# Patient Record
Sex: MALE | Race: BLACK OR AFRICAN AMERICAN
[De-identification: names, ages, dates, MRNs, and addresses within clinical notes are randomized per-mention and may not be internally consistent; named-entity substitution may affect disease eponyms.]

---

## 2017-07-24 ENCOUNTER — HOSPITAL ENCOUNTER (OUTPATIENT)
Dept: HOSPITAL 72 - CAT | Age: 67
Discharge: HOME | End: 2017-07-24
Payer: MEDICARE

## 2017-07-24 DIAGNOSIS — R22.31: Primary | ICD-10-CM

## 2017-07-24 DIAGNOSIS — Z95.0: ICD-10-CM

## 2017-07-24 DIAGNOSIS — M47.892: ICD-10-CM

## 2017-07-24 DIAGNOSIS — I51.7: ICD-10-CM

## 2017-07-24 DIAGNOSIS — J32.9: ICD-10-CM

## 2017-07-24 DIAGNOSIS — R60.0: ICD-10-CM

## 2017-07-24 PROCEDURE — 71260 CT THORAX DX C+: CPT

## 2017-07-24 PROCEDURE — 70491 CT SOFT TISSUE NECK W/DYE: CPT

## 2017-07-24 NOTE — DIAGNOSTIC IMAGING REPORT
Clinical Indication: 58-year-old male outpatient with right upper extremity swelling

and chest pain



Technique: IV administration nonionic contrast. Spiral acquisition obtained through

the chest. Multiplanar reconstructions generated. Total dose length product 1746

mGycm. CTDIvol(s) 8, 121, 27, 19  mGy. At the desires are inclusive of neck CT

reported separately Dose reduction achieved using automated exposure control





Comparison: None



Findings:  There are somewhat low lung volumes. There is crowding of the 

vascular

markings. There is equivocal slight prominence to the centrilobular 

interstitial

markings, particularly in the upper lobes. No focal airspace consolidation,

infiltrates, or effusions are demonstrated. Some scarring is seen in the 

posterior

lateral left lower lobe at the level of pulmonary hilum.



The heart is enlarged, demonstrating four-chamber cardiomegaly. There is a 

right

subclavian AICD. The right innominate vein is probably patent. Patency of the 

right

subclavian vein cannot be established as it is not opacified. However, there are 

no

significant collaterals to suggest significant venoocclusive disease. The main

pulmonary artery is ectatic, measuring 3.8 cm diameter. The thoracic aorta is 

also

somewhat ectatic although not aneurysmal. There are extensive coronary 

artery

calcifications.



No pericardial effusion. No mediastinal hilar mass or adenopathy. Included thyroid

is unremarkable. No axillary or chest wall mass or adenopathy.



The included upper abdominal anatomy is unremarkable.



Impression: Possible mild pulmonary edema



No definite findings to explain stated clinical history of right arm swelling.

However, the right subclavian vein is not opacified, and stenosis of it cannot 

be

confidently ruled out, particular in view of the presence of AICD wires within.

However, there are no definite collaterals to suggest such. Noninvasive duplex

imaging may be useful to confirm patency



Cardiomegaly



Mild interstitial septal thickening, may reflect mild pulmonary edema



Mildly ectatic main pulmonary artery, could indicate pulmonary internal hypertension



Minimal left upper lobe parenchymal scarring



The CT scanner at [facility] is accredited by the American College 

of

Radiology and the scans are performed using protocols designed to limit 

radiation

exposure to as low as reasonably achievable to attain images of sufficient

resolution adequate for diagnostic evaluation.

## 2017-07-24 NOTE — DIAGNOSTIC IMAGING REPORT
Indication: Right upper extremity swelling and chest pain



Technique: IV administration nonionic contrast  Spiral acquisitions obtained through

the neck   Multiplanar      reconstructions were generated. Total dose length

product 1746 mGycm.  CTDIvol(s) 8, 121, 27, 19 mGy. Radiation dose was minimized

using automated exposure control



Comparison: None



Findings: Slightly prominent adenoids, otherwise unremarkable nasopharynx. 

Symmetric

prominent tonsillar pillars, otherwise unremarkable oral pharynx. Note tortuosity of

the proximal right internal carotid artery, which protrudes immediately lateral 

and

posterior to the right posterior oral pharyngeal mucosa. The left puriform sinus is

not aerated, and there is a small amount of hypoattenuating soft tissue in this

area, which measures approximately 7 mm transverse by 12 mm AP by 21 mm caudad.

Within this soft tissue, there is a 5 mm calcification. The larynx appears

unremarkable.



No cervical mass or adenopathy is evident. No evidence of significant carotid

arterial or jugular venous stenosis. Normal and symmetrical bilateral parotid 

and

submandibular glands. No supraclavicular mass or adenopathy. There is sphenoid 

sinus

disease noted on the highest cut. The thyroid is unremarkable. Wires from what 

is

presumably a right chest pacemaker are noted. There is degenerative cervical

spondylosis. Patient is edentulous



Impression: Asymmetric absence of aeration of the left piriform sinus, possibly

occupied by a small amount of abnormal soft tissue as well as a calcification.

Although this could be on the basis of physiologic asymmetry, mucosal malignancy at

this location cannot be ruled out, and further evaluation with direct visualization

is recommended.



Tortuosity of the proximal right internal carotid artery, which is located just

behind the right lateral oropharynx



CT sinus disease



Pacemaker



Degenerative cervical spondylosis



The CT scanner at [facility] is accredited by the American College 

of

Radiology and the scans are performed using protocols designed to limit 

radiation

exposure to as low as reasonably achievable to attain images of sufficient

resolution adequate for diagnostic evaluation.

## 2017-11-24 ENCOUNTER — HOSPITAL ENCOUNTER (OUTPATIENT)
Dept: HOSPITAL 72 - CAT | Age: 67
Discharge: HOME | End: 2017-11-24
Payer: MEDICARE

## 2017-11-24 DIAGNOSIS — M81.0: Primary | ICD-10-CM

## 2017-11-24 PROCEDURE — 77078 CT BONE DENSITY AXIAL: CPT

## 2017-11-29 NOTE — DIAGNOSTIC IMAGING REPORT
Indication: Osteoporosis



Technique: 10 mm thick slices obtained through the L2, L3, and L4 vertebral 

bodies.

Cortical and trabecular regions of interest were drawn. The average trabecular 

bone

mineral density was calculated. Total dose length product 34 mGycm.  CTDIvol(s) 4x3

mGy. Dose reduction achieved using automated exposure control





Comparison: None



Findings: The calculated bone mineral density is 139 mg ca-HA/ml.  The T score is

-1.33. This indicates the patient's bone mineral density is  1.33 standard

deviations  below that of normal 20-year-old males. The Z score is 1.84. This

indicates the patient's bone mineral density is 1.84 standard deviations above that

of age-matched controls



Incidentally noted on images through L2 is possible dilatation of the abdominal

aorta, which appears to measure up to 4.2 cm transverse



Impression: Patient mineral density is 10-25% below that of normal 20-year-old

males. Patient is considered osteopenic by WHO criteria. Insufficiency fracture risk

is moderate



Possible infrarenal abdominal aortic aneurysm. Recommend further evaluation 

with

abdominal aortic ultrasound. Dr. Hebert was notified of this at the time 

of

interpretation







The CT scanner at [facility] is accredited by the American College 

of

Radiology and the scans are performed using protocols designed to limit 

radiation

exposure to as low as reasonably achievable to attain images of sufficient

resolution adequate for diagnostic evaluation.

## 2018-11-28 ENCOUNTER — HOSPITAL ENCOUNTER (OUTPATIENT)
Dept: HOSPITAL 72 - NUM | Age: 68
Discharge: HOME | End: 2018-11-28
Payer: MEDICARE

## 2018-11-28 DIAGNOSIS — M54.9: Primary | ICD-10-CM

## 2018-11-28 PROCEDURE — 78306 BONE IMAGING WHOLE BODY: CPT

## 2018-11-28 NOTE — DIAGNOSTIC IMAGING REPORT
Indication: Back pain.

 

Technique: 24.4 mCi of technetium 99 M-MDP was injected intravenously. A whole-body

bone scan was then performed in anterior and posterior projections. Several spot

images were also obtained.

 

Comparison:  None

 

Findings: . Normal uptake is demonstrated throughout the axial skeleton. Some uptake

associated with arthritis likely present within the shoulders, knees, feet/ankles.

There are no focal lesions identified to indicate metastatic neoplasm.

 

Impression:  Negative whole body bone scan

## 2020-01-28 ENCOUNTER — HOSPITAL ENCOUNTER (INPATIENT)
Dept: HOSPITAL 72 - EMR | Age: 70
LOS: 4 days | Discharge: NURSING, REHAB OR LONG-TERM CARE | DRG: 101 | End: 2020-02-01
Payer: MEDICARE

## 2020-01-28 VITALS — BODY MASS INDEX: 32.51 KG/M2 | WEIGHT: 219.5 LBS | HEIGHT: 69 IN

## 2020-01-28 VITALS — DIASTOLIC BLOOD PRESSURE: 76 MMHG | SYSTOLIC BLOOD PRESSURE: 133 MMHG

## 2020-01-28 VITALS — SYSTOLIC BLOOD PRESSURE: 132 MMHG | DIASTOLIC BLOOD PRESSURE: 84 MMHG

## 2020-01-28 VITALS — DIASTOLIC BLOOD PRESSURE: 73 MMHG | SYSTOLIC BLOOD PRESSURE: 144 MMHG

## 2020-01-28 VITALS — DIASTOLIC BLOOD PRESSURE: 68 MMHG | SYSTOLIC BLOOD PRESSURE: 132 MMHG

## 2020-01-28 DIAGNOSIS — G40.909: Primary | ICD-10-CM

## 2020-01-28 DIAGNOSIS — D64.9: ICD-10-CM

## 2020-01-28 DIAGNOSIS — N39.0: ICD-10-CM

## 2020-01-28 DIAGNOSIS — B96.20: ICD-10-CM

## 2020-01-28 DIAGNOSIS — Z95.0: ICD-10-CM

## 2020-01-28 DIAGNOSIS — E11.9: ICD-10-CM

## 2020-01-28 DIAGNOSIS — I69.351: ICD-10-CM

## 2020-01-28 DIAGNOSIS — N17.9: ICD-10-CM

## 2020-01-28 LAB
ADD MANUAL DIFF: NO
ALBUMIN SERPL-MCNC: 3.7 G/DL (ref 3.4–5)
ALBUMIN/GLOB SERPL: 0.8 {RATIO} (ref 1–2.7)
ALP SERPL-CCNC: 82 U/L (ref 46–116)
ALT SERPL-CCNC: 28 U/L (ref 12–78)
ANION GAP SERPL CALC-SCNC: 8 MMOL/L (ref 5–15)
APPEARANCE UR: CLEAR
APTT PPP: (no result) S
AST SERPL-CCNC: 19 U/L (ref 15–37)
BASOPHILS NFR BLD AUTO: 1.4 % (ref 0–2)
BILIRUB SERPL-MCNC: 0.3 MG/DL (ref 0.2–1)
BUN SERPL-MCNC: 14 MG/DL (ref 7–18)
CALCIUM SERPL-MCNC: 9.5 MG/DL (ref 8.5–10.1)
CHLORIDE SERPL-SCNC: 104 MMOL/L (ref 98–107)
CO2 SERPL-SCNC: 28 MMOL/L (ref 21–32)
CREAT SERPL-MCNC: 1.3 MG/DL (ref 0.55–1.3)
EOSINOPHIL NFR BLD AUTO: 3.8 % (ref 0–3)
ERYTHROCYTE [DISTWIDTH] IN BLOOD BY AUTOMATED COUNT: 11.8 % (ref 11.6–14.8)
GLOBULIN SER-MCNC: 4.7 G/DL
GLUCOSE UR STRIP-MCNC: NEGATIVE MG/DL
HCT VFR BLD CALC: 39.5 % (ref 42–52)
HGB BLD-MCNC: 13.4 G/DL (ref 14.2–18)
KETONES UR QL STRIP: NEGATIVE
LEUKOCYTE ESTERASE UR QL STRIP: (no result)
LYMPHOCYTES NFR BLD AUTO: 27.4 % (ref 20–45)
MCV RBC AUTO: 87 FL (ref 80–99)
MONOCYTES NFR BLD AUTO: 6.4 % (ref 1–10)
NEUTROPHILS NFR BLD AUTO: 61.1 % (ref 45–75)
NITRITE UR QL STRIP: NEGATIVE
PH UR STRIP: 6 [PH] (ref 4.5–8)
PLATELET # BLD: 215 K/UL (ref 150–450)
POTASSIUM SERPL-SCNC: 3.9 MMOL/L (ref 3.5–5.1)
PROT UR QL STRIP: NEGATIVE
RBC # BLD AUTO: 4.53 M/UL (ref 4.7–6.1)
SODIUM SERPL-SCNC: 140 MMOL/L (ref 136–145)
SP GR UR STRIP: 1.01 (ref 1–1.03)
UROBILINOGEN UR-MCNC: NORMAL MG/DL (ref 0–1)
WBC # BLD AUTO: 8 K/UL (ref 4.8–10.8)

## 2020-01-28 PROCEDURE — 81003 URINALYSIS AUTO W/O SCOPE: CPT

## 2020-01-28 PROCEDURE — 96375 TX/PRO/DX INJ NEW DRUG ADDON: CPT

## 2020-01-28 PROCEDURE — 87086 URINE CULTURE/COLONY COUNT: CPT

## 2020-01-28 PROCEDURE — 93005 ELECTROCARDIOGRAM TRACING: CPT

## 2020-01-28 PROCEDURE — 87081 CULTURE SCREEN ONLY: CPT

## 2020-01-28 PROCEDURE — 96365 THER/PROPH/DIAG IV INF INIT: CPT

## 2020-01-28 PROCEDURE — 85025 COMPLETE CBC W/AUTO DIFF WBC: CPT

## 2020-01-28 PROCEDURE — 99285 EMERGENCY DEPT VISIT HI MDM: CPT

## 2020-01-28 PROCEDURE — 80053 COMPREHEN METABOLIC PANEL: CPT

## 2020-01-28 PROCEDURE — 71045 X-RAY EXAM CHEST 1 VIEW: CPT

## 2020-01-28 PROCEDURE — 87181 SC STD AGAR DILUTION PER AGT: CPT

## 2020-01-28 PROCEDURE — 36415 COLL VENOUS BLD VENIPUNCTURE: CPT

## 2020-01-28 PROCEDURE — 80048 BASIC METABOLIC PNL TOTAL CA: CPT

## 2020-01-28 PROCEDURE — 80299 QUANTITATIVE ASSAY DRUG: CPT

## 2020-01-28 PROCEDURE — 84484 ASSAY OF TROPONIN QUANT: CPT

## 2020-01-28 RX ADMIN — CARVEDILOL SCH MG: 25 TABLET, FILM COATED ORAL at 22:27

## 2020-01-28 RX ADMIN — DEXTROSE AND SODIUM CHLORIDE SCH MLS/HR: 5; .45 INJECTION, SOLUTION INTRAVENOUS at 22:29

## 2020-01-28 RX ADMIN — HEPARIN SODIUM SCH UNITS: 5000 INJECTION INTRAVENOUS; SUBCUTANEOUS at 22:29

## 2020-01-28 RX ADMIN — TAMSULOSIN HYDROCHLORIDE SCH MG: 0.4 CAPSULE ORAL at 22:26

## 2020-01-28 NOTE — EMERGENCY ROOM REPORT
History of Present Illness


General


Chief Complaint:  Seizure


Source:  Patient





Present Illness


HPI


Patient is a 69-year-old male brought in by ambulance after multiple seizures.  

Patient reportedly had multiple focal seizures at his facility.  He had no head 

injury.  Had not been sick for the past few days.  Previous history of seizure 

disorders and normally takes Keppra.  He had prior CVA with resulting right-

sided weakness.  Denies any headache currently.  Reports being compliant with 

his medications.


Allergies:  


Coded Allergies:  


     No Known Allergies (Unverified , 12/15/14)





Patient History


Past Medical History:  see triage record


Reviewed Nursing Documentation:  PMH: Agreed; PSxH: Agreed





Nursing Documentation-PMH


Past Medical History:  No History, Except For


Hx Cardiac Problems:  Yes


Hx Hypertension:  Yes


Hx COPD:  Yes


Hx Diabetes:  Yes


Hx Cancer:  No


Hx Gastrointestinal Problems:  No


Hx Cerebrovascular Accident:  Yes - X 3


Hx Seizures:  Yes


Hx Weakness:  Yes - RT SIDED WEAKNESS





Review of Systems


All Other Systems:  negative except mentioned in HPI





Physical Exam





Vital Signs








  Date Time  Temp Pulse Resp B/P (MAP) Pulse Ox O2 Delivery O2 Flow Rate FiO2


 


1/28/20 14:15 98.8 66 18 132/68 (89) 100 Room Air  








Sp02 EP Interpretation:  reviewed, normal


General Appearance:  normal inspection, no apparent distress, alert, 

Chronically Ill


Head:  atraumatic


ENT:  normal ENT inspection, hearing grossly normal, normal voice


Neck:  normal inspection, full range of motion, supple, no bony tend


Respiratory:  normal inspection, lungs clear, normal breath sounds, no 

respiratory distress, no retraction, no wheezing


Cardiovascular #1:  regular rate, rhythm, no edema


Gastrointestinal:  normal inspection, normal bowel sounds, non tender, soft, no 

guarding, no hernia


Genitourinary:  no CVA tenderness


Musculoskeletal:  normal inspection, back normal, normal range of motion


Neurologic:  alert, CNs III-XII nml as tested, motor weakness - dense right 

hemiplegia, responsive, aphasia - expressive


Psychiatric:  normal inspection, judgement/insight normal, mood/affect normal


Skin:  no rash


Lymphatic:  normal inspection





Medical Decision Making


Diagnostic Impression:  


 Primary Impression:  


 Urinary tract infection


 Additional Impressions:  


 Recurrent seizures


 History of CVA with residual deficit


ER Course


Presented for recurrent seizures.  Differential diagnosis include was not 

limited to electrolyte abnormality, medication noncompliance, seizure among 

others.  Because of complexity of patient's case laboratory tests and imaging 

studies were ordered.  Laboratory testing showed no acute abnormalities.  

Patient was given IV Keppra.   Dr. Arron Barkley was contacted for inpatient 

management due to recurrent seizures.





Laboratory Tests








Test


  1/28/20


14:30 1/28/20


15:15


 


White Blood Count


  8.0 K/UL


(4.8-10.8) 


 


 


Red Blood Count


  4.53 M/UL


(4.70-6.10)  L 


 


 


Hemoglobin


  13.4 G/DL


(14.2-18.0)  L 


 


 


Hematocrit


  39.5 %


(42.0-52.0)  L 


 


 


Mean Corpuscular Volume 87 FL (80-99)   


 


Mean Corpuscular Hemoglobin


  29.5 PG


(27.0-31.0) 


 


 


Mean Corpuscular Hemoglobin


Concent 33.8 G/DL


(32.0-36.0) 


 


 


Red Cell Distribution Width


  11.8 %


(11.6-14.8) 


 


 


Platelet Count


  215 K/UL


(150-450) 


 


 


Mean Platelet Volume


  7.9 FL


(6.5-10.1) 


 


 


Neutrophils (%) (Auto)


  61.1 %


(45.0-75.0) 


 


 


Lymphocytes (%) (Auto)


  27.4 %


(20.0-45.0) 


 


 


Monocytes (%) (Auto)


  6.4 %


(1.0-10.0) 


 


 


Eosinophils (%) (Auto)


  3.8 %


(0.0-3.0)  H 


 


 


Basophils (%) (Auto)


  1.4 %


(0.0-2.0) 


 


 


Sodium Level


  140 MMOL/L


(136-145) 


 


 


Potassium Level


  3.9 MMOL/L


(3.5-5.1) 


 


 


Chloride Level


  104 MMOL/L


() 


 


 


Carbon Dioxide Level


  28 MMOL/L


(21-32) 


 


 


Anion Gap


  8 mmol/L


(5-15) 


 


 


Blood Urea Nitrogen


  14 mg/dL


(7-18) 


 


 


Creatinine


  1.3 MG/DL


(0.55-1.30) 


 


 


Estimate Glomerular


Filtration Rate  mL/min (>60)  


  


 


 


Glucose Level


  161 MG/DL


()  H 


 


 


Calcium Level


  9.5 MG/DL


(8.5-10.1) 


 


 


Total Bilirubin


  0.3 MG/DL


(0.2-1.0) 


 


 


Aspartate Amino Transferase


(AST) 19 U/L (15-37)


  


 


 


Alanine Aminotransferase (ALT)


  28 U/L (12-78)


  


 


 


Alkaline Phosphatase


  82 U/L


() 


 


 


Troponin I


  0.000 ng/mL


(0.000-0.056) 


 


 


Total Protein


  8.4 G/DL


(6.4-8.2)  H 


 


 


Albumin


  3.7 G/DL


(3.4-5.0) 


 


 


Globulin 4.7 g/dL   


 


Albumin/Globulin Ratio


  0.8 (1.0-2.7)


L 


 


 


Urine Color  Pale yellow  


 


Urine Appearance  Clear  


 


Urine pH  6 (4.5-8.0)  


 


Urine Specific Gravity


  


  1.010


(1.005-1.035)


 


Urine Protein


  


  Negative


(NEGATIVE)


 


Urine Glucose (UA)


  


  Negative


(NEGATIVE)


 


Urine Ketones


  


  Negative


(NEGATIVE)


 


Urine Blood


  


  Negative


(NEGATIVE)


 


Urine Nitrite


  


  Negative


(NEGATIVE)


 


Urine Bilirubin


  


  Negative


(NEGATIVE)


 


Urine Urobilinogen


  


  Normal MG/DL


(0.0-1.0)


 


Urine Leukocyte Esterase


  


  2+ (NEGATIVE)


H


 


Urine RBC


  


  0-2 /HPF (0 -


0)  H


 


Urine WBC


  


  10-15 /HPF (0


- 0)  H


 


Urine Squamous Epithelial


Cells 


  Few /LPF


(NONE/OCC)


 


Urine Bacteria


  


  Moderate /HPF


(NONE)  H











Last Vital Signs








  Date Time  Temp Pulse Resp B/P (MAP) Pulse Ox O2 Delivery O2 Flow Rate FiO2


 


1/28/20 14:23 98.8 81 18 132/68 100 Room Air  








Status:  unchanged


Disposition:  ADMITTED AS INPATIENT


Condition:  Stable











Victor Manuel Nam MD Jan 28, 2020 14:27

## 2020-01-28 NOTE — NUR
ED Nurse Note:



PT ARRIVED WITH RA 61 DUE TO SEIZURE FROM HOME. ACCOMPANYING NURSE STATEST THAT 
PT HAS HAD 2 FOCAL SEIZURES IN CHAIR. DENIES HEAD TRAUMA OR KO. PT IS AOX4 AND 
IS COOPERATIVE

## 2020-01-28 NOTE — NUR
HAND-OFF: 

Report given to PJ CH. Pt is awake and stable. Pt is on seizure precaution, pads around 
bed and suction is available.

## 2020-01-28 NOTE — DIAGNOSTIC IMAGING REPORT
Indication: Shortness of breath

 

Technique: One view of the chest

 

Comparison: none

 

Findings: There is an AICD noted. The power pack obscures the right lung base. No

definite acute infiltrates, effusions, congestion. The heart is mildly enlarged. The

aorta is tortuous ectatic and calcified.

 

Impression: Somewhat limited exam due to the presence of an AICD

 

No definite acute process

 

Cardiomegaly

## 2020-01-28 NOTE — NUR
TRANSFER TO FLOOR:

Patient transferred to TELE as ordered, per ERMD .   Report given to DMIA KEANE. 
 Belongings GIVEN TO PT.

## 2020-01-28 NOTE — NUR
NURSE NOTES:

Received pt from ER RN KENNEDY, Pt is awake and alert, pt has tachypnea, no complain of pain 
at this moment, pt has intact iv  access LFA 20G SL. pt is on continues heart monitoring. 
skin is intact. pt has 10$ and refused to keep in safe. all needs attended, bed is locked 
and is in the lowest position, call light within easy reach. will continue to monitor.

## 2020-01-28 NOTE — NUR
NURSE NOTES:

received patient from Amberly Garcia, patient in stable condition, on RA, VSS, denies pain at 
this time, IV access on left forearm, asymptomatic, patent, bed low&locked, side rails upx3, 
call light within reach, will continue to monitor and reassess. Dr Barkley called for 
admission orders, awaiting call back

## 2020-01-29 VITALS — SYSTOLIC BLOOD PRESSURE: 110 MMHG | DIASTOLIC BLOOD PRESSURE: 76 MMHG

## 2020-01-29 VITALS — DIASTOLIC BLOOD PRESSURE: 80 MMHG | SYSTOLIC BLOOD PRESSURE: 123 MMHG

## 2020-01-29 VITALS — SYSTOLIC BLOOD PRESSURE: 117 MMHG | DIASTOLIC BLOOD PRESSURE: 77 MMHG

## 2020-01-29 VITALS — DIASTOLIC BLOOD PRESSURE: 77 MMHG | SYSTOLIC BLOOD PRESSURE: 124 MMHG

## 2020-01-29 VITALS — SYSTOLIC BLOOD PRESSURE: 120 MMHG | DIASTOLIC BLOOD PRESSURE: 76 MMHG

## 2020-01-29 VITALS — SYSTOLIC BLOOD PRESSURE: 110 MMHG | DIASTOLIC BLOOD PRESSURE: 77 MMHG

## 2020-01-29 VITALS — DIASTOLIC BLOOD PRESSURE: 82 MMHG | SYSTOLIC BLOOD PRESSURE: 142 MMHG

## 2020-01-29 LAB
ADD MANUAL DIFF: NO
ANION GAP SERPL CALC-SCNC: 8 MMOL/L (ref 5–15)
BASOPHILS NFR BLD AUTO: 1.9 % (ref 0–2)
BUN SERPL-MCNC: 16 MG/DL (ref 7–18)
CALCIUM SERPL-MCNC: 9 MG/DL (ref 8.5–10.1)
CHLORIDE SERPL-SCNC: 105 MMOL/L (ref 98–107)
CO2 SERPL-SCNC: 29 MMOL/L (ref 21–32)
CREAT SERPL-MCNC: 1 MG/DL (ref 0.55–1.3)
EOSINOPHIL NFR BLD AUTO: 3.4 % (ref 0–3)
ERYTHROCYTE [DISTWIDTH] IN BLOOD BY AUTOMATED COUNT: 11.7 % (ref 11.6–14.8)
HCT VFR BLD CALC: 37.6 % (ref 42–52)
HGB BLD-MCNC: 12.7 G/DL (ref 14.2–18)
LYMPHOCYTES NFR BLD AUTO: 24 % (ref 20–45)
MCV RBC AUTO: 86 FL (ref 80–99)
MONOCYTES NFR BLD AUTO: 7.5 % (ref 1–10)
NEUTROPHILS NFR BLD AUTO: 63.2 % (ref 45–75)
PLATELET # BLD: 206 K/UL (ref 150–450)
POTASSIUM SERPL-SCNC: 3.9 MMOL/L (ref 3.5–5.1)
RBC # BLD AUTO: 4.36 M/UL (ref 4.7–6.1)
SODIUM SERPL-SCNC: 142 MMOL/L (ref 136–145)
WBC # BLD AUTO: 6.9 K/UL (ref 4.8–10.8)

## 2020-01-29 RX ADMIN — HEPARIN SODIUM SCH UNITS: 5000 INJECTION INTRAVENOUS; SUBCUTANEOUS at 08:49

## 2020-01-29 RX ADMIN — HYDRALAZINE HYDROCHLORIDE SCH MG: 50 TABLET ORAL at 22:00

## 2020-01-29 RX ADMIN — SPIRONOLACTONE SCH MG: 25 TABLET, FILM COATED ORAL at 08:45

## 2020-01-29 RX ADMIN — HYDRALAZINE HYDROCHLORIDE SCH MG: 50 TABLET ORAL at 14:00

## 2020-01-29 RX ADMIN — CARVEDILOL SCH MG: 25 TABLET, FILM COATED ORAL at 08:46

## 2020-01-29 RX ADMIN — SODIUM CHLORIDE SCH MLS/HR: 0.9 INJECTION INTRAVENOUS at 17:11

## 2020-01-29 RX ADMIN — DEXTROSE AND SODIUM CHLORIDE SCH MLS/HR: 5; .45 INJECTION, SOLUTION INTRAVENOUS at 13:44

## 2020-01-29 RX ADMIN — HEPARIN SODIUM SCH UNITS: 5000 INJECTION INTRAVENOUS; SUBCUTANEOUS at 22:03

## 2020-01-29 RX ADMIN — LISINOPRIL SCH MG: 20 TABLET ORAL at 21:00

## 2020-01-29 RX ADMIN — LISINOPRIL SCH MG: 20 TABLET ORAL at 08:47

## 2020-01-29 RX ADMIN — TAMSULOSIN HYDROCHLORIDE SCH MG: 0.4 CAPSULE ORAL at 21:58

## 2020-01-29 RX ADMIN — CARVEDILOL SCH MG: 25 TABLET, FILM COATED ORAL at 22:00

## 2020-01-29 NOTE — HISTORY AND PHYSICAL REPORT
DATE OF ADMISSION:  01/28/2020

TIME SEEN:  9 a.m.



CONSULTANTS:

1. ______

2. Marc Mitchell M.D



CHIEF COMPLAINT:  Seizure, CVA, UTI.



BRIEF HISTORY:  This is a 69-year-old male from [facility],

presented with above-mentioned diagnoses, seizure x2 was noted,  came to

[place] campus, diagnosed with the above, admitted to telemetry for further

care.  Currently, calm in bed, slightly lethargic which is baseline.  No

complaint.



REVIEW OF SYSTEMS:  No chest pain.  No shortness of breath.  No nausea,

vomiting, or diarrhea.



PAST MEDICAL HISTORY:  Includes CVA, seizure, right-sided weakness.



PAST SURGICAL HISTORY:  Pacemaker.



MEDICATIONS:  Include atorvastatin, hydralazine, furosemide, gabapentin,

spironolactone, levetiracetam, Tylenol, nitroglycerin, ceftriaxone.



ALLERGIES:  Denies.



SOCIAL HISTORY:  No smoking.  No alcohol.  No intravenous drug abuse.



FAMILY HISTORY:  Noncontributory.



PHYSICAL EXAMINATION:

GENERAL:  Calm in bed, oriented x2, in no acute distress.

VITAL SIGNS:  Temperature 99, pulse 77, respirations 18, and blood pressure

142/82, now recheck at 126/77.

CARDIOVASCULAR:  No murmur.

LUNGS:  Distant and clear.

ABDOMEN:  Positive bowel sound positive.  Nontender.  Nondistended.

EXTREMITIES:  No cyanosis, clubbing, or edema.

NEUROLOGIC:  Right-sided weakness noted on arm and legs.



LABORATORY DATA:  Hemoglobin and hematocrit 12 and 37, otherwise CBC is

normal.  BMP show glucose 196, otherwise BMP is normal.  Urinalysis show

2+ leukocyte esterase.



ASSESSMENT:

1. Seizure.

2. CVA.

3. Right-sided weakness.

4. UTI.

5. Diabetes.

6. Anemia.



PLAN:

1. Blood pressure and seizure control.

2. Dietary followup.

3. Resume home medications.

4. PT and dietary evaluation.

5. Seizure precautions.

6. CBC and BMP in the morning.









  ______________________________________________

  Arron Barkley D.O.





DR:  Kei

D:  01/29/2020 09:44

T:  01/29/2020 15:00

JOB#:  [id number]

CC:

## 2020-01-29 NOTE — NUR
P.T Note:

P.T evaluation completed. Based on P.T evaluation, pt is currently baseline  independent in 
all areas of functional mobilities and gait/locomotion w/o AD needed despite residual 
deficits from old CVA therefor skilled P.T not needed at this time. D/C P.T services. Thank 
you for this referral.

## 2020-01-29 NOTE — CONSULTATION
History of Present Illness


General


Date patient seen:  Jan 29, 2020


Chief Complaint:  Seizure





Present Illness


HPI





70 y/o M with hx of HTN, DM2, seizure disorder on Keppra, CVA x3 w/ resultant R 

side weakness presented to ED on 1/28 after multiple seizures episodes





Denied head injury, recent illness


Allergies:  


Coded Allergies:  


     No Known Allergies (Unverified , 12/15/14)





Medication History


Scheduled


Carvedilol (Coreg), 25 MG ORAL EVERY 12 HOURS, (Reported)


Cefepime Hcl/D5w (Cefepime-Dextrose 2 Gm/50 Ml), 2 GM IVPB EVERY 12 HOURS


Cranberry Fruit Concentrate (Cranberry), 450 MG PO DAILY, (Reported)


Furosemide* (Lasix*), 20 MG ORAL DAILY, (Reported)


Gabapentin (Neurontin), 300 MG ORAL THREE TIMES A DAY, (Reported)


Gabapentin* (Neurontin*), 300 MG ORAL THREE TIMES A DAY, (Reported)


Hydralazine Hcl* (Hydralazine Hcl*), 100 MG ORAL EVERY 8 HOURS, (Reported)


Levetiracetam (Keppra), 1,000 MG ORAL EVERY 12 HOURS, (Reported)


Lisinopril (Lisinopril*), 20 MG ORAL BID, (Reported)


Loratadine (Claritin), 10 MG ORAL DAILY, (Reported)


Multivitamins* (Multivitamins*), 1 TAB ORAL DAILY, (Reported)


Polyvinyl Alcohol (Polyvinyl Alcohol), 1 DRP BOTH EYES THREE TIMES A DAY, (

Reported)


Simvastatin (Zocor), 20 MG ORAL BEDTIME, (Reported)


Spironolactone* (Aldactone*), 25 MG ORAL DAILY, (Reported)


Tamsulosin HCl (Flomax), 0.4 MG ORAL DAILY, (Reported)





Scheduled PRN


Acetaminophen (Tylenol), 650 MG ORAL Q4HR PRN for Fever/Headache/Mild Pain, (

Reported)


Dextran 70/Hypromellose (Artificial Tears Eye Drops*), 1 DROP BOTH EYES TID PRN 

for Dry Eyes, (Reported)


Nitroglycerin (Nitroglycerin), 0.4 MG SL PRN PRN for Prn Chest Pain, (Reported)





Miscellaneous Medications


Leuprolide Acetate (Leuprolide Acetate), 7.5 MG IM, (Reported)





Patient History


Healthcare decision maker





Resuscitation status


Full Code


Advanced Directive on File


No


Patient History Narrative








Pmhx: as above





Shx: reviewed





Fhx: non contributory





Review of Systems


All Other Systems:  negative except mentioned in HPI





Physical Exam


Physical Exam Narrative


General Appearance:  normal inspection, well appearing, no apparent distress, 

alert, Chronically Ill


Head:  atraumatic


ENT:  normal ENT inspection, hearing grossly normal, normal voice


Neck:  normal inspection, full range of motion, supple, no bony tend


Respiratory:  normal inspection, lungs clear, normal breath sounds, no 

respiratory distress, no retraction, no wheezing


Cardiovascular   regular rate, rhythm, no edema


Gastrointestinal:  normal inspection, normal bowel sounds, non tender, soft, no 

guarding, no hernia


Genitourinary:  no CVA tenderness


Musculoskeletal:  normal inspection, back normal, normal range of motion


Neurologic:  alert, CNs III-XII nml as tested, motor weakness - dense right 

hemiplegia, responsive, aphasia - expressive


Skin:  no rash





Last 24 Hour Vital Signs








  Date Time  Temp Pulse Resp B/P (MAP) Pulse Ox O2 Delivery O2 Flow Rate FiO2


 


1/29/20 12:00 97.9 66 20 110/77 (88) 94   


 


1/29/20 09:00      Nasal Cannula 2.0 


 


1/29/20 09:00      Room Air  


 


1/29/20 08:47    126/77    


 


1/29/20 08:46  61  126/77    


 


1/29/20 08:00  72      


 


1/29/20 08:00 98.7 61 19 124/77 (93) 94   


 


1/29/20 04:00  65      


 


1/29/20 04:00 99.4 77 18 142/82 (102) 93   


 


1/29/20 00:00  94      


 


1/29/20 00:00  76      


 


1/29/20 00:00 98.8 81 18 117/77 (90) 93   


 


1/28/20 22:28    132/84    


 


1/28/20 22:27  94  132/84    


 


1/28/20 22:24 98.1 94 18 132/84 (100) 95   


 


1/28/20 21:00      Room Air  


 


1/28/20 19:06      Room Air  


 


1/28/20 19:06 99.1 93 22 144/73 (96) 96   


 


1/28/20 18:50  91      


 


1/28/20 18:40 98.7 93 25 127/83 96 Room Air  


 


1/28/20 18:32 98.8 60 16 133/76 99 Room Air  


 


1/28/20 14:23 98.8 81 18 132/68 100 Room Air  


 


1/28/20 14:23  66 18   Room Air  


 


1/28/20 14:15 98.8 66 18 132/68 (89) 100 Room Air  

















Intake and Output  


 


 1/28/20 1/29/20





 19:00 07:00


 


Intake Total 700 ml 


 


Output Total 0 ml 


 


Balance 700 ml 


 


  


 


Intake IV Total 700 ml 


 


Output Urine Total 0 ml 











Laboratory Tests








Test


  1/28/20


14:30 1/28/20


15:15 1/29/20


06:43


 


White Blood Count


  8.0 K/UL


(4.8-10.8) 


  6.9 K/UL


(4.8-10.8)


 


Red Blood Count


  4.53 M/UL


(4.70-6.10)  L 


  4.36 M/UL


(4.70-6.10)  L


 


Hemoglobin


  13.4 G/DL


(14.2-18.0)  L 


  12.7 G/DL


(14.2-18.0)  L


 


Hematocrit


  39.5 %


(42.0-52.0)  L 


  37.6 %


(42.0-52.0)  L


 


Mean Corpuscular Volume 87 FL (80-99)    86 FL (80-99)  


 


Mean Corpuscular Hemoglobin


  29.5 PG


(27.0-31.0) 


  29.2 PG


(27.0-31.0)


 


Mean Corpuscular Hemoglobin


Concent 33.8 G/DL


(32.0-36.0) 


  33.8 G/DL


(32.0-36.0)


 


Red Cell Distribution Width


  11.8 %


(11.6-14.8) 


  11.7 %


(11.6-14.8)


 


Platelet Count


  215 K/UL


(150-450) 


  206 K/UL


(150-450)


 


Mean Platelet Volume


  7.9 FL


(6.5-10.1) 


  8.3 FL


(6.5-10.1)


 


Neutrophils (%) (Auto)


  61.1 %


(45.0-75.0) 


  63.2 %


(45.0-75.0)


 


Lymphocytes (%) (Auto)


  27.4 %


(20.0-45.0) 


  24.0 %


(20.0-45.0)


 


Monocytes (%) (Auto)


  6.4 %


(1.0-10.0) 


  7.5 %


(1.0-10.0)


 


Eosinophils (%) (Auto)


  3.8 %


(0.0-3.0)  H 


  3.4 %


(0.0-3.0)  H


 


Basophils (%) (Auto)


  1.4 %


(0.0-2.0) 


  1.9 %


(0.0-2.0)


 


Sodium Level


  140 MMOL/L


(136-145) 


  142 MMOL/L


(136-145)


 


Potassium Level


  3.9 MMOL/L


(3.5-5.1) 


  3.9 MMOL/L


(3.5-5.1)


 


Chloride Level


  104 MMOL/L


() 


  105 MMOL/L


()


 


Carbon Dioxide Level


  28 MMOL/L


(21-32) 


  29 MMOL/L


(21-32)


 


Anion Gap


  8 mmol/L


(5-15) 


  8 mmol/L


(5-15)


 


Blood Urea Nitrogen


  14 mg/dL


(7-18) 


  16 mg/dL


(7-18)


 


Creatinine


  1.3 MG/DL


(0.55-1.30) 


  1.0 MG/DL


(0.55-1.30)


 


Estimat Glomerular Filtration


Rate  mL/min (>60)  


  


   mL/min (>60)  


 


 


Glucose Level


  161 MG/DL


()  H 


  196 MG/DL


()  H


 


Calcium Level


  9.5 MG/DL


(8.5-10.1) 


  9.0 MG/DL


(8.5-10.1)


 


Total Bilirubin


  0.3 MG/DL


(0.2-1.0) 


  


 


 


Aspartate Amino Transf


(AST/SGOT) 19 U/L (15-37)


  


  


 


 


Alanine Aminotransferase


(ALT/SGPT) 28 U/L (12-78)


  


  


 


 


Alkaline Phosphatase


  82 U/L


() 


  


 


 


Troponin I


  0.000 ng/mL


(0.000-0.056) 


  


 


 


Total Protein


  8.4 G/DL


(6.4-8.2)  H 


  


 


 


Albumin


  3.7 G/DL


(3.4-5.0) 


  


 


 


Globulin 4.7 g/dL    


 


Albumin/Globulin Ratio


  0.8 (1.0-2.7)


L 


  


 


 


Urine Color  Pale yellow   


 


Urine Appearance  Clear   


 


Urine pH  6 (4.5-8.0)   


 


Urine Specific Gravity


  


  1.010


(1.005-1.035) 


 


 


Urine Protein


  


  Negative


(NEGATIVE) 


 


 


Urine Glucose (UA)


  


  Negative


(NEGATIVE) 


 


 


Urine Ketones


  


  Negative


(NEGATIVE) 


 


 


Urine Blood


  


  Negative


(NEGATIVE) 


 


 


Urine Nitrite


  


  Negative


(NEGATIVE) 


 


 


Urine Bilirubin


  


  Negative


(NEGATIVE) 


 


 


Urine Urobilinogen


  


  Normal MG/DL


(0.0-1.0) 


 


 


Urine Leukocyte Esterase


  


  2+ (NEGATIVE)


H 


 


 


Urine RBC


  


  0-2 /HPF (0 -


0)  H 


 


 


Urine WBC


  


  10-15 /HPF (0


- 0)  H 


 


 


Urine Squamous Epithelial


Cells 


  Few /LPF


(NONE/OCC) 


 


 


Urine Bacteria


  


  Moderate /HPF


(NONE)  H 


 











Microbiology








 Date/Time


Source Procedure


Growth Status


 


 


 1/28/20 15:15


Urine,Clean Catch Urine Culture - Preliminary


Gram Negative Bacillus 1 Resulted








Height (Feet):  5


Height (Inches):  9.00


Weight (Pounds):  219


Medications





Current Medications








 Medications


  (Trade)  Dose


 Ordered  Sig/Aarti


 Route


 PRN Reason  Start Time


 Stop Time Status Last Admin


Dose Admin


 


 Acetaminophen


  (Tylenol)  650 mg  Q4H  PRN


 ORAL


 Mild Pain/Temp > 100.5  1/28/20 20:45


 2/27/20 20:44   


 


 


 Atorvastatin


 Calcium


  (Lipitor)  10 mg  BEDTIME


 ORAL


   1/29/20 21:00


 2/28/20 20:59   


 


 


 Carvedilol


  (Coreg)  25 mg  EVERY 12  HOURS


 ORAL


   1/28/20 21:00


 2/27/20 20:59  1/29/20 08:46


 


 


 Dextrose/Sodium


 Chloride  1,000 ml @ 


 60 mls/hr  [id number]


 IV


   1/28/20 21:15


 2/27/20 21:14  1/28/20 22:29


 


 


 Furosemide


  (Lasix)  20 mg  DAILY


 ORAL


   1/29/20 09:00


 2/28/20 08:59  1/29/20 08:46


 


 


 Gabapentin


  (Neurontin)  300 mg  THREE TIMES A  DAY


 ORAL


   1/29/20 09:00


 2/28/20 08:59  1/29/20 08:47


 


 


 Heparin Sodium


  (Porcine)


  (Heparin 5000


 units/ml)  5,000 units  EVERY 12  HOURS


 SUBQ


   1/28/20 21:00


 2/27/20 20:59  1/29/20 08:49


 


 


 Hydralazine HCl


  (Apresoline)  100 mg  Q8HR


 ORAL


   1/29/20 14:00


 2/28/20 13:59   


 


 


 Levetiracetam


  (Keppra)  1,000 mg  Q12HR


 ORAL


   1/28/20 21:00


 2/27/20 20:59  1/29/20 08:45


 


 


 Lisinopril


  (PriniviL)  20 mg  Q12HR


 ORAL


   1/29/20 09:00


 2/27/20 20:59  1/29/20 08:47


 


 


 Lorazepam


  (Ativan 2mg/ml


 1ml)  1 mg  Q4H  PRN


 IV


 For Anxiety  1/28/20 20:45


 2/4/20 20:44   


 


 


 Multivitamins


  (Multivitamins)  1 tab  DAILY


 ORAL


   1/29/20 09:00


 2/28/20 08:59  1/29/20 08:47


 


 


 Nitroglycerin


  (Ntg)  0.4 mg  Q5M  PRN


 SL


 Prn Chest Pain  1/28/20 20:45


 2/27/20 20:44   


 


 


 Spironolactone


  (Aldactone)  25 mg  DAILY


 ORAL


   1/29/20 09:00


 2/28/20 08:59  1/29/20 08:45


 


 


 Tamsulosin HCl


  (Flomax)  0.4 mg  BEDTIME


 ORAL


   1/28/20 21:00


 2/27/20 20:59  1/28/20 22:26


 











Assessment/Plan


Assessment/Plan:


Abx:


Ceftriaxone x1 1/28





Assessment:


Seizure breakthrough, multiple episodes





Afebrile


No leukocytosis





UTI (frequency)


  -u/a wbc 10-15, nit neg, leuk +2; ucx >100k GNR





MELVI, improving





HTN


 DM2


seizure disorder on Keppra


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





Plan:


-Continue empiric Ceftriaxone #2 penidng ucx


-f/u cx


-Monitor CBC/CMP, temperatures





Thank you for this consultation. Will continue to follow along with you.





Discussed with Naila Donnelly M.D. Jan 29, 2020 12:32

## 2020-01-29 NOTE — NUR
ST NOTES:

REFERRED FOR A SWALLOWING EVALUATION BY DR MATOS, SEE FULL REPORT.



DYSPHAGIA RISK FACTORS FOR THIS 69 Y.O.M.:



ACUTE ISSUES:



RECURRENT SEIZURES, UTI



RELEVANT MEDS: KEPPRA, ATIVAN, FLOMAX. 



H/O DYSPHAGIA, GERD (NO MEDS), ANOREXIA AND HAS FOOD PREFERENCES, LEFT CVA 

RSW AND APHASIA X3 ONE IN 2011, EPILEPSY (KEPPRA), COPD, SMOKING (QUIT 

2011), BIPOLAR II, CARDIAC D/O AND PACEMAKER, DM2,CHRONIC RHINITIS ON 

CLARITIN. 



[place] [facility] [place] HAD A MILD OROPHARYNGEAL DYSPHAGIA AND 

ORAL APRAXIA AND PERIODS OF REFUSAL OF PO TRIALS (HAD FOOD PREFERENCES). 

[facility]QU[facility] WITH ASPIRATION 

AND REFLUX PRECAUTIONS. NO PNA AT THAT TIME.  

(NO MOD BARIUM SWALLOW STUDY DUE TO SCHEDULE CONFLICTS), PLACED ON 

ADVANCE DIRETIVE STATES NO TUBE FEEDINGS. 



AT SNF ON A PATRICE REGULAR TEXTURE DIET AND THIN LIQUIDS. CURRENTLY ON A 

REGULAR TYPE AND TEXTURE DIET AND THIN LIQUIDS BUT NO INTAKE RECORDED (DOA 

YESTERDAY 1/28/20). PER RN, TOOK ALL 4 SMALL PILLS WITH WATER W/O APPARENT 

DIFFICULTY. PT WANTED TO TAKE THEM ALL AT ONCE. 



THE PATIENT IS MOSTLY NONVERBAL AND HAS EXPRESSIVE APHASIA AND ORAL VERBAL 

APRAXIA. HAS UPPER AND LOWER DENTURES THAT HE DOES NOT WANT TO USE WITH 

MASTICATED SOLIDS (PREFERS TO GUM THE FOOD). HE IS ABLE TO IMITATE SOME 

NUMBERS, VOWELS AND ALSO HAS SOME DYSARTHRIA (SOFTER VOICE AND LESS 

PRECISE IN ARTICULATION) MILD LEFT LABIAL WEAKNESS OR ASYMMETRY AT REST 

MOSTLY. ADEQUATE LIP STRENGTH FOR RETRACTION/PROTRUSION. TONGUE SPEED IS 

SLOWER, LESS COORDINATED, FAIR STRENGTH, POOR LATERALIZATION TO THE RIGHT, 

ELEVATION, AND DEPRESSION. PROBLEMS COMPOUNDED BY ORAL APRAXIA WITH TONGUE 

MOSTLY. 



INITIAL IMPRESSIONS

S/S OF AT LEAST A MILD OROPHARYNGEAL DYSPHAGIA MOSTLY WITH MASTICATED 

SOLIDS MOSTLY (W/O DENTITION)



GIVEN 1/2 CRACKER, SLOWER CHEWING (15 SECONDS), SWALLOWS 

WITH HYOLARYNGEAL EXCURSION, MIN ORAL RESIDUE ON TONGUE THAT HE CLEARS 

WITH A SECOND SPONTANEOUS SWALLOW.  NO OVERT ASPIRATION.



APPEARS TO HAVE A GROSSLY FUNCTIONAL SWALLOW WITH THIN LIQUIDS VIA STRAW 

SEQUENTIAL SIP (3 OZ WATER CALVIN SWALLOW PROTOCOL) WITH GOOD RESP RATE) AND 

 PUREED TSP. NO OVERT ASPIRATION.



GIVEN NEURO DX HAS SILENT ASPIRATION RISK BUT LUNGS ARE CLEAR NOW.



RECOMMENDATION

DOWNGRADE TO [facility] SOFT CHOPPED DIET AND THIN LIQUIDS WITH POSTED ASPIRATION AND REFLUX 
PRECAUTIONS AND ASSIST WITH MEALS.



IF DISLIKES CHOPPED CAN SEND SOFT CHEW AND CUT FOOD FOR HIM AS HE HAS A RIGHT UE DEFICT AND 
NEEDS ASSIST.



PATRICE DIET TYPE FROM SNF OR PER RD RECOMMENDATIONS WHEN AVAILABLE. 



CONSIDER MOD BARIUM SWALLOW STUDY TO FURTHER ASSESS SWALLOW, DETERMINE SILENT ASP 
RISK/ETIOLOGY, AND ATTEMPT TRIAL TX (DO NOT HOLD UP DC FOR THIS STUDY AS IT CAN BE COMPLETED 
AS AN OUTPATIENT).



SKILLED DYSPHAGIA MANAGEMENT AND TX AND COGNITIVE-COMMUNICATIVE EVAL/TX FOR COMMUNICATION 
TIPS (HAS EXPRESSIVE APHASIA AND ORAL-VERBAL APRAXIA).



EDUCATED/TRAINED RN (CATIE) IN POSTED PRECAUTIONS. 

CONSIDER

## 2020-01-29 NOTE — NUR
CASE MANAGEMENT:REVIEW



69 YR OLD MALE BIBA FROM [facility]



CC: SEIZURE



SI: RECURRENT SEIZURE. UTI

98.7   66  18   132/68  100% ON RA



IS: 500CC NS BOLUS

IV KEPPRA

IV ROCEPHIN

CHEST XRAY

**: TO TELEMETRY 

DCP; RETURN TO [facility]

## 2020-01-29 NOTE — NUR
NURSE NOTES:

Received patient from Amberly Ricardo, patient in stable condition, on RA,denies pain at this 
time, IV access on left wrist g 24, asymptomatic, patent, bed low&locked, side rails upx3, 
call light within reach, will continue to monitor and reassess.Family at bedside

## 2020-01-29 NOTE — NUR
NURSE NOTES:

Patient stable AOx3. No s/sx of distress. RR even and unlabored on RA. Patient ambulating 
with steady gait. Side rails upx2, call light within reach, bed low and locked. Will 
continue to monitor.

## 2020-01-30 VITALS — DIASTOLIC BLOOD PRESSURE: 66 MMHG | SYSTOLIC BLOOD PRESSURE: 102 MMHG

## 2020-01-30 VITALS — SYSTOLIC BLOOD PRESSURE: 124 MMHG | DIASTOLIC BLOOD PRESSURE: 74 MMHG

## 2020-01-30 VITALS — SYSTOLIC BLOOD PRESSURE: 139 MMHG | DIASTOLIC BLOOD PRESSURE: 88 MMHG

## 2020-01-30 VITALS — DIASTOLIC BLOOD PRESSURE: 79 MMHG | SYSTOLIC BLOOD PRESSURE: 122 MMHG

## 2020-01-30 VITALS — SYSTOLIC BLOOD PRESSURE: 105 MMHG | DIASTOLIC BLOOD PRESSURE: 69 MMHG

## 2020-01-30 VITALS — SYSTOLIC BLOOD PRESSURE: 115 MMHG | DIASTOLIC BLOOD PRESSURE: 74 MMHG

## 2020-01-30 LAB
ADD MANUAL DIFF: NO
ANION GAP SERPL CALC-SCNC: 9 MMOL/L (ref 5–15)
BASOPHILS NFR BLD AUTO: 1.7 % (ref 0–2)
BUN SERPL-MCNC: 13 MG/DL (ref 7–18)
CALCIUM SERPL-MCNC: 9.2 MG/DL (ref 8.5–10.1)
CHLORIDE SERPL-SCNC: 105 MMOL/L (ref 98–107)
CO2 SERPL-SCNC: 28 MMOL/L (ref 21–32)
CREAT SERPL-MCNC: 1 MG/DL (ref 0.55–1.3)
EOSINOPHIL NFR BLD AUTO: 6.4 % (ref 0–3)
ERYTHROCYTE [DISTWIDTH] IN BLOOD BY AUTOMATED COUNT: 12 % (ref 11.6–14.8)
HCT VFR BLD CALC: 38.9 % (ref 42–52)
HGB BLD-MCNC: 13 G/DL (ref 14.2–18)
LYMPHOCYTES NFR BLD AUTO: 18.9 % (ref 20–45)
MCV RBC AUTO: 87 FL (ref 80–99)
MONOCYTES NFR BLD AUTO: 9.4 % (ref 1–10)
NEUTROPHILS NFR BLD AUTO: 63.7 % (ref 45–75)
PLATELET # BLD: 208 K/UL (ref 150–450)
POTASSIUM SERPL-SCNC: 3.9 MMOL/L (ref 3.5–5.1)
RBC # BLD AUTO: 4.47 M/UL (ref 4.7–6.1)
SODIUM SERPL-SCNC: 142 MMOL/L (ref 136–145)
WBC # BLD AUTO: 5.9 K/UL (ref 4.8–10.8)

## 2020-01-30 RX ADMIN — LISINOPRIL SCH MG: 20 TABLET ORAL at 21:16

## 2020-01-30 RX ADMIN — DEXTROSE AND SODIUM CHLORIDE SCH MLS/HR: 5; .45 INJECTION, SOLUTION INTRAVENOUS at 23:21

## 2020-01-30 RX ADMIN — DEXTROSE AND SODIUM CHLORIDE SCH MLS/HR: 5; .45 INJECTION, SOLUTION INTRAVENOUS at 07:31

## 2020-01-30 RX ADMIN — HEPARIN SODIUM SCH UNITS: 5000 INJECTION INTRAVENOUS; SUBCUTANEOUS at 09:03

## 2020-01-30 RX ADMIN — HYDRALAZINE HYDROCHLORIDE SCH MG: 50 TABLET ORAL at 06:00

## 2020-01-30 RX ADMIN — SODIUM CHLORIDE SCH MLS/HR: 0.9 INJECTION INTRAVENOUS at 16:30

## 2020-01-30 RX ADMIN — CARVEDILOL SCH MG: 25 TABLET, FILM COATED ORAL at 09:02

## 2020-01-30 RX ADMIN — SPIRONOLACTONE SCH MG: 25 TABLET, FILM COATED ORAL at 09:02

## 2020-01-30 RX ADMIN — HYDRALAZINE HYDROCHLORIDE SCH MG: 50 TABLET ORAL at 12:53

## 2020-01-30 RX ADMIN — LISINOPRIL SCH MG: 20 TABLET ORAL at 09:02

## 2020-01-30 RX ADMIN — TAMSULOSIN HYDROCHLORIDE SCH MG: 0.4 CAPSULE ORAL at 21:06

## 2020-01-30 RX ADMIN — HEPARIN SODIUM SCH UNITS: 5000 INJECTION INTRAVENOUS; SUBCUTANEOUS at 21:17

## 2020-01-30 RX ADMIN — CARVEDILOL SCH MG: 25 TABLET, FILM COATED ORAL at 21:00

## 2020-01-30 RX ADMIN — HYDRALAZINE HYDROCHLORIDE SCH MG: 50 TABLET ORAL at 22:00

## 2020-01-30 NOTE — NUR
SWALLOW STATUS AND PLAN:

 NOTE:  D/C SUMMARY:

PATIENT TOLERATING CURRENT DIET WITH NO OVERT S/S OF ASPIRATION.  HE IS 

ABLE TO INDEPENDENTLY SELF FEED HIMSELF WITH MINIMAL SET UP ASSIST.  

PATIENT HAS MET P.O. INTAKE GOALS, NURSING STAFF MET ASPIRATION 

PRECAUTIONS AND ORAL CARE GOALS.  

CONTINUE CURRENT DIET

D/C FROM SKILLED ST SERVICE

## 2020-01-30 NOTE — GENERAL PROGRESS NOTE
Assessment/Plan


Problem List:  


(1) Sepsis


ICD Codes:  A41.9 - Sepsis, unspecified organism


SNOMED:  32366065


(2) Urinary tract infection


ICD Codes:  N39.0 - Urinary tract infection, site not specified


SNOMED:  91444420


(3) Recurrent seizures


ICD Codes:  G40.909 - Epilepsy, unspecified, not intractable, without status 

epilepticus


SNOMED:  88503827, [id number]


(4) History of CVA with residual deficit


ICD Codes:  I69.30 - Unspecified sequelae of cerebral infarction


SNOMED:  680957426


Status:  unchanged


Assessment/Plan:


seizure control pt diet eval cbc bmp am





Subjective


Constitutional:  Reports: weakness


Allergies:  


Coded Allergies:  


     No Known Allergies (Unverified , 12/15/14)


All Systems:  reviewed and negative except above


Subjective


sitting calm





Objective





Last 24 Hour Vital Signs








  Date Time  Temp Pulse Resp B/P (MAP) Pulse Ox O2 Delivery O2 Flow Rate FiO2


 


1/30/20 12:53    105/69    


 


1/30/20 12:00  60      


 


1/30/20 12:00 98.0 62 20 105/69 (81) 99   


 


1/30/20 09:02    122/79    


 


1/30/20 09:02  62  122/79    


 


1/30/20 09:00      Room Air  


 


1/30/20 08:00  61      


 


1/30/20 08:00 98.2 62 20 122/79 (93) 99   


 


1/30/20 06:00    140/75    


 


1/30/20 04:00  60      


 


1/30/20 04:00 97.2 63 19 139/88 (105) 96   


 


1/30/20 00:00  68      


 


1/30/20 00:00 97.2 60 18 115/74 (88) 95   


 


1/29/20 22:00    123/80    


 


1/29/20 22:00  60  123/80    


 


1/29/20 21:55    123/80 (94)    


 


1/29/20 21:00    123/80    


 


1/29/20 21:00      Room Air  


 


1/29/20 20:00 97.5 60 16 120/76 (91) 94   


 


1/29/20 20:00  64      


 


1/29/20 16:00 97.5 67 19 110/76 (87) 94   


 


1/29/20 16:00  62      

















Intake and Output  


 


 1/29/20 1/30/20





 19:00 07:00


 


Intake Total 965 ml 


 


Output Total 1500 ml 800 ml


 


Balance -535 ml -800 ml


 


  


 


Intake Oral 280 ml 


 


IV Total 685 ml 


 


Output Urine Total 1500 ml 800 ml


 


# Voids 3 








Laboratory Tests


1/30/20 06:38: 


White Blood Count 5.9, Red Blood Count 4.47L, Hemoglobin 13.0L, Hematocrit 38.9L

, Mean Corpuscular Volume 87, Mean Corpuscular Hemoglobin 29.1, Mean 

Corpuscular Hemoglobin Concent 33.4, Red Cell Distribution Width 12.0, Platelet 

Count 208, Mean Platelet Volume 8.1, Neutrophils (%) (Auto) 63.7, Lymphocytes (%

) (Auto) 18.9L, Monocytes (%) (Auto) 9.4, Eosinophils (%) (Auto) 6.4H, 

Basophils (%) (Auto) 1.7, Sodium Level 142, Potassium Level 3.9, Chloride Level 

105, Carbon Dioxide Level 28, Anion Gap 9, Blood Urea Nitrogen 13, Creatinine 

1.0, Estimat Glomerular Filtration Rate , Glucose Level 214H, Calcium Level 9.2


Height (Feet):  5


Height (Inches):  9.00


Weight (Pounds):  219


General Appearance:  lethargic


EENT:  normal ENT inspection


Neck:  normal alignment


Cardiovascular:  normal peripheral pulses, normal rate, regular rhythm


Respiratory/Chest:  chest wall non-tender, lungs clear, normal breath sounds


Abdomen:  normal bowel sounds, non tender, soft


Extremities:  normal inspection


Edema:  no edema noted Arm (L), no edema noted Arm (R), no edema noted Leg (L), 

no edema noted Leg (R), no edema noted Pedal (L), no edema noted Pedal (R), no 

edema noted Generalized


Neurologic:  motor weakness


Skin:  normal pigmentation, warm/dry











Arron Barkley DO Jan 30, 2020 14:44

## 2020-01-30 NOTE — INFECTIOUS DISEASES PROG NOTE
Assessment/Plan


Assessment/Plan








Assessment:


Seizure breakthrough, multiple episodes





Afebrile


No leukocytosis





UTI (frequency)


  -u/a wbc 10-15, nit neg, leuk +2; ucx >100k E.coli (R cipro/levo)





MELVI, improving





HTN


 DM2


seizure disorder on Keppra


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





Plan:


-Continue empiric Ceftriaxone #3/5-7 for UTI


-f/u cx


-Monitor CBC/CMP, temperatures


-Neuro eval


-aspiration precautions





Thank you for this consultation. Will continue to follow along with you.





Discussed with RN





Subjective


Allergies:  


Coded Allergies:  


     No Known Allergies (Unverified , 12/15/14)


Subjective


afebrile


no leukocytosis





Objective


Vital Signs





Last 24 Hour Vital Signs








  Date Time  Temp Pulse Resp B/P (MAP) Pulse Ox O2 Delivery O2 Flow Rate FiO2


 


1/30/20 12:53    105/69    


 


1/30/20 12:00  60      


 


1/30/20 12:00 98.0 62 20 105/69 (81) 99   


 


1/30/20 09:02    122/79    


 


1/30/20 09:02  62  122/79    


 


1/30/20 09:00      Room Air  


 


1/30/20 08:00  61      


 


1/30/20 08:00 98.2 62 20 122/79 (93) 99   


 


1/30/20 06:00    140/75    


 


1/30/20 04:00  60      


 


1/30/20 04:00 97.2 63 19 139/88 (105) 96   


 


1/30/20 00:00  68      


 


1/30/20 00:00 97.2 60 18 115/74 (88) 95   


 


1/29/20 22:00    123/80    


 


1/29/20 22:00  60  123/80    


 


1/29/20 21:55    123/80 (94)    


 


1/29/20 21:00    123/80    


 


1/29/20 21:00      Room Air  


 


1/29/20 20:00 97.5 60 16 120/76 (91) 94   


 


1/29/20 20:00  64      


 


1/29/20 16:00 97.5 67 19 110/76 (87) 94   


 


1/29/20 16:00  62      








Height (Feet):  5


Height (Inches):  9.00


Weight (Pounds):  219


Objective


General Appearance:  normal inspection, well appearing, no apparent distress, 

alert, Chronically Ill


Head:  atraumatic


ENT:  normal ENT inspection, hearing grossly normal, normal voice


Neck:  normal inspection, full range of motion, supple, no bony tend


Respiratory:  normal inspection, lungs clear, normal breath sounds, no 

respiratory distress, no retraction, no wheezing


Cardiovascular   regular rate, rhythm, no edema


Gastrointestinal:  normal inspection, normal bowel sounds, non tender, soft, no 

guarding, no hernia


Genitourinary:  no CVA tenderness


Musculoskeletal:  normal inspection, back normal, normal range of motion


Neurologic:  alert, CNs III-XII nml as tested, motor weakness - dense right 

hemiplegia, responsive, aphasia - expressive


Skin:  no rash





Microbiology








 Date/Time


Source Procedure


Growth Status


 


 


 1/28/20 15:15


Urine,Clean Catch Urine Culture - Final


Escherichia Coli Complete











Laboratory Tests








Test


  1/30/20


06:38


 


White Blood Count


  5.9 K/UL


(4.8-10.8)


 


Red Blood Count


  4.47 M/UL


(4.70-6.10)  L


 


Hemoglobin


  13.0 G/DL


(14.2-18.0)  L


 


Hematocrit


  38.9 %


(42.0-52.0)  L


 


Mean Corpuscular Volume 87 FL (80-99)  


 


Mean Corpuscular Hemoglobin


  29.1 PG


(27.0-31.0)


 


Mean Corpuscular Hemoglobin


Concent 33.4 G/DL


(32.0-36.0)


 


Red Cell Distribution Width


  12.0 %


(11.6-14.8)


 


Platelet Count


  208 K/UL


(150-450)


 


Mean Platelet Volume


  8.1 FL


(6.5-10.1)


 


Neutrophils (%) (Auto)


  63.7 %


(45.0-75.0)


 


Lymphocytes (%) (Auto)


  18.9 %


(20.0-45.0)  L


 


Monocytes (%) (Auto)


  9.4 %


(1.0-10.0)


 


Eosinophils (%) (Auto)


  6.4 %


(0.0-3.0)  H


 


Basophils (%) (Auto)


  1.7 %


(0.0-2.0)


 


Sodium Level


  142 MMOL/L


(136-145)


 


Potassium Level


  3.9 MMOL/L


(3.5-5.1)


 


Chloride Level


  105 MMOL/L


()


 


Carbon Dioxide Level


  28 MMOL/L


(21-32)


 


Anion Gap


  9 mmol/L


(5-15)


 


Blood Urea Nitrogen


  13 mg/dL


(7-18)


 


Creatinine


  1.0 MG/DL


(0.55-1.30)


 


Estimat Glomerular Filtration


Rate  mL/min (>60)  


 


 


Glucose Level


  214 MG/DL


()  H


 


Calcium Level


  9.2 MG/DL


(8.5-10.1)











Current Medications








 Medications


  (Trade)  Dose


 Ordered  Sig/Aarti


 Route


 PRN Reason  Start Time


 Stop Time Status Last Admin


Dose Admin


 


 Acetaminophen


  (Tylenol)  650 mg  Q4H  PRN


 ORAL


 Mild Pain/Temp > 100.5  1/28/20 20:45


 2/27/20 20:44   


 


 


 Atorvastatin


 Calcium


  (Lipitor)  10 mg  BEDTIME


 ORAL


   1/29/20 21:00


 2/28/20 20:59  1/29/20 21:57


 


 


 Carvedilol


  (Coreg)  25 mg  EVERY 12  HOURS


 ORAL


   1/28/20 21:00


 2/27/20 20:59  1/30/20 09:02


 


 


 Ceftriaxone


 Sodium 1 gm/


 Dextrose  55 ml @ 


 110 mls/hr  Q24H


 IVPB


   1/29/20 17:00


 2/5/20 16:59  1/29/20 17:11


 


 


 Dextrose/Sodium


 Chloride  1,000 ml @ 


 60 mls/hr  [id number]


 IV


   1/28/20 21:15


 2/27/20 21:14  1/30/20 07:31


 


 


 Furosemide


  (Lasix)  20 mg  DAILY


 ORAL


   1/29/20 09:00


 2/28/20 08:59  1/30/20 09:01


 


 


 Gabapentin


  (Neurontin)  300 mg  THREE TIMES A  DAY


 ORAL


   1/29/20 09:00


 2/28/20 08:59  1/30/20 12:53


 


 


 Heparin Sodium


  (Porcine)


  (Heparin 5000


 units/ml)  5,000 units  EVERY 12  HOURS


 SUBQ


   1/28/20 21:00


 2/27/20 20:59  1/30/20 09:03


 


 


 Hydralazine HCl


  (Apresoline)  100 mg  Q8HR


 ORAL


   1/29/20 14:00


 2/28/20 13:59  1/30/20 06:00


 


 


 Levetiracetam


  (Keppra)  1,000 mg  Q12HR


 ORAL


   1/28/20 21:00


 2/27/20 20:59  1/30/20 09:02


 


 


 Lisinopril


  (PriniviL)  20 mg  Q12HR


 ORAL


   1/29/20 09:00


 2/27/20 20:59  1/30/20 09:02


 


 


 Lorazepam


  (Ativan 2mg/ml


 1ml)  1 mg  Q4H  PRN


 IV


 For Anxiety  1/28/20 20:45


 2/4/20 20:44   


 


 


 Multivitamins


  (Multivitamins)  1 tab  DAILY


 ORAL


   1/29/20 09:00


 2/28/20 08:59  1/30/20 09:02


 


 


 Nitroglycerin


  (Ntg)  0.4 mg  Q5M  PRN


 SL


 Prn Chest Pain  1/28/20 20:45


 2/27/20 20:44   


 


 


 Spironolactone


  (Aldactone)  25 mg  DAILY


 ORAL


   1/29/20 09:00


 2/28/20 08:59  1/30/20 09:02


 


 


 Tamsulosin HCl


  (Flomax)  0.4 mg  BEDTIME


 ORAL


   1/28/20 21:00


 2/27/20 20:59  1/29/20 21:58


 

















Naila Lindsay M.D. Jan 30, 2020 14:50

## 2020-01-30 NOTE — NUR
NURSE NOTES:

Report received from Stephenie SWEET RN.  Pt in stable condition and plan of care endorsed.

## 2020-01-31 VITALS — DIASTOLIC BLOOD PRESSURE: 61 MMHG | SYSTOLIC BLOOD PRESSURE: 119 MMHG

## 2020-01-31 VITALS — SYSTOLIC BLOOD PRESSURE: 112 MMHG | DIASTOLIC BLOOD PRESSURE: 65 MMHG

## 2020-01-31 VITALS — DIASTOLIC BLOOD PRESSURE: 76 MMHG | SYSTOLIC BLOOD PRESSURE: 111 MMHG

## 2020-01-31 VITALS — DIASTOLIC BLOOD PRESSURE: 80 MMHG | SYSTOLIC BLOOD PRESSURE: 122 MMHG

## 2020-01-31 VITALS — DIASTOLIC BLOOD PRESSURE: 59 MMHG | SYSTOLIC BLOOD PRESSURE: 111 MMHG

## 2020-01-31 VITALS — DIASTOLIC BLOOD PRESSURE: 74 MMHG | SYSTOLIC BLOOD PRESSURE: 134 MMHG

## 2020-01-31 LAB
ADD MANUAL DIFF: NO
ANION GAP SERPL CALC-SCNC: 6 MMOL/L (ref 5–15)
BASOPHILS NFR BLD AUTO: 0.9 % (ref 0–2)
BUN SERPL-MCNC: 14 MG/DL (ref 7–18)
CALCIUM SERPL-MCNC: 9.2 MG/DL (ref 8.5–10.1)
CHLORIDE SERPL-SCNC: 105 MMOL/L (ref 98–107)
CO2 SERPL-SCNC: 31 MMOL/L (ref 21–32)
CREAT SERPL-MCNC: 1 MG/DL (ref 0.55–1.3)
EOSINOPHIL NFR BLD AUTO: 7.7 % (ref 0–3)
ERYTHROCYTE [DISTWIDTH] IN BLOOD BY AUTOMATED COUNT: 12.1 % (ref 11.6–14.8)
HCT VFR BLD CALC: 38.3 % (ref 42–52)
HGB BLD-MCNC: 12.7 G/DL (ref 14.2–18)
LYMPHOCYTES NFR BLD AUTO: 21.6 % (ref 20–45)
MCV RBC AUTO: 88 FL (ref 80–99)
MONOCYTES NFR BLD AUTO: 12.1 % (ref 1–10)
NEUTROPHILS NFR BLD AUTO: 57.8 % (ref 45–75)
PLATELET # BLD: 190 K/UL (ref 150–450)
POTASSIUM SERPL-SCNC: 4.3 MMOL/L (ref 3.5–5.1)
RBC # BLD AUTO: 4.35 M/UL (ref 4.7–6.1)
SODIUM SERPL-SCNC: 142 MMOL/L (ref 136–145)
WBC # BLD AUTO: 5.7 K/UL (ref 4.8–10.8)

## 2020-01-31 RX ADMIN — HEPARIN SODIUM SCH UNITS: 5000 INJECTION INTRAVENOUS; SUBCUTANEOUS at 09:25

## 2020-01-31 RX ADMIN — HYDRALAZINE HYDROCHLORIDE SCH MG: 50 TABLET ORAL at 21:31

## 2020-01-31 RX ADMIN — LISINOPRIL SCH MG: 20 TABLET ORAL at 09:00

## 2020-01-31 RX ADMIN — SPIRONOLACTONE SCH MG: 25 TABLET, FILM COATED ORAL at 09:14

## 2020-01-31 RX ADMIN — HYDRALAZINE HYDROCHLORIDE SCH MG: 50 TABLET ORAL at 06:00

## 2020-01-31 RX ADMIN — DEXTROSE AND SODIUM CHLORIDE SCH MLS/HR: 5; .45 INJECTION, SOLUTION INTRAVENOUS at 17:16

## 2020-01-31 RX ADMIN — HYDRALAZINE HYDROCHLORIDE SCH MG: 50 TABLET ORAL at 14:00

## 2020-01-31 RX ADMIN — HEPARIN SODIUM SCH UNITS: 5000 INJECTION INTRAVENOUS; SUBCUTANEOUS at 21:34

## 2020-01-31 RX ADMIN — CARVEDILOL SCH MG: 25 TABLET, FILM COATED ORAL at 09:15

## 2020-01-31 RX ADMIN — LISINOPRIL SCH MG: 20 TABLET ORAL at 21:30

## 2020-01-31 RX ADMIN — CARVEDILOL SCH MG: 25 TABLET, FILM COATED ORAL at 21:30

## 2020-01-31 RX ADMIN — TAMSULOSIN HYDROCHLORIDE SCH MG: 0.4 CAPSULE ORAL at 21:30

## 2020-01-31 RX ADMIN — SODIUM CHLORIDE SCH MLS/HR: 0.9 INJECTION INTRAVENOUS at 17:16

## 2020-01-31 NOTE — NUR
*-*DISCHRAGE PLANNING*-*

 

PATIENT HAS BEEN REFERRED TO:

 [facility]

  P: [phone]

  F: [phone]

## 2020-01-31 NOTE — NUR
NURSE NOTES:

Patient stable, eating breakfast at this time. No complaints at this time. No s/sx of 
distress. RR even and unlabored on RA. IV fluids infusing. Side rails upx2, call light 
within reach, bed low and locked. Will continue to monitor.

## 2020-01-31 NOTE — NUR
RD ASSESSMENT & RECOMMENDATIONS

SEE CARE ACTIVITY FOR COMPLETE ASSESSMENT



DAILY ESTIMATED NEEDS:

Needs based on cardiac 79kg abw 

25-30  kcals/kg 

[id number]  total kcals

1-1.2  g protein/kg

79-95  g total protein 

Fluid per MD, on lasix  





NUTRITION DIAGNOSIS:

Swallowing difficulty r/t cardiac history as evidenced by h/o multiple

CVA's, R side deficit, s/p SLP eval on mech soft chopped texture diet.



CURRENT DIET: PATRICE mech soft chopped     





PO DIET RECOMMENDATIONS:

Maintain Cardiac diet 





ADDITIONAL RECOMMENDATIONS:

1) Check A1C -> pt w/elev BG (187-214)  

2) Daily wts on lasix; standing preferred as able  

3) On lasix> check lytes daily

## 2020-01-31 NOTE — INFECTIOUS DISEASES PROG NOTE
Assessment/Plan


Assessment/Plan








Assessment:


Seizure breakthrough, multiple episodes





Afebrile


No leukocytosis





UTI (frequency)


  -u/a wbc 10-15, nit neg, leuk +2; ucx >100k E.coli (R cipro/levo)





MELVI, improving





HTN


 DM2


seizure disorder on Keppra


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





Plan:


-Continue empiric Ceftriaxone #4/5-7 for UTI


-f/u cx


-Monitor CBC/CMP, temperatures


-Neuro eval


-aspiration precautions





Thank you for this consultation. Will continue to follow along with you.





Discussed with RN





Subjective


Allergies:  


Coded Allergies:  


     No Known Allergies (Unverified , 12/15/14)


Subjective


afebrile


no leukocytosis





Objective


Vital Signs





Last 24 Hour Vital Signs








  Date Time  Temp Pulse Resp B/P (MAP) Pulse Ox O2 Delivery O2 Flow Rate FiO2


 


1/31/20 14:00    118/74    


 


1/31/20 12:00  61      


 


1/31/20 12:00 97.1 63 18 118/74 (89) 96   


 


1/31/20 09:15  71  111/76    


 


1/31/20 09:00    111/76    


 


1/31/20 08:08      Room Air  


 


1/31/20 08:00 98.0 71 18 111/76 (88) 95   


 


1/31/20 08:00  75      


 


1/31/20 06:00    110/70    


 


1/31/20 04:00  71      


 


1/31/20 04:00 97.4 73 18 119/61 (80) 97   


 


1/31/20 00:00  64      


 


1/31/20 00:00 97.3 64 18 111/59 (76) 97   


 


1/30/20 22:00    106/59    


 


1/30/20 21:16    105/66    


 


1/30/20 21:00      Room Air  


 


1/30/20 20:00  63      


 


1/30/20 20:00 97.5 67 20 102/66 (78) 99   








Height (Feet):  5


Height (Inches):  9.00


Weight (Pounds):  219


Objective


General Appearance:  normal inspection, well appearing, no apparent distress, 

alert, Chronically Ill


Head:  atraumatic


ENT:  normal ENT inspection, hearing grossly normal, normal voice


Neck:  normal inspection, full range of motion, supple, no bony tend


Respiratory:  normal inspection, lungs clear, normal breath sounds, no 

respiratory distress, no retraction, no wheezing


Cardiovascular   regular rate, rhythm, no edema


Gastrointestinal:  normal inspection, normal bowel sounds, non tender, soft, no 

guarding, no hernia


Genitourinary:  no CVA tenderness


Musculoskeletal:  normal inspection, back normal, normal range of motion


Neurologic:  alert, CNs III-XII nml as tested, motor weakness - dense right 

hemiplegia, responsive, aphasia - expressive


Skin:  no rash





Microbiology








 Date/Time


Source Procedure


Growth Status


 


 


 1/28/20 17:41


Nasal Nares MRSA Culture - Final


NO METHICILLIN RESISTANT STAPH AUREUS... Complete











Laboratory Tests








Test


  1/31/20


05:40


 


White Blood Count


  5.7 K/UL


(4.8-10.8)


 


Red Blood Count


  4.35 M/UL


(4.70-6.10)  L


 


Hemoglobin


  12.7 G/DL


(14.2-18.0)  L


 


Hematocrit


  38.3 %


(42.0-52.0)  L


 


Mean Corpuscular Volume 88 FL (80-99)  


 


Mean Corpuscular Hemoglobin


  29.2 PG


(27.0-31.0)


 


Mean Corpuscular Hemoglobin


Concent 33.1 G/DL


(32.0-36.0)


 


Red Cell Distribution Width


  12.1 %


(11.6-14.8)


 


Platelet Count


  190 K/UL


(150-450)


 


Mean Platelet Volume


  7.6 FL


(6.5-10.1)


 


Neutrophils (%) (Auto)


  57.8 %


(45.0-75.0)


 


Lymphocytes (%) (Auto)


  21.6 %


(20.0-45.0)


 


Monocytes (%) (Auto)


  12.1 %


(1.0-10.0)  H


 


Eosinophils (%) (Auto)


  7.7 %


(0.0-3.0)  H


 


Basophils (%) (Auto)


  0.9 %


(0.0-2.0)


 


Sodium Level


  142 MMOL/L


(136-145)


 


Potassium Level


  4.3 MMOL/L


(3.5-5.1)


 


Chloride Level


  105 MMOL/L


()


 


Carbon Dioxide Level


  31 MMOL/L


(21-32)


 


Anion Gap


  6 mmol/L


(5-15)


 


Blood Urea Nitrogen


  14 mg/dL


(7-18)


 


Creatinine


  1.0 MG/DL


(0.55-1.30)


 


Estimat Glomerular Filtration


Rate  mL/min (>60)  


 


 


Glucose Level


  187 MG/DL


()  H


 


Calcium Level


  9.2 MG/DL


(8.5-10.1)











Current Medications








 Medications


  (Trade)  Dose


 Ordered  Sig/Aarti


 Route


 PRN Reason  Start Time


 Stop Time Status Last Admin


Dose Admin


 


 Acetaminophen


  (Tylenol)  650 mg  Q4H  PRN


 ORAL


 Mild Pain/Temp > 100.5  1/28/20 20:45


 2/27/20 20:44   


 


 


 Atorvastatin


 Calcium


  (Lipitor)  10 mg  BEDTIME


 ORAL


   1/29/20 21:00


 2/28/20 20:59  1/30/20 21:06


 


 


 Carvedilol


  (Coreg)  25 mg  EVERY 12  HOURS


 ORAL


   1/28/20 21:00


 2/27/20 20:59  1/31/20 09:15


 


 


 Ceftriaxone


 Sodium 1 gm/


 Dextrose  55 ml @ 


 110 mls/hr  Q24H


 IVPB


   1/29/20 17:00


 2/5/20 16:59  1/31/20 17:16


 


 


 Dextrose/Sodium


 Chloride  1,000 ml @ 


 60 mls/hr  [id number]


 IV


   1/28/20 21:15


 2/27/20 21:14  1/31/20 17:16


 


 


 Furosemide


  (Lasix)  20 mg  DAILY


 ORAL


   1/29/20 09:00


 2/28/20 08:59  1/31/20 09:15


 


 


 Gabapentin


  (Neurontin)  300 mg  THREE TIMES A  DAY


 ORAL


   1/29/20 09:00


 2/28/20 08:59  1/31/20 17:17


 


 


 Heparin Sodium


  (Porcine)


  (Heparin 5000


 units/ml)  5,000 units  EVERY 12  HOURS


 SUBQ


   1/28/20 21:00


 2/27/20 20:59  1/31/20 09:25


 


 


 Hydralazine HCl


  (Apresoline)  100 mg  Q8HR


 ORAL


   1/29/20 14:00


 2/28/20 13:59  1/31/20 06:00


 


 


 Levetiracetam


  (Keppra)  1,000 mg  Q12HR


 ORAL


   1/28/20 21:00


 2/27/20 20:59  1/31/20 09:15


 


 


 Lisinopril


  (PriniviL)  20 mg  Q12HR


 ORAL


   1/29/20 09:00


 2/27/20 20:59  1/30/20 21:16


 


 


 Lorazepam


  (Ativan 2mg/ml


 1ml)  1 mg  Q4H  PRN


 IV


 For Anxiety  1/28/20 20:45


 2/4/20 20:44   


 


 


 Multivitamins


  (Multivitamins)  1 tab  DAILY


 ORAL


   1/29/20 09:00


 2/28/20 08:59  1/31/20 09:15


 


 


 Nitroglycerin


  (Ntg)  0.4 mg  Q5M  PRN


 SL


 Prn Chest Pain  1/28/20 20:45


 2/27/20 20:44   


 


 


 Spironolactone


  (Aldactone)  25 mg  DAILY


 ORAL


   1/29/20 09:00


 2/28/20 08:59  1/31/20 09:14


 


 


 Tamsulosin HCl


  (Flomax)  0.4 mg  BEDTIME


 ORAL


   1/28/20 21:00


 2/27/20 20:59  1/30/20 21:06


 

















Naila Lindsay M.D. Jan 31, 2020 17:20

## 2020-01-31 NOTE — GENERAL PROGRESS NOTE
Assessment/Plan


Problem List:  


(1) Sepsis


ICD Codes:  A41.9 - Sepsis, unspecified organism


SNOMED:  55168921


(2) Urinary tract infection


ICD Codes:  N39.0 - Urinary tract infection, site not specified


SNOMED:  52977284


(3) Recurrent seizures


ICD Codes:  G40.909 - Epilepsy, unspecified, not intractable, without status 

epilepticus


SNOMED:  33172302, 76691106


(4) History of CVA with residual deficit


ICD Codes:  I69.30 - Unspecified sequelae of cerebral infarction


SNOMED:  864932872


Status:  stable, progressing


Assessment/Plan:


seizure control pt diet eval cbc bmp am dc if clear





Subjective


Constitutional:  Reports: weakness


Allergies:  


Coded Allergies:  


     No Known Allergies (Unverified , 12/15/14)


All Systems:  reviewed and negative except above


Subjective


sitting calm no seizure last night





Objective





Last 24 Hour Vital Signs








  Date Time  Temp Pulse Resp B/P (MAP) Pulse Ox O2 Delivery O2 Flow Rate FiO2


 


1/31/20 08:08      Room Air  


 


1/31/20 06:00    110/70    


 


1/31/20 04:00  71      


 


1/31/20 04:00 97.4 73 18 119/61 (80) 97   


 


1/31/20 00:00  64      


 


1/31/20 00:00 97.3 64 18 111/59 (76) 97   


 


1/30/20 22:00    106/59    


 


1/30/20 21:16    105/66    


 


1/30/20 21:00      Room Air  


 


1/30/20 20:00  63      


 


1/30/20 20:00 97.5 67 20 102/66 (78) 99   


 


1/30/20 16:00  60      


 


1/30/20 16:00 98.3 61 20 124/74 (91) 99   


 


1/30/20 12:53    105/69    


 


1/30/20 12:00  60      


 


1/30/20 12:00 98.0 62 20 105/69 (81) 99   

















Intake and Output  


 


 1/30/20 1/31/20





 19:00 07:00


 


Intake Total 1200 ml 


 


Output Total 800 ml 


 


Balance 400 ml 


 


  


 


Intake Oral 1200 ml 


 


Output Urine Total 800 ml 


 


# Voids  3








Laboratory Tests


1/31/20 05:40: 


White Blood Count 5.7, Red Blood Count 4.35L, Hemoglobin 12.7L, Hematocrit 38.3L

, Mean Corpuscular Volume 88, Mean Corpuscular Hemoglobin 29.2, Mean 

Corpuscular Hemoglobin Concent 33.1, Red Cell Distribution Width 12.1, Platelet 

Count 190, Mean Platelet Volume 7.6, Neutrophils (%) (Auto) 57.8, Lymphocytes (%

) (Auto) 21.6, Monocytes (%) (Auto) 12.1H, Eosinophils (%) (Auto) 7.7H, 

Basophils (%) (Auto) 0.9, Sodium Level 142, Potassium Level 4.3, Chloride Level 

105, Carbon Dioxide Level 31, Anion Gap 6, Blood Urea Nitrogen 14, Creatinine 

1.0, Estimat Glomerular Filtration Rate , Glucose Level 187H, Calcium Level 9.2


Height (Feet):  5


Height (Inches):  9.00


Weight (Pounds):  219


General Appearance:  lethargic


EENT:  normal ENT inspection


Neck:  normal alignment


Cardiovascular:  normal peripheral pulses, normal rate, regular rhythm


Respiratory/Chest:  chest wall non-tender, lungs clear, normal breath sounds


Abdomen:  normal bowel sounds, non tender, soft


Extremities:  normal inspection


Edema:  no edema noted Arm (L), no edema noted Arm (R), no edema noted Leg (L), 

no edema noted Leg (R), no edema noted Pedal (L), no edema noted Pedal (R), no 

edema noted Generalized


Neurologic:  motor weakness


Skin:  normal pigmentation, warm/dry











Arron Barkley DO Jan 31, 2020 09:13

## 2020-02-01 VITALS — DIASTOLIC BLOOD PRESSURE: 70 MMHG | SYSTOLIC BLOOD PRESSURE: 118 MMHG

## 2020-02-01 VITALS — DIASTOLIC BLOOD PRESSURE: 75 MMHG | SYSTOLIC BLOOD PRESSURE: 122 MMHG

## 2020-02-01 VITALS — SYSTOLIC BLOOD PRESSURE: 114 MMHG | DIASTOLIC BLOOD PRESSURE: 71 MMHG

## 2020-02-01 VITALS — DIASTOLIC BLOOD PRESSURE: 77 MMHG | SYSTOLIC BLOOD PRESSURE: 127 MMHG

## 2020-02-01 LAB
ADD MANUAL DIFF: NO
ANION GAP SERPL CALC-SCNC: 8 MMOL/L (ref 5–15)
BASOPHILS NFR BLD AUTO: 2.1 % (ref 0–2)
BUN SERPL-MCNC: 12 MG/DL (ref 7–18)
CALCIUM SERPL-MCNC: 9.3 MG/DL (ref 8.5–10.1)
CHLORIDE SERPL-SCNC: 105 MMOL/L (ref 98–107)
CO2 SERPL-SCNC: 29 MMOL/L (ref 21–32)
CREAT SERPL-MCNC: 1 MG/DL (ref 0.55–1.3)
EOSINOPHIL NFR BLD AUTO: 9 % (ref 0–3)
ERYTHROCYTE [DISTWIDTH] IN BLOOD BY AUTOMATED COUNT: 12.4 % (ref 11.6–14.8)
HCT VFR BLD CALC: 38.2 % (ref 42–52)
HGB BLD-MCNC: 13.1 G/DL (ref 14.2–18)
LYMPHOCYTES NFR BLD AUTO: 22.7 % (ref 20–45)
MCV RBC AUTO: 87 FL (ref 80–99)
MONOCYTES NFR BLD AUTO: 12.9 % (ref 1–10)
NEUTROPHILS NFR BLD AUTO: 53.3 % (ref 45–75)
PLATELET # BLD: 190 K/UL (ref 150–450)
POTASSIUM SERPL-SCNC: 4.2 MMOL/L (ref 3.5–5.1)
RBC # BLD AUTO: 4.37 M/UL (ref 4.7–6.1)
SODIUM SERPL-SCNC: 142 MMOL/L (ref 136–145)
WBC # BLD AUTO: 5.3 K/UL (ref 4.8–10.8)

## 2020-02-01 RX ADMIN — SPIRONOLACTONE SCH MG: 25 TABLET, FILM COATED ORAL at 09:28

## 2020-02-01 RX ADMIN — LISINOPRIL SCH MG: 20 TABLET ORAL at 09:29

## 2020-02-01 RX ADMIN — CARVEDILOL SCH MG: 25 TABLET, FILM COATED ORAL at 09:29

## 2020-02-01 RX ADMIN — HEPARIN SODIUM SCH UNITS: 5000 INJECTION INTRAVENOUS; SUBCUTANEOUS at 09:28

## 2020-02-01 RX ADMIN — DEXTROSE AND SODIUM CHLORIDE SCH MLS/HR: 5; .45 INJECTION, SOLUTION INTRAVENOUS at 08:22

## 2020-02-01 RX ADMIN — HYDRALAZINE HYDROCHLORIDE SCH MG: 50 TABLET ORAL at 07:04

## 2020-02-01 NOTE — INFECTIOUS DISEASES PROG NOTE
Assessment/Plan


Assessment/Plan








Assessment:


Seizure breakthrough, multiple episodes





Afebrile


No leukocytosis





UTI (frequency)


  -u/a wbc 10-15, nit neg, leuk +2; ucx >100k E.coli (R cipro/levo)





MELVI, improving





HTN


 DM2


seizure disorder on Keppra


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





Plan:


-Continue empiric Ceftriaxone #5/5-7 for UTI


-f/u cx


-Monitor CBC/CMP, temperatures


-Neuro eval


-aspiration precautions





Thank you for this consultation. Will continue to follow along with you.





Discussed with RN





Subjective


Allergies:  


Coded Allergies:  


     No Known Allergies (Unverified , 12/15/14)


Subjective


afebrile


no leukocytosis





Objective


Vital Signs





Last 24 Hour Vital Signs








  Date Time  Temp Pulse Resp B/P (MAP) Pulse Ox O2 Delivery O2 Flow Rate FiO2


 


2/1/20 09:29    127/77    


 


2/1/20 09:29  72  127/77    


 


2/1/20 08:21      Room Air  


 


2/1/20 08:00  65      


 


2/1/20 08:00 98.2 72 18 127/77 (94) 95   


 


2/1/20 07:04    118/70    


 


2/1/20 04:00  64      


 


2/1/20 04:00 98.0 60 18 118/70 (86) 97   


 


2/1/20 00:00  60      


 


2/1/20 00:00 98.8 61 18 114/71 (85) 97   


 


1/31/20 21:31    112/65    


 


1/31/20 21:30    112/65    


 


1/31/20 21:30  68  112/65    


 


1/31/20 21:00      Room Air  


 


1/31/20 20:00 97.9 68 18 112/65 (81) 95   


 


1/31/20 20:00  65      


 


1/31/20 16:00 97.5 70 19 122/80 (94) 97   


 


1/31/20 16:00  62      


 


1/31/20 14:00    118/74    


 


1/31/20 12:00  61      


 


1/31/20 12:00 97.1 63 18 118/74 (89) 96   








Height (Feet):  5


Height (Inches):  9.00


Weight (Pounds):  219


Objective


General Appearance:  normal inspection, well appearing, no apparent distress, 

alert, Chronically Ill


Head:  atraumatic


ENT:  normal ENT inspection, hearing grossly normal, normal voice


Neck:  normal inspection, full range of motion, supple, no bony tend


Respiratory:  normal inspection, lungs clear, normal breath sounds, no 

respiratory distress, no retraction, no wheezing


Cardiovascular   regular rate, rhythm, no edema


Gastrointestinal:  normal inspection, normal bowel sounds, non tender, soft, no 

guarding, no hernia


Genitourinary:  no CVA tenderness


Musculoskeletal:  normal inspection, back normal, normal range of motion


Neurologic:  alert, CNs III-XII nml as tested, motor weakness - dense right 

hemiplegia, responsive, aphasia - expressive


Skin:  no rash





Laboratory Tests








Test


  2/1/20


06:30


 


White Blood Count


  5.3 K/UL


(4.8-10.8)


 


Red Blood Count


  4.37 M/UL


(4.70-6.10)  L


 


Hemoglobin


  13.1 G/DL


(14.2-18.0)  L


 


Hematocrit


  38.2 %


(42.0-52.0)  L


 


Mean Corpuscular Volume 87 FL (80-99)  


 


Mean Corpuscular Hemoglobin


  29.9 PG


(27.0-31.0)


 


Mean Corpuscular Hemoglobin


Concent 34.2 G/DL


(32.0-36.0)


 


Red Cell Distribution Width


  12.4 %


(11.6-14.8)


 


Platelet Count


  190 K/UL


(150-450)


 


Mean Platelet Volume


  7.9 FL


(6.5-10.1)


 


Neutrophils (%) (Auto)


  53.3 %


(45.0-75.0)


 


Lymphocytes (%) (Auto)


  22.7 %


(20.0-45.0)


 


Monocytes (%) (Auto)


  12.9 %


(1.0-10.0)  H


 


Eosinophils (%) (Auto)


  9.0 %


(0.0-3.0)  H


 


Basophils (%) (Auto)


  2.1 %


(0.0-2.0)  H


 


Sodium Level


  142 MMOL/L


(136-145)


 


Potassium Level


  4.2 MMOL/L


(3.5-5.1)


 


Chloride Level


  105 MMOL/L


()


 


Carbon Dioxide Level


  29 MMOL/L


(21-32)


 


Anion Gap


  8 mmol/L


(5-15)


 


Blood Urea Nitrogen


  12 mg/dL


(7-18)


 


Creatinine


  1.0 MG/DL


(0.55-1.30)


 


Estimat Glomerular Filtration


Rate  mL/min (>60)  


 


 


Glucose Level


  198 MG/DL


()  H


 


Calcium Level


  9.3 MG/DL


(8.5-10.1)











Current Medications








 Medications


  (Trade)  Dose


 Ordered  Sig/Aarti


 Route


 PRN Reason  Start Time


 Stop Time Status Last Admin


Dose Admin


 


 Acetaminophen


  (Tylenol)  650 mg  Q4H  PRN


 ORAL


 Mild Pain/Temp > 100.5  1/28/20 20:45


 2/27/20 20:44   


 


 


 Atorvastatin


 Calcium


  (Lipitor)  10 mg  BEDTIME


 ORAL


   1/29/20 21:00


 2/28/20 20:59  1/31/20 21:30


 


 


 Carvedilol


  (Coreg)  25 mg  EVERY 12  HOURS


 ORAL


   1/28/20 21:00


 2/27/20 20:59  2/1/20 09:29


 


 


 Ceftriaxone


 Sodium 1 gm/


 Dextrose  55 ml @ 


 110 mls/hr  Q24H


 IVPB


   1/29/20 17:00


 2/5/20 16:59  1/31/20 17:16


 


 


 Dextrose/Sodium


 Chloride  1,000 ml @ 


 60 mls/hr  [id number]


 IV


   1/28/20 21:15


 2/27/20 21:14  1/31/20 17:16


 


 


 Furosemide


  (Lasix)  20 mg  DAILY


 ORAL


   1/29/20 09:00


 2/28/20 08:59  2/1/20 09:28


 


 


 Gabapentin


  (Neurontin)  300 mg  THREE TIMES A  DAY


 ORAL


   1/29/20 09:00


 2/28/20 08:59  2/1/20 09:28


 


 


 Heparin Sodium


  (Porcine)


  (Heparin 5000


 units/ml)  5,000 units  EVERY 12  HOURS


 SUBQ


   1/28/20 21:00


 2/27/20 20:59  2/1/20 09:28


 


 


 Hydralazine HCl


  (Apresoline)  100 mg  Q8HR


 ORAL


   1/29/20 14:00


 2/28/20 13:59  2/1/20 07:04


 


 


 Levetiracetam


  (Keppra)  1,000 mg  Q12HR


 ORAL


   1/28/20 21:00


 2/27/20 20:59  2/1/20 09:28


 


 


 Lisinopril


  (PriniviL)  20 mg  Q12HR


 ORAL


   1/29/20 09:00


 2/27/20 20:59  2/1/20 09:29


 


 


 Lorazepam


  (Ativan 2mg/ml


 1ml)  1 mg  Q4H  PRN


 IV


 For Anxiety  1/28/20 20:45


 2/4/20 20:44   


 


 


 Multivitamins


  (Multivitamins)  1 tab  DAILY


 ORAL


   1/29/20 09:00


 2/28/20 08:59  2/1/20 09:28


 


 


 Nitroglycerin


  (Ntg)  0.4 mg  Q5M  PRN


 SL


 Prn Chest Pain  1/28/20 20:45


 2/27/20 20:44   


 


 


 Spironolactone


  (Aldactone)  25 mg  DAILY


 ORAL


   1/29/20 09:00


 2/28/20 08:59  2/1/20 09:28


 


 


 Tamsulosin HCl


  (Flomax)  0.4 mg  BEDTIME


 ORAL


   1/28/20 21:00


 2/27/20 20:59  1/31/20 21:30


 

















Naila Lindsay M.D. Feb 1, 2020 11:40

## 2020-02-01 NOTE — NUR
NURSE NOTES:

Patient discharged. All belongings with patient including clothes and upper and lower 
dentures. No s/sx of distress. Patient stable. VSS. Report given to Danielle at  [facility].

## 2020-02-01 NOTE — NUR
NURSE NOTES:

Patient stable AOx3. No s/sx of distress. RR even and unlabored on RA. Patient upset that he 
did not get his breakfast tray. Kitchen called. Side rails upx2, call light within reach, 
bed low and locked. Will continue to monitor.

## 2020-02-01 NOTE — NUR
NURSE NOTES:

Spoke with Dr. Barkley regarding discharge. Patient cannot be discharge without clearance from 
Dr. Short. Message left for Dr. Short.

## 2020-02-01 NOTE — CONSULTATION
DATE OF CONSULTATION:

INTERNAL MEDICINE CONSULTATION



CONSULTING PHYSICIAN:  Hayder Hahn M.D.



HISTORY OF PRESENT ILLNESS:  This is a 69-year-old male with a history of

hypertension, diabetes mellitus, seizure disorder, previous CVA, who is a

nursing home resident.  He was sent to the hospital after a breakthrough

seizure.  The patient has been monitored in the hospital for the last 48

hours.  He has remained seizure free after receiving Keppra in the

hospital.  He has also been found to have E. coli sensitive to all

medications except fluoroquinolones.  He has been on Rocephin.  He has no

further leukocytosis or fever.  At this time, he states he is feeling

well.



HOME MEDICATIONS:  Coreg, Lasix, Neurontin, hydralazine, Keppra,

lisinopril, Claritin, Zocor, Aldactone and Flomax.



REVIEW OF SYSTEMS:  Denies any headaches, hematemesis, melena,

hematochezia, weight loss.



SOCIAL HISTORY:  He is a nursing home resident.



PHYSICAL EXAMINATION:

GENERAL:  Reveals a 69-year-old male.

HEENT:  Unremarkable.

LUNGS:  Clear breath sounds bilaterally with normal heart sounds.

ABDOMEN:  Soft.

EXTREMITIES:  There is no edema.



LABORATORY DATA:  Lab testing shows normal CBC and BMP.  Glucose is 198.

Urine culture discussed above.  Imaging studies, x-ray chest is

negative.



IMPRESSION:

1. Seizure disorder, controlled now on Keppra.

2. UTI with E. coli.



DISCUSSION:  The patient is stable and seizure free for the last 2 days.  I

have been asked to consult as an internist due to his medical needs by Dr. Arron Barkley.  The patient is comfortable and afebrile.  I will switch him

to oral Bactrim and discharge back to nursing facility.  He has been

seizure free for 48 hours.









  ______________________________________________

  Hayder Hahn M.D. DR:  PAMELA

D:  02/01/2020 10:22

T:  02/01/2020 20:08

JOB#:  [id number]

CC:

## 2020-02-01 NOTE — GENERAL PROGRESS NOTE
Assessment/Plan


Problem List:  


(1) Sepsis


ICD Codes:  A41.9 - Sepsis, unspecified organism


SNOMED:  22728166


(2) Urinary tract infection


ICD Codes:  N39.0 - Urinary tract infection, site not specified


SNOMED:  83575177


(3) Recurrent seizures


ICD Codes:  G40.909 - Epilepsy, unspecified, not intractable, without status 

epilepticus


SNOMED:  65517407, 51427715


(4) History of CVA with residual deficit


ICD Codes:  I69.30 - Unspecified sequelae of cerebral infarction


SNOMED:  951711321


Status:  stable, progressing


Assessment/Plan:


seizure control pt diet eval cbc bmp am dc if clear





Subjective


Constitutional:  Reports: weakness


Allergies:  


Coded Allergies:  


     No Known Allergies (Unverified , 12/15/14)


All Systems:  reviewed and negative except above


Subjective


sitting calm sleepy





Objective





Last 24 Hour Vital Signs








  Date Time  Temp Pulse Resp B/P (MAP) Pulse Ox O2 Delivery O2 Flow Rate FiO2


 


2/1/20 08:21      Room Air  


 


2/1/20 07:04    118/70    


 


2/1/20 04:00  64      


 


2/1/20 04:00 98.0 60 18 118/70 (86) 97   


 


2/1/20 00:00  60      


 


2/1/20 00:00 98.8 61 18 114/71 (85) 97   


 


1/31/20 21:31    112/65    


 


1/31/20 21:30    112/65    


 


1/31/20 21:30  68  112/65    


 


1/31/20 21:00      Room Air  


 


1/31/20 20:00 97.9 68 18 112/65 (81) 95   


 


1/31/20 20:00  65      


 


1/31/20 16:00 97.5 70 19 122/80 (94) 97   


 


1/31/20 16:00  62      


 


1/31/20 14:00    118/74    


 


1/31/20 12:00  61      


 


1/31/20 12:00 97.1 63 18 118/74 (89) 96   

















Intake and Output  


 


 1/31/20 2/1/20





 19:00 07:00


 


Intake Total 1000 ml 


 


Balance 1000 ml 


 


  


 


Intake Oral 1000 ml 


 


# Voids 9 3


 


# Bowel Movements 1 








Laboratory Tests


2/1/20 06:30: 


White Blood Count 5.3, Red Blood Count 4.37L, Hemoglobin 13.1L, Hematocrit 38.2L

, Mean Corpuscular Volume 87, Mean Corpuscular Hemoglobin 29.9, Mean 

Corpuscular Hemoglobin Concent 34.2, Red Cell Distribution Width 12.4, Platelet 

Count 190, Mean Platelet Volume 7.9, Neutrophils (%) (Auto) 53.3, Lymphocytes (%

) (Auto) 22.7, Monocytes (%) (Auto) 12.9H, Eosinophils (%) (Auto) 9.0H, 

Basophils (%) (Auto) 2.1H, Sodium Level 142, Potassium Level 4.2, Chloride 

Level 105, Carbon Dioxide Level 29, Anion Gap 8, Blood Urea Nitrogen 12, 

Creatinine 1.0, Estimat Glomerular Filtration Rate , Glucose Level 198H, 

Calcium Level 9.3


Height (Feet):  5


Height (Inches):  9.00


Weight (Pounds):  219


General Appearance:  lethargic


EENT:  normal ENT inspection


Neck:  normal alignment


Cardiovascular:  normal peripheral pulses, normal rate, regular rhythm


Respiratory/Chest:  chest wall non-tender, lungs clear, normal breath sounds


Abdomen:  normal bowel sounds, non tender, soft


Extremities:  normal inspection


Edema:  no edema noted Arm (L), no edema noted Arm (R), no edema noted Leg (L), 

no edema noted Leg (R), no edema noted Pedal (L), no edema noted Pedal (R), no 

edema noted Generalized


Neurologic:  motor weakness


Skin:  normal pigmentation, warm/dry











Arron Barkley DO Feb 1, 2020 09:19

## 2020-02-03 NOTE — DISCHARGE SUMMARY
Discharge Summary


Discharge Summary


_


DATE OF ADMISSION: 1/28/2020





DATE OF DISCHARGE: 2/01/2020








DISCHARGED BY: Dr. Barkley





REASON FOR ADMISSION: 


69 years old male with past medical history of hypertension, COPD, CVA with 

right-sided weakness, seizure disorder, was brought by ambulance after multiply 

seizure episodes.


Laboratory work-up revealed no leukocytosis, stable hemoglobin  and hematocrit.

  


Stable electrolytes.  


BUN 14, creatinine 1.3.  


Glucose 161.  


Stable LFT.  


Troponin negative.  


Urinalysis revealed evidence of UTI.  


Chest x-ray demonstrated no definite acute process.  Cardiomegaly.


In emergency department patient received IV Keppra, started  on empiric 

antibiotics and admitted for further management.





CONSULTANTS:


pulmonary Dr. Selma [facility] specialist Dr. Lindsay


 


 


[facility] COURSE: 


Patient admitted to telemetry floor.  


Seizure precautions maintained.  


Patient was  on Keppra.  


Patient was working with physical therapist .


No further seizure activities.  


DVT prophylaxis provided.





Patient started on empiric antibiotic .


Urine culture revealed E. coli. 


Antibiotic continued to complete the course.


Patient remained afebrile no leukocytosis.





Aspiration precaution maintained..


Statin continued.  


Blood pressure was managed with the current regimen and remained stable.  


Renal parameters were closely monitored,  creatinine down to 1.0. 


Patient clinically stabilized and was ready for transfer back to skilled 

nursing facility for continuation of care





FINAL DIAGNOSES: 


Recurrent seizure with seizure breakthrough, multiply episodes


E. coli UTI


History of CVA  x3 with residual R sided deficit 


Acute kidney injury


Hypertension


Diabetes mellitus type 2











DISCHARGE MEDICATIONS:


See Medication Reconciliation list.





DISCHARGE INSTRUCTIONS:


Patient was discharged to the skilled nursing facility.


Follow up with medical doctor at the facility.





I have been assigned to dictate discharge summary for this account. 


I was not involved in the patient's management.











Dorita Duke NP Feb 3, 2020 08:24

## 2020-04-24 ENCOUNTER — HOSPITAL ENCOUNTER (INPATIENT)
Dept: HOSPITAL 72 - EMR | Age: 70
LOS: 22 days | DRG: 871 | End: 2020-05-16
Payer: MEDICARE

## 2020-04-24 VITALS — SYSTOLIC BLOOD PRESSURE: 121 MMHG | DIASTOLIC BLOOD PRESSURE: 70 MMHG

## 2020-04-24 VITALS — DIASTOLIC BLOOD PRESSURE: 71 MMHG | SYSTOLIC BLOOD PRESSURE: 118 MMHG

## 2020-04-24 VITALS — SYSTOLIC BLOOD PRESSURE: 95 MMHG | DIASTOLIC BLOOD PRESSURE: 54 MMHG

## 2020-04-24 VITALS — DIASTOLIC BLOOD PRESSURE: 69 MMHG | SYSTOLIC BLOOD PRESSURE: 121 MMHG

## 2020-04-24 VITALS — DIASTOLIC BLOOD PRESSURE: 64 MMHG | SYSTOLIC BLOOD PRESSURE: 118 MMHG

## 2020-04-24 VITALS — DIASTOLIC BLOOD PRESSURE: 72 MMHG | SYSTOLIC BLOOD PRESSURE: 125 MMHG

## 2020-04-24 VITALS — DIASTOLIC BLOOD PRESSURE: 69 MMHG | SYSTOLIC BLOOD PRESSURE: 117 MMHG

## 2020-04-24 VITALS — DIASTOLIC BLOOD PRESSURE: 69 MMHG | SYSTOLIC BLOOD PRESSURE: 126 MMHG

## 2020-04-24 VITALS — SYSTOLIC BLOOD PRESSURE: 121 MMHG | DIASTOLIC BLOOD PRESSURE: 72 MMHG

## 2020-04-24 VITALS — DIASTOLIC BLOOD PRESSURE: 68 MMHG | SYSTOLIC BLOOD PRESSURE: 122 MMHG

## 2020-04-24 VITALS — SYSTOLIC BLOOD PRESSURE: 126 MMHG | DIASTOLIC BLOOD PRESSURE: 70 MMHG

## 2020-04-24 VITALS — BODY MASS INDEX: 26.52 KG/M2 | WEIGHT: 175 LBS | HEIGHT: 68 IN

## 2020-04-24 VITALS — DIASTOLIC BLOOD PRESSURE: 77 MMHG | SYSTOLIC BLOOD PRESSURE: 123 MMHG

## 2020-04-24 VITALS — DIASTOLIC BLOOD PRESSURE: 89 MMHG | SYSTOLIC BLOOD PRESSURE: 136 MMHG

## 2020-04-24 VITALS — SYSTOLIC BLOOD PRESSURE: 80 MMHG | DIASTOLIC BLOOD PRESSURE: 56 MMHG

## 2020-04-24 VITALS — DIASTOLIC BLOOD PRESSURE: 62 MMHG | SYSTOLIC BLOOD PRESSURE: 119 MMHG

## 2020-04-24 VITALS — SYSTOLIC BLOOD PRESSURE: 108 MMHG | DIASTOLIC BLOOD PRESSURE: 63 MMHG

## 2020-04-24 VITALS — SYSTOLIC BLOOD PRESSURE: 126 MMHG | DIASTOLIC BLOOD PRESSURE: 67 MMHG

## 2020-04-24 VITALS — DIASTOLIC BLOOD PRESSURE: 65 MMHG | SYSTOLIC BLOOD PRESSURE: 121 MMHG

## 2020-04-24 VITALS — SYSTOLIC BLOOD PRESSURE: 115 MMHG | DIASTOLIC BLOOD PRESSURE: 63 MMHG

## 2020-04-24 VITALS — SYSTOLIC BLOOD PRESSURE: 119 MMHG | DIASTOLIC BLOOD PRESSURE: 65 MMHG

## 2020-04-24 VITALS — SYSTOLIC BLOOD PRESSURE: 125 MMHG | DIASTOLIC BLOOD PRESSURE: 68 MMHG

## 2020-04-24 VITALS — DIASTOLIC BLOOD PRESSURE: 69 MMHG | SYSTOLIC BLOOD PRESSURE: 123 MMHG

## 2020-04-24 DIAGNOSIS — Z95.810: ICD-10-CM

## 2020-04-24 DIAGNOSIS — J12.9: ICD-10-CM

## 2020-04-24 DIAGNOSIS — F33.3: ICD-10-CM

## 2020-04-24 DIAGNOSIS — I69.951: ICD-10-CM

## 2020-04-24 DIAGNOSIS — I50.9: ICD-10-CM

## 2020-04-24 DIAGNOSIS — E87.0: ICD-10-CM

## 2020-04-24 DIAGNOSIS — G40.909: ICD-10-CM

## 2020-04-24 DIAGNOSIS — E11.9: ICD-10-CM

## 2020-04-24 DIAGNOSIS — I11.0: ICD-10-CM

## 2020-04-24 DIAGNOSIS — R65.21: ICD-10-CM

## 2020-04-24 DIAGNOSIS — J96.01: ICD-10-CM

## 2020-04-24 DIAGNOSIS — N17.9: ICD-10-CM

## 2020-04-24 DIAGNOSIS — U07.1: ICD-10-CM

## 2020-04-24 DIAGNOSIS — J44.0: ICD-10-CM

## 2020-04-24 DIAGNOSIS — N39.0: ICD-10-CM

## 2020-04-24 DIAGNOSIS — I42.9: ICD-10-CM

## 2020-04-24 DIAGNOSIS — A41.89: Primary | ICD-10-CM

## 2020-04-24 DIAGNOSIS — E87.6: ICD-10-CM

## 2020-04-24 DIAGNOSIS — I69.920: ICD-10-CM

## 2020-04-24 DIAGNOSIS — F32.3: ICD-10-CM

## 2020-04-24 LAB
ADD MANUAL DIFF: NO
ALBUMIN SERPL-MCNC: 3.3 G/DL (ref 3.4–5)
ALBUMIN/GLOB SERPL: 0.7 {RATIO} (ref 1–2.7)
ALP SERPL-CCNC: 51 U/L (ref 46–116)
ALT SERPL-CCNC: 32 U/L (ref 12–78)
ANION GAP SERPL CALC-SCNC: 12 MMOL/L (ref 5–15)
APPEARANCE UR: (no result)
APTT BLD: 34 SEC (ref 23–33)
APTT PPP: YELLOW S
AST SERPL-CCNC: 27 U/L (ref 15–37)
BASOPHILS NFR BLD AUTO: 0.6 % (ref 0–2)
BILIRUB SERPL-MCNC: 0.3 MG/DL (ref 0.2–1)
BUN SERPL-MCNC: 71 MG/DL (ref 7–18)
CALCIUM SERPL-MCNC: 8.4 MG/DL (ref 8.5–10.1)
CHLORIDE SERPL-SCNC: 105 MMOL/L (ref 98–107)
CK MB SERPL-MCNC: 0.8 NG/ML (ref 0–3.6)
CK SERPL-CCNC: 423 U/L (ref 26–308)
CO2 SERPL-SCNC: 25 MMOL/L (ref 21–32)
CREAT SERPL-MCNC: 2.4 MG/DL (ref 0.55–1.3)
EOSINOPHIL NFR BLD AUTO: 0.1 % (ref 0–3)
ERYTHROCYTE [DISTWIDTH] IN BLOOD BY AUTOMATED COUNT: 11.1 % (ref 11.6–14.8)
GLOBULIN SER-MCNC: 4.6 G/DL
GLUCOSE UR STRIP-MCNC: NEGATIVE MG/DL
HCT VFR BLD CALC: 34.4 % (ref 42–52)
HGB BLD-MCNC: 11.6 G/DL (ref 14.2–18)
INR PPP: 1.1 (ref 0.9–1.1)
KETONES UR QL STRIP: NEGATIVE
LEUKOCYTE ESTERASE UR QL STRIP: NEGATIVE
LYMPHOCYTES NFR BLD AUTO: 9.1 % (ref 20–45)
MCV RBC AUTO: 86 FL (ref 80–99)
MONOCYTES NFR BLD AUTO: 6.5 % (ref 1–10)
NEUTROPHILS NFR BLD AUTO: 83.7 % (ref 45–75)
NITRITE UR QL STRIP: NEGATIVE
PH UR STRIP: 5 [PH] (ref 4.5–8)
PHOSPHATE SERPL-MCNC: 4.3 MG/DL (ref 2.5–4.9)
PLATELET # BLD: 149 K/UL (ref 150–450)
POTASSIUM SERPL-SCNC: 4.5 MMOL/L (ref 3.5–5.1)
PROT UR QL STRIP: (no result)
RBC # BLD AUTO: 3.99 M/UL (ref 4.7–6.1)
SODIUM SERPL-SCNC: 142 MMOL/L (ref 136–145)
SP GR UR STRIP: 1.02 (ref 1–1.03)
UROBILINOGEN UR-MCNC: NORMAL MG/DL (ref 0–1)
WBC # BLD AUTO: 5.7 K/UL (ref 4.8–10.8)

## 2020-04-24 PROCEDURE — 80076 HEPATIC FUNCTION PANEL: CPT

## 2020-04-24 PROCEDURE — 84481 FREE ASSAY (FT-3): CPT

## 2020-04-24 PROCEDURE — 83615 LACTATE (LD) (LDH) ENZYME: CPT

## 2020-04-24 PROCEDURE — 06HM33Z INSERTION OF INFUSION DEVICE INTO RIGHT FEMORAL VEIN, PERCUTANEOUS APPROACH: ICD-10-PCS

## 2020-04-24 PROCEDURE — 82553 CREATINE MB FRACTION: CPT

## 2020-04-24 PROCEDURE — 80202 ASSAY OF VANCOMYCIN: CPT

## 2020-04-24 PROCEDURE — 97803 MED NUTRITION INDIV SUBSEQ: CPT

## 2020-04-24 PROCEDURE — 94664 DEMO&/EVAL PT USE INHALER: CPT

## 2020-04-24 PROCEDURE — 83605 ASSAY OF LACTIC ACID: CPT

## 2020-04-24 PROCEDURE — 85730 THROMBOPLASTIN TIME PARTIAL: CPT

## 2020-04-24 PROCEDURE — 36569 INSJ PICC 5 YR+ W/O IMAGING: CPT

## 2020-04-24 PROCEDURE — 85610 PROTHROMBIN TIME: CPT

## 2020-04-24 PROCEDURE — 93306 TTE W/DOPPLER COMPLETE: CPT

## 2020-04-24 PROCEDURE — 82728 ASSAY OF FERRITIN: CPT

## 2020-04-24 PROCEDURE — 82550 ASSAY OF CK (CPK): CPT

## 2020-04-24 PROCEDURE — 85025 COMPLETE CBC W/AUTO DIFF WBC: CPT

## 2020-04-24 PROCEDURE — 96367 TX/PROPH/DG ADDL SEQ IV INF: CPT

## 2020-04-24 PROCEDURE — 85651 RBC SED RATE NONAUTOMATED: CPT

## 2020-04-24 PROCEDURE — 80048 BASIC METABOLIC PNL TOTAL CA: CPT

## 2020-04-24 PROCEDURE — 76937 US GUIDE VASCULAR ACCESS: CPT

## 2020-04-24 PROCEDURE — 36600 WITHDRAWAL OF ARTERIAL BLOOD: CPT

## 2020-04-24 PROCEDURE — 82803 BLOOD GASES ANY COMBINATION: CPT

## 2020-04-24 PROCEDURE — 96365 THER/PROPH/DIAG IV INF INIT: CPT

## 2020-04-24 PROCEDURE — 83690 ASSAY OF LIPASE: CPT

## 2020-04-24 PROCEDURE — 99291 CRITICAL CARE FIRST HOUR: CPT

## 2020-04-24 PROCEDURE — 87040 BLOOD CULTURE FOR BACTERIA: CPT

## 2020-04-24 PROCEDURE — 36415 COLL VENOUS BLD VENIPUNCTURE: CPT

## 2020-04-24 PROCEDURE — 74018 RADEX ABDOMEN 1 VIEW: CPT

## 2020-04-24 PROCEDURE — 83036 HEMOGLOBIN GLYCOSYLATED A1C: CPT

## 2020-04-24 PROCEDURE — 83880 ASSAY OF NATRIURETIC PEPTIDE: CPT

## 2020-04-24 PROCEDURE — 81003 URINALYSIS AUTO W/O SCOPE: CPT

## 2020-04-24 PROCEDURE — 84443 ASSAY THYROID STIM HORMONE: CPT

## 2020-04-24 PROCEDURE — 84484 ASSAY OF TROPONIN QUANT: CPT

## 2020-04-24 PROCEDURE — 87081 CULTURE SCREEN ONLY: CPT

## 2020-04-24 PROCEDURE — 82977 ASSAY OF GGT: CPT

## 2020-04-24 PROCEDURE — 87086 URINE CULTURE/COLONY COUNT: CPT

## 2020-04-24 PROCEDURE — 85007 BL SMEAR W/DIFF WBC COUNT: CPT

## 2020-04-24 PROCEDURE — 93005 ELECTROCARDIOGRAM TRACING: CPT

## 2020-04-24 PROCEDURE — 94660 CPAP INITIATION&MGMT: CPT

## 2020-04-24 PROCEDURE — 85384 FIBRINOGEN ACTIVITY: CPT

## 2020-04-24 PROCEDURE — 82962 GLUCOSE BLOOD TEST: CPT

## 2020-04-24 PROCEDURE — 84550 ASSAY OF BLOOD/URIC ACID: CPT

## 2020-04-24 PROCEDURE — 87635 SARS-COV-2 COVID-19 AMP PRB: CPT

## 2020-04-24 PROCEDURE — 86140 C-REACTIVE PROTEIN: CPT

## 2020-04-24 PROCEDURE — 84100 ASSAY OF PHOSPHORUS: CPT

## 2020-04-24 PROCEDURE — 71045 X-RAY EXAM CHEST 1 VIEW: CPT

## 2020-04-24 PROCEDURE — 84439 ASSAY OF FREE THYROXINE: CPT

## 2020-04-24 PROCEDURE — 85379 FIBRIN DEGRADATION QUANT: CPT

## 2020-04-24 PROCEDURE — 83735 ASSAY OF MAGNESIUM: CPT

## 2020-04-24 PROCEDURE — 80053 COMPREHEN METABOLIC PANEL: CPT

## 2020-04-24 PROCEDURE — 92950 HEART/LUNG RESUSCITATION CPR: CPT

## 2020-04-24 PROCEDURE — 80299 QUANTITATIVE ASSAY DRUG: CPT

## 2020-04-24 NOTE — NUR
ED Nurse Note:



stop titrating Levofed at this point. Patient's /63. Per Dr. Powell 
keep MAP at 70.

## 2020-04-24 NOTE — NUR
ED Nurse Note:

Patient is resting with no physical s/s of acute distress vital signs stable 
and documented.

## 2020-04-24 NOTE — NUR
ED Nurse Note:

Patietn is resting comfortably with no physical s/s of acute distress. vital 
signs are within normal range. Will continue to monitor.

## 2020-04-24 NOTE — NUR
ED Nurse Note:

Called and rendered report to [facility]. Receving nurse will call back when room 
is ready for patient arrival.

## 2020-04-24 NOTE — CONSULTATION
DATE OF CONSULTATION:  04/24/2020

CARDIAC ELECTROPHYSIOLOGY CONSULTATION



CONSULTING PHYSICIAN:  Tommy Otero MD.



REFERRING PHYSICIAN:  Arron Barkley DO.



REASON FOR CONSULTATION:  Management of congestive heart failure and

evaluation of the patient's defibrillator and management of shock.



HISTORY OF PRESENT ILLNESS:  The patient is a 69-year-old gentleman with

history of hypertension, history of severe cardiomyopathy status post

right-sided dual-chamber defibrillator implantation as well as history of

CVA with right-sided weakness and seizure disorder, who is a resident of

[facility].  The patient was brought to the

emergency room for hypotension, altered mental status.  Apparently at the

patient's nursing home, there were other patient's with COVID.  At time of

my evaluation, the patient is in COVID isolation.  The patient also was

hypotensive and started on Levophed.  At the time of my evaluation, the

patient is unable to provide meaningful information.



REVIEW OF SYSTEMS:  Cannot be obtained.



PAST MEDICAL HISTORY:  As mentioned above.



FAMILY HISTORY:  Noncontributory.



MEDICATIONS:  Include Coreg, Lasix, hydralazine, lisinopril, Zocor,

Aldactone, Flomax.



PHYSICAL EXAMINATION:

VITAL SIGNS:  Show blood pressure of 136/89, pulse 72, respirations 16,

temperature 97.2.  His initial blood pressure was 84/54.

HEAD AND NECK:  Shows positive JVD.

LUNGS:  Decreased breath sounds.

CARDIOVASCULAR:  Regular S1 and S2 with no gallop.

ABDOMEN:  Soft.

EXTREMITIES:  A 1+ pitting edema.  Defibrillator is in right subclavian.



LABORATORY AND DIAGNOSTIC DATA:  His labs show white count of 5.7,

hemoglobin 11.6, hematocrit 34.4, platelet count 149.  Sodium 142,

potassium 4.5, BUN of 71, creatinine 2.4, and glucose of 229.  His first

troponin is negative.



ASSESSMENT AND PLAN:

1. Hypotension could be due to cardiomyopathy versus sepsis.  His BNP is

736.  The patient was already evaluated by Dr. Lindsay and started on IV

antibiotics.  The patient is already on Levophed.  We will get an

echocardiogram for further evaluation.

2. History of cardiomyopathy.  Because of hypotension, we will

discontinue Coreg, hydralazine, lisinopril, and Aldactone until blood

pressure more stabilized.

3. Status post right-sided ICD.  The brand is not clear at this point.

We will try to interrogate the ICD after we find the device.

4. History of hypertension, currently hypotensive.

5. History of CVA with residual weakness.

6. Rule out COVID-19 in a patient in the nursing home with COVID-19

residents.



Thank you very much, Dr. Barkley, for allowing me to participate in the

care of this patient.  Please do not hesitate to contact me if you have

any questions regarding my evaluation.  The case was discussed with the

emergency room physician as well.









  ______________________________________________

  Tommy Otero M.D.





DR:  Xander

D:  04/24/2020 15:27

T:  04/24/2020 22:55

JOB#:  [id number]

CC:

## 2020-04-24 NOTE — NUR
Spoke with -states keep patient NPO- he will give ICU orders to ICU 
nurse when patient gets to ICU.

ICU nurse should call him for orders.

-------------------------------------------------------------------------------

Addendum: 04/24/20 at 2028 by ALEXIA

-------------------------------------------------------------------------------

Raul Paiz-charge nurse spoke with -jeannette Rbaago.

## 2020-04-24 NOTE — EMERGENCY ROOM REPORT
History of Present Illness


General


Chief Complaint:  Altered Mental Status


Source:  Medical Record





Present Illness


HPI


69-year-old male history of hypertension, cardiomyopathy AICD presents with 

hypotension, altered mental status no known aggravating relieving factors 

severity is severe, constant started today patient was more altered, patient 

presents from [facility] via EMS


Allergies:  


Coded Allergies:  


     No Known Allergies (Unverified , 12/15/14)





COVID-19 Screening


Contact w/high risk pt:  Yes


Recent Travel to affected area:  No


Experienced COVID-19 symptoms?:  No





Patient History


Limited by:  medical condition - Currently altered


Past Medical History:  see triage record


Reviewed Nursing Documentation:  PMH: Agreed; PSxH: Agreed





Nursing Documentation-PMH


Hx Cardiac Problems:  Yes


Hx Hypertension:  Yes


Hx COPD:  No


Hx Diabetes:  No -


Hx Cancer:  No


Hx Gastrointestinal Problems:  No


Hx Cerebrovascular Accident:  Yes - X 3


Hx Seizures:  Yes


Hx Weakness:  Yes - RT SIDED WEAKNESS





Review of Systems


All Other Systems:  limited - Currently altered





Physical Exam





Vital Signs








  Date Time  Temp Pulse Resp B/P (MAP) Pulse Ox O2 Delivery O2 Flow Rate FiO2


 


4/24/20 09:50 97.2 78 16 80/56 (64) 94 Nasal Cannula 2.0 








Sp02 EP Interpretation:  reviewed, normal


General Appearance:  alert, moderate distress


Head:  normocephalic, atraumatic


Eyes:  bilateral eye PERRL, bilateral eye EOMI


ENT:  uvula midline, dry mucus membranes


Neck:  supple, thyroid normal, supple/symm/no masses


Respiratory:  no respiratory distress, no retraction, no accessory muscle use


Cardiovascular #1:  normal peripheral pulses, regular rate, rhythm, no edema, 

no gallop


Gastrointestinal:  non tender, soft, no guarding, no rebound


Musculoskeletal:  normal inspection


Neurologic:  alert, responsive


Skin:  no rash





Procedures


Critical Care Time


Critical Care Time


Given the critical condition in which the patient arrived, the patient was 

immediately assessed by myself and the nurse, and cardiac monitoring initiated 

due to the potential for rapid decompensation of the patient's clinical 

condition. During the course of the patient's stay, I spent a considerable 

amount of time at the bedside performing serial re-evaluations of the patient's 

hemodynamic and clinical status because of the recognized potential threat to 

life or limb in this condition. I then had a chance to review not only all of 

the available current laboratory and radiographic studies obtained today, but I 

also reviewed old records available to me at the time. Additionally, any 

ancillary information available including paramedic records were reviewed. 

Sequential vital signs were obtained. 





Critical Care time of 43 minutes was performed exclusive of billable procedures.





Central Line


Central Line :  


   Consent:  Emergent


   Central Line Lumen:  triple


   Maximal Sterile Barrier Tech:  yes cap, yes mask, yes sterile gown, yes 

sterile gloves, yes large sterile sheet, yes hand hygiene, yes chlorhexidine 

prep


   Central Line Postion:  femoral (R)


   Anesthesia:  Lidocaine


   cc's of anesthesia:  5


   Complications:  none


   Central Line Post Position:  sutured, good blood return


   Attempts:  One


   Patient Tolerated:  Well


   Complications:  None





Medical Decision Making


Diagnostic Impression:  


 Primary Impression:  


 Altered mental status


 Qualified Codes:  R41.82 - Altered mental status, unspecified


 Additional Impressions:  


 Hypotension


 Qualified Codes:  I95.9 - Hypotension, unspecified


 Sepsis


 Qualified Codes:  A41.9 - Sepsis, unspecified organism


ER Course


69-year-old male presents with altered mental status, hypotension, concern for 

possible sepsis versus COVID rule out,


Patient required fluid resuscitation patient received 500 cc from EMS, we 

resuscitated with another 500 cc of fluid normal saline we are giving judicious 

with the fluid resuscitation due to the fact that patient has cardiomyopathy 

AICD


Patient given cefepime, vancomycin, Swenson was inserted additionally a right 

femoral central line was inserted


Patient admitted to Dr. Arron Henning patient required multiple re-evaluations


Patient's norepinephrine was titrated to a goal of MAP 65


Patient admitted to ICU will board in the emergency room until a bed opens up





Laboratory Tests








Test


  4/24/20


10:05 4/24/20


10:14 4/24/20


11:21


 


Arterial Blood pH


  7.357


(7.350-7.450) 


  


 


 


Arterial Blood Partial


Pressure CO2 37.9 mmHg


(35.0-45.0) 


  


 


 


Arterial Blood Partial


Pressure O2 118.7 mmHg


(75.0-100.0)  H 


  


 


 


Arterial Blood HCO3


  20.8 mmol/L


(22.0-26.0)  L 


  


 


 


Arterial Blood Oxygen


Saturation 97.7 %


() 


  


 


 


Arterial Blood Base Excess -4.2 (-2-2)  L  


 


Wong Test Positive    


 


White Blood Count


  


  5.7 K/UL


(4.8-10.8) 


 


 


Red Blood Count


  


  3.99 M/UL


(4.70-6.10)  L 


 


 


Hemoglobin


  


  11.6 G/DL


(14.2-18.0)  L 


 


 


Hematocrit


  


  34.4 %


(42.0-52.0)  L 


 


 


Mean Corpuscular Volume  86 FL (80-99)   


 


Mean Corpuscular Hemoglobin


  


  29.1 PG


(27.0-31.0) 


 


 


Mean Corpuscular Hemoglobin


Concent 


  33.7 G/DL


(32.0-36.0) 


 


 


Red Cell Distribution Width


  


  11.1 %


(11.6-14.8)  L 


 


 


Platelet Count


  


  149 K/UL


(150-450)  L 


 


 


Mean Platelet Volume


  


  6.5 FL


(6.5-10.1) 


 


 


Neutrophils (%) (Auto)


  


  83.7 %


(45.0-75.0)  H 


 


 


Lymphocytes (%) (Auto)


  


  9.1 %


(20.0-45.0)  L 


 


 


Monocytes (%) (Auto)


  


  6.5 %


(1.0-10.0) 


 


 


Eosinophils (%) (Auto)


  


  0.1 %


(0.0-3.0) 


 


 


Basophils (%) (Auto)


  


  0.6 %


(0.0-2.0) 


 


 


Prothrombin Time


  


  11.2 SEC


(9.30-11.50) 


 


 


Prothrombin Time INR  1.1 (0.9-1.1)   


 


Activated Partial


Thromboplast Time 


  34 SEC (23-33)


H 


 


 


Sodium Level


  


  142 MMOL/L


(136-145) 


 


 


Potassium Level


  


  4.5 MMOL/L


(3.5-5.1) 


 


 


Chloride Level


  


  105 MMOL/L


() 


 


 


Carbon Dioxide Level


  


  25 MMOL/L


(21-32) 


 


 


Anion Gap


  


  12 mmol/L


(5-15) 


 


 


Blood Urea Nitrogen


  


  71 mg/dL


(7-18)  H 


 


 


Creatinine


  


  2.4 MG/DL


(0.55-1.30)  H 


 


 


Estimated Glomerular


Filtration Rate 


  32.5 mL/min


(>60) 


 


 


Glucose Level


  


  229 MG/DL


()  H 


 


 


Lactic Acid Level


  


  1.30 mmol/L


(0.4-2.0) 


 


 


Calcium Level


  


  8.4 MG/DL


(8.5-10.1)  L 


 


 


Phosphorus Level


  


  4.3 MG/DL


(2.5-4.9) 


 


 


Magnesium Level


  


  2.4 MG/DL


(1.8-2.4) 


 


 


Total Bilirubin


  


  0.3 MG/DL


(0.2-1.0) 


 


 


Aspartate Amino Transferase


(AST) 


  27 U/L (15-37)


  


 


 


Alanine Aminotransferase (ALT)


  


  32 U/L (12-78)


  


 


 


Alkaline Phosphatase


  


  51 U/L


() 


 


 


Total Creatine Kinase


  


  423 U/L


()  H 


 


 


Creatine Kinase MB


  


  0.8 NG/ML


(0.0-3.6) 


 


 


Creatine Kinase MB Relative


Index 


  0.1  


  


 


 


Troponin I


  


  0.003 ng/mL


(0.000-0.056) 


 


 


Pro-B-Type Natriuretic Peptide


  


  736 pg/mL


(0-125)  H 


 


 


Total Protein


  


  7.9 G/DL


(6.4-8.2) 


 


 


Albumin


  


  3.3 G/DL


(3.4-5.0)  L 


 


 


Globulin  4.6 g/dL   


 


Albumin/Globulin Ratio


  


  0.7 (1.0-2.7)


L 


 


 


Lipase


  


  217 U/L


() 


 


 


Thyroid Stimulating Hormone


(TSH) 


  0.184 uiU/mL


(0.358-3.740) 


 


 


Free Thyroxine


  


  0.97 NG/DL


(0.76-1.46) 


 


 


Free Triiodothyronine


  


  1.7 pg/mL


(2.3-4.2)  L 


 


 


Urine Color   Yellow  


 


Urine Appearance


  


  


  Slightly


cloudy


 


Urine pH   5 (4.5-8.0)  


 


Urine Specific Gravity


  


  


  1.020


(1.005-1.035)


 


Urine Protein


  


  


  2+ (NEGATIVE)


H


 


Urine Glucose (UA)


  


  


  Negative


(NEGATIVE)


 


Urine Ketones


  


  


  Negative


(NEGATIVE)


 


Urine Blood


  


  


  Negative


(NEGATIVE)


 


Urine Nitrite


  


  


  Negative


(NEGATIVE)


 


Urine Bilirubin


  


  


  Negative


(NEGATIVE)


 


Urine Urobilinogen


  


  


  Normal MG/DL


(0.0-1.0)


 


Urine Leukocyte Esterase


  


  


  Negative


(NEGATIVE)


 


Urine RBC


  


  


  0-2 /HPF (0 -


0)  H


 


Urine WBC


  


  


  0-2 /HPF (0 -


0)


 


Urine Squamous Epithelial


Cells 


  


  Many /LPF


(NONE/OCC)  H


 


Urine Bacteria


  


  


  Few /HPF


(NONE)








EKG Diagnostic Results


EKG Time:  09:55


EP Interpretation:  NSR, rate 78, QTc 433, no acute ST elevations, left axis 

deviation





Rhythm Strip Diag. Results


Rhythm Strip Time:  10:58


EP Interpretation:  yes


Rate:  72


Rhythm:  NSR, no PVC's





Chest X-Ray Diagnostic Results


Chest X-Ray Diagnostic Results :  


   Chest X-Ray Ordered:  Yes


   # of Views/Limited/Complete:  1 View


   Indication:  Shortness of Breath


   EP Interpretation:  Yes


   Interpretation:  other - Consolidation left lung versus atelectasis


   Impression:  Other - Possible pneumonia left lower lobe


   Electronically Signed by:  Torey Powell MD





Last Vital Signs








  Date Time  Temp Pulse Resp B/P (MAP) Pulse Ox O2 Delivery O2 Flow Rate FiO2


 


4/24/20 09:50 97.2 78 16 80/56 (64) 94 Nasal Cannula 2.0 








Disposition:  ADMITTED AS INPATIENT


Condition:  Critical


Referrals:  


Arron Barkley DO (PCP)





Focused Exam


Allergies:  


Coded Allergies:  


     No Known Allergies (Unverified , 12/15/14)


Date Exam Occurred:  Apr 24, 2020


Time Exam Occurred:  11:02


Laboratory Studies





Laboratory Tests








Test


  4/24/20


10:05 4/24/20


10:14


 


Arterial Blood pH


  7.357


(7.350-7.450) 


 


 


Arterial Blood Partial


Pressure CO2 37.9 mmHg


(35.0-45.0) 


 


 


Arterial Blood Partial


Pressure O2 118.7 mmHg


(75.0-100.0)  H 


 


 


Arterial Blood HCO3


  20.8 mmol/L


(22.0-26.0)  L 


 


 


Arterial Blood Oxygen


Saturation 97.7 %


() 


 


 


Arterial Blood Base Excess -4.2 (-2-2)  L 


 


Wong Test Positive   


 


White Blood Count


  


  5.7 K/UL


(4.8-10.8)


 


Red Blood Count


  


  3.99 M/UL


(4.70-6.10)  L


 


Hemoglobin


  


  11.6 G/DL


(14.2-18.0)  L


 


Hematocrit


  


  34.4 %


(42.0-52.0)  L


 


Mean Corpuscular Volume  86 FL (80-99)  


 


Mean Corpuscular Hemoglobin


  


  29.1 PG


(27.0-31.0)


 


Mean Corpuscular Hemoglobin


Concent 


  33.7 G/DL


(32.0-36.0)


 


Red Cell Distribution Width


  


  11.1 %


(11.6-14.8)  L


 


Platelet Count


  


  149 K/UL


(150-450)  L


 


Mean Platelet Volume


  


  6.5 FL


(6.5-10.1)


 


Neutrophils (%) (Auto)


  


  83.7 %


(45.0-75.0)  H


 


Lymphocytes (%) (Auto)


  


  9.1 %


(20.0-45.0)  L


 


Monocytes (%) (Auto)


  


  6.5 %


(1.0-10.0)


 


Eosinophils (%) (Auto)


  


  0.1 %


(0.0-3.0)


 


Basophils (%) (Auto)


  


  0.6 %


(0.0-2.0)


 


Prothrombin Time


  


  11.2 SEC


(9.30-11.50)


 


Prothromb Time International


Ratio 


  1.1 (0.9-1.1)  


 


 


Activated Partial


Thromboplast Time 


  34 SEC (23-33)


H


 


Sodium Level


  


  142 MMOL/L


(136-145)


 


Potassium Level


  


  4.5 MMOL/L


(3.5-5.1)


 


Chloride Level


  


  105 MMOL/L


()


 


Carbon Dioxide Level


  


  25 MMOL/L


(21-32)


 


Anion Gap


  


  12 mmol/L


(5-15)


 


Blood Urea Nitrogen


  


  71 mg/dL


(7-18)  H


 


Creatinine


  


  2.4 MG/DL


(0.55-1.30)  H


 


Estimat Glomerular Filtration


Rate 


  32.5 mL/min


(>60)


 


Glucose Level


  


  229 MG/DL


()  H


 


Lactic Acid Level  Pending  


 


Calcium Level


  


  8.4 MG/DL


(8.5-10.1)  L


 


Phosphorus Level  Pending  


 


Magnesium Level  Pending  


 


Total Bilirubin  Pending  


 


Aspartate Amino Transf


(AST/SGOT) 


  Pending  


 


 


Alanine Aminotransferase


(ALT/SGPT) 


  Pending  


 


 


Alkaline Phosphatase  Pending  


 


Total Creatine Kinase  Pending  


 


Creatine Kinase MB  Pending  


 


Troponin I


  


  0.003 ng/mL


(0.000-0.056)


 


Pro-B-Type Natriuretic Peptide  Pending  


 


Total Protein  Pending  


 


Albumin  Pending  


 


Globulin  Pending  


 


Lipase  Pending  


 


Thyroid Stimulating Hormone


(TSH) 


  Pending  


 


 


Free Thyroxine  Pending  


 


Free Triiodothyronine  Pending  








Vital Signs





Last 24 Hour Vital Signs








  Date Time  Temp Pulse Resp B/P (MAP) Pulse Ox O2 Delivery O2 Flow Rate FiO2


 


4/24/20 10:50    92/55    


 


4/24/20 09:50 97.2 78 16 80/56 (64) 94 Nasal Cannula 2.0 








Respiratory Exam:  CTA Bilaterally


Cardiovascular Exam:  S1, S2


Capillary Refill:  Less Than 2 Seconds


Peripheral Pulse:  Strong


Pulse Location:  Radial


Skin Exam:  Normal Torey Hewitt MD Apr 24, 2020 10:43

## 2020-04-24 NOTE — DIAGNOSTIC IMAGING REPORT
Indication: Shortness of breath

 

Technique: One view of the chest

 

Comparison: 1/28/2020

 

Findings: Atelectasis and/or infiltrate is seen at the left lung base. The remaining

lungs and pleural spaces are clear. There is a right chest AICD again demonstrated.

The heart is borderline enlarged. The aorta is tortuous ectatic and calcified.

Degenerative changes of the right shoulder are noted.

 

Impression: Left basilar atelectasis and/or infiltrate

 

Other findings as noted

## 2020-04-24 NOTE — NUR
NURSE NOTES:

Pt admitted from ER. Received report from DIMA Gaitan. Pt is restring on the bed and awake 
and alert and forgetful. On O2 1L via nasal cannula and SaO2 98% noted. Iv site intact and 
no sign of infiltration noted. Pt has Rt. femoral TLC and dressing is clean and dry. On 
running with Levophed drip 8mcg/min. Denied pain at this time. Pt has Swenson cath and patent 
and drainage well. Applied cardiac monitor and noted SR. Pt has Rt chest AICD. Pt has upper 
and lower denture and 34$ cash but Pt refused save money to safe. Explained benefits and 
risks. Given admission instruction and smoking cessation. On droplet precaution for PUI 
COVID-19. Noted old scar on sacral area. Placed fall and seizure precaution. Left message to 
Dr. Hernandez for admission order and awating call back. Will continue to care plan.

## 2020-04-24 NOTE — NUR
HAND-OFF: 

Report given to DIMA Gaitan. Patient is in the bed,n with eyes close, VSS at 
thuis time, Levofed running at rate 8 mcg/min

## 2020-04-24 NOTE — CONSULTATION
History of Present Illness


General


Date patient seen:  Apr 24, 2020


Chief Complaint:  Altered Mental Status





Present Illness


HPI








68 y/o M with hx of HTN, cadiomyopathy s/p AICD, CVA x3 w/ residual R side 

weakness, seizure disorder, NH resident ([facility]) presented 

to ED on 4/24 with hypotension, altered mental status


Allergies:  


Coded Allergies:  


     No Known Allergies (Unverified , 12/15/14)





Medication History


Scheduled


Carvedilol (Coreg), 25 MG ORAL EVERY 12 HOURS, (Reported)


Cranberry Fruit Concentrate (Cranberry), 450 MG PO DAILY, (Reported)


Docusate Sodium* (Docusate Sodium*), 100 MG ORAL DAILY, (Reported)


Furosemide* (Lasix*), 20 MG ORAL DAILY, (Reported)


Gabapentin* (Neurontin*), 300 MG ORAL EVERY 8 HOURS, (Reported)


Hydralazine Hcl* (Hydralazine Hcl*), 100 MG ORAL EVERY 8 HOURS, (Reported)


Levetiracetam (Keppra), 1,000 MG ORAL EVERY 12 HOURS, (Reported)


Lisinopril (Lisinopril*), 20 MG ORAL BID, (Reported)


Loratadine (Claritin), 10 MG ORAL DAILY, (Reported)


Multivitamin With Minerals (Multivitamins With Minerals*), 1 TAB ORAL DAILY, (

Reported)


Simvastatin (Zocor), 20 MG ORAL BEDTIME, (Reported)


Spironolactone* (Aldactone*), 25 MG ORAL DAILY, (Reported)


Tamsulosin HCl (Flomax), 0.4 MG ORAL DAILY, (Reported)





Scheduled PRN


Acetaminophen (Tylenol), 650 MG ORAL Q4HR PRN for Fever/Headache/Mild Pain, (

Reported)


Bisacodyl (Dulcolax), 10 MG RC AS NEEDED PRN for Constipation, (Reported)


Dextran 70/Hypromellose (Artificial Tears Eye Drops*), 1 DROP BOTH EYES TID PRN 

for Dry Eyes, (Reported)


Leuprolide Acetate (Lupron Depot), 7.5 MG IM for 15th every month, (Reported)


Mag Hydrox/Al Hydrox/Simeth (Maalox Maximum Strength Susp), 30 ML PO Q4HR PRN 

for Per rx protocol, (Reported)


Magnesium Hydroxide* (Milk Of Magnesia*), 30 ML ORAL QHS PRN for Constipation, (

Reported)


Nitroglycerin (Nitroglycerin), 0.4 MG SL PRN PRN for Prn Chest Pain, (Reported)


Sennosides (Senna), 17.2 MG PO BEDTIME PRN for Constipation, (Reported)





Patient History


Healthcare decision maker





Resuscitation status





Advanced Directive on File





Patient History Narrative





Pmhx: as above





Shx: reviewed





Fhx: non contributory





Physical Exam


Physical Exam Narrative


General Appearance:  alert, moderate distress


Head:  normocephalic, atraumatic


Eyes:  bilateral eye PERRL, bilateral eye EOMI


ENT:  uvula midline, dry mucus membranes


Neck:  supple, thyroid normal, supple/symm/no masses


Respiratory:  no respiratory distress, no retraction, no accessory muscle use


Cardiovascular #1:  normal peripheral pulses, regular rate, rhythm, no edema, 

no gallop


Gastrointestinal:  non tender, soft, no guarding, no rebound


Musculoskeletal:  normal inspection


Neurologic:  alert, responsive


Skin:  no rash





Last 24 Hour Vital Signs








  Date Time  Temp Pulse Resp B/P (MAP) Pulse Ox O2 Delivery O2 Flow Rate FiO2


 


4/24/20 14:23    136/89    


 


4/24/20 14:15 97.2 72 16 136/89 100 Nasal Cannula 1.0 


 


4/24/20 13:14 97.2 68 16 108/63 100 Nasal Cannula 1.0 


 


4/24/20 11:15 97.2 78 16 95/54 100 Nasal Cannula 1.0 


 


4/24/20 11:10    96/56    


 


4/24/20 11:05    84/54    


 


4/24/20 11:00    90/55    


 


4/24/20 10:55    91/60    


 


4/24/20 10:50    92/55    


 


4/24/20 10:15  78 16   Nasal Cannula 2.0 


 


4/24/20 10:15 97.2 78 16 80/56 94 Nasal Cannula 2.0 


 


4/24/20 09:50 97.2 78 16 80/56 (64) 94 Nasal Cannula 2.0 











Laboratory Tests








Test


  4/24/20


10:05 4/24/20


10:14 4/24/20


11:21


 


Arterial Blood pH


  7.357


(7.350-7.450) 


  


 


 


Arterial Blood Partial


Pressure CO2 37.9 mmHg


(35.0-45.0) 


  


 


 


Arterial Blood Partial


Pressure O2 118.7 mmHg


(75.0-100.0)  H 


  


 


 


Arterial Blood HCO3


  20.8 mmol/L


(22.0-26.0)  L 


  


 


 


Arterial Blood Oxygen


Saturation 97.7 %


() 


  


 


 


Arterial Blood Base Excess -4.2 (-2-2)  L  


 


Wong Test Positive    


 


White Blood Count


  


  5.7 K/UL


(4.8-10.8) 


 


 


Red Blood Count


  


  3.99 M/UL


(4.70-6.10)  L 


 


 


Hemoglobin


  


  11.6 G/DL


(14.2-18.0)  L 


 


 


Hematocrit


  


  34.4 %


(42.0-52.0)  L 


 


 


Mean Corpuscular Volume  86 FL (80-99)   


 


Mean Corpuscular Hemoglobin


  


  29.1 PG


(27.0-31.0) 


 


 


Mean Corpuscular Hemoglobin


Concent 


  33.7 G/DL


(32.0-36.0) 


 


 


Red Cell Distribution Width


  


  11.1 %


(11.6-14.8)  L 


 


 


Platelet Count


  


  149 K/UL


(150-450)  L 


 


 


Mean Platelet Volume


  


  6.5 FL


(6.5-10.1) 


 


 


Neutrophils (%) (Auto)


  


  83.7 %


(45.0-75.0)  H 


 


 


Lymphocytes (%) (Auto)


  


  9.1 %


(20.0-45.0)  L 


 


 


Monocytes (%) (Auto)


  


  6.5 %


(1.0-10.0) 


 


 


Eosinophils (%) (Auto)


  


  0.1 %


(0.0-3.0) 


 


 


Basophils (%) (Auto)


  


  0.6 %


(0.0-2.0) 


 


 


Prothrombin Time


  


  11.2 SEC


(9.30-11.50) 


 


 


Prothromb Time International


Ratio 


  1.1 (0.9-1.1)  


  


 


 


Activated Partial


Thromboplast Time 


  34 SEC (23-33)


H 


 


 


Sodium Level


  


  142 MMOL/L


(136-145) 


 


 


Potassium Level


  


  4.5 MMOL/L


(3.5-5.1) 


 


 


Chloride Level


  


  105 MMOL/L


() 


 


 


Carbon Dioxide Level


  


  25 MMOL/L


(21-32) 


 


 


Anion Gap


  


  12 mmol/L


(5-15) 


 


 


Blood Urea Nitrogen


  


  71 mg/dL


(7-18)  H 


 


 


Creatinine


  


  2.4 MG/DL


(0.55-1.30)  H 


 


 


Estimat Glomerular Filtration


Rate 


  32.5 mL/min


(>60) 


 


 


Glucose Level


  


  229 MG/DL


()  H 


 


 


Lactic Acid Level


  


  1.30 mmol/L


(0.4-2.0) 


 


 


Calcium Level


  


  8.4 MG/DL


(8.5-10.1)  L 


 


 


Phosphorus Level


  


  4.3 MG/DL


(2.5-4.9) 


 


 


Magnesium Level


  


  2.4 MG/DL


(1.8-2.4) 


 


 


Total Bilirubin


  


  0.3 MG/DL


(0.2-1.0) 


 


 


Aspartate Amino Transf


(AST/SGOT) 


  27 U/L (15-37)


  


 


 


Alanine Aminotransferase


(ALT/SGPT) 


  32 U/L (12-78)


  


 


 


Alkaline Phosphatase


  


  51 U/L


() 


 


 


Total Creatine Kinase


  


  423 U/L


()  H 


 


 


Creatine Kinase MB


  


  0.8 NG/ML


(0.0-3.6) 


 


 


Creatine Kinase MB Relative


Index 


  0.1  


  


 


 


Troponin I


  


  0.003 ng/mL


(0.000-0.056) 


 


 


Pro-B-Type Natriuretic Peptide


  


  736 pg/mL


(0-125)  H 


 


 


Total Protein


  


  7.9 G/DL


(6.4-8.2) 


 


 


Albumin


  


  3.3 G/DL


(3.4-5.0)  L 


 


 


Globulin  4.6 g/dL   


 


Albumin/Globulin Ratio


  


  0.7 (1.0-2.7)


L 


 


 


Lipase


  


  217 U/L


() 


 


 


Thyroid Stimulating Hormone


(TSH) 


  0.184 uiU/mL


(0.358-3.740) 


 


 


Free Thyroxine


  


  0.97 NG/DL


(0.76-1.46) 


 


 


Free Triiodothyronine


  


  1.7 pg/mL


(2.3-4.2)  L 


 


 


Urine Color   Yellow  


 


Urine Appearance


  


  


  Slightly


cloudy


 


Urine pH   5 (4.5-8.0)  


 


Urine Specific Gravity


  


  


  1.020


(1.005-1.035)


 


Urine Protein


  


  


  2+ (NEGATIVE)


H


 


Urine Glucose (UA)


  


  


  Negative


(NEGATIVE)


 


Urine Ketones


  


  


  Negative


(NEGATIVE)


 


Urine Blood


  


  


  Negative


(NEGATIVE)


 


Urine Nitrite


  


  


  Negative


(NEGATIVE)


 


Urine Bilirubin


  


  


  Negative


(NEGATIVE)


 


Urine Urobilinogen


  


  


  Normal MG/DL


(0.0-1.0)


 


Urine Leukocyte Esterase


  


  


  Negative


(NEGATIVE)


 


Urine RBC


  


  


  0-2 /HPF (0 -


0)  H


 


Urine WBC


  


  


  0-2 /HPF (0 -


0)


 


Urine Squamous Epithelial


Cells 


  


  Many /LPF


(NONE/OCC)  H


 


Urine Bacteria


  


  


  Few /HPF


(NONE)








Height (Feet):  5


Height (Inches):  8.00


Weight (Pounds):  180


Medications





Current Medications








 Medications


  (Trade)  Dose


 Ordered  Sig/Aarti


 Route


 PRN Reason  Start Time


 Stop Time Status Last Admin


Dose Admin


 


 Norepinephrine


 Bitartrate 4 mg/


 Dextrose  250 ml @ 0


 mls/hr  Q24H


 IV


   4/24/20 10:45


 5/24/20 10:44  4/24/20 10:50


 











Assessment/Plan


Assessment/Plan:





Abx:


IV Vancomycin x1 4/24


Cefepime x1 4/24





Assessment:


Septic shock-r/o bacteremia





Afebrile


No leukocytosis


   -u/a neg





Probable PNA- r/o COVID19 (from NH with confirmed cases)


At 1l NC


  -CXR: Left basilar atelectasis and/or infiltrate


 


MELVI





 HTN


cadiomyopathy s/p AICD


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


seizure disorder


NH resident ([facility]) 








Plan


-Continue empiric IV Vancomycin and Cefepime #1


-f/u cx


-Monitor CBC/CMP, temperatures


-COVID 19 precautions


-f/u SARS-COV2 PCR


-clarify goals of care


-aspiration precautions





Thank you for consulting [facility]. Will continue to follow along with 

you.





Discussed with RN,











Naila Lindsay M.D. Apr 24, 2020 14:56

## 2020-04-24 NOTE — NUR
ED Nurse Note:



Patient was brought in by [facility] from [facility], due to being altered x 3 
hrs and hypotensive (normal mental status AxOx3 per EMS). Per EMS patient's BS 
440. Patient presented lethargic, sleepy, AAO x3, BSv 210, BP 80/62 at bed 
side. Patient has non-labored breathing, O2 sat 98% on 2L via NC.

## 2020-04-24 NOTE — NUR
ED Nurse Note:



Second bag of Levofed started. Patient's BP 90/56. Levofed running at rate, 8 
mcg/min.

## 2020-04-24 NOTE — NUR
ED Nurse Note:

Patient gown changed due to a soiled spot. Will continue to monitor for 
transport to ICU.

## 2020-04-24 NOTE — CARDIAC ELECTROPHYSIOLOGY PN
Subjective


Subjective


Pt seen in ER and [facility] ER MD


[id number]





Objective





Last 24 Hour Vital Signs








  Date Time  Temp Pulse Resp B/P (MAP) Pulse Ox O2 Delivery O2 Flow Rate FiO2


 


4/24/20 14:23    136/89    


 


4/24/20 14:15 97.2 72 16 136/89 100 Nasal Cannula 1.0 


 


4/24/20 13:14 97.2 68 16 108/63 100 Nasal Cannula 1.0 


 


4/24/20 11:15 97.2 78 16 95/54 100 Nasal Cannula 1.0 


 


4/24/20 11:10    96/56    


 


4/24/20 11:05    84/54    


 


4/24/20 11:00    90/55    


 


4/24/20 10:55    91/60    


 


4/24/20 10:50    92/55    


 


4/24/20 10:15  78 16   Nasal Cannula 2.0 


 


4/24/20 10:15 97.2 78 16 80/56 94 Nasal Cannula 2.0 


 


4/24/20 09:50 97.2 78 16 80/56 (64) 94 Nasal Cannula 2.0 











Laboratory Tests








Test


  4/24/20


10:05 4/24/20


10:14 4/24/20


11:21


 


Arterial Blood pH


  7.357


(7.350-7.450) 


  


 


 


Arterial Blood Partial


Pressure CO2 37.9 mmHg


(35.0-45.0) 


  


 


 


Arterial Blood Partial


Pressure O2 118.7 mmHg


(75.0-100.0)  H 


  


 


 


Arterial Blood HCO3


  20.8 mmol/L


(22.0-26.0)  L 


  


 


 


Arterial Blood Oxygen


Saturation 97.7 %


() 


  


 


 


Arterial Blood Base Excess -4.2 (-2-2)  L  


 


Wong Test Positive    


 


White Blood Count


  


  5.7 K/UL


(4.8-10.8) 


 


 


Red Blood Count


  


  3.99 M/UL


(4.70-6.10)  L 


 


 


Hemoglobin


  


  11.6 G/DL


(14.2-18.0)  L 


 


 


Hematocrit


  


  34.4 %


(42.0-52.0)  L 


 


 


Mean Corpuscular Volume  86 FL (80-99)   


 


Mean Corpuscular Hemoglobin


  


  29.1 PG


(27.0-31.0) 


 


 


Mean Corpuscular Hemoglobin


Concent 


  33.7 G/DL


(32.0-36.0) 


 


 


Red Cell Distribution Width


  


  11.1 %


(11.6-14.8)  L 


 


 


Platelet Count


  


  149 K/UL


(150-450)  L 


 


 


Mean Platelet Volume


  


  6.5 FL


(6.5-10.1) 


 


 


Neutrophils (%) (Auto)


  


  83.7 %


(45.0-75.0)  H 


 


 


Lymphocytes (%) (Auto)


  


  9.1 %


(20.0-45.0)  L 


 


 


Monocytes (%) (Auto)


  


  6.5 %


(1.0-10.0) 


 


 


Eosinophils (%) (Auto)


  


  0.1 %


(0.0-3.0) 


 


 


Basophils (%) (Auto)


  


  0.6 %


(0.0-2.0) 


 


 


Prothrombin Time


  


  11.2 SEC


(9.30-11.50) 


 


 


Prothromb Time International


Ratio 


  1.1 (0.9-1.1)  


  


 


 


Activated Partial


Thromboplast Time 


  34 SEC (23-33)


H 


 


 


Sodium Level


  


  142 MMOL/L


(136-145) 


 


 


Potassium Level


  


  4.5 MMOL/L


(3.5-5.1) 


 


 


Chloride Level


  


  105 MMOL/L


() 


 


 


Carbon Dioxide Level


  


  25 MMOL/L


(21-32) 


 


 


Anion Gap


  


  12 mmol/L


(5-15) 


 


 


Blood Urea Nitrogen


  


  71 mg/dL


(7-18)  H 


 


 


Creatinine


  


  2.4 MG/DL


(0.55-1.30)  H 


 


 


Estimat Glomerular Filtration


Rate 


  32.5 mL/min


(>60) 


 


 


Glucose Level


  


  229 MG/DL


()  H 


 


 


Lactic Acid Level


  


  1.30 mmol/L


(0.4-2.0) 


 


 


Calcium Level


  


  8.4 MG/DL


(8.5-10.1)  L 


 


 


Phosphorus Level


  


  4.3 MG/DL


(2.5-4.9) 


 


 


Magnesium Level


  


  2.4 MG/DL


(1.8-2.4) 


 


 


Total Bilirubin


  


  0.3 MG/DL


(0.2-1.0) 


 


 


Aspartate Amino Transf


(AST/SGOT) 


  27 U/L (15-37)


  


 


 


Alanine Aminotransferase


(ALT/SGPT) 


  32 U/L (12-78)


  


 


 


Alkaline Phosphatase


  


  51 U/L


() 


 


 


Total Creatine Kinase


  


  423 U/L


()  H 


 


 


Creatine Kinase MB


  


  0.8 NG/ML


(0.0-3.6) 


 


 


Creatine Kinase MB Relative


Index 


  0.1  


  


 


 


Troponin I


  


  0.003 ng/mL


(0.000-0.056) 


 


 


Pro-B-Type Natriuretic Peptide


  


  736 pg/mL


(0-125)  H 


 


 


Total Protein


  


  7.9 G/DL


(6.4-8.2) 


 


 


Albumin


  


  3.3 G/DL


(3.4-5.0)  L 


 


 


Globulin  4.6 g/dL   


 


Albumin/Globulin Ratio


  


  0.7 (1.0-2.7)


L 


 


 


Lipase


  


  217 U/L


() 


 


 


Thyroid Stimulating Hormone


(TSH) 


  0.184 uiU/mL


(0.358-3.740) 


 


 


Free Thyroxine


  


  0.97 NG/DL


(0.76-1.46) 


 


 


Free Triiodothyronine


  


  1.7 pg/mL


(2.3-4.2)  L 


 


 


Urine Color   Yellow  


 


Urine Appearance


  


  


  Slightly


cloudy


 


Urine pH   5 (4.5-8.0)  


 


Urine Specific Gravity


  


  


  1.020


(1.005-1.035)


 


Urine Protein


  


  


  2+ (NEGATIVE)


H


 


Urine Glucose (UA)


  


  


  Negative


(NEGATIVE)


 


Urine Ketones


  


  


  Negative


(NEGATIVE)


 


Urine Blood


  


  


  Negative


(NEGATIVE)


 


Urine Nitrite


  


  


  Negative


(NEGATIVE)


 


Urine Bilirubin


  


  


  Negative


(NEGATIVE)


 


Urine Urobilinogen


  


  


  Normal MG/DL


(0.0-1.0)


 


Urine Leukocyte Esterase


  


  


  Negative


(NEGATIVE)


 


Urine RBC


  


  


  0-2 /HPF (0 -


0)  H


 


Urine WBC


  


  


  0-2 /HPF (0 -


0)


 


Urine Squamous Epithelial


Cells 


  


  Many /LPF


(NONE/OCC)  H


 


Urine Bacteria


  


  


  Few /HPF


(NONE)

















Tommy Otero MD Apr 24, 2020 15:25

## 2020-04-24 NOTE — NUR
ED Nurse Note:



central right femoral line was placed by Dr. Powell, triple lumen. Patient 
tolerated procedure well.

## 2020-04-25 VITALS — DIASTOLIC BLOOD PRESSURE: 60 MMHG | SYSTOLIC BLOOD PRESSURE: 105 MMHG

## 2020-04-25 VITALS — DIASTOLIC BLOOD PRESSURE: 65 MMHG | SYSTOLIC BLOOD PRESSURE: 112 MMHG

## 2020-04-25 VITALS — DIASTOLIC BLOOD PRESSURE: 63 MMHG | SYSTOLIC BLOOD PRESSURE: 110 MMHG

## 2020-04-25 VITALS — DIASTOLIC BLOOD PRESSURE: 62 MMHG | SYSTOLIC BLOOD PRESSURE: 100 MMHG

## 2020-04-25 VITALS — SYSTOLIC BLOOD PRESSURE: 105 MMHG | DIASTOLIC BLOOD PRESSURE: 74 MMHG

## 2020-04-25 VITALS — SYSTOLIC BLOOD PRESSURE: 112 MMHG | DIASTOLIC BLOOD PRESSURE: 80 MMHG

## 2020-04-25 VITALS — DIASTOLIC BLOOD PRESSURE: 61 MMHG | SYSTOLIC BLOOD PRESSURE: 104 MMHG

## 2020-04-25 VITALS — SYSTOLIC BLOOD PRESSURE: 114 MMHG | DIASTOLIC BLOOD PRESSURE: 65 MMHG

## 2020-04-25 VITALS — SYSTOLIC BLOOD PRESSURE: 109 MMHG | DIASTOLIC BLOOD PRESSURE: 65 MMHG

## 2020-04-25 VITALS — SYSTOLIC BLOOD PRESSURE: 110 MMHG | DIASTOLIC BLOOD PRESSURE: 69 MMHG

## 2020-04-25 VITALS — SYSTOLIC BLOOD PRESSURE: 109 MMHG | DIASTOLIC BLOOD PRESSURE: 60 MMHG

## 2020-04-25 VITALS — DIASTOLIC BLOOD PRESSURE: 65 MMHG | SYSTOLIC BLOOD PRESSURE: 116 MMHG

## 2020-04-25 VITALS — SYSTOLIC BLOOD PRESSURE: 107 MMHG | DIASTOLIC BLOOD PRESSURE: 63 MMHG

## 2020-04-25 VITALS — DIASTOLIC BLOOD PRESSURE: 63 MMHG | SYSTOLIC BLOOD PRESSURE: 99 MMHG

## 2020-04-25 VITALS — SYSTOLIC BLOOD PRESSURE: 108 MMHG | DIASTOLIC BLOOD PRESSURE: 64 MMHG

## 2020-04-25 VITALS — DIASTOLIC BLOOD PRESSURE: 58 MMHG | SYSTOLIC BLOOD PRESSURE: 104 MMHG

## 2020-04-25 VITALS — DIASTOLIC BLOOD PRESSURE: 63 MMHG | SYSTOLIC BLOOD PRESSURE: 108 MMHG

## 2020-04-25 VITALS — SYSTOLIC BLOOD PRESSURE: 110 MMHG | DIASTOLIC BLOOD PRESSURE: 64 MMHG

## 2020-04-25 VITALS — SYSTOLIC BLOOD PRESSURE: 118 MMHG | DIASTOLIC BLOOD PRESSURE: 64 MMHG

## 2020-04-25 VITALS — DIASTOLIC BLOOD PRESSURE: 59 MMHG | SYSTOLIC BLOOD PRESSURE: 97 MMHG

## 2020-04-25 VITALS — DIASTOLIC BLOOD PRESSURE: 67 MMHG | SYSTOLIC BLOOD PRESSURE: 121 MMHG

## 2020-04-25 VITALS — DIASTOLIC BLOOD PRESSURE: 58 MMHG | SYSTOLIC BLOOD PRESSURE: 109 MMHG

## 2020-04-25 VITALS — DIASTOLIC BLOOD PRESSURE: 68 MMHG | SYSTOLIC BLOOD PRESSURE: 106 MMHG

## 2020-04-25 VITALS — DIASTOLIC BLOOD PRESSURE: 55 MMHG | SYSTOLIC BLOOD PRESSURE: 106 MMHG

## 2020-04-25 VITALS — DIASTOLIC BLOOD PRESSURE: 60 MMHG | SYSTOLIC BLOOD PRESSURE: 109 MMHG

## 2020-04-25 VITALS — SYSTOLIC BLOOD PRESSURE: 105 MMHG | DIASTOLIC BLOOD PRESSURE: 56 MMHG

## 2020-04-25 VITALS — SYSTOLIC BLOOD PRESSURE: 107 MMHG | DIASTOLIC BLOOD PRESSURE: 66 MMHG

## 2020-04-25 VITALS — DIASTOLIC BLOOD PRESSURE: 67 MMHG | SYSTOLIC BLOOD PRESSURE: 115 MMHG

## 2020-04-25 VITALS — DIASTOLIC BLOOD PRESSURE: 55 MMHG | SYSTOLIC BLOOD PRESSURE: 90 MMHG

## 2020-04-25 VITALS — SYSTOLIC BLOOD PRESSURE: 106 MMHG | DIASTOLIC BLOOD PRESSURE: 63 MMHG

## 2020-04-25 VITALS — DIASTOLIC BLOOD PRESSURE: 67 MMHG | SYSTOLIC BLOOD PRESSURE: 116 MMHG

## 2020-04-25 VITALS — DIASTOLIC BLOOD PRESSURE: 63 MMHG | SYSTOLIC BLOOD PRESSURE: 111 MMHG

## 2020-04-25 VITALS — DIASTOLIC BLOOD PRESSURE: 65 MMHG | SYSTOLIC BLOOD PRESSURE: 117 MMHG

## 2020-04-25 LAB
ADD MANUAL DIFF: NO
ANION GAP SERPL CALC-SCNC: 10 MMOL/L (ref 5–15)
BASOPHILS NFR BLD AUTO: 1.1 % (ref 0–2)
BUN SERPL-MCNC: 43 MG/DL (ref 7–18)
CALCIUM SERPL-MCNC: 8.6 MG/DL (ref 8.5–10.1)
CHLORIDE SERPL-SCNC: 107 MMOL/L (ref 98–107)
CO2 SERPL-SCNC: 25 MMOL/L (ref 21–32)
CREAT SERPL-MCNC: 1.2 MG/DL (ref 0.55–1.3)
EOSINOPHIL NFR BLD AUTO: 0 % (ref 0–3)
ERYTHROCYTE [DISTWIDTH] IN BLOOD BY AUTOMATED COUNT: 11 % (ref 11.6–14.8)
HCT VFR BLD CALC: 37.4 % (ref 42–52)
HGB BLD-MCNC: 12.6 G/DL (ref 14.2–18)
LYMPHOCYTES NFR BLD AUTO: 18.9 % (ref 20–45)
MCV RBC AUTO: 86 FL (ref 80–99)
MONOCYTES NFR BLD AUTO: 10.2 % (ref 1–10)
NEUTROPHILS NFR BLD AUTO: 69.8 % (ref 45–75)
PLATELET # BLD: 137 K/UL (ref 150–450)
POTASSIUM SERPL-SCNC: 4.6 MMOL/L (ref 3.5–5.1)
RBC # BLD AUTO: 4.36 M/UL (ref 4.7–6.1)
SODIUM SERPL-SCNC: 142 MMOL/L (ref 136–145)
WBC # BLD AUTO: 5.2 K/UL (ref 4.8–10.8)

## 2020-04-25 RX ADMIN — SODIUM CHLORIDE SCH MLS/HR: 9 INJECTION, SOLUTION INTRAVENOUS at 10:22

## 2020-04-25 RX ADMIN — CHLORHEXIDINE GLUCONATE SCH APPLIC: 213 SOLUTION TOPICAL at 19:53

## 2020-04-25 RX ADMIN — SODIUM CHLORIDE SCH MLS/HR: 0.9 INJECTION INTRAVENOUS at 19:53

## 2020-04-25 RX ADMIN — SODIUM CHLORIDE SCH MLS/HR: 9 INJECTION, SOLUTION INTRAVENOUS at 21:26

## 2020-04-25 NOTE — NUR
PT Note

PT adriano completed, treatment initiated. Patient is alert, cooperative. He has muscle 
weakness, requiring extensive assist in bed mobility; unable to sit/stand. Patient can 
benefit from PT services to increase his muscle strength and balance to improve his 
functional mobility.

-------------------------------------------------------------------------------

Addendum: 04/25/20 at 1711 by CHARLENE CORTEZ PT

-------------------------------------------------------------------------------

Amended: Links added.

## 2020-04-25 NOTE — HISTORY AND PHYSICAL REPORT
DATE OF ADMISSION:  04/24/2020

DATE AND TIME SEEN:  On 04/25/2020 at 9 a.m.



CONSULTANTS:

1. Hayder Hahn MD.

2. Marc Mitchell MD.

3. Tommy Otero MD.

4. Chapito Short MD.

5. Demarcus Love MD.



CHIEF COMPLAINT:  Altered mental status, hypotension, rule out

COVID.



BRIEF HISTORY:  This is a 69-year-old male from [facility],

presented with above-mentioned diagnosis with low blood pressure.

Therefore, was admitted to ICU.  Currently, O2 NC, calm, sleeping in bed,

in ICU, nonverbal.



REVIEW OF SYSTEMS:  Unavailable.



PAST MEDICAL HISTORY:  Include seizure, aphasia, weakness, COPD,

diabetes.



PAST SURGICAL HISTORY:  Pacemaker.



MEDICATION:  Include cefepime, vancomycin, levetiracetam, norepinephrine.



ALLERGIES:  Denies.



SOCIAL HISTORY:  Unable to obtain secondary to the patient's lethargy.



PHYSICAL EXAMINATION:

GENERAL:  Lethargic in bed, O2 NC, sleepy in bed, in ICU.

VITAL SIGNS:  Temperature is pending, pulse 65, respirations 31, blood

pressure 108/63.

HEENT:  Normocephalic, atraumatic.

NECK:  Trachea is midline.

PULMONARY:  O2 NC, slight short of breath.

ABDOMEN:  No apparent wounds noted.

EXTREMITIES:  Show no cyanosis, clubbing.



LABORATORY AND DIAGNOSTIC DATA:  Labs at this time show hemoglobin 12.6,

otherwise platelets 137 and BMP shows BUN 43, glucose 175.  Troponin

0.054.  BNP is 2706, otherwise.  Urinalysis show 2+ protein, otherwise

normal.



ASSESSMENT:  Altered mental status, hypotension, rule out COVID, anemia,

seizure, aphasia, weakness, COPD, diabetes.



PLAN:  O2 and pulmonary treatment.  Antibiotics per Infectious Disease.

Blood pressure, blood sugar, and seizure control.  Dietary followup.

Resume home medications.  PT and dietary evaluation.  CBC and BMP in the

morning.









  ______________________________________________

  Arron Barkley D.O.





DR:  AGUSTIN

D:  04/25/2020 09:15

T:  04/26/2020 03:26

JOB#:  [id number]

CC:

## 2020-04-25 NOTE — CARDIAC ELECTROPHYSIOLOGY PN
Assessment/Plan


Assessment/Plan


1. Shock could be due to cardiomyopathy versus sepsis.  His BNP is 736.  


 The patient was already on Abx by Dr. Lindsay  The patient is already on 

Levophed.  We will get an


echocardiogram for further evaluation.





2. History of cardiomyopathy.  Because of hypotension off 


discontinue Coreg, hydralazine, lisinopril, and Aldactone until blood


pressure more stabilized.





3. Status post right-sided ICD.  The brand is not clear at this point.


We will try to interrogate the ICD after we find the device.





4. History of hypertension, currently hypotensive.


5. History of CVA with residual weakness.


6. Rule out COVID-19 in a patient in the nursing home with COVID-19 residents.








ODILIA RN





Subjective


Subjective


On 2 mcg of Levophed in ICU   on 1 liter NC. Covid results pending





Objective





Last 24 Hour Vital Signs








  Date Time  Temp Pulse Resp B/P (MAP) Pulse Ox O2 Delivery O2 Flow Rate FiO2


 


4/25/20 15:00 100.5 69 27 104/58 (73) 98   


 


4/25/20 14:00  69 27 110/63 (79) 100   


 


4/25/20 13:00 101.3 67  109/60 (76)    


 


4/25/20 13:00    109/60    


 


4/25/20 12:00      Nasal Cannula 1.0 


 


4/25/20 12:00  63 27 112/80 (91) 98   


 


4/25/20 12:00    99/61    


 


4/25/20 12:00  71      


 


4/25/20 11:00  67 27 100/62 (75) 98   


 


4/25/20 11:00    100/62    


 


4/25/20 10:25    105/61    


 


4/25/20 10:06    105/61    


 


4/25/20 10:00  68 27 109/65 (80) 100   


 


4/25/20 09:00    109/58    


 


4/25/20 09:00  66 27 109/58 (75) 97   


 


4/25/20 08:08      Nasal Cannula 1.0 


 


4/25/20 08:00 99.1 67 27 104/61 (75) 99   


 


4/25/20 08:00    104/61    


 


4/25/20 08:00  68      


 


4/25/20 07:00  65 31 108/63 (78) 98   


 


4/25/20 07:00    108/63    


 


4/25/20 06:30  68 30 107/66 (80) 97   


 


4/25/20 06:00  65 28 105/60 (75) 96   


 


4/25/20 06:00    105/60    


 


4/25/20 05:30  72 28 111/63 (79) 98   


 


4/25/20 05:00  70 25 109/60 (76) 97   


 


4/25/20 05:00    107/63    


 


4/25/20 04:30  68 27 108/64 (79) 97   


 


4/25/20 04:00 99.6 67 28 107/63 (78) 97   


 


4/25/20 04:00  69      


 


4/25/20 04:00      Nasal Cannula 1.0 


 


4/25/20 03:30  68 26 105/74 (84) 97   


 


4/25/20 03:00  67 29 106/63 (77) 96   


 


4/25/20 03:00    106/63    


 


4/25/20 02:30  68 26 106/55 (72) 97   


 


4/25/20 02:00  86 24 118/64 (82) 100   


 


4/25/20 02:00    118/64    


 


4/25/20 01:30  69 24 110/69 (83) 100   


 


4/25/20 01:00  64 20 117/65 (82) 100   


 


4/25/20 01:00    117/65    


 


4/25/20 00:37    116/67    


 


4/25/20 00:30  67 22 116/65 (82) 99   


 


4/25/20 00:00 99.0 63 22 121/65 (83) 100   


 


4/25/20 00:00    121/65    


 


4/25/20 00:00      Nasal Cannula 1.0 


 


4/25/20 00:00  65      


 


4/24/20 23:30  67 22 119/62 (81) 98   


 


4/24/20 23:00      Nasal Cannula 1.0 


 


4/24/20 23:00  65 20 118/64 (82) 99   


 


4/24/20 23:00    118/64    


 


4/24/20 22:34  66      


 


4/24/20 22:30  65 22 115/63 (80) 98   


 


4/24/20 22:15 98.4 66 18 117/69 (85) 98   


 


4/24/20 22:15    117/69    


 


4/24/20 22:00 98.2 66 20 119/70 99 Nasal Cannula 1.0 


 


4/24/20 21:50 98.2 72 20 123/69 97 Nasal Cannula 1.0 


 


4/24/20 21:30 98.2 66 8 118/71 98 Nasal Cannula 1.0 


 


4/24/20 21:15 98.2 66 23 123/77 98 Nasal Cannula 1.0 


 


4/24/20 21:08 98.2 66 20 122/68 99 Nasal Cannula 1.0 


 


4/24/20 21:00 98.2 67 25 122/68 99 Nasal Cannula 1.0 


 


4/24/20 20:55 98.2 65 19 126/70 99 Nasal Cannula 1.0 


 


4/24/20 20:45 98.2 67 14 126/69 98 Nasal Cannula 1.0 


 


4/24/20 20:40 98.2 65 14 121/69 100 Nasal Cannula 1.0 


 


4/24/20 20:35 98.2 63 16 121/70 100 Nasal Cannula 1.0 


 


4/24/20 20:30 98.2 64 14 125/68 100 Nasal Cannula 1.0 


 


4/24/20 20:00 98.2 65 18 121/72 99 Nasal Cannula 1.0 


 


4/24/20 19:00 98.2 65 13 119/65 100 Nasal Cannula 1.0 


 


4/24/20 18:15 98.2 74 16 126/67 100 Nasal Cannula 1.0 


 


4/24/20 18:05    92/56    


 


4/24/20 16:15 98.2 74 16 125/72 100 Nasal Cannula 1.0 

















Intake and Output  


 


 4/24/20 4/25/20





 19:00 07:00


 


Intake Total  826.28 ml


 


Output Total 100 ml 1700 ml


 


Balance -100 ml -873.72 ml


 


  


 


IV Total  826.28 ml


 


Output Urine Total 100 ml 1700 ml











Laboratory Tests








Test


  4/25/20


05:08


 


White Blood Count


  5.2 K/UL


(4.8-10.8)


 


Red Blood Count


  4.36 M/UL


(4.70-6.10)  L


 


Hemoglobin


  12.6 G/DL


(14.2-18.0)  L


 


Hematocrit


  37.4 %


(42.0-52.0)  L


 


Mean Corpuscular Volume 86 FL (80-99)  


 


Mean Corpuscular Hemoglobin


  28.9 PG


(27.0-31.0)


 


Mean Corpuscular Hemoglobin


Concent 33.7 G/DL


(32.0-36.0)


 


Red Cell Distribution Width


  11.0 %


(11.6-14.8)  L


 


Platelet Count


  137 K/UL


(150-450)  L


 


Mean Platelet Volume


  7.0 FL


(6.5-10.1)


 


Neutrophils (%) (Auto)


  69.8 %


(45.0-75.0)


 


Lymphocytes (%) (Auto)


  18.9 %


(20.0-45.0)  L


 


Monocytes (%) (Auto)


  10.2 %


(1.0-10.0)  H


 


Eosinophils (%) (Auto)


  0.0 %


(0.0-3.0)


 


Basophils (%) (Auto)


  1.1 %


(0.0-2.0)


 


Sodium Level


  142 MMOL/L


(136-145)


 


Potassium Level


  4.6 MMOL/L


(3.5-5.1)


 


Chloride Level


  107 MMOL/L


()


 


Carbon Dioxide Level


  25 MMOL/L


(21-32)


 


Anion Gap


  10 mmol/L


(5-15)


 


Blood Urea Nitrogen


  43 mg/dL


(7-18)  H


 


Creatinine


  1.2 MG/DL


(0.55-1.30)


 


Estimat Glomerular Filtration


Rate > 60 mL/min


(>60)


 


Glucose Level


  175 MG/DL


()  H


 


Calcium Level


  8.6 MG/DL


(8.5-10.1)


 


Troponin I


  0.054 ng/mL


(0.000-0.056)


 


Pro-B-Type Natriuretic Peptide


  2706 pg/mL


(0-125)  H


 


Random Vancomycin Level 11.3 ug/mL  











Microbiology








 Date/Time


Source Procedure


Growth Status


 


 


 4/24/20 17:42


Rectum  Received








Objective





HEAD AND NECK:  Shows positive JVD.


LUNGS:  Decreased breath sounds.


CARDIOVASCULAR:  Regular S1 and S2 with no gallop.


ABDOMEN:  Soft.


EXTREMITIES:  A 1+ pitting edema.  Defibrillator is in right subclavian.











Tommy Otero MD Apr 25, 2020 15:44

## 2020-04-25 NOTE — NUR
HAND-OFF: 

Report given to DIMA Peralta. Pt is sleeping on the bed and no sign of acute distress noted.

## 2020-04-25 NOTE — NUR
NURSE NOTES:

Pt is sleeping on the bed and no sign of acute distress noted. Bp is stable with Levophed 
drip @ 1mcg/min. Placed fall precaution. Will continue to care plan.

## 2020-04-25 NOTE — NUR
NURSE NOTES:

Pt is resting on the bed and no sign of acute distress noted. Checked /67mmHg. 
Decrease Levophed drip 4mcg/min. On O2 1L via nasal cannula and SaO2 99% noted. On Swenson 
cath and drainage well. Placed fall precaution. Will continue to care plan.

## 2020-04-25 NOTE — NUR
NURSE NOTES:

Pt is sleeping on the bed and no sign of acute distress noted. On O2 1L via nasal cannula 
and SaO2 97-98% noted. On running with Levophed drip 2mcg/min. Will continue to monitor any 
change of condition.

## 2020-04-25 NOTE — INFECTIOUS DISEASES PROG NOTE
Assessment/Plan


Assessment/Plan





Assessment:


Septic shock-r/o bacteremia





Fever


No leukocytosis


   -u/a neg


   -Bcx p





Probable PNA- r/o COVID19 (from NH with confirmed cases)


At 1l NC


  -CXR: Left basilar atelectasis and/or infiltrate


 


MELVI, improving





 HTN


cadiomyopathy s/p AICD


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


seizure disorder


[facility] 








Plan


-Continue empiric IV Vancomycin and Cefepime #2


-f/u cx


-Monitor CBC/CMP, temperatures


-COVID 19 precautions


-f/u SARS-COV2 PCR


-clarify goals of care


-aspiration precautions





Thank you for consulting [facility]. Will continue to follow along with 

you.





Discussed with RN,





Subjective


Allergies:  


Coded Allergies:  


     No Known Allergies (Unverified , 12/15/14)


Subjective


Tm 101.3


at 1l NC


levophed at 2


no leuokcytosis


Cr improving





Objective


Vital Signs





Last 24 Hour Vital Signs








  Date Time  Temp Pulse Resp B/P (MAP) Pulse Ox O2 Delivery O2 Flow Rate FiO2


 


4/25/20 15:00 100.5 69 27 104/58 (73) 98   


 


4/25/20 14:00  69 27 110/63 (79) 100   


 


4/25/20 13:00 101.3 67  109/60 (76)    


 


4/25/20 13:00    109/60    


 


4/25/20 12:00      Nasal Cannula 1.0 


 


4/25/20 12:00  63 27 112/80 (91) 98   


 


4/25/20 12:00    99/61    


 


4/25/20 12:00  71      


 


4/25/20 11:00  67 27 100/62 (75) 98   


 


4/25/20 11:00    100/62    


 


4/25/20 10:25    105/61    


 


4/25/20 10:06    105/61    


 


4/25/20 10:00  68 27 109/65 (80) 100   


 


4/25/20 09:00    109/58    


 


4/25/20 09:00  66 27 109/58 (75) 97   


 


4/25/20 08:08      Nasal Cannula 1.0 


 


4/25/20 08:00 99.1 67 27 104/61 (75) 99   


 


4/25/20 08:00    104/61    


 


4/25/20 08:00  68      


 


4/25/20 07:00  65 31 108/63 (78) 98   


 


4/25/20 07:00    108/63    


 


4/25/20 06:30  68 30 107/66 (80) 97   


 


4/25/20 06:00  65 28 105/60 (75) 96   


 


4/25/20 06:00    105/60    


 


4/25/20 05:30  72 28 111/63 (79) 98   


 


4/25/20 05:00  70 25 109/60 (76) 97   


 


4/25/20 05:00    107/63    


 


4/25/20 04:30  68 27 108/64 (79) 97   


 


4/25/20 04:00 99.6 67 28 107/63 (78) 97   


 


4/25/20 04:00  69      


 


4/25/20 04:00      Nasal Cannula 1.0 


 


4/25/20 03:30  68 26 105/74 (84) 97   


 


4/25/20 03:00  67 29 106/63 (77) 96   


 


4/25/20 03:00    106/63    


 


4/25/20 02:30  68 26 106/55 (72) 97   


 


4/25/20 02:00  86 24 118/64 (82) 100   


 


4/25/20 02:00    118/64    


 


4/25/20 01:30  69 24 110/69 (83) 100   


 


4/25/20 01:00  64 20 117/65 (82) 100   


 


4/25/20 01:00    117/65    


 


4/25/20 00:37    116/67    


 


4/25/20 00:30  67 22 116/65 (82) 99   


 


4/25/20 00:00 99.0 63 22 121/65 (83) 100   


 


4/25/20 00:00    121/65    


 


4/25/20 00:00      Nasal Cannula 1.0 


 


4/25/20 00:00  65      


 


4/24/20 23:30  67 22 119/62 (81) 98   


 


4/24/20 23:00      Nasal Cannula 1.0 


 


4/24/20 23:00  65 20 118/64 (82) 99   


 


4/24/20 23:00    118/64    


 


4/24/20 22:34  66      


 


4/24/20 22:30  65 22 115/63 (80) 98   


 


4/24/20 22:15 98.4 66 18 117/69 (85) 98   


 


4/24/20 22:15    117/69    


 


4/24/20 22:00 98.2 66 20 119/70 99 Nasal Cannula 1.0 


 


4/24/20 21:50 98.2 72 20 123/69 97 Nasal Cannula 1.0 


 


4/24/20 21:30 98.2 66 8 118/71 98 Nasal Cannula 1.0 


 


4/24/20 21:15 98.2 66 23 123/77 98 Nasal Cannula 1.0 


 


4/24/20 21:08 98.2 66 20 122/68 99 Nasal Cannula 1.0 


 


4/24/20 21:00 98.2 67 25 122/68 99 Nasal Cannula 1.0 


 


4/24/20 20:55 98.2 65 19 126/70 99 Nasal Cannula 1.0 


 


4/24/20 20:45 98.2 67 14 126/69 98 Nasal Cannula 1.0 


 


4/24/20 20:40 98.2 65 14 121/69 100 Nasal Cannula 1.0 


 


4/24/20 20:35 98.2 63 16 121/70 100 Nasal Cannula 1.0 


 


4/24/20 20:30 98.2 64 14 125/68 100 Nasal Cannula 1.0 


 


4/24/20 20:00 98.2 65 18 121/72 99 Nasal Cannula 1.0 


 


4/24/20 19:00 98.2 65 13 119/65 100 Nasal Cannula 1.0 


 


4/24/20 18:15 98.2 74 16 126/67 100 Nasal Cannula 1.0 


 


4/24/20 18:05    92/56    








Height (Feet):  5


Height (Inches):  8.00


Weight (Pounds):  189


Objective


 not examined to limit COVID19 exposure





Microbiology








 Date/Time


Source Procedure


Growth Status


 


 


 4/24/20 17:42


Rectum  Received











Laboratory Tests








Test


  4/25/20


05:08


 


White Blood Count


  5.2 K/UL


(4.8-10.8)


 


Red Blood Count


  4.36 M/UL


(4.70-6.10)  L


 


Hemoglobin


  12.6 G/DL


(14.2-18.0)  L


 


Hematocrit


  37.4 %


(42.0-52.0)  L


 


Mean Corpuscular Volume 86 FL (80-99)  


 


Mean Corpuscular Hemoglobin


  28.9 PG


(27.0-31.0)


 


Mean Corpuscular Hemoglobin


Concent 33.7 G/DL


(32.0-36.0)


 


Red Cell Distribution Width


  11.0 %


(11.6-14.8)  L


 


Platelet Count


  137 K/UL


(150-450)  L


 


Mean Platelet Volume


  7.0 FL


(6.5-10.1)


 


Neutrophils (%) (Auto)


  69.8 %


(45.0-75.0)


 


Lymphocytes (%) (Auto)


  18.9 %


(20.0-45.0)  L


 


Monocytes (%) (Auto)


  10.2 %


(1.0-10.0)  H


 


Eosinophils (%) (Auto)


  0.0 %


(0.0-3.0)


 


Basophils (%) (Auto)


  1.1 %


(0.0-2.0)


 


Sodium Level


  142 MMOL/L


(136-145)


 


Potassium Level


  4.6 MMOL/L


(3.5-5.1)


 


Chloride Level


  107 MMOL/L


()


 


Carbon Dioxide Level


  25 MMOL/L


(21-32)


 


Anion Gap


  10 mmol/L


(5-15)


 


Blood Urea Nitrogen


  43 mg/dL


(7-18)  H


 


Creatinine


  1.2 MG/DL


(0.55-1.30)


 


Estimat Glomerular Filtration


Rate > 60 mL/min


(>60)


 


Glucose Level


  175 MG/DL


()  H


 


Calcium Level


  8.6 MG/DL


(8.5-10.1)


 


Troponin I


  0.054 ng/mL


(0.000-0.056)


 


Pro-B-Type Natriuretic Peptide


  2706 pg/mL


(0-125)  H


 


Random Vancomycin Level 11.3 ug/mL  











Current Medications








 Medications


  (Trade)  Dose


 Ordered  Sig/Aarti


 Route


 PRN Reason  Start Time


 Stop Time Status Last Admin


Dose Admin


 


 Acetaminophen


  (Tylenol)  650 mg  Q6H  PRN


 ORAL


 For Headache  4/25/20 15:45


 5/25/20 15:44  4/25/20 16:14


 


 


 Cefepime HCl 1 gm/


 Dextrose  55 ml @ 


 110 mls/hr  Q12H


 IVPB


   4/25/20 20:00


 5/2/20 19:59   


 


 


 Chlorhexidine


 Gluconate


  (Gloria-Hex 2%)  1 applic  DAILY@2000


 TOPIC


   4/25/20 20:00


 7/24/20 19:59   


 


 


 Gabapentin


  (Neurontin)  300 mg  Q8HR


 ORAL


   4/25/20 00:00


 5/25/20 00:00  4/25/20 15:01


 


 


 Levetiracetam


  (Keppra)  1,000 mg  Q12HR


 ORAL


   4/25/20 00:00


 5/25/20 00:00  4/25/20 08:24


 


 


 Lorazepam


  (Ativan 2mg/ml


 1ml)  1 mg  EVERY 6 HOURS  PRN


 IV


 For Anxiety  4/25/20 16:00


 5/2/20 15:59   


 


 


 Memantine


  (Namenda)  5 mg  DAILY


 ORAL


   4/25/20 17:00


 5/25/20 16:59   


 


 


 Norepinephrine


 Bitartrate 4 mg/


 Dextrose  250 ml @ 0


 mls/hr  Q24H  PRN


 IV


 For hypotension  4/24/20 23:00


 5/24/20 22:59  4/25/20 10:25


 


 


 Sodium Chloride  1,000 ml @ 


 75 mls/hr  [id number]


 IV


   4/24/20 23:00


 5/24/20 22:59  4/25/20 15:01


 


 


 Vancomycin HCl


  (Vanco rx to


 dose)  1 ea  DAILY  PRN


 MISC


 Per rx protocol  4/24/20 15:00


 5/24/20 14:59   


 


 


 Vancomycin HCl


 750 mg/Dextrose  275 ml @ 


 183.333


 mls/hr  Q12H


 IVPB


   4/25/20 09:00


 4/30/20 08:59  4/25/20 10:22


 

















Naila Lindsay M.D. Apr 25, 2020 16:29

## 2020-04-25 NOTE — CONSULTATION
DATE OF CONSULTATION:  04/24/2020

PULMONARY CONSULTATION



CONSULTING PHYSICIAN:  Hayder Hahn M.D.



HISTORY OF PRESENT ILLNESS:  This is a 69-year-old male, nursing home

resident who was sent to the hospital with altered mental status.  Patient

was also found to be hypotensive.  Patient has a history of cardiomyopathy

and AICD.  He is unable to provide a clear history.  Currently, he is on

Levophed drip.  He is also hydrating well on nasal oxygen.



PAST MEDICAL HISTORY:  Notable for AICD, chronic heart failure,

cardiomyopathy, hypertension, nursing home resident.  There are no known

pulmonary issues.  He has previously had a stroke.



REVIEW OF SYSTEMS:  Not reliable.



PHYSICAL EXAMINATION:

GENERAL:  Reveals an elderly male.

HEENT:  Unremarkable.

LUNGS:  Decreased breath sounds bilaterally with normal heart

sounds.

ABDOMEN:  Soft.

EXTREMITIES:  There is no edema.

VITAL SIGNS:  Blood pressure 80/60, heart rate is 74, respirations 18, O2

saturation _______ on 2 L oxygen.



LABORATORY DATA:  Lab testing shows x-ray chest with left

atelectasis/infiltrate.  Hemoglobin 11.6.  Creatinine 2.4.



IMPRESSION:

1. Cardiomyopathy.

2. Hypotension.

3. Left lung pneumonia.

4. Nursing home resident.

5. Rule out COVID-19.



DISCUSSION:

1. Admit to the hospital.

2. Continue current medications.

3. Start pressors.

4. Oxygen.

5. Pulmonary hygiene.

6. Broad-spectrum antibiotics.

7. We will follow.









  ______________________________________________

  Hayder Hahn M.D.





DR:  JUNO

D:  04/24/2020 17:03

T:  04/25/2020 00:59

JOB#:  [id number]

CC:

## 2020-04-25 NOTE — NUR
NURSE NOTES:

Morning care was done. Tolerated BP with Levophed drip. Changed position. Placed fall and 
Seizure precaution. Will continue to care plan.

## 2020-04-25 NOTE — NUR
NURSE NOTES:

received report from DIMA Peralta. Pt is restring on the bed and awake and alert and 
forgetful. On O2 1L via nasal cannula and SaO2 % noted. Iv site intact and no sign of 
infiltration noted. Pt has Rt. femoral TLC and dressing is clean and dry. On running with 
Levophed drip 2mcg/min and BP is stable. Denied pain at this time. Pt has Swenson cath and 
patent and drainage well. On cardiac monitor and noted SR. pt was done PT therapy today.  
Placed fall and seizure precaution. Will continue to care plan.

## 2020-04-25 NOTE — NUR
NURSE NOTES:

Pt is resting on the bed and awake and forgetful. Noted BP: 118/64mmHg. Decrease Levophed 
4mcg/min. Repositioned. Will continue to monitor any change of condition.

## 2020-04-25 NOTE — NUR
NURSE NOTES:

Report received from Chaparro Dodd RN.Pt resting quietly in bed asleep ,easily awakens with 
verbal and tactile stimuli,no signs of resp distress ,on 1 L NC ,noted no discomfort,SR on 
the monitor, Pt kept NPO except medic,skin warm and dry,PIV site to ROBERT intact,RT Femoral 
TLC with Levophed drip at 2 mcg/hr ,and  IVF NS at 75 ml/hr ,with Swenson cath draining yellow 
urine,SR up x2 HOB elevated,bed lock in lowest position,will continue with plans of care.

## 2020-04-26 VITALS — SYSTOLIC BLOOD PRESSURE: 86 MMHG | DIASTOLIC BLOOD PRESSURE: 56 MMHG

## 2020-04-26 VITALS — DIASTOLIC BLOOD PRESSURE: 73 MMHG | SYSTOLIC BLOOD PRESSURE: 122 MMHG

## 2020-04-26 VITALS — DIASTOLIC BLOOD PRESSURE: 67 MMHG | SYSTOLIC BLOOD PRESSURE: 119 MMHG

## 2020-04-26 VITALS — DIASTOLIC BLOOD PRESSURE: 74 MMHG | SYSTOLIC BLOOD PRESSURE: 121 MMHG

## 2020-04-26 VITALS — DIASTOLIC BLOOD PRESSURE: 57 MMHG | SYSTOLIC BLOOD PRESSURE: 97 MMHG

## 2020-04-26 VITALS — DIASTOLIC BLOOD PRESSURE: 67 MMHG | SYSTOLIC BLOOD PRESSURE: 118 MMHG

## 2020-04-26 VITALS — SYSTOLIC BLOOD PRESSURE: 121 MMHG | DIASTOLIC BLOOD PRESSURE: 68 MMHG

## 2020-04-26 VITALS — SYSTOLIC BLOOD PRESSURE: 102 MMHG | DIASTOLIC BLOOD PRESSURE: 64 MMHG

## 2020-04-26 VITALS — DIASTOLIC BLOOD PRESSURE: 73 MMHG | SYSTOLIC BLOOD PRESSURE: 120 MMHG

## 2020-04-26 VITALS — DIASTOLIC BLOOD PRESSURE: 75 MMHG | SYSTOLIC BLOOD PRESSURE: 122 MMHG

## 2020-04-26 VITALS — DIASTOLIC BLOOD PRESSURE: 71 MMHG | SYSTOLIC BLOOD PRESSURE: 123 MMHG

## 2020-04-26 VITALS — DIASTOLIC BLOOD PRESSURE: 78 MMHG | SYSTOLIC BLOOD PRESSURE: 122 MMHG

## 2020-04-26 VITALS — SYSTOLIC BLOOD PRESSURE: 97 MMHG | DIASTOLIC BLOOD PRESSURE: 65 MMHG

## 2020-04-26 VITALS — DIASTOLIC BLOOD PRESSURE: 64 MMHG | SYSTOLIC BLOOD PRESSURE: 103 MMHG

## 2020-04-26 VITALS — DIASTOLIC BLOOD PRESSURE: 74 MMHG | SYSTOLIC BLOOD PRESSURE: 123 MMHG

## 2020-04-26 VITALS — SYSTOLIC BLOOD PRESSURE: 122 MMHG | DIASTOLIC BLOOD PRESSURE: 73 MMHG

## 2020-04-26 VITALS — DIASTOLIC BLOOD PRESSURE: 71 MMHG | SYSTOLIC BLOOD PRESSURE: 120 MMHG

## 2020-04-26 VITALS — DIASTOLIC BLOOD PRESSURE: 62 MMHG | SYSTOLIC BLOOD PRESSURE: 117 MMHG

## 2020-04-26 VITALS — DIASTOLIC BLOOD PRESSURE: 76 MMHG | SYSTOLIC BLOOD PRESSURE: 125 MMHG

## 2020-04-26 VITALS — DIASTOLIC BLOOD PRESSURE: 74 MMHG | SYSTOLIC BLOOD PRESSURE: 119 MMHG

## 2020-04-26 VITALS — DIASTOLIC BLOOD PRESSURE: 66 MMHG | SYSTOLIC BLOOD PRESSURE: 107 MMHG

## 2020-04-26 VITALS — DIASTOLIC BLOOD PRESSURE: 77 MMHG | SYSTOLIC BLOOD PRESSURE: 114 MMHG

## 2020-04-26 VITALS — DIASTOLIC BLOOD PRESSURE: 71 MMHG | SYSTOLIC BLOOD PRESSURE: 112 MMHG

## 2020-04-26 VITALS — DIASTOLIC BLOOD PRESSURE: 76 MMHG | SYSTOLIC BLOOD PRESSURE: 110 MMHG

## 2020-04-26 VITALS — DIASTOLIC BLOOD PRESSURE: 71 MMHG | SYSTOLIC BLOOD PRESSURE: 119 MMHG

## 2020-04-26 VITALS — SYSTOLIC BLOOD PRESSURE: 90 MMHG | DIASTOLIC BLOOD PRESSURE: 57 MMHG

## 2020-04-26 VITALS — DIASTOLIC BLOOD PRESSURE: 79 MMHG | SYSTOLIC BLOOD PRESSURE: 117 MMHG

## 2020-04-26 VITALS — DIASTOLIC BLOOD PRESSURE: 64 MMHG | SYSTOLIC BLOOD PRESSURE: 109 MMHG

## 2020-04-26 VITALS — SYSTOLIC BLOOD PRESSURE: 128 MMHG | DIASTOLIC BLOOD PRESSURE: 73 MMHG

## 2020-04-26 VITALS — SYSTOLIC BLOOD PRESSURE: 121 MMHG | DIASTOLIC BLOOD PRESSURE: 72 MMHG

## 2020-04-26 VITALS — DIASTOLIC BLOOD PRESSURE: 60 MMHG | SYSTOLIC BLOOD PRESSURE: 105 MMHG

## 2020-04-26 VITALS — SYSTOLIC BLOOD PRESSURE: 127 MMHG | DIASTOLIC BLOOD PRESSURE: 74 MMHG

## 2020-04-26 VITALS — DIASTOLIC BLOOD PRESSURE: 63 MMHG | SYSTOLIC BLOOD PRESSURE: 109 MMHG

## 2020-04-26 VITALS — SYSTOLIC BLOOD PRESSURE: 120 MMHG | DIASTOLIC BLOOD PRESSURE: 74 MMHG

## 2020-04-26 VITALS — DIASTOLIC BLOOD PRESSURE: 69 MMHG | SYSTOLIC BLOOD PRESSURE: 118 MMHG

## 2020-04-26 VITALS — DIASTOLIC BLOOD PRESSURE: 76 MMHG | SYSTOLIC BLOOD PRESSURE: 129 MMHG

## 2020-04-26 VITALS — SYSTOLIC BLOOD PRESSURE: 123 MMHG | DIASTOLIC BLOOD PRESSURE: 73 MMHG

## 2020-04-26 VITALS — DIASTOLIC BLOOD PRESSURE: 75 MMHG | SYSTOLIC BLOOD PRESSURE: 128 MMHG

## 2020-04-26 VITALS — DIASTOLIC BLOOD PRESSURE: 72 MMHG | SYSTOLIC BLOOD PRESSURE: 121 MMHG

## 2020-04-26 VITALS — SYSTOLIC BLOOD PRESSURE: 119 MMHG | DIASTOLIC BLOOD PRESSURE: 73 MMHG

## 2020-04-26 VITALS — DIASTOLIC BLOOD PRESSURE: 71 MMHG | SYSTOLIC BLOOD PRESSURE: 110 MMHG

## 2020-04-26 VITALS — SYSTOLIC BLOOD PRESSURE: 125 MMHG | DIASTOLIC BLOOD PRESSURE: 76 MMHG

## 2020-04-26 VITALS — DIASTOLIC BLOOD PRESSURE: 73 MMHG | SYSTOLIC BLOOD PRESSURE: 129 MMHG

## 2020-04-26 VITALS — SYSTOLIC BLOOD PRESSURE: 120 MMHG | DIASTOLIC BLOOD PRESSURE: 68 MMHG

## 2020-04-26 VITALS — DIASTOLIC BLOOD PRESSURE: 69 MMHG | SYSTOLIC BLOOD PRESSURE: 114 MMHG

## 2020-04-26 VITALS — DIASTOLIC BLOOD PRESSURE: 71 MMHG | SYSTOLIC BLOOD PRESSURE: 127 MMHG

## 2020-04-26 VITALS — SYSTOLIC BLOOD PRESSURE: 123 MMHG | DIASTOLIC BLOOD PRESSURE: 75 MMHG

## 2020-04-26 VITALS — DIASTOLIC BLOOD PRESSURE: 70 MMHG | SYSTOLIC BLOOD PRESSURE: 129 MMHG

## 2020-04-26 VITALS — SYSTOLIC BLOOD PRESSURE: 115 MMHG | DIASTOLIC BLOOD PRESSURE: 72 MMHG

## 2020-04-26 LAB
ADD MANUAL DIFF: NO
ANION GAP SERPL CALC-SCNC: 7 MMOL/L (ref 5–15)
BASOPHILS NFR BLD AUTO: 0.5 % (ref 0–2)
BUN SERPL-MCNC: 28 MG/DL (ref 7–18)
CALCIUM SERPL-MCNC: 8.5 MG/DL (ref 8.5–10.1)
CHLORIDE SERPL-SCNC: 104 MMOL/L (ref 98–107)
CO2 SERPL-SCNC: 28 MMOL/L (ref 21–32)
CREAT SERPL-MCNC: 1.1 MG/DL (ref 0.55–1.3)
EOSINOPHIL NFR BLD AUTO: 0.1 % (ref 0–3)
ERYTHROCYTE [DISTWIDTH] IN BLOOD BY AUTOMATED COUNT: 11.1 % (ref 11.6–14.8)
HCT VFR BLD CALC: 35.5 % (ref 42–52)
HGB BLD-MCNC: 11.9 G/DL (ref 14.2–18)
LYMPHOCYTES NFR BLD AUTO: 17.4 % (ref 20–45)
MCV RBC AUTO: 86 FL (ref 80–99)
MONOCYTES NFR BLD AUTO: 6.8 % (ref 1–10)
NEUTROPHILS NFR BLD AUTO: 75.3 % (ref 45–75)
PLATELET # BLD: 121 K/UL (ref 150–450)
POTASSIUM SERPL-SCNC: 4.3 MMOL/L (ref 3.5–5.1)
RBC # BLD AUTO: 4.13 M/UL (ref 4.7–6.1)
SODIUM SERPL-SCNC: 139 MMOL/L (ref 136–145)
WBC # BLD AUTO: 5.6 K/UL (ref 4.8–10.8)

## 2020-04-26 RX ADMIN — SODIUM CHLORIDE SCH MLS/HR: 9 INJECTION, SOLUTION INTRAVENOUS at 08:24

## 2020-04-26 RX ADMIN — SODIUM CHLORIDE SCH MLS/HR: 9 INJECTION, SOLUTION INTRAVENOUS at 22:48

## 2020-04-26 RX ADMIN — SODIUM CHLORIDE SCH MLS/HR: 0.9 INJECTION INTRAVENOUS at 20:26

## 2020-04-26 RX ADMIN — CHLORHEXIDINE GLUCONATE SCH APPLIC: 213 SOLUTION TOPICAL at 20:26

## 2020-04-26 RX ADMIN — SODIUM CHLORIDE SCH MLS/HR: 0.9 INJECTION INTRAVENOUS at 08:25

## 2020-04-26 NOTE — NUR
NURSE NOTES:

Late entry: PT and report received from DIMA Mayfield; PT received in bed AAOx2-3 cooperative, 
no S/S of respiratory distress noted upon enter room on 1L-NC; cardiac monitor shows HR 70, 
SR; provided breakfast clear liquid diet as ordered but PT refused breakfast left at bedside 
advised PT to eat if possible; mckay intact patent draining at lowest position; PIV L-hand 
20g intact patent flushes well, R-fem TLC infusing levophed @ 1mcg/min and NS @ 75ml/hr 
patent intact flushes well. Will continue to monitor PT throughout shift.

## 2020-04-26 NOTE — PROGRESS NOTE
DATE:  04/26/2020

SUBJECTIVE:  This is a 69-year-old male patient with altered mental status.

Right now, he is in ICU, rule out COVID.  He has altered mental status,

confusion, disorganized thought process.  That is why, his attending has

requested daily psychiatric consultation.



MENTAL STATUS EXAMINATION:  This is a 69-year-old male.  His appearance is

disheveled.  Attitude, irritable and agitated.  Affect, guarded and

restricted.  Intellect poor.  Mood, depressed, anxious.  Motor activity,

psychomotor agitation.  Attention span is poor.  Orientation x2.  Speech

is low volume, slurred.  Thought process, disorganized and illogical.

Insight and judgment is poor.



DIAGNOSIS:  Paranoid schizophrenia, acute exacerbation.



PLAN:  My plan for this patient is to treat him with a medication regimen

consisting of Namenda 5 mg daily, Ativan 1 mg IV every 6 hours p.r.n.

anxiety, agitation.  Provided him with 20 minutes of cognitive behavioral

therapy to help him identify his automatic negative thoughts, help him

convert his negative thoughts to more positive thoughts to reduce

depression, anxiety, and mood lability.  Chart reviewed.  Discussed with

staff.  Seen and assessed in his room.









  ______________________________________________

  Demarcus Love M.D.





DR:  OLGA

D:  04/26/2020 10:41

T:  04/26/2020 16:44

JOB#:  [id number]

CC:

## 2020-04-26 NOTE — NUR
NURSE NOTES:

Morning care was done. Noted small amount BM. Cleaned Pt and applied lotion and cream. 
Rechecked /67mmHg. decreased Levophed drip @ 1mcg/hr. Noted BT: 99.3F. Keep cooling 
measure. Will continue to monitor any change of condition.

## 2020-04-26 NOTE — NUR
HAND-OFF: 

Report given to Amberly Monk. Pt is sleeing on the bed and no sign of acute distress noted.

## 2020-04-26 NOTE — CONSULTATION
DATE OF CONSULTATION:

HISTORY OF PRESENT ILLNESS:  The patient is a 69-year-old male patient who

has altered mental status.  He came into the hospital confused and

disorganized, who was originally brought into the ER, but he ended up in

the ICU because he has altered mental status, hypotension, sepsis, and

rule out COVID-19, so he _____ in the ICU.  He is very confused,

disorganized, and has mood lability.  He has got no logical plan for his

own self-care, _____ feelings of helplessness, hopelessness, low energy,

poor appetite, loss of interest in activity.  He does have altered mental

status, confusion.  Apparently, his cognition has declined significantly

below his baseline, worsened by his stress with medical illness.  That is

why, daily psychiatric consultation requested to try to help this

patient's cognition improve or at least prevent any further decline in his

cognition.  Also, he does have some confusion, some disorganized thought

process, and altered mental status throughout hospital course.  He is a

very poor historian, so lot of information had to be obtained from the

patient's chart review.



PAST MEDICAL HISTORY:  Seizure disorder, aphasia, weakness, COPD,

diabetes.



ALLERGIES:  He has got no known drug allergies.



PSYCHOTROPIC MEDICATIONS ON ADMISSION:  This patient is on a psychotropic

medication regimen consisting of Neurontin 300 mg q.8 hours.



FAMILY PSYCHIATRIC HISTORY:  Unknown.



PAIN ASSESSMENT:  3/10 pain.



DEVELOPMENTAL PROBLEMS:  Denies.



PSYCHIATRIC HISTORY:  He is a poor historian.  He has major depressive

disorder, mild, recurrent with psychotic features, rule out dementia with

psychosis.



SOCIAL HISTORY:  The patient is currently living in [facility],

financially supported by [facility] and Medicare.



STRENGTHS:  He is motivated to get better and has a place to live.



WEAKNESSES:  Impulsive minimal support system.



MENTAL STATUS EXAMINATION:  This is a 69-year-old male patient.  His

appearance is disheveled.  His attitude is irritable and agitated.  Affect

is guarded and restricted.  His intellect is poor.  Mood, depressed and

anxious.  Motor activity, psychomotor agitation.  Attention span is poor

because he cannot do serial 7's or spell world backwards.  Orientation x2

to person and place, but not time and situation.  Speech is nonsensical.

Thought process, disorganized and illogical.  Thought content, auditory

hallucinations, paranoid delusions.  Perception is poor because of

perceptual disturbance such as auditory hallucinations and paranoid

delusions.  Abstract reasoning is poor because he does not proverbs, only

has concrete thinking.  Short-term memory 3/3 recall.  Long-term memory is

poor because _____ long-term events of his life.  Insight and judgement

poor.



DIAGNOSIS:  Major depressive disorder, mild, recurrent with psychotic

features, rule out dementia with psychosis.



PLAN:  Treat him with Namenda 5 mg daily and Neurontin 300 mg 3 times a

day, Ativan 1 mg IV every 6 hours p.r.n. anxiety, agitation.  A 20 minutes

of insight-oriented psychotherapy to help him recognize his psychiatric

and physical symptoms to help better cognition and better orientation on

the unit and better impulse control.  Chart reviewed.  Discussed with

staff.  Patient was seen and assessed at bedtime.



I would like to thank, Dr. Arron Barkley, for this consultation.









  ______________________________________________

  Demarcus Love M.D.





DR:  MAX

D:  04/25/2020 16:18

T:  04/26/2020 04:55

JOB#:  [id number]

CC:

## 2020-04-26 NOTE — NUR
NURSE NOTES:

Encouraged PT to eat, tried feeding PT, not wanting to eat his clear liquid diet. Attempted 
to make him drink some ensure, only drank small amount with meds, and refused. Will continue 
to monitor PT.

## 2020-04-26 NOTE — GENERAL PROGRESS NOTE
Assessment/Plan


Problem List:  


(1) Suspected COVID-19 virus infection


ICD Codes:  Z20.828 - Contact with and (suspected) exposure to other viral 

communicable diseases


SNOMED:  869199052


(2) Anemia


ICD Codes:  D64.9 - Anemia, unspecified


SNOMED:  401267447


(3) Weak


ICD Codes:  R53.1 - Weakness


SNOMED:  60500186


(4) COPD (chronic obstructive pulmonary disease)


ICD Codes:  J44.9 - Chronic obstructive pulmonary disease, unspecified


SNOMED:  15476855


(5) Diabetes


ICD Codes:  E11.9 - Type 2 diabetes mellitus without complications


SNOMED:  53199703


(6) Epileptic seizure, generalized


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 

not intractable, without status epilepticus


SNOMED:  53195827


(7) Altered mental status


ICD Codes:  R41.82 - Altered mental status, unspecified


SNOMED:  967536692


Qualifiers:  


   Qualified Codes:  R41.82 - Altered mental status, unspecified


(8) Hypotension


ICD Codes:  I95.9 - Hypotension, unspecified


SNOMED:  21961959


Qualifiers:  


   Qualified Codes:  I95.9 - Hypotension, unspecified


Status:  unchanged


Assessment/Plan:


o2 pulm tx abx pt diet cbc bmp am





Subjective


Constitutional:  Reports: weakness


Allergies:  


Coded Allergies:  


     No Known Allergies (Unverified , 12/15/14)


All Systems:  reviewed and negative except above


Subjective


o2nc calm in bed





Objective





Last 24 Hour Vital Signs








  Date Time  Temp Pulse Resp B/P (MAP) Pulse Ox O2 Delivery O2 Flow Rate FiO2


 


4/26/20 07:00  74 27 121/72 (88) 97   


 


4/26/20 07:00    121/71    


 


4/26/20 06:30  72 31 123/75 (91) 97   


 


4/26/20 06:00    128/73    


 


4/26/20 06:00  71 28 128/73 (91) 97   


 


4/26/20 05:30  70 25 125/76 (92) 99   


 


4/26/20 05:00  65 22 110/76 (87) 100   


 


4/26/20 05:00    110/76    


 


4/26/20 04:30  65 25 118/67 (84) 98   


 


4/26/20 04:00 99.3 68 27 119/67 (84) 99   


 


4/26/20 04:00    119/67    


 


4/26/20 04:00  67      


 


4/26/20 04:00      Nasal Cannula 1.0 


 


4/26/20 03:30  68 30 117/79 (92) 97   


 


4/26/20 03:00  65 21 120/68 (85) 99   


 


4/26/20 03:00    120/68    


 


4/26/20 02:30  61 22 109/63 (78) 99   


 


4/26/20 02:08    86/56    


 


4/26/20 02:08  65 26 86/56 (66) 97   


 


4/26/20 02:04  65 26 90/57 (68) 97   


 


4/26/20 02:00  65 24 97/57 (70) 96   


 


4/26/20 02:00  65 24 97/57 (70) 96   


 


4/26/20 01:30  69 23 105/60 (75) 97   


 


4/26/20 01:00  66 27 109/64 (79) 97   


 


4/26/20 00:30  70 22 107/66 (80) 97   


 


4/26/20 00:26 99.5       


 


4/26/20 00:00  62      


 


4/26/20 00:00 100.5 68 25 112/71 (85) 98   


 


4/26/20 00:00    112/71    


 


4/26/20 00:00      Nasal Cannula 1.0 


 


4/25/20 23:00  64 22 115/67 (83) 99   


 


4/25/20 23:00    115/67    


 


4/25/20 22:30  66 26 110/64 (79) 99   


 


4/25/20 22:00  67 19 112/65 (81) 96   


 


4/25/20 22:00    112/62    


 


4/25/20 21:30  65 21 106/68 (81) 99   


 


4/25/20 21:00    116/67    


 


4/25/20 21:00  66 19 116/67 (83) 99   


 


4/25/20 20:00 98.0 60 21 99/63 (75) 100   


 


4/25/20 20:00    99/63    


 


4/25/20 20:00      Nasal Cannula 1.0 


 


4/25/20 20:00  60      


 


4/25/20 19:00  64 20 114/65 (81) 99   


 


4/25/20 19:00    114/65    


 


4/25/20 18:00 99.0 67 27 90/55 (67) 97   


 


4/25/20 17:00  70 27 105/56 (72) 97   


 


4/25/20 16:00  67      


 


4/25/20 16:00 101.5 69 28 97/59 (72) 100   


 


4/25/20 16:00      Nasal Cannula 1.0 


 


4/25/20 15:00 100.5 69 27 104/58 (73) 98   


 


4/25/20 14:00  69 27 110/63 (79) 100   


 


4/25/20 13:00 101.3 67  109/60 (76)    


 


4/25/20 13:00    109/60    


 


4/25/20 12:00      Nasal Cannula 1.0 


 


4/25/20 12:00  63 27 112/80 (91) 98   


 


4/25/20 12:00    99/61    


 


4/25/20 12:00  71      


 


4/25/20 11:00  67 27 100/62 (75) 98   


 


4/25/20 11:00    100/62    


 


4/25/20 10:25    105/61    


 


4/25/20 10:06    105/61    


 


4/25/20 10:00  68 27 109/65 (80) 100   


 


4/25/20 09:00    109/58    


 


4/25/20 09:00  66 27 109/58 (75) 97   

















Intake and Output  


 


 4/25/20 4/26/20





 19:00 07:00


 


Intake Total 3383.333 ml 1376.50 ml


 


Output Total 1075 ml 910 ml


 


Balance 2308.333 ml 466.50 ml


 


  


 


Intake Oral 2500 ml 150 ml


 


IV Total 763.333 ml 1226.50 ml


 


Other 120 ml 


 


Output Urine Total 1075 ml 910 ml


 


# Bowel Movements 1 2








Laboratory Tests


4/26/20 04:00: 


White Blood Count 5.6, Red Blood Count 4.13L, Hemoglobin 11.9L, Hematocrit 35.5L

, Mean Corpuscular Volume 86, Mean Corpuscular Hemoglobin 28.7, Mean 

Corpuscular Hemoglobin Concent 33.4, Red Cell Distribution Width 11.1L, 

Platelet Count 121L, Mean Platelet Volume 6.6, Neutrophils (%) (Auto) 75.3H, 

Lymphocytes (%) (Auto) 17.4L, Monocytes (%) (Auto) 6.8, Eosinophils (%) (Auto) 

0.1, Basophils (%) (Auto) 0.5, Sodium Level 139, Potassium Level 4.3, Chloride 

Level 104, Carbon Dioxide Level 28, Anion Gap 7, Blood Urea Nitrogen 28H, 

Creatinine 1.1, Estimat Glomerular Filtration Rate > 60, Glucose Level 146H, 

Calcium Level 8.5


Height (Feet):  5


Height (Inches):  8.00


Weight (Pounds):  189


General Appearance:  lethargic


EENT:  normal ENT inspection


Neck:  normal alignment


Cardiovascular:  normal rate, regular rhythm


Respiratory/Chest:  no respiratory distress, no accessory muscle use


Extremities:  normal inspection


Skin:  normal pigmentation











Arron Barkley  Apr 26, 2020 08:42

## 2020-04-26 NOTE — NUR
NURSE NOTES:

Pt is sleeping on the bed. On O2 1L via nasal cannula and SaO2 97% noted. Noted BP: 86/56 
mmHg. Resume Levophed drip @ 2mcg/min. Changed Position. Will continue to monitor any orozco 
of condition.

## 2020-04-26 NOTE — PULMONOLOGY PROGRESS NOTE
Assessment/Plan


Assessment/Plan


IMPRESSION:


1. Cardiomyopathy.


2. Hypotension.


3. Left lung pneumonia.


4. Nursing home resident.


5. Rule out COVID-19.





DISCUSSION:


1. Admit to the hospital.


2. Continue current medications.


3. Continue pressors.


4. Oxygen.


5. Pulmonary hygiene.


6. Broad-spectrum antibiotics.


7. I will follow.














  ______________________________________________


  Hayder Hahn M.D.





Subjective


Interval Events:  Looking better; on low dose levophed; febrile


Constitutional:  Reports: no symptoms


Respiratory:  Reports: no symptoms


Cardiovascular:  Reports: no symptoms


Gastrointestinal/Abdominal:  Reports: no symptoms


Genitourinary:  Reports: no symptoms


Neurologic:  Reports: no symptoms


Allergies:  


Coded Allergies:  


     No Known Allergies (Unverified , 12/15/14)


All Systems:  reviewed and negative except above





Objective





Last 24 Hour Vital Signs








  Date Time  Temp Pulse Resp B/P (MAP) Pulse Ox O2 Delivery O2 Flow Rate FiO2


 


4/26/20 12:00    103/64    


 


4/26/20 12:00      Nasal Cannula 1.0 


 


4/26/20 11:00    98/64    


 


4/26/20 11:00  68 26 97/65 (76) 97   


 


4/26/20 10:30  72 26 114/69 (84) 97   


 


4/26/20 10:00  70 27 110/71 (84) 98   


 


4/26/20 10:00    110/71    


 


4/26/20 09:30 100.3 72 26 121/72 (88) 97   


 


4/26/20 09:21 101.0       


 


4/26/20 09:00      Nasal Cannula 1.0 


 


4/26/20 09:00    128/75    


 


4/26/20 09:00 101.3 71 30 128/75 (92) 97   


 


4/26/20 08:30  72 30 129/70 (89) 97   


 


4/26/20 08:00  68      


 


4/26/20 08:00    122/73    


 


4/26/20 08:00  71 27 122/73 (89) 97   


 


4/26/20 07:30  71 26 118/69 (85) 97   


 


4/26/20 07:00  74 27 121/72 (88) 97   


 


4/26/20 07:00    121/71    


 


4/26/20 06:30  72 31 123/75 (91) 97   


 


4/26/20 06:00    128/73    


 


4/26/20 06:00  71 28 128/73 (91) 97   


 


4/26/20 05:30  70 25 125/76 (92) 99   


 


4/26/20 05:00  65 22 110/76 (87) 100   


 


4/26/20 05:00    110/76    


 


4/26/20 04:30  65 25 118/67 (84) 98   


 


4/26/20 04:00 99.3 68 27 119/67 (84) 99   


 


4/26/20 04:00    119/67    


 


4/26/20 04:00  67      


 


4/26/20 04:00      Nasal Cannula 1.0 


 


4/26/20 03:30  68 30 117/79 (92) 97   


 


4/26/20 03:00  65 21 120/68 (85) 99   


 


4/26/20 03:00    120/68    


 


4/26/20 02:30  61 22 109/63 (78) 99   


 


4/26/20 02:08    86/56    


 


4/26/20 02:08  65 26 86/56 (66) 97   


 


4/26/20 02:04  65 26 90/57 (68) 97   


 


4/26/20 02:00  65 24 97/57 (70) 96   


 


4/26/20 02:00  65 24 97/57 (70) 96   


 


4/26/20 01:30  69 23 105/60 (75) 97   


 


4/26/20 01:00  66 27 109/64 (79) 97   


 


4/26/20 00:30  70 22 107/66 (80) 97   


 


4/26/20 00:26 99.5       


 


4/26/20 00:00  62      


 


4/26/20 00:00 100.5 68 25 112/71 (85) 98   


 


4/26/20 00:00    112/71    


 


4/26/20 00:00      Nasal Cannula 1.0 


 


4/25/20 23:00  64 22 115/67 (83) 99   


 


4/25/20 23:00    115/67    


 


4/25/20 22:30  66 26 110/64 (79) 99   


 


4/25/20 22:00  67 19 112/65 (81) 96   


 


4/25/20 22:00    112/62    


 


4/25/20 21:30  65 21 106/68 (81) 99   


 


4/25/20 21:00    116/67    


 


4/25/20 21:00  66 19 116/67 (83) 99   


 


4/25/20 20:00 98.0 60 21 99/63 (75) 100   


 


4/25/20 20:00    99/63    


 


4/25/20 20:00      Nasal Cannula 1.0 


 


4/25/20 20:00  60      


 


4/25/20 19:00  64 20 114/65 (81) 99   


 


4/25/20 19:00    114/65    


 


4/25/20 18:00 99.0 67 27 90/55 (67) 97   


 


4/25/20 17:00  70 27 105/56 (72) 97   


 


4/25/20 16:00  67      


 


4/25/20 16:00 101.5 69 28 97/59 (72) 100   


 


4/25/20 16:00      Nasal Cannula 1.0 


 


4/25/20 15:00 100.5 69 27 104/58 (73) 98   


 


4/25/20 14:00  69 27 110/63 (79) 100   


 


4/25/20 13:00 101.3 67  109/60 (76)    


 


4/25/20 13:00    109/60    

















Intake and Output  


 


 4/25/20 4/26/20





 19:00 07:00


 


Intake Total 3383.333 ml 1376.50 ml


 


Output Total 1075 ml 910 ml


 


Balance 2308.333 ml 466.50 ml


 


  


 


Intake Oral 2500 ml 150 ml


 


IV Total 763.333 ml 1226.50 ml


 


Other 120 ml 


 


Output Urine Total 1075 ml 910 ml


 


# Bowel Movements 1 2








General Appearance:  no acute distress


HEENT:  normocephalic


Respiratory/Chest:  chest wall non-tender, lungs clear


Cardiovascular:  normal peripheral pulses


Abdomen:  normal bowel sounds





Microbiology








 Date/Time


Source Procedure


Growth Status


 


 


 4/24/20 10:14


Blood Blood Culture - Preliminary


NO GROWTH AFTER 24 HOURS Resulted


 


 4/24/20 10:14


Blood Blood Culture - Preliminary


NO GROWTH AFTER 24 HOURS Resulted


 


 4/24/20 17:42


Nasal Nares MRSA Culture - Final


NO METHICILLIN RESISTANT STAPH AUREUS... Complete


 


 4/24/20 17:42


Rectum VRE Culture - Final


NO VANCOMYCIN RESISTANT ENTEROCOCCUS ... Complete


 


 4/24/20 17:42


Rectum - Final


NO CARBAPENEM-RESISTANT ENTEROBACTERI... Complete








Laboratory Tests


4/26/20 04:00: 


White Blood Count 5.6, Red Blood Count 4.13L, Hemoglobin 11.9L, Hematocrit 35.5L

, Mean Corpuscular Volume 86, Mean Corpuscular Hemoglobin 28.7, Mean 

Corpuscular Hemoglobin Concent 33.4, Red Cell Distribution Width 11.1L, 

Platelet Count 121L, Mean Platelet Volume 6.6, Neutrophils (%) (Auto) 75.3H, 

Lymphocytes (%) (Auto) 17.4L, Monocytes (%) (Auto) 6.8, Eosinophils (%) (Auto) 

0.1, Basophils (%) (Auto) 0.5, Sodium Level 139, Potassium Level 4.3, Chloride 

Level 104, Carbon Dioxide Level 28, Anion Gap 7, Blood Urea Nitrogen 28H, 

Creatinine 1.1, Estimat Glomerular Filtration Rate > 60, Glucose Level 146H, 

Calcium Level 8.5





Current Medications








 Medications


  (Trade)  Dose


 Ordered  Sig/Aarti


 Route


 PRN Reason  Start Time


 Stop Time Status Last Admin


Dose Admin


 


 Acetaminophen


  (Tylenol)  650 mg  Q6H  PRN


 ORAL


 Temp >100.5  4/26/20 08:45


 5/26/20 08:44  4/26/20 08:44


 


 


 Cefepime HCl 1 gm/


 Dextrose  55 ml @ 


 110 mls/hr  Q12H


 IVPB


   4/25/20 20:00


 5/2/20 19:59  4/26/20 08:25


 


 


 Chlorhexidine


 Gluconate


  (Gloria-Hex 2%)  1 applic  DAILY@2000


 TOPIC


   4/25/20 20:00


 7/24/20 19:59  4/25/20 19:53


 


 


 Gabapentin


  (Neurontin)  300 mg  Q8HR


 ORAL


   4/25/20 00:00


 5/25/20 00:00  4/26/20 06:07


 


 


 Levetiracetam


  (Keppra)  1,000 mg  Q12HR


 ORAL


   4/25/20 00:00


 5/25/20 00:00  4/26/20 08:25


 


 


 Lorazepam


  (Ativan 2mg/ml


 1ml)  1 mg  EVERY 6 HOURS  PRN


 IV


 For Anxiety  4/25/20 16:00


 5/2/20 15:59   


 


 


 Memantine


  (Namenda)  5 mg  DAILY


 ORAL


   4/25/20 17:00


 5/25/20 16:59  4/26/20 08:25


 


 


 Norepinephrine


 Bitartrate 4 mg/


 Dextrose  250 ml @ 0


 mls/hr  Q24H  PRN


 IV


 For hypotension  4/24/20 23:00


 5/24/20 22:59  4/25/20 10:25


 


 


 Sodium Chloride  1,000 ml @ 


 75 mls/hr  [id number]


 IV


   4/24/20 23:00


 5/24/20 22:59  4/26/20 03:59


 


 


 Vancomycin HCl


  (Vanco rx to


 dose)  1 ea  DAILY  PRN


 MISC


 Per rx protocol  4/24/20 15:00


 5/24/20 14:59   


 


 


 Vancomycin HCl


 750 mg/Dextrose  275 ml @ 


 183.333


 mls/hr  Q12H


 IVPB


   4/25/20 09:00


 4/30/20 08:59  4/26/20 08:24


 

















Hayder Hahn MD Apr 26, 2020 12:40

## 2020-04-26 NOTE — NUR
NURSE NOTES:

Running Levophed drip @ 1mcg/min. BP is stable. repositioned. Provide oral hydration. Will 
continue to care plan.

## 2020-04-26 NOTE — NUR
NURSE NOTES:

Pt is sleeping on the bed and no sign of acute distress noted. Checked BP: 112/71mmHg. Held 
Levophed drip at this time. Will continue to monitor any change of condition. 

-------------------------------------------------------------------------------

Addendum: 04/26/20 at 0224 by MIGDALIA DONOVAN RN RN

-------------------------------------------------------------------------------

Noted BT: 100.5F via Axillary. Applied cooling measure and given tylenol Prn med. Will 
continue to monitor any change of condition.

## 2020-04-26 NOTE — NUR
NURSE NOTES:

RECEIVED REPORT FROM [facility] PT AWAKE  AND ALERT  COOPERATIVE AND FOLLOWS COMMAND 
REPOSITION AND SUCTION IV SITE GOOD IV FUSING WELL  TAKING PO MEDICATION

## 2020-04-26 NOTE — NUR
NURSE NOTES:

VSS, no S/S of respiratory distress on 1L-NC, saturating well @ 97%, will continue to 
monitor.

## 2020-04-26 NOTE — NUR
RD ASSESSMENT & RECOMMENDATIONS

SEE CARE ACTIVITY FOR COMPLETE ASSESSMENT



DAILY ESTIMATED NEEDS:

Needs based on Cardiac 74kg abw 

25-30  kcals/kg 

6795-6812  total kcals

1-1.2  g protein/kg

74-89  g total protein 

25-30  mL/kg

4787-0528  total fluid mLs



NUTRITION DIAGNOSIS:

Altered nutrition related lab values r/t clinical status as evidenced by

elev BG (146 175), febrile (tmax 101.5)



CURRENT DIET: CLD    



PO DIET RECOMMENDATIONS:

Advance as able to Low Na  





ADDITIONAL RECOMMENDATIONS:

1) Check HgA1C -> pt w/elev BG (146-175)  

2) Add Ensure Clear w/ CLD-> advance diet as able  

3) Monitor ICU status, respiratory status  

4) Obtain a calibrated bed scale wt 

5) Rec SLP eval prior to advancing diet

## 2020-04-26 NOTE — NUR
NURSE NOTES:

PT had 1 BM soft medium sized, cleaned and dried, skin remains intact, repositioned, linens 
changed, VSS on levophed 1mcg/min will continue to monitor PT.

## 2020-04-27 VITALS — SYSTOLIC BLOOD PRESSURE: 107 MMHG | DIASTOLIC BLOOD PRESSURE: 75 MMHG

## 2020-04-27 VITALS — SYSTOLIC BLOOD PRESSURE: 120 MMHG | DIASTOLIC BLOOD PRESSURE: 74 MMHG

## 2020-04-27 VITALS — DIASTOLIC BLOOD PRESSURE: 74 MMHG | SYSTOLIC BLOOD PRESSURE: 117 MMHG

## 2020-04-27 VITALS — DIASTOLIC BLOOD PRESSURE: 72 MMHG | SYSTOLIC BLOOD PRESSURE: 116 MMHG

## 2020-04-27 VITALS — SYSTOLIC BLOOD PRESSURE: 117 MMHG | DIASTOLIC BLOOD PRESSURE: 72 MMHG

## 2020-04-27 VITALS — SYSTOLIC BLOOD PRESSURE: 128 MMHG | DIASTOLIC BLOOD PRESSURE: 73 MMHG

## 2020-04-27 VITALS — SYSTOLIC BLOOD PRESSURE: 110 MMHG | DIASTOLIC BLOOD PRESSURE: 98 MMHG

## 2020-04-27 VITALS — SYSTOLIC BLOOD PRESSURE: 119 MMHG | DIASTOLIC BLOOD PRESSURE: 71 MMHG

## 2020-04-27 VITALS — DIASTOLIC BLOOD PRESSURE: 67 MMHG | SYSTOLIC BLOOD PRESSURE: 115 MMHG

## 2020-04-27 VITALS — DIASTOLIC BLOOD PRESSURE: 73 MMHG | SYSTOLIC BLOOD PRESSURE: 130 MMHG

## 2020-04-27 VITALS — DIASTOLIC BLOOD PRESSURE: 70 MMHG | SYSTOLIC BLOOD PRESSURE: 116 MMHG

## 2020-04-27 VITALS — SYSTOLIC BLOOD PRESSURE: 114 MMHG | DIASTOLIC BLOOD PRESSURE: 70 MMHG

## 2020-04-27 VITALS — DIASTOLIC BLOOD PRESSURE: 68 MMHG | SYSTOLIC BLOOD PRESSURE: 117 MMHG

## 2020-04-27 VITALS — SYSTOLIC BLOOD PRESSURE: 120 MMHG | DIASTOLIC BLOOD PRESSURE: 70 MMHG

## 2020-04-27 VITALS — DIASTOLIC BLOOD PRESSURE: 70 MMHG | SYSTOLIC BLOOD PRESSURE: 109 MMHG

## 2020-04-27 VITALS — SYSTOLIC BLOOD PRESSURE: 116 MMHG | DIASTOLIC BLOOD PRESSURE: 72 MMHG

## 2020-04-27 VITALS — DIASTOLIC BLOOD PRESSURE: 72 MMHG | SYSTOLIC BLOOD PRESSURE: 110 MMHG

## 2020-04-27 VITALS — SYSTOLIC BLOOD PRESSURE: 117 MMHG | DIASTOLIC BLOOD PRESSURE: 71 MMHG

## 2020-04-27 VITALS — DIASTOLIC BLOOD PRESSURE: 77 MMHG | SYSTOLIC BLOOD PRESSURE: 121 MMHG

## 2020-04-27 VITALS — SYSTOLIC BLOOD PRESSURE: 114 MMHG | DIASTOLIC BLOOD PRESSURE: 74 MMHG

## 2020-04-27 VITALS — DIASTOLIC BLOOD PRESSURE: 68 MMHG | SYSTOLIC BLOOD PRESSURE: 111 MMHG

## 2020-04-27 VITALS — DIASTOLIC BLOOD PRESSURE: 72 MMHG | SYSTOLIC BLOOD PRESSURE: 125 MMHG

## 2020-04-27 VITALS — SYSTOLIC BLOOD PRESSURE: 121 MMHG | DIASTOLIC BLOOD PRESSURE: 77 MMHG

## 2020-04-27 VITALS — SYSTOLIC BLOOD PRESSURE: 115 MMHG | DIASTOLIC BLOOD PRESSURE: 74 MMHG

## 2020-04-27 VITALS — DIASTOLIC BLOOD PRESSURE: 69 MMHG | SYSTOLIC BLOOD PRESSURE: 116 MMHG

## 2020-04-27 VITALS — DIASTOLIC BLOOD PRESSURE: 75 MMHG | SYSTOLIC BLOOD PRESSURE: 113 MMHG

## 2020-04-27 VITALS — SYSTOLIC BLOOD PRESSURE: 121 MMHG | DIASTOLIC BLOOD PRESSURE: 72 MMHG

## 2020-04-27 VITALS — SYSTOLIC BLOOD PRESSURE: 117 MMHG | DIASTOLIC BLOOD PRESSURE: 79 MMHG

## 2020-04-27 VITALS — SYSTOLIC BLOOD PRESSURE: 117 MMHG | DIASTOLIC BLOOD PRESSURE: 78 MMHG

## 2020-04-27 VITALS — SYSTOLIC BLOOD PRESSURE: 121 MMHG | DIASTOLIC BLOOD PRESSURE: 73 MMHG

## 2020-04-27 VITALS — SYSTOLIC BLOOD PRESSURE: 122 MMHG | DIASTOLIC BLOOD PRESSURE: 71 MMHG

## 2020-04-27 VITALS — DIASTOLIC BLOOD PRESSURE: 70 MMHG | SYSTOLIC BLOOD PRESSURE: 128 MMHG

## 2020-04-27 VITALS — DIASTOLIC BLOOD PRESSURE: 70 MMHG | SYSTOLIC BLOOD PRESSURE: 117 MMHG

## 2020-04-27 VITALS — SYSTOLIC BLOOD PRESSURE: 123 MMHG | DIASTOLIC BLOOD PRESSURE: 71 MMHG

## 2020-04-27 VITALS — SYSTOLIC BLOOD PRESSURE: 108 MMHG | DIASTOLIC BLOOD PRESSURE: 74 MMHG

## 2020-04-27 VITALS — DIASTOLIC BLOOD PRESSURE: 71 MMHG | SYSTOLIC BLOOD PRESSURE: 113 MMHG

## 2020-04-27 VITALS — DIASTOLIC BLOOD PRESSURE: 66 MMHG | SYSTOLIC BLOOD PRESSURE: 119 MMHG

## 2020-04-27 VITALS — SYSTOLIC BLOOD PRESSURE: 93 MMHG | DIASTOLIC BLOOD PRESSURE: 63 MMHG

## 2020-04-27 VITALS — SYSTOLIC BLOOD PRESSURE: 113 MMHG | DIASTOLIC BLOOD PRESSURE: 71 MMHG

## 2020-04-27 LAB
ADD MANUAL DIFF: NO
ANION GAP SERPL CALC-SCNC: 7 MMOL/L (ref 5–15)
BASOPHILS NFR BLD AUTO: 0.4 % (ref 0–2)
BUN SERPL-MCNC: 19 MG/DL (ref 7–18)
CALCIUM SERPL-MCNC: 8.6 MG/DL (ref 8.5–10.1)
CHLORIDE SERPL-SCNC: 100 MMOL/L (ref 98–107)
CO2 SERPL-SCNC: 29 MMOL/L (ref 21–32)
CREAT SERPL-MCNC: 1 MG/DL (ref 0.55–1.3)
EOSINOPHIL NFR BLD AUTO: 0.1 % (ref 0–3)
ERYTHROCYTE [DISTWIDTH] IN BLOOD BY AUTOMATED COUNT: 10.8 % (ref 11.6–14.8)
HCT VFR BLD CALC: 35.9 % (ref 42–52)
HGB BLD-MCNC: 12.1 G/DL (ref 14.2–18)
LYMPHOCYTES NFR BLD AUTO: 15.7 % (ref 20–45)
MCV RBC AUTO: 85 FL (ref 80–99)
MONOCYTES NFR BLD AUTO: 7.4 % (ref 1–10)
NEUTROPHILS NFR BLD AUTO: 76.5 % (ref 45–75)
PLATELET # BLD: 120 K/UL (ref 150–450)
POTASSIUM SERPL-SCNC: 4 MMOL/L (ref 3.5–5.1)
RBC # BLD AUTO: 4.23 M/UL (ref 4.7–6.1)
SODIUM SERPL-SCNC: 136 MMOL/L (ref 136–145)
WBC # BLD AUTO: 5.8 K/UL (ref 4.8–10.8)

## 2020-04-27 RX ADMIN — SODIUM CHLORIDE SCH MLS/HR: 0.9 INJECTION INTRAVENOUS at 20:31

## 2020-04-27 RX ADMIN — SODIUM CHLORIDE SCH MLS/HR: 9 INJECTION, SOLUTION INTRAVENOUS at 10:35

## 2020-04-27 RX ADMIN — CHLORHEXIDINE GLUCONATE SCH APPLIC: 213 SOLUTION TOPICAL at 20:30

## 2020-04-27 RX ADMIN — SODIUM CHLORIDE SCH MLS/HR: 0.9 INJECTION INTRAVENOUS at 08:40

## 2020-04-27 NOTE — NUR
NURSE NOTES:

Patient received in bed from [facility]. Patient is a/o x 2-3 cooperative and calm in bed, 
no s/s of respiratory distress noted. patient is on 1L NC. patient on a cardiac monitor, SR 
HR of 74. patient provided with clear liquid diet at bedside. Swenson Catheter intact, patent, 
draining yellow urine without complications. Patient's IV site Lt hand 20g intact, patent. 
patient's right femoral TLC infusing Levophed @ 1mcg/min and NS @ 75ml/hr without 
complications. Call light within reach, bed locked and in lowest position. Will continue to 
monitor.

## 2020-04-27 NOTE — GENERAL PROGRESS NOTE
Assessment/Plan


Problem List:  


(1) Suspected COVID-19 virus infection


ICD Codes:  Z20.828 - Contact with and (suspected) exposure to other viral 

communicable diseases


SNOMED:  394691919


(2) Anemia


ICD Codes:  D64.9 - Anemia, unspecified


SNOMED:  794654405


(3) Weak


ICD Codes:  R53.1 - Weakness


SNOMED:  48697840


(4) COPD (chronic obstructive pulmonary disease)


ICD Codes:  J44.9 - Chronic obstructive pulmonary disease, unspecified


SNOMED:  84408862


(5) Diabetes


ICD Codes:  E11.9 - Type 2 diabetes mellitus without complications


SNOMED:  10932482


(6) Epileptic seizure, generalized


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 

not intractable, without status epilepticus


SNOMED:  09674426


(7) Altered mental status


ICD Codes:  R41.82 - Altered mental status, unspecified


SNOMED:  186910875


Qualifiers:  


   Qualified Codes:  R41.82 - Altered mental status, unspecified


(8) Hypotension


ICD Codes:  I95.9 - Hypotension, unspecified


SNOMED:  90919153


Qualifiers:  


   Qualified Codes:  I95.9 - Hypotension, unspecified


Status:  unchanged


Assessment/Plan:


o2 pulm tx abx pt diet cbc bmp am





Subjective


Constitutional:  Reports: weakness


Allergies:  


Coded Allergies:  


     No Known Allergies (Unverified , 12/15/14)


All Systems:  reviewed and negative except above


Subjective


o2nc calm in bed in icu





Objective





Last 24 Hour Vital Signs








  Date Time  Temp Pulse Resp B/P (MAP) Pulse Ox O2 Delivery O2 Flow Rate FiO2


 


4/27/20 08:30  72 26 93/63 (73) 97   


 


4/27/20 08:00    117/72    


 


4/27/20 08:00  71 21 117/72 (87) 97   


 


4/27/20 07:30  76 27 116/70 (85) 97   


 


4/27/20 07:00    115/74    


 


4/27/20 07:00  70 28 115/74 (88) 97   


 


4/27/20 06:00 100.5 73 32 113/71 (85) 97   


 


4/27/20 06:00    123/71    


 


4/27/20 05:57 99.6       


 


4/27/20 05:30  69 32 123/71 (88) 97   


 


4/27/20 05:00    115/67    


 


4/27/20 05:00  85 29 110/98 (102) 94   


 


4/27/20 04:30  69 28 115/67 (83) 98   


 


4/27/20 04:00 100.0 67 29 119/66 (83) 96   


 


4/27/20 04:00    115/67    


 


4/27/20 04:00      Nasal Cannula 1.0 


 


4/27/20 04:00  67      


 


4/27/20 03:30  63 28 109/70 (83) 97   


 


4/27/20 03:00  65 28 108/74 (85) 97   


 


4/27/20 03:00    109/70    


 


4/27/20 02:30  68 29 114/74 (87) 98   


 


4/27/20 02:00 99.5 63 23 117/68 (84) 99   


 


4/27/20 02:00    114/74    


 


4/27/20 01:30  65 24 121/72 (88) 98   


 


4/27/20 01:00    121/72    


 


4/27/20 01:00  67 27 117/74 (88) 96   


 


4/27/20 00:30  64 25 116/72 (87) 98   


 


4/27/20 00:00 99.0 64 24 117/70 (86) 97   


 


4/27/20 00:00  68      


 


4/27/20 00:00      Nasal Cannula 1.0 


 


4/27/20 00:00    116/72    


 


4/26/20 23:30  68 24 119/71 (87) 98   


 


4/26/20 23:00  70 21 120/74 (89) 98   


 


4/26/20 23:00    119/71    


 


4/26/20 22:30  69 29 119/74 (89) 98   


 


4/26/20 22:00  70 30 125/76 (92) 98   


 


4/26/20 22:00    125/76    


 


4/26/20 21:30  72 28 119/73 (88) 97   


 


4/26/20 21:00  70 30 122/75 (91) 97   


 


4/26/20 21:00    119/73    


 


4/26/20 20:30  71 33 123/71 (88) 96   


 


4/26/20 20:00      Nasal Cannula 1.0 


 


4/26/20 20:00    123/71    


 


4/26/20 20:00  65      


 


4/26/20 20:00 99.9 73 32 122/73 (89) 97   


 


4/26/20 19:30  69 30 122/78 (93) 97   


 


4/26/20 19:00  67 28 123/74 (90) 97   


 


4/26/20 19:00    122/78    


 


4/26/20 18:00  66 26 121/74 (90) 96   


 


4/26/20 18:00    121/74    


 


4/26/20 17:30  68 25 123/73 (90) 98   


 


4/26/20 17:00  72 32 127/71 (89) 98   


 


4/26/20 17:00    127/71    


 


4/26/20 16:30  72 27 114/77 (89) 98   


 


4/26/20 16:00  71 27 129/76 (93) 98   


 


4/26/20 16:00  70      


 


4/26/20 16:00      Nasal Cannula 1.0 


 


4/26/20 16:00    129/76    


 


4/26/20 15:30 100.0 66 26 121/68 (85) 98   


 


4/26/20 15:00  71 30 120/71 (87) 97   


 


4/26/20 15:00    120/71    


 


4/26/20 14:30  70 28 129/73 (91) 98   


 


4/26/20 14:00  64 25 115/72 (86) 98   


 


4/26/20 14:00    115/72    


 


4/26/20 13:30  70 26 127/74 (91) 98   


 


4/26/20 13:00  67 21 120/73 (89) 100   


 


4/26/20 13:00    120/73    


 


4/26/20 12:30  66 25 117/62 (80) 98   


 


4/26/20 12:00  69      


 


4/26/20 12:00    103/64    


 


4/26/20 12:00      Nasal Cannula 1.0 


 


4/26/20 12:00 99.9 66 23 103/64 (77) 99   


 


4/26/20 11:30  67 25 102/64 (77) 98   


 


4/26/20 11:00    98/64    


 


4/26/20 11:00  68 26 97/65 (76) 97   


 


4/26/20 10:30  72 26 114/69 (84) 97   


 


4/26/20 10:00  70 27 110/71 (84) 98   


 


4/26/20 10:00    110/71    


 


4/26/20 09:30 100.3 72 26 121/72 (88) 97   

















Intake and Output  


 


 4/26/20 4/27/20





 19:00 07:00


 


Intake Total 1275.000 ml 925.00 ml


 


Output Total 580 ml 495 ml


 


Balance 695.000 ml 430.00 ml


 


  


 


Intake Oral  150 ml


 


IV Total 1275.000 ml 775.00 ml


 


Output Urine Total 580 ml 495 ml


 


# Bowel Movements 2 3








Laboratory Tests


4/26/20 20:38: Vancomycin Level Trough 9.9


4/27/20 04:45: 


White Blood Count 5.8, Red Blood Count 4.23L, Hemoglobin 12.1L, Hematocrit 35.9L

, Mean Corpuscular Volume 85, Mean Corpuscular Hemoglobin 28.7, Mean 

Corpuscular Hemoglobin Concent 33.9, Red Cell Distribution Width 10.8L, 

Platelet Count 120L, Mean Platelet Volume 7.5, Neutrophils (%) (Auto) 76.5H, 

Lymphocytes (%) (Auto) 15.7L, Monocytes (%) (Auto) 7.4, Eosinophils (%) (Auto) 

0.1, Basophils (%) (Auto) 0.4, Sodium Level 136, Potassium Level 4.0, Chloride 

Level 100, Carbon Dioxide Level 29, Anion Gap 7, Blood Urea Nitrogen 19H, 

Creatinine 1.0, Estimat Glomerular Filtration Rate > 60, Glucose Level 164H, 

Calcium Level 8.6


Height (Feet):  5


Height (Inches):  8.00


Weight (Pounds):  185


General Appearance:  lethargic


EENT:  normal ENT inspection


Neck:  normal alignment


Cardiovascular:  normal rate, regular rhythm


Respiratory/Chest:  no respiratory distress, no accessory muscle use


Extremities:  normal inspection


Skin:  normal pigmentation











Arron Barkley DO Apr 27, 2020 09:00

## 2020-04-27 NOTE — PROGRESS NOTE
DATE:  04/27/2020

SUBJECTIVE:  This is a 69-year-old male patient with altered mental status,

hypertension, sepsis.  He has confusion, disorganized thought process.  He

has got no logical plan for his own self-care, and got feelings of

helplessness, hopelessness, low energy, poor appetite, and loss of

interest in activity.  _____ The patient is in the ICU right now.  He has

some confusion, disorganized thought process, and some decline in

cognition below his baseline.  That is why, he does require acute

inpatient treatment at this time.



MENTAL STATUS EXAMINATION:  This is a 69-year-old male patient.  Appearance

is disheveled.  Attitude, irritable and agitated.  Affect, guarded and

restricted.  Intellect, poor.  Mood, depressed and anxious.  Motor

activity, psychomotor agitation.  Insight and judgment is poor.



DIAGNOSIS:  Major depressive disorder, mild, recurrent with psychotic

features.



PLAN:  Treat this patient with a medication regimen _____ 1 mg IV every 6

hours p.r.n. anxiety and agitation, Namenda 5 mg daily, Neurontin 300 mg

p.o. every 8 hours.  Twenty minutes of behavioral management.  Chart

reviewed.  Discussed with staff.  The patient is seen and assessed at

bedside in the ICU unit.  Poor cognition, so he has better recognition and

impulse control in the ICU unit.  Chart reviewed.  Discussed with staff.

Twenty minutes of insight-oriented psychotherapy provided.









  ______________________________________________

  Demarcus Love M.D.





DR:  ALPA

D:  04/27/2020 09:49

T:  04/27/2020 19:36

JOB#:  [id number]

CC:

## 2020-04-27 NOTE — NUR
NURSE NOTES:





patient on a isolation precaution for Covid-19 positive. notified to CN. all safety measures 
applied.

## 2020-04-27 NOTE — NUR
NURSE NOTES:





notified to Dr. Hahn regarding patient positive for Covid-10. no new orders at this time.

## 2020-04-27 NOTE — NUR
Social Work

Patient is a positive COVID-19 patient, currently isolated in the ICU. Patient is A/O x2/3 
and requires assistance with all ADLS, came from [facility]. This SW spoke 
with Deneen, Medical Records at [facility], who explains patient is his own decision 
maker, does not have an Advance Directive, family, friend or POA. Bioethics to be consulted, 
as needed.

## 2020-04-27 NOTE — INFECTIOUS DISEASES PROG NOTE
Assessment/Plan


Assessment/Plan





Assessment:


Septic shock-r/o bacteremia





Fever


No leukocytosis


   -u/a neg


   -Bcx NTD





Probable PNA- 2ry to COVID19 (from NH with confirmed cases)


At 1l NC


  -4/24 SARS-COV2 PCR +


  -CXR: Left basilar atelectasis and/or infiltrate


 


MELVI, improving





 HTN


cadiomyopathy s/p AICD


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


seizure disorder


NH resident ([facility]) 








Plan


-Dc empiric IV Vancomycin #4


-Continue empiric Cefepime #4/5


-f/u cx


-Monitor CBC/CMP, temperatures


-COVID 19 precautions


-clarify goals of care


-aspiration precautions


-inflamatory markers


-CXR am





Thank you for consulting [facility]. Will continue to follow along with 

you.





Discussed with RN,





Subjective


Allergies:  


Coded Allergies:  


     No Known Allergies (Unverified , 12/15/14)


Subjective


Tm 100.5


at 1L NC


Levo at 1


Bcx NTD


covid positive





Objective


Vital Signs





Last 24 Hour Vital Signs








  Date Time  Temp Pulse Resp B/P (MAP) Pulse Ox O2 Delivery O2 Flow Rate FiO2


 


4/27/20 10:30  71 28 107/75 (86) 97   


 


4/27/20 10:00  74 28 110/72 (85) 97   


 


4/27/20 09:30  71 26 111/68 (82) 97   


 


4/27/20 09:00  75 24 120/74 (89) 98   


 


4/27/20 08:30  72 26 93/63 (73) 97   


 


4/27/20 08:00      Nasal Cannula 1.0 


 


4/27/20 08:00 98.1 71 21 117/72 (87) 97   


 


4/27/20 08:00    117/72    


 


4/27/20 08:00  73      


 


4/27/20 07:30 98.1 76 27 116/70 (85) 97   


 


4/27/20 07:00    115/74    


 


4/27/20 07:00  70 28 115/74 (88) 97   


 


4/27/20 06:00 100.5 73 32 113/71 (85) 97   


 


4/27/20 06:00    123/71    


 


4/27/20 05:57 99.6       


 


4/27/20 05:30  69 32 123/71 (88) 97   


 


4/27/20 05:00    115/67    


 


4/27/20 05:00  85 29 110/98 (102) 94   


 


4/27/20 04:30  69 28 115/67 (83) 98   


 


4/27/20 04:00 100.0 67 29 119/66 (83) 96   


 


4/27/20 04:00    115/67    


 


4/27/20 04:00      Nasal Cannula 1.0 


 


4/27/20 04:00  67      


 


4/27/20 03:30  63 28 109/70 (83) 97   


 


4/27/20 03:00  65 28 108/74 (85) 97   


 


4/27/20 03:00    109/70    


 


4/27/20 02:30  68 29 114/74 (87) 98   


 


4/27/20 02:00 99.5 63 23 117/68 (84) 99   


 


4/27/20 02:00    114/74    


 


4/27/20 01:30  65 24 121/72 (88) 98   


 


4/27/20 01:00    121/72    


 


4/27/20 01:00  67 27 117/74 (88) 96   


 


4/27/20 00:30  64 25 116/72 (87) 98   


 


4/27/20 00:00 99.0 64 24 117/70 (86) 97   


 


4/27/20 00:00  68      


 


4/27/20 00:00      Nasal Cannula 1.0 


 


4/27/20 00:00    116/72    


 


4/26/20 23:30  68 24 119/71 (87) 98   


 


4/26/20 23:00  70 21 120/74 (89) 98   


 


4/26/20 23:00    119/71    


 


4/26/20 22:30  69 29 119/74 (89) 98   


 


4/26/20 22:00  70 30 125/76 (92) 98   


 


4/26/20 22:00    125/76    


 


4/26/20 21:30  72 28 119/73 (88) 97   


 


4/26/20 21:00  70 30 122/75 (91) 97   


 


4/26/20 21:00    119/73    


 


4/26/20 20:30  71 33 123/71 (88) 96   


 


4/26/20 20:00      Nasal Cannula 1.0 


 


4/26/20 20:00    123/71    


 


4/26/20 20:00  65      


 


4/26/20 20:00 99.9 73 32 122/73 (89) 97   


 


4/26/20 19:30  69 30 122/78 (93) 97   


 


4/26/20 19:00  67 28 123/74 (90) 97   


 


4/26/20 19:00    122/78    


 


4/26/20 18:00  66 26 121/74 (90) 96   


 


4/26/20 18:00    121/74    


 


4/26/20 17:30  68 25 123/73 (90) 98   


 


4/26/20 17:00  72 32 127/71 (89) 98   


 


4/26/20 17:00    127/71    


 


4/26/20 16:30  72 27 114/77 (89) 98   


 


4/26/20 16:00  71 27 129/76 (93) 98   


 


4/26/20 16:00  70      


 


4/26/20 16:00      Nasal Cannula 1.0 


 


4/26/20 16:00    129/76    


 


4/26/20 15:30 100.0 66 26 121/68 (85) 98   


 


4/26/20 15:00  71 30 120/71 (87) 97   


 


4/26/20 15:00    120/71    


 


4/26/20 14:30  70 28 129/73 (91) 98   


 


4/26/20 14:00  64 25 115/72 (86) 98   


 


4/26/20 14:00    115/72    


 


4/26/20 13:30  70 26 127/74 (91) 98   


 


4/26/20 13:00  67 21 120/73 (89) 100   


 


4/26/20 13:00    120/73    


 


4/26/20 12:30  66 25 117/62 (80) 98   


 


4/26/20 12:00  69      


 


4/26/20 12:00    103/64    


 


4/26/20 12:00      Nasal Cannula 1.0 


 


4/26/20 12:00 99.9 66 23 103/64 (77) 99   








Height (Feet):  5


Height (Inches):  8.00


Weight (Pounds):  185


Objective


 not examined to limit COVID19 exposure





Microbiology








 Date/Time


Source Procedure


Growth Status


 


 


 4/24/20 17:42


Nasal Nares MRSA Culture - Final


NO METHICILLIN RESISTANT STAPH AUREUS... Complete


 


 4/24/20 17:42


Rectum VRE Culture - Final


NO VANCOMYCIN RESISTANT ENTEROCOCCUS ... Complete


 


 4/24/20 17:42


Rectum - Final


NO CARBAPENEM-RESISTANT ENTEROBACTERI... Complete











Laboratory Tests








Test


  4/26/20


20:38 4/27/20


04:45


 


Vancomycin Level Trough


  9.9 ug/mL


(5.0-12.0) 


 


 


White Blood Count


  


  5.8 K/UL


(4.8-10.8)


 


Red Blood Count


  


  4.23 M/UL


(4.70-6.10)  L


 


Hemoglobin


  


  12.1 G/DL


(14.2-18.0)  L


 


Hematocrit


  


  35.9 %


(42.0-52.0)  L


 


Mean Corpuscular Volume  85 FL (80-99)  


 


Mean Corpuscular Hemoglobin


  


  28.7 PG


(27.0-31.0)


 


Mean Corpuscular Hemoglobin


Concent 


  33.9 G/DL


(32.0-36.0)


 


Red Cell Distribution Width


  


  10.8 %


(11.6-14.8)  L


 


Platelet Count


  


  120 K/UL


(150-450)  L


 


Mean Platelet Volume


  


  7.5 FL


(6.5-10.1)


 


Neutrophils (%) (Auto)


  


  76.5 %


(45.0-75.0)  H


 


Lymphocytes (%) (Auto)


  


  15.7 %


(20.0-45.0)  L


 


Monocytes (%) (Auto)


  


  7.4 %


(1.0-10.0)


 


Eosinophils (%) (Auto)


  


  0.1 %


(0.0-3.0)


 


Basophils (%) (Auto)


  


  0.4 %


(0.0-2.0)


 


Sodium Level


  


  136 MMOL/L


(136-145)


 


Potassium Level


  


  4.0 MMOL/L


(3.5-5.1)


 


Chloride Level


  


  100 MMOL/L


()


 


Carbon Dioxide Level


  


  29 MMOL/L


(21-32)


 


Anion Gap


  


  7 mmol/L


(5-15)


 


Blood Urea Nitrogen


  


  19 mg/dL


(7-18)  H


 


Creatinine


  


  1.0 MG/DL


(0.55-1.30)


 


Estimat Glomerular Filtration


Rate 


  > 60 mL/min


(>60)


 


Glucose Level


  


  164 MG/DL


()  H


 


Calcium Level


  


  8.6 MG/DL


(8.5-10.1)











Current Medications








 Medications


  (Trade)  Dose


 Ordered  Sig/Aarti


 Route


 PRN Reason  Start Time


 Stop Time Status Last Admin


Dose Admin


 


 Acetaminophen


  (Tylenol)  650 mg  Q6H  PRN


 ORAL


 Temp >100.5  4/26/20 08:45


 5/26/20 08:44  4/27/20 05:27


 


 


 Cefepime HCl 1 gm/


 Dextrose  55 ml @ 


 110 mls/hr  Q12H


 IVPB


   4/25/20 20:00


 5/2/20 19:59  4/27/20 08:40


 


 


 Chlorhexidine


 Gluconate


  (Gloria-Hex 2%)  1 applic  DAILY@2000


 TOPIC


   4/25/20 20:00


 7/24/20 19:59  4/26/20 20:26


 


 


 Gabapentin


  (Neurontin)  300 mg  Q8HR


 ORAL


   4/25/20 00:00


 5/25/20 00:00  4/27/20 05:25


 


 


 Levetiracetam


  (Keppra)  1,000 mg  Q12HR


 ORAL


   4/25/20 00:00


 5/25/20 00:00  4/27/20 08:40


 


 


 Lorazepam


  (Ativan 2mg/ml


 1ml)  1 mg  EVERY 6 HOURS  PRN


 IV


 For Anxiety  4/25/20 16:00


 5/2/20 15:59   


 


 


 Memantine


  (Namenda)  5 mg  DAILY


 ORAL


   4/25/20 17:00


 5/25/20 16:59  4/27/20 08:40


 


 


 Norepinephrine


 Bitartrate 4 mg/


 Dextrose  250 ml @ 0


 mls/hr  Q24H  PRN


 IV


 For hypotension  4/24/20 23:00


 5/24/20 22:59  4/25/20 10:25


 


 


 Sodium Chloride  1,000 ml @ 


 75 mls/hr  [id number]


 IV


   4/24/20 23:00


 5/24/20 22:59  4/27/20 05:25


 


 


 Vancomycin HCl


  (Vanco rx to


 dose)  1 ea  DAILY  PRN


 MISC


 Per rx protocol  4/24/20 15:00


 5/24/20 14:59   


 


 


 Vancomycin HCl 1


 gm/Dextrose  275 ml @ 


 183.333


 mls/hr  Q12H


 IVPB


   4/26/20 23:00


 5/1/20 22:59  4/27/20 10:35


 

















Naila Lindsay M.D. Apr 27, 2020 12:07

## 2020-04-27 NOTE — NUR
NURSE NOTES:

received report from joe edmond pt  awake and alert  follows command vs stable  off levo drip 
reposition and suction no resp distress noted

## 2020-04-27 NOTE — NUR
CASE MANAGEMENT: REVIEW



69 YEAR OLD MALE BIBA FROM [facility]



CC: HYPOTENSION 





SI: SEPSIS . COVID-19 

T 97.2 HR 78 RR 16 BP 80/56 SAT 94% NC/2L

BUN 71 CR 2.4  

CXR -- LEFT BASILAR ATELECTASIS AND/OR INFILTRATE









IS: VANCO IV X1

CEFEPIME IV X1

LEVOPHED 8MCG IV X1







***PATIENT ADMITTED TO ICU 04/24/2020***

DCP: PATIENT IS FROM [facility]

## 2020-04-27 NOTE — NUR
NURSE NOTES:





notified Dr. Stroud regarding patient's levophed is on hold due to  at 1300. notified 
CN. Will continue to monitor.

## 2020-04-27 NOTE — PULMONOLOGY PROGRESS NOTE
Assessment/Plan


Assessment/Plan


IMPRESSION:


1. Cardiomyopathy.


2. Hypotension.


3. Left lung pneumonia.


4. Nursing home resident.


5. Positive COVID-19.





DISCUSSION:


1. Continue isolation


2. Continue current medications.


3. Continue pressors.


4. Oxygen.


5. Pulmonary hygiene.


6. Broad-spectrum antibiotics.


7. I will follow.














  ______________________________________________


  Hayder Hahn M.D.





Subjective


Interval Events:  Looking better; on low dose levophed; febrile


Constitutional:  Reports: no symptoms


Respiratory:  Reports: no symptoms


Cardiovascular:  Reports: no symptoms


Gastrointestinal/Abdominal:  Reports: no symptoms


Genitourinary:  Reports: no symptoms


Neurologic:  Reports: no symptoms


Allergies:  


Coded Allergies:  


     No Known Allergies (Unverified , 12/15/14)


All Systems:  reviewed and negative except above





Objective





Last 24 Hour Vital Signs








  Date Time  Temp Pulse Resp B/P (MAP) Pulse Ox O2 Delivery O2 Flow Rate FiO2


 


4/27/20 10:30  71 28 107/75 (86) 97   


 


4/27/20 10:00  74 28 110/72 (85) 97   


 


4/27/20 09:30  71 26 111/68 (82) 97   


 


4/27/20 09:00  75 24 120/74 (89) 98   


 


4/27/20 08:30  72 26 93/63 (73) 97   


 


4/27/20 08:00      Nasal Cannula 1.0 


 


4/27/20 08:00 98.1 71 21 117/72 (87) 97   


 


4/27/20 08:00    117/72    


 


4/27/20 08:00  73      


 


4/27/20 07:30 98.1 76 27 116/70 (85) 97   


 


4/27/20 07:00    115/74    


 


4/27/20 07:00  70 28 115/74 (88) 97   


 


4/27/20 06:00 100.5 73 32 113/71 (85) 97   


 


4/27/20 06:00    123/71    


 


4/27/20 05:57 99.6       


 


4/27/20 05:30  69 32 123/71 (88) 97   


 


4/27/20 05:00    115/67    


 


4/27/20 05:00  85 29 110/98 (102) 94   


 


4/27/20 04:30  69 28 115/67 (83) 98   


 


4/27/20 04:00 100.0 67 29 119/66 (83) 96   


 


4/27/20 04:00    115/67    


 


4/27/20 04:00      Nasal Cannula 1.0 


 


4/27/20 04:00  67      


 


4/27/20 03:30  63 28 109/70 (83) 97   


 


4/27/20 03:00  65 28 108/74 (85) 97   


 


4/27/20 03:00    109/70    


 


4/27/20 02:30  68 29 114/74 (87) 98   


 


4/27/20 02:00 99.5 63 23 117/68 (84) 99   


 


4/27/20 02:00    114/74    


 


4/27/20 01:30  65 24 121/72 (88) 98   


 


4/27/20 01:00    121/72    


 


4/27/20 01:00  67 27 117/74 (88) 96   


 


4/27/20 00:30  64 25 116/72 (87) 98   


 


4/27/20 00:00 99.0 64 24 117/70 (86) 97   


 


4/27/20 00:00  68      


 


4/27/20 00:00      Nasal Cannula 1.0 


 


4/27/20 00:00    116/72    


 


4/26/20 23:30  68 24 119/71 (87) 98   


 


4/26/20 23:00  70 21 120/74 (89) 98   


 


4/26/20 23:00    119/71    


 


4/26/20 22:30  69 29 119/74 (89) 98   


 


4/26/20 22:00  70 30 125/76 (92) 98   


 


4/26/20 22:00    125/76    


 


4/26/20 21:30  72 28 119/73 (88) 97   


 


4/26/20 21:00  70 30 122/75 (91) 97   


 


4/26/20 21:00    119/73    


 


4/26/20 20:30  71 33 123/71 (88) 96   


 


4/26/20 20:00      Nasal Cannula 1.0 


 


4/26/20 20:00    123/71    


 


4/26/20 20:00  65      


 


4/26/20 20:00 99.9 73 32 122/73 (89) 97   


 


4/26/20 19:30  69 30 122/78 (93) 97   


 


4/26/20 19:00  67 28 123/74 (90) 97   


 


4/26/20 19:00    122/78    


 


4/26/20 18:00  66 26 121/74 (90) 96   


 


4/26/20 18:00    121/74    


 


4/26/20 17:30  68 25 123/73 (90) 98   


 


4/26/20 17:00  72 32 127/71 (89) 98   


 


4/26/20 17:00    127/71    


 


4/26/20 16:30  72 27 114/77 (89) 98   


 


4/26/20 16:00  71 27 129/76 (93) 98   


 


4/26/20 16:00  70      


 


4/26/20 16:00      Nasal Cannula 1.0 


 


4/26/20 16:00    129/76    


 


4/26/20 15:30 100.0 66 26 121/68 (85) 98   


 


4/26/20 15:00  71 30 120/71 (87) 97   


 


4/26/20 15:00    120/71    


 


4/26/20 14:30  70 28 129/73 (91) 98   


 


4/26/20 14:00  64 25 115/72 (86) 98   


 


4/26/20 14:00    115/72    


 


4/26/20 13:30  70 26 127/74 (91) 98   


 


4/26/20 13:00  67 21 120/73 (89) 100   


 


4/26/20 13:00    120/73    


 


4/26/20 12:30  66 25 117/62 (80) 98   


 


4/26/20 12:00  69      


 


4/26/20 12:00    103/64    


 


4/26/20 12:00      Nasal Cannula 1.0 


 


4/26/20 12:00 99.9 66 23 103/64 (77) 99   


 


4/26/20 11:30  67 25 102/64 (77) 98   

















Intake and Output  


 


 4/26/20 4/27/20





 19:00 07:00


 


Intake Total 1275.000 ml 925.00 ml


 


Output Total 580 ml 520 ml


 


Balance 695.000 ml 405.00 ml


 


  


 


Intake Oral  150 ml


 


IV Total 1275.000 ml 775.00 ml


 


Output Urine Total 580 ml 520 ml


 


# Bowel Movements 2 3








General Appearance:  no acute distress


HEENT:  normocephalic


Respiratory/Chest:  chest wall non-tender, lungs clear


Cardiovascular:  normal peripheral pulses


Abdomen:  normal bowel sounds





Microbiology








 Date/Time


Source Procedure


Growth Status


 


 


 4/24/20 17:42


Nasal Nares MRSA Culture - Final


NO METHICILLIN RESISTANT STAPH AUREUS... Complete


 


 4/24/20 17:42


Rectum VRE Culture - Final


NO VANCOMYCIN RESISTANT ENTEROCOCCUS ... Complete


 


 4/24/20 17:42


Rectum - Final


NO CARBAPENEM-RESISTANT ENTEROBACTERI... Complete








Laboratory Tests


4/26/20 20:38: Vancomycin Level Trough 9.9


4/27/20 04:45: 


White Blood Count 5.8, Red Blood Count 4.23L, Hemoglobin 12.1L, Hematocrit 35.9L

, Mean Corpuscular Volume 85, Mean Corpuscular Hemoglobin 28.7, Mean 

Corpuscular Hemoglobin Concent 33.9, Red Cell Distribution Width 10.8L, 

Platelet Count 120L, Mean Platelet Volume 7.5, Neutrophils (%) (Auto) 76.5H, 

Lymphocytes (%) (Auto) 15.7L, Monocytes (%) (Auto) 7.4, Eosinophils (%) (Auto) 

0.1, Basophils (%) (Auto) 0.4, Sodium Level 136, Potassium Level 4.0, Chloride 

Level 100, Carbon Dioxide Level 29, Anion Gap 7, Blood Urea Nitrogen 19H, 

Creatinine 1.0, Estimat Glomerular Filtration Rate > 60, Glucose Level 164H, 

Calcium Level 8.6





Current Medications








 Medications


  (Trade)  Dose


 Ordered  Sig/Aarti


 Route


 PRN Reason  Start Time


 Stop Time Status Last Admin


Dose Admin


 


 Acetaminophen


  (Tylenol)  650 mg  Q6H  PRN


 ORAL


 Temp >100.5  4/26/20 08:45


 5/26/20 08:44  4/27/20 05:27


 


 


 Cefepime HCl 1 gm/


 Dextrose  55 ml @ 


 110 mls/hr  Q12H


 IVPB


   4/25/20 20:00


 5/2/20 19:59  4/27/20 08:40


 


 


 Chlorhexidine


 Gluconate


  (Gloria-Hex 2%)  1 applic  DAILY@2000


 TOPIC


   4/25/20 20:00


 7/24/20 19:59  4/26/20 20:26


 


 


 Gabapentin


  (Neurontin)  300 mg  Q8HR


 ORAL


   4/25/20 00:00


 5/25/20 00:00  4/27/20 05:25


 


 


 Levetiracetam


  (Keppra)  1,000 mg  Q12HR


 ORAL


   4/25/20 00:00


 5/25/20 00:00  4/27/20 08:40


 


 


 Lorazepam


  (Ativan 2mg/ml


 1ml)  1 mg  EVERY 6 HOURS  PRN


 IV


 For Anxiety  4/25/20 16:00


 5/2/20 15:59   


 


 


 Memantine


  (Namenda)  5 mg  DAILY


 ORAL


   4/25/20 17:00


 5/25/20 16:59  4/27/20 08:40


 


 


 Norepinephrine


 Bitartrate 4 mg/


 Dextrose  250 ml @ 0


 mls/hr  Q24H  PRN


 IV


 For hypotension  4/24/20 23:00


 5/24/20 22:59  4/25/20 10:25


 


 


 Sodium Chloride  1,000 ml @ 


 75 mls/hr  [id number]


 IV


   4/24/20 23:00


 5/24/20 22:59  4/27/20 05:25


 


 


 Vancomycin HCl


  (Vanco rx to


 dose)  1 ea  DAILY  PRN


 MISC


 Per rx protocol  4/24/20 15:00


 5/24/20 14:59   


 


 


 Vancomycin HCl 1


 gm/Dextrose  275 ml @ 


 183.333


 mls/hr  Q12H


 IVPB


   4/26/20 23:00


 5/1/20 22:59  4/27/20 10:35


 

















Hayder Hahn MD Apr 27, 2020 11:23

## 2020-04-27 NOTE — NUR
NURSE NOTES:





patient's BP is maintaining in stable range without levophed at this time. Charge nurse 
notified. Dr. Stroud made aware.

## 2020-04-27 NOTE — CARDIAC ELECTROPHYSIOLOGY PN
Assessment/Plan


Assessment/Plan


1. Shock could be due to cardiomyopathy and sepsis.  His BNP is 736.  


  On Cefepime and Vanco by Dr. Lindsay  The patient is already on Levophed.  


   Echocardiogram still pending due to Covid. Levophed only 1 mcg





2. History of cardiomyopathy.  Because of hypotension off  Coreg, hydralazine, 

lisinopril, and Aldactone  





3. Status post right-sided TONY ICD.    





4. History of hypertension, currently hypotensive.


5. History of CVA with residual weakness.


6. COVID-19 PNA off the vent 








DW RN





Subjective


Subjective


On 1 mcg of Levophed in ICU still on 1 liter NC. Covid is now positive





Objective





Last 24 Hour Vital Signs








  Date Time  Temp Pulse Resp B/P (MAP) Pulse Ox O2 Delivery O2 Flow Rate FiO2


 


4/27/20 13:30  77 28 121/77 (92) 97   


 


4/27/20 13:00 99.4 72 28 130/73 (92) 98   


 


4/27/20 12:30  74 28 113/71 (85) 97   


 


4/27/20 12:00 98.6 73 27 117/71 (86) 97   


 


4/27/20 12:00    117/71    


 


4/27/20 12:00      Nasal Cannula 1.0 


 


4/27/20 11:30  69 28 119/71 (87) 98   


 


4/27/20 11:00  69 27 117/70 (86) 97   


 


4/27/20 11:00    117/70    


 


4/27/20 10:30  71 28 107/75 (86) 97   


 


4/27/20 10:00    110/72    


 


4/27/20 10:00  74 28 110/72 (85) 97   


 


4/27/20 09:30  71 26 111/68 (82) 97   


 


4/27/20 09:00  75 24 120/74 (89) 98   


 


4/27/20 09:00    120/74    


 


4/27/20 08:30  72 26 93/63 (73) 97   


 


4/27/20 08:00      Nasal Cannula 1.0 


 


4/27/20 08:00 98.1 71 21 117/72 (87) 97   


 


4/27/20 08:00    117/72    


 


4/27/20 08:00  73      


 


4/27/20 07:30 98.1 76 27 116/70 (85) 97   


 


4/27/20 07:00    115/74    


 


4/27/20 07:00  70 28 115/74 (88) 97   


 


4/27/20 06:00 100.5 73 32 113/71 (85) 97   


 


4/27/20 06:00    123/71    


 


4/27/20 05:57 99.6       


 


4/27/20 05:30  69 32 123/71 (88) 97   


 


4/27/20 05:00    115/67    


 


4/27/20 05:00  85 29 110/98 (102) 94   


 


4/27/20 04:30  69 28 115/67 (83) 98   


 


4/27/20 04:00 100.0 67 29 119/66 (83) 96   


 


4/27/20 04:00    115/67    


 


4/27/20 04:00      Nasal Cannula 1.0 


 


4/27/20 04:00  67      


 


4/27/20 03:30  63 28 109/70 (83) 97   


 


4/27/20 03:00  65 28 108/74 (85) 97   


 


4/27/20 03:00    109/70    


 


4/27/20 02:30  68 29 114/74 (87) 98   


 


4/27/20 02:00 99.5 63 23 117/68 (84) 99   


 


4/27/20 02:00    114/74    


 


4/27/20 01:30  65 24 121/72 (88) 98   


 


4/27/20 01:00    121/72    


 


4/27/20 01:00  67 27 117/74 (88) 96   


 


4/27/20 00:30  64 25 116/72 (87) 98   


 


4/27/20 00:00 99.0 64 24 117/70 (86) 97   


 


4/27/20 00:00  68      


 


4/27/20 00:00      Nasal Cannula 1.0 


 


4/27/20 00:00    116/72    


 


4/26/20 23:30  68 24 119/71 (87) 98   


 


4/26/20 23:00  70 21 120/74 (89) 98   


 


4/26/20 23:00    119/71    


 


4/26/20 22:30  69 29 119/74 (89) 98   


 


4/26/20 22:00  70 30 125/76 (92) 98   


 


4/26/20 22:00    125/76    


 


4/26/20 21:30  72 28 119/73 (88) 97   


 


4/26/20 21:00  70 30 122/75 (91) 97   


 


4/26/20 21:00    119/73    


 


4/26/20 20:30  71 33 123/71 (88) 96   


 


4/26/20 20:00      Nasal Cannula 1.0 


 


4/26/20 20:00    123/71    


 


4/26/20 20:00  65      


 


4/26/20 20:00 99.9 73 32 122/73 (89) 97   


 


4/26/20 19:30  69 30 122/78 (93) 97   


 


4/26/20 19:00  67 28 123/74 (90) 97   


 


4/26/20 19:00    122/78    


 


4/26/20 18:00  66 26 121/74 (90) 96   


 


4/26/20 18:00    121/74    


 


4/26/20 17:30  68 25 123/73 (90) 98   


 


4/26/20 17:00  72 32 127/71 (89) 98   


 


4/26/20 17:00    127/71    


 


4/26/20 16:30  72 27 114/77 (89) 98   


 


4/26/20 16:00  71 27 129/76 (93) 98   


 


4/26/20 16:00  70      


 


4/26/20 16:00      Nasal Cannula 1.0 


 


4/26/20 16:00    129/76    


 


4/26/20 15:30 100.0 66 26 121/68 (85) 98   


 


4/26/20 15:00  71 30 120/71 (87) 97   


 


4/26/20 15:00    120/71    


 


4/26/20 14:30  70 28 129/73 (91) 98   


 


4/26/20 14:00  64 25 115/72 (86) 98   


 


4/26/20 14:00    115/72    

















Intake and Output  


 


 4/26/20 4/27/20





 19:00 07:00


 


Intake Total 1275.000 ml 925.00 ml


 


Output Total 580 ml 520 ml


 


Balance 695.000 ml 405.00 ml


 


  


 


Intake Oral  150 ml


 


IV Total 1275.000 ml 775.00 ml


 


Output Urine Total 580 ml 520 ml


 


# Bowel Movements 2 3











Laboratory Tests








Test


  4/26/20


20:38 4/27/20


04:45


 


Vancomycin Level Trough


  9.9 ug/mL


(5.0-12.0) 


 


 


White Blood Count


  


  5.8 K/UL


(4.8-10.8)


 


Red Blood Count


  


  4.23 M/UL


(4.70-6.10)  L


 


Hemoglobin


  


  12.1 G/DL


(14.2-18.0)  L


 


Hematocrit


  


  35.9 %


(42.0-52.0)  L


 


Mean Corpuscular Volume  85 FL (80-99)  


 


Mean Corpuscular Hemoglobin


  


  28.7 PG


(27.0-31.0)


 


Mean Corpuscular Hemoglobin


Concent 


  33.9 G/DL


(32.0-36.0)


 


Red Cell Distribution Width


  


  10.8 %


(11.6-14.8)  L


 


Platelet Count


  


  120 K/UL


(150-450)  L


 


Mean Platelet Volume


  


  7.5 FL


(6.5-10.1)


 


Neutrophils (%) (Auto)


  


  76.5 %


(45.0-75.0)  H


 


Lymphocytes (%) (Auto)


  


  15.7 %


(20.0-45.0)  L


 


Monocytes (%) (Auto)


  


  7.4 %


(1.0-10.0)


 


Eosinophils (%) (Auto)


  


  0.1 %


(0.0-3.0)


 


Basophils (%) (Auto)


  


  0.4 %


(0.0-2.0)


 


Sodium Level


  


  136 MMOL/L


(136-145)


 


Potassium Level


  


  4.0 MMOL/L


(3.5-5.1)


 


Chloride Level


  


  100 MMOL/L


()


 


Carbon Dioxide Level


  


  29 MMOL/L


(21-32)


 


Anion Gap


  


  7 mmol/L


(5-15)


 


Blood Urea Nitrogen


  


  19 mg/dL


(7-18)  H


 


Creatinine


  


  1.0 MG/DL


(0.55-1.30)


 


Estimat Glomerular Filtration


Rate 


  > 60 mL/min


(>60)


 


Glucose Level


  


  164 MG/DL


()  H


 


Calcium Level


  


  8.6 MG/DL


(8.5-10.1)











Microbiology








 Date/Time


Source Procedure


Growth Status


 


 


 4/24/20 17:42


Nasal Nares MRSA Culture - Final


NO METHICILLIN RESISTANT STAPH AUREUS... Complete


 


 4/24/20 17:42


Rectum VRE Culture - Final


NO VANCOMYCIN RESISTANT ENTEROCOCCUS ... Complete


 


 4/24/20 17:42


Rectum - Final


NO CARBAPENEM-RESISTANT ENTEROBACTERI... Complete








Objective





HEAD AND NECK:  Positive JVD.


LUNGS:  Decreased breath sounds.


CARDIOVASCULAR:  Regular S1 and S2 with no gallop.


ABDOMEN:  Soft.


EXTREMITIES:  1+ pitting edema.  Defibrillator is in right subclavian.











Tomym Otero MD Apr 27, 2020 13:57

## 2020-04-28 VITALS — SYSTOLIC BLOOD PRESSURE: 123 MMHG | DIASTOLIC BLOOD PRESSURE: 76 MMHG

## 2020-04-28 VITALS — DIASTOLIC BLOOD PRESSURE: 74 MMHG | SYSTOLIC BLOOD PRESSURE: 120 MMHG

## 2020-04-28 VITALS — SYSTOLIC BLOOD PRESSURE: 124 MMHG | DIASTOLIC BLOOD PRESSURE: 73 MMHG

## 2020-04-28 VITALS — SYSTOLIC BLOOD PRESSURE: 117 MMHG | DIASTOLIC BLOOD PRESSURE: 62 MMHG

## 2020-04-28 VITALS — SYSTOLIC BLOOD PRESSURE: 132 MMHG | DIASTOLIC BLOOD PRESSURE: 76 MMHG

## 2020-04-28 VITALS — SYSTOLIC BLOOD PRESSURE: 125 MMHG | DIASTOLIC BLOOD PRESSURE: 81 MMHG

## 2020-04-28 VITALS — DIASTOLIC BLOOD PRESSURE: 77 MMHG | SYSTOLIC BLOOD PRESSURE: 127 MMHG

## 2020-04-28 VITALS — SYSTOLIC BLOOD PRESSURE: 127 MMHG | DIASTOLIC BLOOD PRESSURE: 85 MMHG

## 2020-04-28 VITALS — DIASTOLIC BLOOD PRESSURE: 75 MMHG | SYSTOLIC BLOOD PRESSURE: 119 MMHG

## 2020-04-28 VITALS — SYSTOLIC BLOOD PRESSURE: 113 MMHG | DIASTOLIC BLOOD PRESSURE: 85 MMHG

## 2020-04-28 VITALS — SYSTOLIC BLOOD PRESSURE: 127 MMHG | DIASTOLIC BLOOD PRESSURE: 70 MMHG

## 2020-04-28 VITALS — SYSTOLIC BLOOD PRESSURE: 128 MMHG | DIASTOLIC BLOOD PRESSURE: 73 MMHG

## 2020-04-28 VITALS — SYSTOLIC BLOOD PRESSURE: 116 MMHG | DIASTOLIC BLOOD PRESSURE: 77 MMHG

## 2020-04-28 VITALS — SYSTOLIC BLOOD PRESSURE: 121 MMHG | DIASTOLIC BLOOD PRESSURE: 70 MMHG

## 2020-04-28 VITALS — SYSTOLIC BLOOD PRESSURE: 126 MMHG | DIASTOLIC BLOOD PRESSURE: 76 MMHG

## 2020-04-28 VITALS — SYSTOLIC BLOOD PRESSURE: 125 MMHG | DIASTOLIC BLOOD PRESSURE: 68 MMHG

## 2020-04-28 VITALS — SYSTOLIC BLOOD PRESSURE: 127 MMHG | DIASTOLIC BLOOD PRESSURE: 73 MMHG

## 2020-04-28 VITALS — DIASTOLIC BLOOD PRESSURE: 79 MMHG | SYSTOLIC BLOOD PRESSURE: 116 MMHG

## 2020-04-28 VITALS — SYSTOLIC BLOOD PRESSURE: 118 MMHG | DIASTOLIC BLOOD PRESSURE: 71 MMHG

## 2020-04-28 VITALS — SYSTOLIC BLOOD PRESSURE: 117 MMHG | DIASTOLIC BLOOD PRESSURE: 77 MMHG

## 2020-04-28 VITALS — DIASTOLIC BLOOD PRESSURE: 75 MMHG | SYSTOLIC BLOOD PRESSURE: 123 MMHG

## 2020-04-28 VITALS — SYSTOLIC BLOOD PRESSURE: 130 MMHG | DIASTOLIC BLOOD PRESSURE: 73 MMHG

## 2020-04-28 VITALS — DIASTOLIC BLOOD PRESSURE: 82 MMHG | SYSTOLIC BLOOD PRESSURE: 114 MMHG

## 2020-04-28 LAB
ADD MANUAL DIFF: NO
ANION GAP SERPL CALC-SCNC: 8 MMOL/L (ref 5–15)
BASOPHILS NFR BLD AUTO: 0.4 % (ref 0–2)
BUN SERPL-MCNC: 17 MG/DL (ref 7–18)
CALCIUM SERPL-MCNC: 8 MG/DL (ref 8.5–10.1)
CHLORIDE SERPL-SCNC: 101 MMOL/L (ref 98–107)
CO2 SERPL-SCNC: 28 MMOL/L (ref 21–32)
CREAT SERPL-MCNC: 0.8 MG/DL (ref 0.55–1.3)
EOSINOPHIL NFR BLD AUTO: 0.4 % (ref 0–3)
ERYTHROCYTE [DISTWIDTH] IN BLOOD BY AUTOMATED COUNT: 10.6 % (ref 11.6–14.8)
FERRITIN SERPL-MCNC: > 2000 NG/ML (ref 8–388)
HCT VFR BLD CALC: 36.1 % (ref 42–52)
HGB BLD-MCNC: 12.5 G/DL (ref 14.2–18)
LYMPHOCYTES NFR BLD AUTO: 12.2 % (ref 20–45)
MCV RBC AUTO: 84 FL (ref 80–99)
MONOCYTES NFR BLD AUTO: 7.5 % (ref 1–10)
NEUTROPHILS NFR BLD AUTO: 79.6 % (ref 45–75)
PLATELET # BLD: 122 K/UL (ref 150–450)
POTASSIUM SERPL-SCNC: 3.8 MMOL/L (ref 3.5–5.1)
RBC # BLD AUTO: 4.31 M/UL (ref 4.7–6.1)
SODIUM SERPL-SCNC: 137 MMOL/L (ref 136–145)
WBC # BLD AUTO: 5.5 K/UL (ref 4.8–10.8)

## 2020-04-28 RX ADMIN — SODIUM CHLORIDE SCH MLS/HR: 0.9 INJECTION INTRAVENOUS at 08:09

## 2020-04-28 RX ADMIN — CHLORHEXIDINE GLUCONATE SCH APPLIC: 213 SOLUTION TOPICAL at 20:36

## 2020-04-28 RX ADMIN — SODIUM CHLORIDE SCH MLS/HR: 0.9 INJECTION INTRAVENOUS at 20:37

## 2020-04-28 NOTE — INFECTIOUS DISEASES PROG NOTE
Assessment/Plan


Assessment/Plan





Assessment:


Septic shock-off pressors now





Fever


No leukocytosis


   -u/a neg


   -Bcx NTD





Probable PNA- 2ry to COVID19 (from NH with confirmed cases)


At 1l NC


  -4/28 CXR: Bilateral mid and lower lung interstitial disease, new since prior 

study.Possible developing left pleural effusion


        Ferritn >2000, CRP 22.3


  -4/24 SARS-COV2 PCR +


  -CXR: Left basilar atelectasis and/or infiltrate


 


MELVI, improving





 HTN


cadiomyopathy s/p AICD


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


seizure disorder


NH resident ([facility]) 








Plan:


-Continue empiric Cefepime #5/5


   -4/27 SP IV Vancomycin #4





-f/u cx


-Monitor CBC/CMP, temperatures


-COVID 19 precautions


-clarify goals of care


-aspiration precautions


-inflamatory markers





Thank you for consulting [facility]. Will continue to follow along with 

you.





Discussed with RN,





Subjective


Allergies:  


Coded Allergies:  


     No Known Allergies (Unverified , 12/15/14)


Subjective


Tm 101.9


at 1L NC


off levo


no leukocytosis


Bcx NTD





Objective


Vital Signs





Last 24 Hour Vital Signs








  Date Time  Temp Pulse Resp B/P (MAP) Pulse Ox O2 Delivery O2 Flow Rate FiO2


 


4/28/20 10:00  83 29 126/76 (93) 94   


 


4/28/20 09:00 100.0 87 33 116/79 (91) 94   


 


4/28/20 08:46 100.0       


 


4/28/20 08:00 101.9 79 32 127/70 (89) 93   


 


4/28/20 08:00      Nasal Cannula 1.0 


 


4/28/20 07:27  75      


 


4/28/20 07:00  72 30 132/76 (94) 95   


 


4/28/20 06:00  81 32 130/73 (92) 89   


 


4/28/20 05:00  72 29 127/73 (91) 95   


 


4/28/20 04:00  77 30 125/81 (96) 95   


 


4/28/20 04:00  72      


 


4/28/20 04:00      Nasal Cannula 1.0 


 


4/28/20 03:00  74 35 120/74 (89) 94   


 


4/28/20 02:00  74 33 128/73 (91) 94   


 


4/28/20 01:00  73 32 125/68 (87) 95   


 


4/28/20 00:00      Nasal Cannula 1.0 


 


4/28/20 00:00  74 31 121/70 (87) 95   


 


4/28/20 00:00  72      


 


4/27/20 23:00  78 32 121/73 (89) 97   


 


4/27/20 22:00 99.0 78 27 122/71 (88) 95   


 


4/27/20 21:00  73      


 


4/27/20 21:00  81 31 116/72 (87) 94   


 


4/27/20 20:00      Nasal Cannula 1.0 


 


4/27/20 20:00  74 28 128/70 (89) 95   


 


4/27/20 19:00  78 31 120/70 (87) 95   


 


4/27/20 18:30  77 31 117/79 (92) 95   


 


4/27/20 18:00  88 25 125/72 (89) 95   


 


4/27/20 17:30 99.2 79 29 128/73 (91) 96   


 


4/27/20 17:00  77 31 117/78 (91) 97   


 


4/27/20 16:00  78 30 116/69 (85) 95   


 


4/27/20 16:00  77      


 


4/27/20 16:00      Nasal Cannula 1.0 


 


4/27/20 15:30  73 29 113/75 (88) 95   


 


4/27/20 15:00  80 32 121/77 (92) 96   


 


4/27/20 14:30  79 33 117/79 (92) 95   


 


4/27/20 14:00  79 28 114/70 (85) 95   


 


4/27/20 13:30  77 28 121/77 (92) 97   


 


4/27/20 13:00 99.4 72 28 130/73 (92) 98   


 


4/27/20 13:00    130/73    


 


4/27/20 12:30  74 28 113/71 (85) 97   


 


4/27/20 12:00  71      


 


4/27/20 12:00 98.6 73 27 117/71 (86) 97   


 


4/27/20 12:00    117/71    


 


4/27/20 12:00      Nasal Cannula 1.0 


 


4/27/20 11:30  69 28 119/71 (87) 98   


 


4/27/20 11:00  69 27 117/70 (86) 97   


 


4/27/20 11:00    117/70    








Height (Feet):  5


Height (Inches):  8.00


Weight (Pounds):  180


Objective


 not examined to limit COVID19 exposure





Laboratory Tests








Test


  4/28/20


05:29


 


White Blood Count


  5.5 K/UL


(4.8-10.8)


 


Red Blood Count


  4.31 M/UL


(4.70-6.10)  L


 


Hemoglobin


  12.5 G/DL


(14.2-18.0)  L


 


Hematocrit


  36.1 %


(42.0-52.0)  L


 


Mean Corpuscular Volume 84 FL (80-99)  


 


Mean Corpuscular Hemoglobin


  29.1 PG


(27.0-31.0)


 


Mean Corpuscular Hemoglobin


Concent 34.7 G/DL


(32.0-36.0)


 


Red Cell Distribution Width


  10.6 %


(11.6-14.8)  L


 


Platelet Count


  122 K/UL


(150-450)  L


 


Mean Platelet Volume


  7.6 FL


(6.5-10.1)


 


Neutrophils (%) (Auto)


  79.6 %


(45.0-75.0)  H


 


Lymphocytes (%) (Auto)


  12.2 %


(20.0-45.0)  L


 


Monocytes (%) (Auto)


  7.5 %


(1.0-10.0)


 


Eosinophils (%) (Auto)


  0.4 %


(0.0-3.0)


 


Basophils (%) (Auto)


  0.4 %


(0.0-2.0)


 


Fibrinogen


  559 mg/dL


(200-400)  H


 


D-Dimer


  12.03 mg/L FEU


(0.00-0.49)  H


 


Sodium Level


  137 MMOL/L


(136-145)


 


Potassium Level


  3.8 MMOL/L


(3.5-5.1)


 


Chloride Level


  101 MMOL/L


()


 


Carbon Dioxide Level


  28 MMOL/L


(21-32)


 


Anion Gap


  8 mmol/L


(5-15)


 


Blood Urea Nitrogen


  17 mg/dL


(7-18)


 


Creatinine


  0.8 MG/DL


(0.55-1.30)


 


Estimat Glomerular Filtration


Rate > 60 mL/min


(>60)


 


Glucose Level


  153 MG/DL


()  H


 


Lactic Acid Level


  1.00 mmol/L


(0.4-2.0)


 


Calcium Level


  8.0 MG/DL


(8.5-10.1)  L


 


Ferritin


  > 2000 NG/ML


(8-388)  H


 


C-Reactive Protein,


Quantitative 22.3 mg/dL


(0.00-0.90)  H











Current Medications








 Medications


  (Trade)  Dose


 Ordered  Sig/Aarti


 Route


 PRN Reason  Start Time


 Stop Time Status Last Admin


Dose Admin


 


 Acetaminophen


  (Tylenol)  650 mg  Q6H  PRN


 ORAL


 Temp >100.5  4/26/20 08:45


 5/26/20 08:44  4/28/20 08:16


 


 


 Cefepime HCl 1 gm/


 Dextrose  55 ml @ 


 110 mls/hr  Q12H


 IVPB


   4/25/20 20:00


 5/2/20 19:59  4/28/20 08:09


 


 


 Chlorhexidine


 Gluconate


  (Gloria-Hex 2%)  1 applic  DAILY@2000


 TOPIC


   4/25/20 20:00


 7/24/20 19:59  4/27/20 20:30


 


 


 Gabapentin


  (Neurontin)  300 mg  Q8HR


 ORAL


   4/25/20 00:00


 5/25/20 00:00  4/28/20 06:08


 


 


 Levetiracetam


  (Keppra)  1,000 mg  Q12HR


 ORAL


   4/25/20 00:00


 5/25/20 00:00  4/28/20 08:08


 


 


 Lorazepam


  (Ativan 2mg/ml


 1ml)  1 mg  EVERY 6 HOURS  PRN


 IV


 For Anxiety  4/25/20 16:00


 5/2/20 15:59   


 


 


 Memantine


  (Namenda)  5 mg  DAILY


 ORAL


   4/25/20 17:00


 5/25/20 16:59  4/28/20 08:08


 


 


 Norepinephrine


 Bitartrate 4 mg/


 Dextrose  250 ml @ 0


 mls/hr  Q24H  PRN


 IV


 For hypotension  4/24/20 23:00


 5/24/20 22:59  4/25/20 10:25


 


 


 Sodium Chloride  1,000 ml @ 


 75 mls/hr  [id number]


 IV


   4/24/20 23:00


 5/24/20 22:59  4/28/20 06:30


 

















Naila Lindsay M.D. Apr 28, 2020 11:02

## 2020-04-28 NOTE — NUR
NURSE NOTES:

received report from emilia rn pt awake and confuse follows simple command reposition and 
suction on il n/c o2 sat 96 %no acute resp distress noted

## 2020-04-28 NOTE — NUR
NURSE NOTES:

Bed bath given to patient, patient tolerated well, no signs of acute distress, patient 
denies any pain. Will continue to monitor.

## 2020-04-28 NOTE — PROGRESS NOTE
DATE:  04/28/2020

SUBJECTIVE:  The patient is a 69-year-old male with, ______ , hypotension,

sepsis, but he has altered mental status, confusion, decline in cognition

below his baseline.



DIAGNOSIS:  Major depressive disorder, mild, recurrent with psychotic

features, rule out dementia with psychosis.



PLAN:  I am going to continue treat this patient with psychotropic

medications to prevent any further decline in his cognition and continued

to be followed by Psychiatry throughout hospital course. Chart reviewed

and discussed with staff.  Seen and assessed in the room.  A 20 minutes of

of behavioral management provided.









  ______________________________________________

  Demarcus Love M.D.





DR:  Bernabe

D:  04/28/2020 14:15

T:  04/28/2020 19:13

JOB#:  [id number]

CC:

## 2020-04-28 NOTE — NUR
NURSE NOTES:

Received patient from Nanda [facility]. Patient is awake, alert and oriented x3. Sinus Rhythm on the 
heart monitor, HR 95. Receiving oxygen via Nasal cannula at 2L/min, O2 saturation at 96%. IV 
site is Left hand 20g patent and intact, Right Femoral TLC is intact and receiving NS at 
75cc/hr.  Swenson catheter is intact and draining. Bed is locked, placed in lowest position, 
side rails up x3, bed alarm on, call light within reach. Will continue to monitor.

## 2020-04-28 NOTE — DIAGNOSTIC IMAGING REPORT
Indication: Shortness of breath

 

Technique: One view of the chest

 

Comparison: 4/24/2020

 

Findings: Interval development of bilateral mid and lower lung interstitial disease.

The heart is borderline enlarged. There may be some pleural fluid on the left. Right

chest AICD again noted.

 

Impression: Bilateral mid and lower lung interstitial disease, new since prior study.

 

Possible developing left pleural effusion

## 2020-04-28 NOTE — CARDIAC ELECTROPHYSIOLOGY PN
Assessment/Plan


Assessment/Plan


1. S/P Shock could be due to cardiomyopathy and sepsis.  His BNP is 736.  


    On iv Abx by Dr. Lindsay   Off Levophed since 4/27/20


    





2. History of cardiomyopathy.  Because of hypotension off  Coreg, hydralazine, 

lisinopril, and Aldactone  


Echocardiogram still pending due to Covid. 





3. Status post right-sided TONY ICD.    





4. History of hypertension, currently hypotensive.


5. History of CVA with residual weakness.


6. COVID-19 PNA off the vent 








DW RN





Subjective


Subjective


Off Levophed since yesterday in ICU still on 2 liter NC. Covid is  positive





Objective





Last 24 Hour Vital Signs








  Date Time  Temp Pulse Resp B/P (MAP) Pulse Ox O2 Delivery O2 Flow Rate FiO2


 


4/28/20 12:00      Nasal Cannula 1.0 


 


4/28/20 11:00  83 29 114/82 (93) 94   


 


4/28/20 10:00  83 29 126/76 (93) 94   


 


4/28/20 09:00 100.0 87 33 116/79 (91) 94   


 


4/28/20 08:46 100.0       


 


4/28/20 08:00 101.9 79 32 127/70 (89) 93   


 


4/28/20 08:00      Nasal Cannula 1.0 


 


4/28/20 07:27  75      


 


4/28/20 07:00  72 30 132/76 (94) 95   


 


4/28/20 06:00  81 32 130/73 (92) 89   


 


4/28/20 05:00  72 29 127/73 (91) 95   


 


4/28/20 04:00  77 30 125/81 (96) 95   


 


4/28/20 04:00  72      


 


4/28/20 04:00      Nasal Cannula 1.0 


 


4/28/20 03:00  74 35 120/74 (89) 94   


 


4/28/20 02:00  74 33 128/73 (91) 94   


 


4/28/20 01:00  73 32 125/68 (87) 95   


 


4/28/20 00:00      Nasal Cannula 1.0 


 


4/28/20 00:00  74 31 121/70 (87) 95   


 


4/28/20 00:00  72      


 


4/27/20 23:00  78 32 121/73 (89) 97   


 


4/27/20 22:00 99.0 78 27 122/71 (88) 95   


 


4/27/20 21:00  73      


 


4/27/20 21:00  81 31 116/72 (87) 94   


 


4/27/20 20:00      Nasal Cannula 1.0 


 


4/27/20 20:00  74 28 128/70 (89) 95   


 


4/27/20 19:00  78 31 120/70 (87) 95   


 


4/27/20 18:30  77 31 117/79 (92) 95   


 


4/27/20 18:00  88 25 125/72 (89) 95   


 


4/27/20 17:30 99.2 79 29 128/73 (91) 96   


 


4/27/20 17:00  77 31 117/78 (91) 97   


 


4/27/20 16:00  78 30 116/69 (85) 95   


 


4/27/20 16:00  77      


 


4/27/20 16:00      Nasal Cannula 1.0 


 


4/27/20 15:30  73 29 113/75 (88) 95   


 


4/27/20 15:00  80 32 121/77 (92) 96   


 


4/27/20 14:30  79 33 117/79 (92) 95   


 


4/27/20 14:00  79 28 114/70 (85) 95   


 


4/27/20 13:30  77 28 121/77 (92) 97   


 


4/27/20 13:00 99.4 72 28 130/73 (92) 98   


 


4/27/20 13:00    130/73    


 


4/27/20 12:30  74 28 113/71 (85) 97   

















Intake and Output  


 


 4/27/20 4/28/20





 19:00 07:00


 


Intake Total 743.750 ml 1090.5 ml


 


Output Total 470 ml 550 ml


 


Balance 273.750 ml 540.5 ml


 


  


 


Intake Oral 320 ml 100 ml


 


IV Total 423.750 ml 990.5 ml


 


Output Urine Total 470 ml 550 ml


 


# Bowel Movements  1











Laboratory Tests








Test


  4/28/20


05:29


 


White Blood Count


  5.5 K/UL


(4.8-10.8)


 


Red Blood Count


  4.31 M/UL


(4.70-6.10)  L


 


Hemoglobin


  12.5 G/DL


(14.2-18.0)  L


 


Hematocrit


  36.1 %


(42.0-52.0)  L


 


Mean Corpuscular Volume 84 FL (80-99)  


 


Mean Corpuscular Hemoglobin


  29.1 PG


(27.0-31.0)


 


Mean Corpuscular Hemoglobin


Concent 34.7 G/DL


(32.0-36.0)


 


Red Cell Distribution Width


  10.6 %


(11.6-14.8)  L


 


Platelet Count


  122 K/UL


(150-450)  L


 


Mean Platelet Volume


  7.6 FL


(6.5-10.1)


 


Neutrophils (%) (Auto)


  79.6 %


(45.0-75.0)  H


 


Lymphocytes (%) (Auto)


  12.2 %


(20.0-45.0)  L


 


Monocytes (%) (Auto)


  7.5 %


(1.0-10.0)


 


Eosinophils (%) (Auto)


  0.4 %


(0.0-3.0)


 


Basophils (%) (Auto)


  0.4 %


(0.0-2.0)


 


Fibrinogen


  559 mg/dL


(200-400)  H


 


D-Dimer


  12.03 mg/L FEU


(0.00-0.49)  H


 


Sodium Level


  137 MMOL/L


(136-145)


 


Potassium Level


  3.8 MMOL/L


(3.5-5.1)


 


Chloride Level


  101 MMOL/L


()


 


Carbon Dioxide Level


  28 MMOL/L


(21-32)


 


Anion Gap


  8 mmol/L


(5-15)


 


Blood Urea Nitrogen


  17 mg/dL


(7-18)


 


Creatinine


  0.8 MG/DL


(0.55-1.30)


 


Estimat Glomerular Filtration


Rate > 60 mL/min


(>60)


 


Glucose Level


  153 MG/DL


()  H


 


Lactic Acid Level


  1.00 mmol/L


(0.4-2.0)


 


Calcium Level


  8.0 MG/DL


(8.5-10.1)  L


 


Ferritin


  > 2000 NG/ML


(8-388)  H


 


C-Reactive Protein,


Quantitative 22.3 mg/dL


(0.00-0.90)  H








Objective





HEAD AND NECK:  Positive JVD.


LUNGS:  Decreased breath sounds.


CARDIOVASCULAR:  Regular S1 and S2 with no gallop.


ABDOMEN:  Soft.


EXTREMITIES:  1+ pitting edema.  Defibrillator is in right subclavian.











Tommy Otero MD Apr 28, 2020 12:16

## 2020-04-28 NOTE — NUR
CASE MANAGEMENT: REVIEW







SI: SEPSIS . COVID-19 

T 101.9 HR 75 RR 32 /70 SAT 93% NC/1L

H/H 12.5/36.1 GLUCOSE 153 FERRITIN >2000









IS: NS IVF @ 75ML/HR 

CEFEPIME IV Q12HR 

KEPPRA PO Q12HR 

GABAPENTIN PO Q8HR 

CLEAR LIQUID DIET 







***ICU STATUS***

DCP: PATIENT IS FROM [facility]

## 2020-04-28 NOTE — NUR
NURSE NOTES:

Gave patient medications as prescribed, no adverse reaction noted. Patient refusing to eat 
breakfast this morning, denies any pain. Turned and repositioned patient. Patient's oral 
temperature read 101.9 degrees Fahrenheit, prescribed Tylenol given, removed blankets from 
patient. Will continue to monitor.

## 2020-04-28 NOTE — GENERAL PROGRESS NOTE
Assessment/Plan


Problem List:  


(1) Suspected COVID-19 virus infection


ICD Codes:  Z20.828 - Contact with and (suspected) exposure to other viral 

communicable diseases


SNOMED:  728579871


(2) Anemia


ICD Codes:  D64.9 - Anemia, unspecified


SNOMED:  943460100


(3) Weak


ICD Codes:  R53.1 - Weakness


SNOMED:  76908508


(4) COPD (chronic obstructive pulmonary disease)


ICD Codes:  J44.9 - Chronic obstructive pulmonary disease, unspecified


SNOMED:  04020820


(5) Diabetes


ICD Codes:  E11.9 - Type 2 diabetes mellitus without complications


SNOMED:  72880074


(6) Epileptic seizure, generalized


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 

not intractable, without status epilepticus


SNOMED:  75965539


(7) Altered mental status


ICD Codes:  R41.82 - Altered mental status, unspecified


SNOMED:  154218754


Qualifiers:  


   Qualified Codes:  R41.82 - Altered mental status, unspecified


(8) Hypotension


ICD Codes:  I95.9 - Hypotension, unspecified


SNOMED:  16531082


Qualifiers:  


   Qualified Codes:  I95.9 - Hypotension, unspecified


Status:  unchanged


Assessment/Plan:


o2 pulm tx abx pt diet cbc bmp am





Subjective


Constitutional:  Reports: weakness


Allergies:  


Coded Allergies:  


     No Known Allergies (Unverified , 12/15/14)


All Systems:  reviewed and negative except above


Subjective


o2nc calm in bed in icu





Objective





Last 24 Hour Vital Signs








  Date Time  Temp Pulse Resp B/P (MAP) Pulse Ox O2 Delivery O2 Flow Rate FiO2


 


4/28/20 12:00      Nasal Cannula 1.0 


 


4/28/20 11:00  83 29 114/82 (93) 94   


 


4/28/20 10:00  83 29 126/76 (93) 94   


 


4/28/20 09:00 100.0 87 33 116/79 (91) 94   


 


4/28/20 08:46 100.0       


 


4/28/20 08:00 101.9 79 32 127/70 (89) 93   


 


4/28/20 08:00      Nasal Cannula 1.0 


 


4/28/20 07:27  75      


 


4/28/20 07:00  72 30 132/76 (94) 95   


 


4/28/20 06:00  81 32 130/73 (92) 89   


 


4/28/20 05:00  72 29 127/73 (91) 95   


 


4/28/20 04:00  77 30 125/81 (96) 95   


 


4/28/20 04:00  72      


 


4/28/20 04:00      Nasal Cannula 1.0 


 


4/28/20 03:00  74 35 120/74 (89) 94   


 


4/28/20 02:00  74 33 128/73 (91) 94   


 


4/28/20 01:00  73 32 125/68 (87) 95   


 


4/28/20 00:00      Nasal Cannula 1.0 


 


4/28/20 00:00  74 31 121/70 (87) 95   


 


4/28/20 00:00  72      


 


4/27/20 23:00  78 32 121/73 (89) 97   


 


4/27/20 22:00 99.0 78 27 122/71 (88) 95   


 


4/27/20 21:00  73      


 


4/27/20 21:00  81 31 116/72 (87) 94   


 


4/27/20 20:00      Nasal Cannula 1.0 


 


4/27/20 20:00  74 28 128/70 (89) 95   


 


4/27/20 19:00  78 31 120/70 (87) 95   


 


4/27/20 18:30  77 31 117/79 (92) 95   


 


4/27/20 18:00  88 25 125/72 (89) 95   


 


4/27/20 17:30 99.2 79 29 128/73 (91) 96   


 


4/27/20 17:00  77 31 117/78 (91) 97   


 


4/27/20 16:00  78 30 116/69 (85) 95   


 


4/27/20 16:00  77      


 


4/27/20 16:00      Nasal Cannula 1.0 


 


4/27/20 15:30  73 29 113/75 (88) 95   


 


4/27/20 15:00  80 32 121/77 (92) 96   


 


4/27/20 14:30  79 33 117/79 (92) 95   


 


4/27/20 14:00  79 28 114/70 (85) 95   


 


4/27/20 13:30  77 28 121/77 (92) 97   


 


4/27/20 13:00 99.4 72 28 130/73 (92) 98   


 


4/27/20 13:00    130/73    

















Intake and Output  


 


 4/27/20 4/28/20





 19:00 07:00


 


Intake Total 743.750 ml 1090.5 ml


 


Output Total 470 ml 550 ml


 


Balance 273.750 ml 540.5 ml


 


  


 


Intake Oral 320 ml 100 ml


 


IV Total 423.750 ml 990.5 ml


 


Output Urine Total 470 ml 550 ml


 


# Bowel Movements  1








Laboratory Tests


4/28/20 05:29: 


White Blood Count 5.5, Red Blood Count 4.31L, Hemoglobin 12.5L, Hematocrit 36.1L

, Mean Corpuscular Volume 84, Mean Corpuscular Hemoglobin 29.1, Mean 

Corpuscular Hemoglobin Concent 34.7, Red Cell Distribution Width 10.6L, 

Platelet Count 122L, Mean Platelet Volume 7.6, Neutrophils (%) (Auto) 79.6H, 

Lymphocytes (%) (Auto) 12.2L, Monocytes (%) (Auto) 7.5, Eosinophils (%) (Auto) 

0.4, Basophils (%) (Auto) 0.4, Fibrinogen 559H, D-Dimer 12.03H, Sodium Level 137

, Potassium Level 3.8, Chloride Level 101, Carbon Dioxide Level 28, Anion Gap 8

, Blood Urea Nitrogen 17, Creatinine 0.8, Estimat Glomerular Filtration Rate > 

60, Glucose Level 153H, Lactic Acid Level 1.00, Calcium Level 8.0L, Ferritin > 

2000H, C-Reactive Protein, Quantitative 22.3H


Height (Feet):  5


Height (Inches):  8.00


Weight (Pounds):  180


General Appearance:  lethargic


EENT:  normal ENT inspection


Neck:  normal alignment


Cardiovascular:  normal rate, regular rhythm


Respiratory/Chest:  no respiratory distress, no accessory muscle use


Extremities:  normal inspection


Skin:  normal pigmentation











Arron Barkley DO Apr 28, 2020 12:46

## 2020-04-28 NOTE — NUR
NURSE NOTES:

On reassessment of patient's temperature it read 100.0 degrees Fahrenheit orally. Encouraged 
patient to drink more ice water, patient complied. Will continue to monitor.

## 2020-04-28 NOTE — PULMONOLOGY PROGRESS NOTE
Assessment/Plan


Assessment/Plan


IMPRESSION:


1. Cardiomyopathy.


2. Hypotension.


3. Left lung pneumonia.


4. Nursing home resident.


5. Positive COVID-19.





DISCUSSION:


1. Continue isolation


2. Continue current medications.


3. Continue pressors.


4. Oxygen.


5. Pulmonary hygiene.


6. Broad-spectrum antibiotics.


7. I will follow.














  ______________________________________________


  Hayder Hahn M.D.





Subjective


Interval Events:  Looking better; on low dose levophed; febrile


Constitutional:  Reports: no symptoms


Respiratory:  Reports: no symptoms


Cardiovascular:  Reports: no symptoms


Gastrointestinal/Abdominal:  Reports: no symptoms


Genitourinary:  Reports: no symptoms


Neurologic:  Reports: no symptoms


Allergies:  


Coded Allergies:  


     No Known Allergies (Unverified , 12/15/14)


All Systems:  reviewed and negative except above





Objective





Last 24 Hour Vital Signs








  Date Time  Temp Pulse Resp B/P (MAP) Pulse Ox O2 Delivery O2 Flow Rate FiO2


 


4/28/20 11:00  83 29 114/82 (93) 94   


 


4/28/20 10:00  83 29 126/76 (93) 94   


 


4/28/20 09:00 100.0 87 33 116/79 (91) 94   


 


4/28/20 08:46 100.0       


 


4/28/20 08:00 101.9 79 32 127/70 (89) 93   


 


4/28/20 08:00      Nasal Cannula 1.0 


 


4/28/20 07:27  75      


 


4/28/20 07:00  72 30 132/76 (94) 95   


 


4/28/20 06:00  81 32 130/73 (92) 89   


 


4/28/20 05:00  72 29 127/73 (91) 95   


 


4/28/20 04:00  77 30 125/81 (96) 95   


 


4/28/20 04:00  72      


 


4/28/20 04:00      Nasal Cannula 1.0 


 


4/28/20 03:00  74 35 120/74 (89) 94   


 


4/28/20 02:00  74 33 128/73 (91) 94   


 


4/28/20 01:00  73 32 125/68 (87) 95   


 


4/28/20 00:00      Nasal Cannula 1.0 


 


4/28/20 00:00  74 31 121/70 (87) 95   


 


4/28/20 00:00  72      


 


4/27/20 23:00  78 32 121/73 (89) 97   


 


4/27/20 22:00 99.0 78 27 122/71 (88) 95   


 


4/27/20 21:00  73      


 


4/27/20 21:00  81 31 116/72 (87) 94   


 


4/27/20 20:00      Nasal Cannula 1.0 


 


4/27/20 20:00  74 28 128/70 (89) 95   


 


4/27/20 19:00  78 31 120/70 (87) 95   


 


4/27/20 18:30  77 31 117/79 (92) 95   


 


4/27/20 18:00  88 25 125/72 (89) 95   


 


4/27/20 17:30 99.2 79 29 128/73 (91) 96   


 


4/27/20 17:00  77 31 117/78 (91) 97   


 


4/27/20 16:00  78 30 116/69 (85) 95   


 


4/27/20 16:00  77      


 


4/27/20 16:00      Nasal Cannula 1.0 


 


4/27/20 15:30  73 29 113/75 (88) 95   


 


4/27/20 15:00  80 32 121/77 (92) 96   


 


4/27/20 14:30  79 33 117/79 (92) 95   


 


4/27/20 14:00  79 28 114/70 (85) 95   


 


4/27/20 13:30  77 28 121/77 (92) 97   


 


4/27/20 13:00 99.4 72 28 130/73 (92) 98   


 


4/27/20 13:00    130/73    


 


4/27/20 12:30  74 28 113/71 (85) 97   


 


4/27/20 12:00  71      


 


4/27/20 12:00 98.6 73 27 117/71 (86) 97   


 


4/27/20 12:00    117/71    


 


4/27/20 12:00      Nasal Cannula 1.0 

















Intake and Output  


 


 4/27/20 4/28/20





 19:00 07:00


 


Intake Total 743.750 ml 1090.5 ml


 


Output Total 470 ml 550 ml


 


Balance 273.750 ml 540.5 ml


 


  


 


Intake Oral 320 ml 100 ml


 


IV Total 423.750 ml 990.5 ml


 


Output Urine Total 470 ml 550 ml


 


# Bowel Movements  1








General Appearance:  no acute distress


HEENT:  normocephalic


Respiratory/Chest:  chest wall non-tender, lungs clear


Cardiovascular:  normal peripheral pulses


Abdomen:  normal bowel sounds


Laboratory Tests


4/28/20 05:29: 


White Blood Count 5.5, Red Blood Count 4.31L, Hemoglobin 12.5L, Hematocrit 36.1L

, Mean Corpuscular Volume 84, Mean Corpuscular Hemoglobin 29.1, Mean 

Corpuscular Hemoglobin Concent 34.7, Red Cell Distribution Width 10.6L, 

Platelet Count 122L, Mean Platelet Volume 7.6, Neutrophils (%) (Auto) 79.6H, 

Lymphocytes (%) (Auto) 12.2L, Monocytes (%) (Auto) 7.5, Eosinophils (%) (Auto) 

0.4, Basophils (%) (Auto) 0.4, Fibrinogen 559H, D-Dimer 12.03H, Sodium Level 137

, Potassium Level 3.8, Chloride Level 101, Carbon Dioxide Level 28, Anion Gap 8

, Blood Urea Nitrogen 17, Creatinine 0.8, Estimat Glomerular Filtration Rate > 

60, Glucose Level 153H, Lactic Acid Level 1.00, Calcium Level 8.0L, Ferritin > 

2000H, C-Reactive Protein, Quantitative 22.3H





Current Medications








 Medications


  (Trade)  Dose


 Ordered  Sig/Aarti


 Route


 PRN Reason  Start Time


 Stop Time Status Last Admin


Dose Admin


 


 Acetaminophen


  (Tylenol)  650 mg  Q6H  PRN


 ORAL


 Temp >100.5  4/26/20 08:45


 5/26/20 08:44  4/28/20 08:16


 


 


 Cefepime HCl 1 gm/


 Dextrose  55 ml @ 


 110 mls/hr  Q12H


 IVPB


   4/25/20 20:00


 5/2/20 19:59  4/28/20 08:09


 


 


 Chlorhexidine


 Gluconate


  (Gloria-Hex 2%)  1 applic  DAILY@2000


 TOPIC


   4/25/20 20:00


 7/24/20 19:59  4/27/20 20:30


 


 


 Gabapentin


  (Neurontin)  300 mg  Q8HR


 ORAL


   4/25/20 00:00


 5/25/20 00:00  4/28/20 06:08


 


 


 Levetiracetam


  (Keppra)  1,000 mg  Q12HR


 ORAL


   4/25/20 00:00


 5/25/20 00:00  4/28/20 08:08


 


 


 Lorazepam


  (Ativan 2mg/ml


 1ml)  1 mg  EVERY 6 HOURS  PRN


 IV


 For Anxiety  4/25/20 16:00


 5/2/20 15:59   


 


 


 Memantine


  (Namenda)  5 mg  DAILY


 ORAL


   4/25/20 17:00


 5/25/20 16:59  4/28/20 08:08


 


 


 Norepinephrine


 Bitartrate 4 mg/


 Dextrose  250 ml @ 0


 mls/hr  Q24H  PRN


 IV


 For hypotension  4/24/20 23:00


 5/24/20 22:59  4/25/20 10:25


 


 


 Sodium Chloride  1,000 ml @ 


 75 mls/hr  [id number]


 IV


   4/24/20 23:00


 5/24/20 22:59  4/28/20 06:30


 

















Hayder Hahn MD Apr 28, 2020 11:48

## 2020-04-29 VITALS — SYSTOLIC BLOOD PRESSURE: 117 MMHG | DIASTOLIC BLOOD PRESSURE: 74 MMHG

## 2020-04-29 VITALS — SYSTOLIC BLOOD PRESSURE: 126 MMHG | DIASTOLIC BLOOD PRESSURE: 64 MMHG

## 2020-04-29 VITALS — SYSTOLIC BLOOD PRESSURE: 122 MMHG | DIASTOLIC BLOOD PRESSURE: 72 MMHG

## 2020-04-29 VITALS — SYSTOLIC BLOOD PRESSURE: 137 MMHG | DIASTOLIC BLOOD PRESSURE: 79 MMHG

## 2020-04-29 VITALS — SYSTOLIC BLOOD PRESSURE: 130 MMHG | DIASTOLIC BLOOD PRESSURE: 73 MMHG

## 2020-04-29 VITALS — SYSTOLIC BLOOD PRESSURE: 121 MMHG | DIASTOLIC BLOOD PRESSURE: 72 MMHG

## 2020-04-29 VITALS — SYSTOLIC BLOOD PRESSURE: 122 MMHG | DIASTOLIC BLOOD PRESSURE: 75 MMHG

## 2020-04-29 VITALS — SYSTOLIC BLOOD PRESSURE: 107 MMHG | DIASTOLIC BLOOD PRESSURE: 67 MMHG

## 2020-04-29 VITALS — DIASTOLIC BLOOD PRESSURE: 73 MMHG | SYSTOLIC BLOOD PRESSURE: 122 MMHG

## 2020-04-29 VITALS — SYSTOLIC BLOOD PRESSURE: 124 MMHG | DIASTOLIC BLOOD PRESSURE: 71 MMHG

## 2020-04-29 VITALS — DIASTOLIC BLOOD PRESSURE: 75 MMHG | SYSTOLIC BLOOD PRESSURE: 127 MMHG

## 2020-04-29 VITALS — DIASTOLIC BLOOD PRESSURE: 82 MMHG | SYSTOLIC BLOOD PRESSURE: 124 MMHG

## 2020-04-29 VITALS — DIASTOLIC BLOOD PRESSURE: 65 MMHG | SYSTOLIC BLOOD PRESSURE: 122 MMHG

## 2020-04-29 VITALS — SYSTOLIC BLOOD PRESSURE: 120 MMHG | DIASTOLIC BLOOD PRESSURE: 79 MMHG

## 2020-04-29 VITALS — DIASTOLIC BLOOD PRESSURE: 77 MMHG | SYSTOLIC BLOOD PRESSURE: 119 MMHG

## 2020-04-29 VITALS — SYSTOLIC BLOOD PRESSURE: 120 MMHG | DIASTOLIC BLOOD PRESSURE: 68 MMHG

## 2020-04-29 LAB
ADD MANUAL DIFF: NO
ANION GAP SERPL CALC-SCNC: 9 MMOL/L (ref 5–15)
BASOPHILS NFR BLD AUTO: 0.3 % (ref 0–2)
BUN SERPL-MCNC: 17 MG/DL (ref 7–18)
CALCIUM SERPL-MCNC: 7.8 MG/DL (ref 8.5–10.1)
CHLORIDE SERPL-SCNC: 103 MMOL/L (ref 98–107)
CO2 SERPL-SCNC: 27 MMOL/L (ref 21–32)
CREAT SERPL-MCNC: 0.8 MG/DL (ref 0.55–1.3)
EOSINOPHIL NFR BLD AUTO: 1.3 % (ref 0–3)
ERYTHROCYTE [DISTWIDTH] IN BLOOD BY AUTOMATED COUNT: 11 % (ref 11.6–14.8)
HCT VFR BLD CALC: 34.7 % (ref 42–52)
HGB BLD-MCNC: 12.1 G/DL (ref 14.2–18)
LYMPHOCYTES NFR BLD AUTO: 11.8 % (ref 20–45)
MCV RBC AUTO: 84 FL (ref 80–99)
MONOCYTES NFR BLD AUTO: 6.2 % (ref 1–10)
NEUTROPHILS NFR BLD AUTO: 80.5 % (ref 45–75)
PLATELET # BLD: 133 K/UL (ref 150–450)
POTASSIUM SERPL-SCNC: 3.6 MMOL/L (ref 3.5–5.1)
RBC # BLD AUTO: 4.1 M/UL (ref 4.7–6.1)
SODIUM SERPL-SCNC: 139 MMOL/L (ref 136–145)
WBC # BLD AUTO: 7.6 K/UL (ref 4.8–10.8)

## 2020-04-29 RX ADMIN — SODIUM CHLORIDE SCH MLS/HR: 0.9 INJECTION INTRAVENOUS at 08:08

## 2020-04-29 RX ADMIN — CHLORHEXIDINE GLUCONATE SCH APPLIC: 213 SOLUTION TOPICAL at 22:44

## 2020-04-29 NOTE — CARDIAC ELECTROPHYSIOLOGY PN
Assessment/Plan


Assessment/Plan


1. S/P Shock  due to cardiomyopathy EF 40 and sepsis.  His BNP is 736.  


    On iv Abx by Dr. Lindsay   Off Levophed since 4/27/20


    


2. Cardiomyopathy EF 40%.  Resume Coreg 3.125 bid





3. Status post right-sided TONY ICD.    





4. History of hypertension  


5. History of CVA with residual weakness.


6. COVID-19 PNA off the vent 








DW RN





Subjective


Subjective


In ICU still on 2 liter NC. Covid is positive. Being transferred to Med Surge





Objective





Last 24 Hour Vital Signs








  Date Time  Temp Pulse Resp B/P (MAP) Pulse Ox O2 Delivery O2 Flow Rate FiO2


 


4/29/20 11:00  90 28 122/65 (84) 96   


 


4/29/20 10:00  82 28 124/71 (88) 95   


 


4/29/20 09:00  89 33 120/68 (85) 94   


 


4/29/20 08:00      Nasal Cannula 1.0 


 


4/29/20 08:00  77      


 


4/29/20 08:00 99.0 78 28 107/67 (80) 100   


 


4/29/20 07:00  76 30 121/72 (88) 100   


 


4/29/20 06:00  82 31 127/75 (92) 100   


 


4/29/20 05:00  82 30 122/75 (91) 98   


 


4/29/20 04:00 98.8 77 31 122/72 (89) 100   


 


4/29/20 04:00  74      


 


4/29/20 04:00      Nasal Cannula 1.0 


 


4/29/20 03:00  80 32 130/73 (92) 100   


 


4/29/20 02:00  77 30 122/73 (89) 100   


 


4/29/20 01:00  79 30 120/79 (93) 100   


 


4/29/20 00:00      Nasal Cannula 1.0 


 


4/29/20 00:00 98.8 82 31 117/74 (88) 100   


 


4/29/20 00:00  79      


 


4/28/20 23:00  79 31 123/76 (92) 100   


 


4/28/20 22:00  77 29 118/71 (87) 98   


 


4/28/20 21:00  76 30 127/77 (94) 92   


 


4/28/20 20:46     95 Nasal Cannula 3.0 32


 


4/28/20 20:00  77      


 


4/28/20 20:00 98.5 73 28 123/75 (91) 88   


 


4/28/20 20:00      Nasal Cannula 1.0 


 


4/28/20 19:00  78 28 119/75 (90) 94   


 


4/28/20 18:00  84 26 116/77 (90) 100   


 


4/28/20 17:00 98.3 74 29 113/85 (94) 94   


 


4/28/20 16:00  75      


 


4/28/20 16:00  82 31 117/62 (80) 95   


 


4/28/20 16:00      Nasal Cannula 1.0 


 


4/28/20 15:00  71 26 124/73 (90) 95   


 


4/28/20 14:00  74 24 127/85 (99) 96   


 


4/28/20 13:00 97.3 78 27 125/81 (96) 93   

















Intake and Output  


 


 4/28/20 4/29/20





 19:00 07:00


 


Intake Total 1030 ml 930 ml


 


Output Total 515 ml 610 ml


 


Balance 515 ml 320 ml


 


  


 


Intake Oral  50 ml


 


IV Total 1030 ml 880 ml


 


Output Urine Total 515 ml 610 ml











Laboratory Tests








Test


  4/29/20


04:30


 


White Blood Count


  7.6 K/UL


(4.8-10.8)


 


Red Blood Count


  4.10 M/UL


(4.70-6.10)  L


 


Hemoglobin


  12.1 G/DL


(14.2-18.0)  L


 


Hematocrit


  34.7 %


(42.0-52.0)  L


 


Mean Corpuscular Volume 84 FL (80-99)  


 


Mean Corpuscular Hemoglobin


  29.4 PG


(27.0-31.0)


 


Mean Corpuscular Hemoglobin


Concent 34.9 G/DL


(32.0-36.0)


 


Red Cell Distribution Width


  11.0 %


(11.6-14.8)  L


 


Platelet Count


  133 K/UL


(150-450)  L


 


Mean Platelet Volume


  7.9 FL


(6.5-10.1)


 


Neutrophils (%) (Auto)


  80.5 %


(45.0-75.0)  H


 


Lymphocytes (%) (Auto)


  11.8 %


(20.0-45.0)  L


 


Monocytes (%) (Auto)


  6.2 %


(1.0-10.0)


 


Eosinophils (%) (Auto)


  1.3 %


(0.0-3.0)


 


Basophils (%) (Auto)


  0.3 %


(0.0-2.0)


 


Sodium Level


  139 MMOL/L


(136-145)


 


Potassium Level


  3.6 MMOL/L


(3.5-5.1)


 


Chloride Level


  103 MMOL/L


()


 


Carbon Dioxide Level


  27 MMOL/L


(21-32)


 


Anion Gap


  9 mmol/L


(5-15)


 


Blood Urea Nitrogen


  17 mg/dL


(7-18)


 


Creatinine


  0.8 MG/DL


(0.55-1.30)


 


Estimat Glomerular Filtration


Rate > 60 mL/min


(>60)


 


Glucose Level


  137 MG/DL


()  H


 


Calcium Level


  7.8 MG/DL


(8.5-10.1)  L








Objective





HEAD AND NECK:  Mild JVD.


LUNGS:  Decreased breath sounds.


CARDIOVASCULAR:  Regular S1 and S2 with no gallop.


ABDOMEN:  Soft.


EXTREMITIES:  1+ pitting edema.  Defibrillator is in right subclavian.











Tommy Otero MD Apr 29, 2020 12:25

## 2020-04-29 NOTE — NUR
CASE MANAGEMENT: REVIEW







SI: SEPSIS . COVID-19 

T 98.8 HR 82 RR 31 /74 SAT 92% NC/3L

H/H 12.1/34.7 CALCIUM 7.8 









IS: NS IVF @ 75ML/HR 

CEFEPIME IV Q12HR 

KEPPRA PO Q12HR 

GABAPENTIN PO Q8HR 

CLEAR LIQUID DIET 







***MED/SURG STATUS***

DCP: PATIENT IS FROM [facility]

## 2020-04-29 NOTE — NUR
NURSE NOTES:

Patient awake, alert x2; on Nasal Cannula 2 Liters, no sing of distress shortness of breath; 
Swenson in place, drains urine well; IV Left Hand TKO and Right Femoral Triple lumen NS 75cc 
running, dressing dry and intact; patient's own denture upper and lower at the bed side; 
belonging list crossed match and singed by transfer and receiving nurse; patients wallet at 
the bed side, patient doesn't want the wallet to be kept in a safe box; side rails up x2, 
breaks engaged, bed at lowest position, bed alarm on; call light within reach; will care out 
the place of care.

## 2020-04-29 NOTE — PROGRESS NOTE
DATE:  04/29/2020

SUBJECTIVE:  This is a 69-year-old patient with altered mental status.

This patient continues to have some confusion, some disorganized thought

process, and decline in cognition below baseline that is why his attending

physician has requested daily psychiatric consultation.  The patient has

diagnosis of pyelonephritis.  He also has ______ , sepsis, anemia,

diabetes, rule out COVID-19 infection.  He has altered mental status and

decline in cognition below his baseline that is why his attending

physician has requested daily psychiatric consultation.



MENTAL STATUS EXAMINATION:  This is a 69-year-old male.  Appearance is

disheveled.  Attitude, irritable and agitated.  Affect, guarded and

restricted.  Intellect poor.  Mood, depressed and anxious.  Motor

activity, psychomotor agitation.  Attention span is poor.  Orientation x2.

Speech is low volume and slurred. Thought process, disorganized and

illogical.  Insight and judgment is poor.



DIAGNOSIS:  Major depressive disorder, mild, recurrent with psychotic

features rule out dementia with psychosis.



PLAN:  Treat with Namenda 5 mg daily,  Ativan 1 mg IV every 6 hours p.r.n.

anxiety and agitation, Neurontin 300 mg p.o. every 8 hours.  A 20 minutes

of reality-based supportive psychotherapy. A 20-minutes of

insight-oriented psychotherapy to help him understand his current physical

and mental illness so that he has better impulse control and behavior on

the unit.  Chart reviewed and discussed with staff.  Seen and assessed at

bedside.









  ______________________________________________

  Demarcus Love M.D.





DR:  Bernabe

D:  04/29/2020 09:53

T:  04/29/2020 16:49

JOB#:  [id number]

CC:

## 2020-04-29 NOTE — NUR
TRANSFER TO FLOOR:

Patient transferred to Med Surg from ICU via hospital bed while on 2L of oxygen. VS remain 
stable. Temp 98.6F axillary. Transfer hand-off report was given to Dustin CH. Pt's 
belonging's list was checked and signed. Skin is intact. Endorsed plan of care.

## 2020-04-29 NOTE — INFECTIOUS DISEASES PROG NOTE
Assessment/Plan


Assessment/Plan





Assessment:


Septic shock-off pressors now





Fever


No leukocytosis


   -u/a neg


   -Bcx NTD





Probable PNA- 2ry to COVID19 (from NH with confirmed cases)


At [facility]


  -4/28 CXR: Bilateral mid and lower lung interstitial disease, new since prior 

study.Possible developing left pleural effusion


        Ferritn >2000, CRP 22.3


  -4/24 SARS-COV2 PCR +


  -CXR: Left basilar atelectasis and/or infiltrate


 


MELVI, improving





 HTN


cadiomyopathy s/p AICD


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


seizure disorder


NH resident ([facility]) 








Plan:


-Dc empiric Cefepime #6/5 and monitor off abx


   -4/27 SP IV Vancomycin #4





-f/u cx


-Monitor CBC/CMP, temperatures


-COVID 19 precautions


-clarify goals of care


-aspiration precautions


-inflamatory markers





Thank you for consulting [facility]. Will continue to follow along with 

you.





Discussed with RN,





Subjective


Allergies:  


Coded Allergies:  


     No Known Allergies (Unverified , 12/15/14)


Subjective


Tm100.8


at [facility]


transferred out ICU to [facility]





Objective


Vital Signs





Last 24 Hour Vital Signs








  Date Time  Temp Pulse Resp B/P (MAP) Pulse Ox O2 Delivery O2 Flow Rate FiO2


 


4/29/20 16:00 100.8 94 25 119/77 (91) 95   


 


4/29/20 13:00 98.6 83 19 137/79 (98) 96   


 


4/29/20 12:00      Nasal Cannula 1.0 


 


4/29/20 12:00 98.6 86 26 126/64 (84) 96   


 


4/29/20 12:00  80      


 


4/29/20 11:00  90 28 122/65 (84) 96   


 


4/29/20 10:00  82 28 124/71 (88) 95   


 


4/29/20 09:36     96 Nasal Cannula 3.0 32


 


4/29/20 09:00  89 33 120/68 (85) 94   


 


4/29/20 08:00      Nasal Cannula 1.0 


 


4/29/20 08:00  77      


 


4/29/20 08:00 99.0 78 28 107/67 (80) 100   


 


4/29/20 07:00  76 30 121/72 (88) 100   


 


4/29/20 06:00  82 31 127/75 (92) 100   


 


4/29/20 05:00  82 30 122/75 (91) 98   


 


4/29/20 04:00 98.8 77 31 122/72 (89) 100   


 


4/29/20 04:00  74      


 


4/29/20 04:00      Nasal Cannula 1.0 


 


4/29/20 03:00  80 32 130/73 (92) 100   


 


4/29/20 02:00  77 30 122/73 (89) 100   


 


4/29/20 01:00  79 30 120/79 (93) 100   


 


4/29/20 00:00      Nasal Cannula 1.0 


 


4/29/20 00:00 98.8 82 31 117/74 (88) 100   


 


4/29/20 00:00  79      


 


4/28/20 23:00  79 31 123/76 (92) 100   


 


4/28/20 22:00  77 29 118/71 (87) 98   


 


4/28/20 21:00  76 30 127/77 (94) 92   


 


4/28/20 20:46     95 Nasal Cannula 3.0 32


 


4/28/20 20:00  77      


 


4/28/20 20:00 98.5 73 28 123/75 (91) 88   


 


4/28/20 20:00      Nasal Cannula 1.0 


 


4/28/20 19:00  78 28 119/75 (90) 94   


 


4/28/20 18:00  84 26 116/77 (90) 100   








Height (Feet):  5


Height (Inches):  8.00


Weight (Pounds):  174


Objective


 not examined to limit COVID19 exposure





Laboratory Tests








Test


  4/29/20


04:30


 


White Blood Count


  7.6 K/UL


(4.8-10.8)


 


Red Blood Count


  4.10 M/UL


(4.70-6.10)  L


 


Hemoglobin


  12.1 G/DL


(14.2-18.0)  L


 


Hematocrit


  34.7 %


(42.0-52.0)  L


 


Mean Corpuscular Volume 84 FL (80-99)  


 


Mean Corpuscular Hemoglobin


  29.4 PG


(27.0-31.0)


 


Mean Corpuscular Hemoglobin


Concent 34.9 G/DL


(32.0-36.0)


 


Red Cell Distribution Width


  11.0 %


(11.6-14.8)  L


 


Platelet Count


  133 K/UL


(150-450)  L


 


Mean Platelet Volume


  7.9 FL


(6.5-10.1)


 


Neutrophils (%) (Auto)


  80.5 %


(45.0-75.0)  H


 


Lymphocytes (%) (Auto)


  11.8 %


(20.0-45.0)  L


 


Monocytes (%) (Auto)


  6.2 %


(1.0-10.0)


 


Eosinophils (%) (Auto)


  1.3 %


(0.0-3.0)


 


Basophils (%) (Auto)


  0.3 %


(0.0-2.0)


 


Sodium Level


  139 MMOL/L


(136-145)


 


Potassium Level


  3.6 MMOL/L


(3.5-5.1)


 


Chloride Level


  103 MMOL/L


()


 


Carbon Dioxide Level


  27 MMOL/L


(21-32)


 


Anion Gap


  9 mmol/L


(5-15)


 


Blood Urea Nitrogen


  17 mg/dL


(7-18)


 


Creatinine


  0.8 MG/DL


(0.55-1.30)


 


Estimat Glomerular Filtration


Rate > 60 mL/min


(>60)


 


Glucose Level


  137 MG/DL


()  H


 


Calcium Level


  7.8 MG/DL


(8.5-10.1)  L











Current Medications








 Medications


  (Trade)  Dose


 Ordered  Sig/Aarti


 Route


 PRN Reason  Start Time


 Stop Time Status Last Admin


Dose Admin


 


 Acetaminophen


  (Tylenol)  650 mg  Q6H  PRN


 ORAL


 Temp >100.5  4/29/20 14:45


 5/26/20 08:44  4/29/20 17:29


 


 


 Carvedilol


  (Coreg)  3.125 mg  EVERY 12  HOURS


 ORAL


   4/29/20 21:00


 5/29/20 20:59   


 


 


 Cefepime HCl 1 gm/


 Dextrose  55 ml @ 


 110 mls/hr  Q12H


 IVPB


   4/29/20 20:00


 5/2/20 19:59   


 


 


 Chlorhexidine


 Gluconate


  (Gloria-Hex 2%)  1 applic  DAILY@2000


 TOPIC


   4/29/20 20:00


 7/24/20 19:59   


 


 


 Gabapentin


  (Neurontin)  300 mg  Q8HR


 ORAL


   4/29/20 22:00


 5/25/20 00:00   


 


 


 Levetiracetam


  (Keppra)  1,000 mg  Q12HR


 ORAL


   4/29/20 21:00


 5/25/20 00:00   


 


 


 Lorazepam


  (Ativan 2mg/ml


 1ml)  1 mg  Q6H  PRN


 IV


 For Anxiety  4/29/20 14:30


 5/6/20 14:29   


 


 


 Memantine


  (Namenda)  5 mg  DAILY


 ORAL


   4/30/20 09:00


 5/25/20 16:59   


 


 


 Sodium Chloride  1,000 ml @ 


 75 mls/hr  [id number]


 IV


   4/29/20 14:15


 5/24/20 22:59  4/29/20 15:20


 

















Naila Lindsay M.D. Apr 29, 2020 17:48

## 2020-04-29 NOTE — NUR
NURSE NOTES:

Pt was cleaned, gown/bed linens were changed. Pt was repositioned with bilateral lower 
extremities elevated, off/heels. VS remain stable. Awaiting for Med/Surg bed to be cleaned, 
to transfer pt.

## 2020-04-29 NOTE — GENERAL PROGRESS NOTE
Assessment/Plan


Problem List:  


(1) Suspected COVID-19 virus infection


ICD Codes:  Z20.828 - Contact with and (suspected) exposure to other viral 

communicable diseases


SNOMED:  923110137


(2) Anemia


ICD Codes:  D64.9 - Anemia, unspecified


SNOMED:  647340708


(3) Weak


ICD Codes:  R53.1 - Weakness


SNOMED:  27141074


(4) COPD (chronic obstructive pulmonary disease)


ICD Codes:  J44.9 - Chronic obstructive pulmonary disease, unspecified


SNOMED:  20441782


(5) Diabetes


ICD Codes:  E11.9 - Type 2 diabetes mellitus without complications


SNOMED:  54262893


(6) Epileptic seizure, generalized


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 

not intractable, without status epilepticus


SNOMED:  40320891


(7) Altered mental status


ICD Codes:  R41.82 - Altered mental status, unspecified


SNOMED:  648686996


Qualifiers:  


   Qualified Codes:  R41.82 - Altered mental status, unspecified


(8) Hypotension


ICD Codes:  I95.9 - Hypotension, unspecified


SNOMED:  44574424


Qualifiers:  


   Qualified Codes:  I95.9 - Hypotension, unspecified


Status:  unchanged


Assessment/Plan:


o2 pulm tx abx pt diet cbc bmp am





Subjective


Constitutional:  Reports: weakness


Allergies:  


Coded Allergies:  


     No Known Allergies (Unverified , 12/15/14)


All Systems:  reviewed and negative except above


Subjective


o2nc calm in bed in icu





Objective





Last 24 Hour Vital Signs








  Date Time  Temp Pulse Resp B/P (MAP) Pulse Ox O2 Delivery O2 Flow Rate FiO2


 


4/29/20 08:00      Nasal Cannula 1.0 


 


4/29/20 08:00  77      


 


4/29/20 08:00 99.0 78 28 107/67 (80) 100   


 


4/29/20 07:00  76 30 121/72 (88) 100   


 


4/29/20 06:00  82 31 127/75 (92) 100   


 


4/29/20 05:00  82 30 122/75 (91) 98   


 


4/29/20 04:00 98.8 77 31 122/72 (89) 100   


 


4/29/20 04:00  74      


 


4/29/20 04:00      Nasal Cannula 1.0 


 


4/29/20 03:00  80 32 130/73 (92) 100   


 


4/29/20 02:00  77 30 122/73 (89) 100   


 


4/29/20 01:00  79 30 120/79 (93) 100   


 


4/29/20 00:00      Nasal Cannula 1.0 


 


4/29/20 00:00 98.8 82 31 117/74 (88) 100   


 


4/29/20 00:00  79      


 


4/28/20 23:00  79 31 123/76 (92) 100   


 


4/28/20 22:00  77 29 118/71 (87) 98   


 


4/28/20 21:00  76 30 127/77 (94) 92   


 


4/28/20 20:46     95 Nasal Cannula 3.0 32


 


4/28/20 20:00  77      


 


4/28/20 20:00 98.5 73 28 123/75 (91) 88   


 


4/28/20 20:00      Nasal Cannula 1.0 


 


4/28/20 19:00  78 28 119/75 (90) 94   


 


4/28/20 18:00  84 26 116/77 (90) 100   


 


4/28/20 17:00 98.3 74 29 113/85 (94) 94   


 


4/28/20 16:00  75      


 


4/28/20 16:00  82 31 117/62 (80) 95   


 


4/28/20 16:00      Nasal Cannula 1.0 


 


4/28/20 15:00  71 26 124/73 (90) 95   


 


4/28/20 14:00  74 24 127/85 (99) 96   


 


4/28/20 13:00 97.3 78 27 125/81 (96) 93   


 


4/28/20 12:00      Nasal Cannula 1.0 


 


4/28/20 12:00  83 28 117/77 (90) 92   


 


4/28/20 11:22  84      


 


4/28/20 11:00  83 29 114/82 (93) 94   


 


4/28/20 10:00  83 29 126/76 (93) 94   


 


4/28/20 09:00 100.0 87 33 116/79 (91) 94   

















Intake and Output  


 


 4/28/20 4/29/20





 19:00 07:00


 


Intake Total 1030 ml 930 ml


 


Output Total 515 ml 610 ml


 


Balance 515 ml 320 ml


 


  


 


Intake Oral  50 ml


 


IV Total 1030 ml 880 ml


 


Output Urine Total 515 ml 610 ml








Laboratory Tests


4/29/20 04:30: 


White Blood Count 7.6, Red Blood Count 4.10L, Hemoglobin 12.1L, Hematocrit 34.7L

, Mean Corpuscular Volume 84, Mean Corpuscular Hemoglobin 29.4, Mean 

Corpuscular Hemoglobin Concent 34.9, Red Cell Distribution Width 11.0L, 

Platelet Count 133L, Mean Platelet Volume 7.9, Neutrophils (%) (Auto) 80.5H, 

Lymphocytes (%) (Auto) 11.8L, Monocytes (%) (Auto) 6.2, Eosinophils (%) (Auto) 

1.3, Basophils (%) (Auto) 0.3, Sodium Level 139, Potassium Level 3.6, Chloride 

Level 103, Carbon Dioxide Level 27, Anion Gap 9, Blood Urea Nitrogen 17, 

Creatinine 0.8, Estimat Glomerular Filtration Rate > 60, Glucose Level 137H, 

Calcium Level 7.8L


Height (Feet):  5


Height (Inches):  8.00


Weight (Pounds):  174


General Appearance:  lethargic


EENT:  normal ENT inspection


Neck:  normal alignment


Cardiovascular:  normal rate, regular rhythm


Respiratory/Chest:  no respiratory distress, no accessory muscle use


Extremities:  normal inspection


Skin:  normal pigmentation











Arron Barkley DO Apr 29, 2020 08:48

## 2020-04-29 NOTE — NUR
NURSE NOTES:

RECEIVED PATIENT FROM [facility]. PATIENT IS AWAKE, AAOX1, ON NC 2L, NO ACUTE  DISTRESS 
NOTED. PATIENT PULLED OUT HIS IV ON LEFT HAND 20G. RIGHT FEMORAL TRIPLE LUMEN INTACT AND 
PATENT, RUNNING NS @ 75ML/HR. BED IS LOCKED AND LOW, BED ALARMS ACTIVE, SIDE RAILS UP X2 AND 
CALL LIGHT IS WITHIN REACH. WILL CONTINUE TO MONITOR.

## 2020-04-29 NOTE — PULMONOLOGY PROGRESS NOTE
Assessment/Plan


Assessment/Plan


IMPRESSION:


1. Cardiomyopathy.


2. Hypotension.


3. Left lung pneumonia.


4. Nursing home resident.


5. Positive COVID-19.





DISCUSSION:


1. Continue isolation


2. Continue current medications.


3. Off pressors.


4. Oxygen.


5. Pulmonary hygiene.


6. Broad-spectrum antibiotics.


7. I will follow.


8. Transfer to med-surg














  ______________________________________________


  Hayder Hahn M.D.





Subjective


Interval Events:  Looking better; off levophed; afebrile


Constitutional:  Reports: no symptoms


Respiratory:  Reports: no symptoms


Cardiovascular:  Reports: no symptoms


Gastrointestinal/Abdominal:  Reports: no symptoms


Genitourinary:  Reports: no symptoms


Neurologic:  Reports: no symptoms


Allergies:  


Coded Allergies:  


     No Known Allergies (Unverified , 12/15/14)


All Systems:  reviewed and negative except above





Objective





Last 24 Hour Vital Signs








  Date Time  Temp Pulse Resp B/P (MAP) Pulse Ox O2 Delivery O2 Flow Rate FiO2


 


4/29/20 09:00  89 33 120/68 (85) 94   


 


4/29/20 08:00      Nasal Cannula 1.0 


 


4/29/20 08:00  77      


 


4/29/20 08:00 99.0 78 28 107/67 (80) 100   


 


4/29/20 07:00  76 30 121/72 (88) 100   


 


4/29/20 06:00  82 31 127/75 (92) 100   


 


4/29/20 05:00  82 30 122/75 (91) 98   


 


4/29/20 04:00 98.8 77 31 122/72 (89) 100   


 


4/29/20 04:00  74      


 


4/29/20 04:00      Nasal Cannula 1.0 


 


4/29/20 03:00  80 32 130/73 (92) 100   


 


4/29/20 02:00  77 30 122/73 (89) 100   


 


4/29/20 01:00  79 30 120/79 (93) 100   


 


4/29/20 00:00      Nasal Cannula 1.0 


 


4/29/20 00:00 98.8 82 31 117/74 (88) 100   


 


4/29/20 00:00  79      


 


4/28/20 23:00  79 31 123/76 (92) 100   


 


4/28/20 22:00  77 29 118/71 (87) 98   


 


4/28/20 21:00  76 30 127/77 (94) 92   


 


4/28/20 20:46     95 Nasal Cannula 3.0 32


 


4/28/20 20:00  77      


 


4/28/20 20:00 98.5 73 28 123/75 (91) 88   


 


4/28/20 20:00      Nasal Cannula 1.0 


 


4/28/20 19:00  78 28 119/75 (90) 94   


 


4/28/20 18:00  84 26 116/77 (90) 100   


 


4/28/20 17:00 98.3 74 29 113/85 (94) 94   


 


4/28/20 16:00  75      


 


4/28/20 16:00  82 31 117/62 (80) 95   


 


4/28/20 16:00      Nasal Cannula 1.0 


 


4/28/20 15:00  71 26 124/73 (90) 95   


 


4/28/20 14:00  74 24 127/85 (99) 96   


 


4/28/20 13:00 97.3 78 27 125/81 (96) 93   


 


4/28/20 12:00      Nasal Cannula 1.0 


 


4/28/20 12:00  83 28 117/77 (90) 92   


 


4/28/20 11:22  84      


 


4/28/20 11:00  83 29 114/82 (93) 94   

















Intake and Output  


 


 4/28/20 4/29/20





 19:00 07:00


 


Intake Total 1030 ml 930 ml


 


Output Total 515 ml 610 ml


 


Balance 515 ml 320 ml


 


  


 


Intake Oral  50 ml


 


IV Total 1030 ml 880 ml


 


Output Urine Total 515 ml 610 ml








General Appearance:  no acute distress


HEENT:  normocephalic


Respiratory/Chest:  chest wall non-tender, lungs clear


Cardiovascular:  normal peripheral pulses


Abdomen:  normal bowel sounds


Laboratory Tests


4/29/20 04:30: 


White Blood Count 7.6, Red Blood Count 4.10L, Hemoglobin 12.1L, Hematocrit 34.7L

, Mean Corpuscular Volume 84, Mean Corpuscular Hemoglobin 29.4, Mean 

Corpuscular Hemoglobin Concent 34.9, Red Cell Distribution Width 11.0L, 

Platelet Count 133L, Mean Platelet Volume 7.9, Neutrophils (%) (Auto) 80.5H, 

Lymphocytes (%) (Auto) 11.8L, Monocytes (%) (Auto) 6.2, Eosinophils (%) (Auto) 

1.3, Basophils (%) (Auto) 0.3, Sodium Level 139, Potassium Level 3.6, Chloride 

Level 103, Carbon Dioxide Level 27, Anion Gap 9, Blood Urea Nitrogen 17, 

Creatinine 0.8, Estimat Glomerular Filtration Rate > 60, Glucose Level 137H, 

Calcium Level 7.8L





Current Medications








 Medications


  (Trade)  Dose


 Ordered  Sig/Aarti


 Route


 PRN Reason  Start Time


 Stop Time Status Last Admin


Dose Admin


 


 Acetaminophen


  (Tylenol)  650 mg  Q6H  PRN


 ORAL


 Temp >100.5  4/26/20 08:45


 5/26/20 08:44  4/28/20 08:16


 


 


 Cefepime HCl 1 gm/


 Dextrose  55 ml @ 


 110 mls/hr  Q12H


 IVPB


   4/25/20 20:00


 5/2/20 19:59  4/29/20 08:08


 


 


 Chlorhexidine


 Gluconate


  (Gloria-Hex 2%)  1 applic  DAILY@2000


 TOPIC


   4/25/20 20:00


 7/24/20 19:59  4/28/20 20:36


 


 


 Gabapentin


  (Neurontin)  300 mg  Q8HR


 ORAL


   4/25/20 00:00


 5/25/20 00:00  4/29/20 06:04


 


 


 Levetiracetam


  (Keppra)  1,000 mg  Q12HR


 ORAL


   4/25/20 00:00


 5/25/20 00:00  4/29/20 08:08


 


 


 Lorazepam


  (Ativan 2mg/ml


 1ml)  1 mg  EVERY 6 HOURS  PRN


 IV


 For Anxiety  4/25/20 16:00


 5/2/20 15:59   


 


 


 Memantine


  (Namenda)  5 mg  DAILY


 ORAL


   4/25/20 17:00


 5/25/20 16:59  4/29/20 08:09


 


 


 Norepinephrine


 Bitartrate 4 mg/


 Dextrose  250 ml @ 0


 mls/hr  Q24H  PRN


 IV


 For hypotension  4/24/20 23:00


 5/24/20 22:59  4/25/20 10:25


 


 


 Sodium Chloride  1,000 ml @ 


 75 mls/hr  [id number]


 IV


   4/24/20 23:00


 5/24/20 22:59  4/29/20 06:12


 

















Hayder Hahn MD Apr 29, 2020 10:02

## 2020-04-29 NOTE — NUR
NURSE NOTES:

Received order from Dr Hahn to transfer pt to Med-Surg, order processed, charge nurse 
aware. Pt is awake, watching TV. AM meds were administered. VS remain stable.

## 2020-04-29 NOTE — NUR
NURSE NOTES:

Received change of shift report from Nanda CH. Pt is awake, alert, oriented x2, on 1L of 
oxygen via nasal cannula at 94-96% O2Sat with no respiratory distress. Denies any pain or 
discomfort at this time. NSR on cardiac monitor, HR 84. Temp 99F axillary. Pt has left hand 
#20G saline locked, patent/intact, and right femoral TLC, infusing NS at 75ml/hour. Swenson 
catheter is present, draining clear/yellow urine. Skin is intact. HOB at high henriquez's, bed 
locked, in lowest position, three side rails up, and call light placed within reach. Pt is 
on seizure precautions, side rails padded, and suction set up at bedside. No episodes of 
seizure noted. Will continue to monitor pt and follow plan of care.

## 2020-04-30 VITALS — DIASTOLIC BLOOD PRESSURE: 83 MMHG | SYSTOLIC BLOOD PRESSURE: 136 MMHG

## 2020-04-30 VITALS — SYSTOLIC BLOOD PRESSURE: 132 MMHG | DIASTOLIC BLOOD PRESSURE: 75 MMHG

## 2020-04-30 VITALS — DIASTOLIC BLOOD PRESSURE: 80 MMHG | SYSTOLIC BLOOD PRESSURE: 127 MMHG

## 2020-04-30 VITALS — SYSTOLIC BLOOD PRESSURE: 103 MMHG | DIASTOLIC BLOOD PRESSURE: 65 MMHG

## 2020-04-30 VITALS — DIASTOLIC BLOOD PRESSURE: 70 MMHG | SYSTOLIC BLOOD PRESSURE: 130 MMHG

## 2020-04-30 LAB
ADD MANUAL DIFF: NO
ALBUMIN SERPL-MCNC: 2 G/DL (ref 3.4–5)
ALP SERPL-CCNC: 55 U/L (ref 46–116)
ALT SERPL-CCNC: 35 U/L (ref 12–78)
ANION GAP SERPL CALC-SCNC: 8 MMOL/L (ref 5–15)
AST SERPL-CCNC: 42 U/L (ref 15–37)
BASOPHILS NFR BLD AUTO: 0.6 % (ref 0–2)
BILIRUB DIRECT SERPL-MCNC: 0.1 MG/DL (ref 0–0.3)
BILIRUB SERPL-MCNC: 0.5 MG/DL (ref 0.2–1)
BUN SERPL-MCNC: 15 MG/DL (ref 7–18)
CALCIUM SERPL-MCNC: 8.6 MG/DL (ref 8.5–10.1)
CHLORIDE SERPL-SCNC: 103 MMOL/L (ref 98–107)
CO2 SERPL-SCNC: 28 MMOL/L (ref 21–32)
CREAT SERPL-MCNC: 0.9 MG/DL (ref 0.55–1.3)
EOSINOPHIL NFR BLD AUTO: 3.1 % (ref 0–3)
ERYTHROCYTE [DISTWIDTH] IN BLOOD BY AUTOMATED COUNT: 10.8 % (ref 11.6–14.8)
HCT VFR BLD CALC: 33.9 % (ref 42–52)
HGB BLD-MCNC: 11.6 G/DL (ref 14.2–18)
LYMPHOCYTES NFR BLD AUTO: 13.6 % (ref 20–45)
MCV RBC AUTO: 85 FL (ref 80–99)
MONOCYTES NFR BLD AUTO: 6.8 % (ref 1–10)
NEUTROPHILS NFR BLD AUTO: 75.9 % (ref 45–75)
PHOSPHATE SERPL-MCNC: 2.1 MG/DL (ref 2.5–4.9)
PLATELET # BLD: 147 K/UL (ref 150–450)
POTASSIUM SERPL-SCNC: 3.3 MMOL/L (ref 3.5–5.1)
RBC # BLD AUTO: 4 M/UL (ref 4.7–6.1)
SODIUM SERPL-SCNC: 139 MMOL/L (ref 136–145)
WBC # BLD AUTO: 8.2 K/UL (ref 4.8–10.8)

## 2020-04-30 RX ADMIN — CHLORHEXIDINE GLUCONATE SCH APPLIC: 213 SOLUTION TOPICAL at 21:19

## 2020-04-30 NOTE — PROGRESS NOTE
DATE:  04/30/2020

SUBJECTIVE:  This is a 69-year-old patient with altered mental status,

hypertension, and sepsis. Medical problems for this patient, he has some

confusion, some disorganized thought process, decline in cognition below

his baseline, pyelonephritis, sepsis, anemia, rule out COVID-19 infection,

altered mental status, decline in cognition below baseline that is why his

attending has requested daily psychiatric consultation.



MENTAL STATUS EXAMINATION:  This is a 69-year-old male.  Appearance is

disheveled.  Attitude, irritable and agitated.  Affect, guarded and

restricted.  Intellect poor.  Mood, depressed and anxious.  Motor

activity, psychomotor agitation.  Insight and judgment is poor.



DIAGNOSIS:  Major depressive disorder, mild, recurrent with psychotic

features rule out dementia with psychosis.



PLAN:  Namenda 5 mg daily,  Ativan 1 mg IV every 6 hours p.r.n. anxiety and

agitation, Neurontin 300 mg p.o. every 8 hours.  A 20 minutes of

reality-based supportive psychotherapy.  A 20-minutes of insight-oriented

psychotherapy to help him understand his current physical and mental

illness to help him have better impulse control and better behavior on the

unit. Chart reviewed and discussed with staff.  Seen and assessed at

bedside.









  ______________________________________________

  Demarcus Love M.D.





DR:  Bernabe

D:  04/30/2020 10:30

T:  04/30/2020 19:23

JOB#:  [id number]

CC:

## 2020-04-30 NOTE — PULMONOLOGY PROGRESS NOTE
Assessment/Plan


Assessment/Plan


IMPRESSION:


1. Cardiomyopathy.


2. Hypotension.


3. Left lung pneumonia.


4. Nursing home resident.


5. Positive COVID-19.





DISCUSSION:


1. Continue isolation


2. Continue current medications.


3. Off pressors.


4. Oxygen.


5. Pulmonary hygiene.


6. Broad-spectrum antibiotics.


7. I will follow.


8. Transfer to med-surg














  ______________________________________________


  Hayder Hahn M.D.





Subjective


Interval Events:  Looking better; off levophed; afebrile


Constitutional:  Reports: no symptoms


Respiratory:  Reports: no symptoms


Cardiovascular:  Reports: no symptoms


Gastrointestinal/Abdominal:  Reports: no symptoms


Genitourinary:  Reports: no symptoms


Neurologic:  Reports: no symptoms


Allergies:  


Coded Allergies:  


     No Known Allergies (Unverified , 12/15/14)


All Systems:  reviewed and negative except above





Objective





Last 24 Hour Vital Signs








  Date Time  Temp Pulse Resp B/P (MAP) Pulse Ox O2 Delivery O2 Flow Rate FiO2


 


4/30/20 12:00 99.0 68 19 130/70 (90) 98   


 


4/30/20 08:15  83  136/83    


 


4/30/20 08:00 98.3 101 20 103/65 (78) 92   


 


4/30/20 04:00 98.3 83 18 136/83 (100) 98   


 


4/30/20 00:00 98.1 82 20 127/80 (96) 94   


 


4/29/20 22:40  87  124/82    


 


4/29/20 20:00 97.2 87 24 124/82 (96) 92   


 


4/29/20 17:59 99.0       


 


4/29/20 16:00 100.8 94 25 119/77 (91) 95   


 


4/29/20 13:00 98.6 83 19 137/79 (98) 96   

















Intake and Output  


 


 4/29/20 4/30/20





 19:00 07:00


 


Intake Total 920 ml 600 ml


 


Output Total 290 ml 


 


Balance 630 ml 600 ml


 


  


 


Intake Oral 60 ml 


 


IV Total 860 ml 600 ml


 


Output Urine Total 290 ml 








General Appearance:  no acute distress


HEENT:  normocephalic


Respiratory/Chest:  chest wall non-tender, lungs clear


Cardiovascular:  normal peripheral pulses


Abdomen:  normal bowel sounds


Laboratory Tests


4/30/20 05:00: 


White Blood Count 8.2, Red Blood Count 4.00L, Hemoglobin 11.6L, Hematocrit 33.9L

, Mean Corpuscular Volume 85, Mean Corpuscular Hemoglobin 29.1, Mean 

Corpuscular Hemoglobin Concent 34.3, Red Cell Distribution Width 10.8L, 

Platelet Count 147L, Mean Platelet Volume 7.8, Neutrophils (%) (Auto) 75.9H, 

Lymphocytes (%) (Auto) 13.6L, Monocytes (%) (Auto) 6.8, Eosinophils (%) (Auto) 

3.1H, Basophils (%) (Auto) 0.6, Sodium Level 139, Potassium Level 3.3L, 

Chloride Level 103, Carbon Dioxide Level 28, Anion Gap 8, Blood Urea Nitrogen 15

, Creatinine 0.9, Estimat Glomerular Filtration Rate > 60, Glucose Level 142H, 

Uric Acid 2.8, Calcium Level 8.6, Phosphorus Level 2.1L, Magnesium Level 1.7L, 

Total Bilirubin 0.5, Direct Bilirubin 0.1, Aspartate Amino Transf (AST/SGOT) 42H

, Alanine Aminotransferase (ALT/SGPT) 35, Alkaline Phosphatase 55, C-Reactive 

Protein, Quantitative 28.6H, Total Protein 6.6, Albumin 2.0L





Current Medications








 Medications


  (Trade)  Dose


 Ordered  Sig/Aarti


 Route


 PRN Reason  Start Time


 Stop Time Status Last Admin


Dose Admin


 


 Acetaminophen


  (Tylenol)  650 mg  Q6H  PRN


 ORAL


 Temp >100.5  4/29/20 14:45


 5/26/20 08:44  4/29/20 17:29


 


 


 Carvedilol


  (Coreg)  3.125 mg  EVERY 12  HOURS


 ORAL


   4/29/20 21:00


 5/29/20 20:59  4/30/20 08:15


 


 


 Chlorhexidine


 Gluconate


  (Gloria-Hex 2%)  1 applic  DAILY@2000


 TOPIC


   4/29/20 20:00


 7/24/20 19:59  4/29/20 22:44


 


 


 Gabapentin


  (Neurontin)  300 mg  Q8HR


 ORAL


   4/29/20 22:00


 5/25/20 00:00  4/30/20 05:31


 


 


 Levetiracetam


  (Keppra)  1,000 mg  Q12HR


 ORAL


   4/29/20 21:00


 5/25/20 00:00  4/30/20 08:15


 


 


 Lorazepam


  (Ativan 2mg/ml


 1ml)  1 mg  Q6H  PRN


 IV


 For Anxiety  4/29/20 14:30


 5/6/20 14:29   


 


 


 Memantine


  (Namenda)  5 mg  DAILY


 ORAL


   4/30/20 09:00


 5/25/20 16:59  4/30/20 08:16


 


 


 Sodium Chloride  1,000 ml @ 


 75 mls/hr  [id number]


 IV


   4/29/20 14:15


 5/24/20 22:59  4/30/20 04:38


 

















Hayder Hahn MD Apr 30, 2020 12:20

## 2020-04-30 NOTE — GENERAL PROGRESS NOTE
Assessment/Plan


Problem List:  


(1) Suspected COVID-19 virus infection


ICD Codes:  Z20.828 - Contact with and (suspected) exposure to other viral 

communicable diseases


SNOMED:  662714678


(2) Anemia


ICD Codes:  D64.9 - Anemia, unspecified


SNOMED:  161719873


(3) Weak


ICD Codes:  R53.1 - Weakness


SNOMED:  48112727


(4) COPD (chronic obstructive pulmonary disease)


ICD Codes:  J44.9 - Chronic obstructive pulmonary disease, unspecified


SNOMED:  14794755


(5) Diabetes


ICD Codes:  E11.9 - Type 2 diabetes mellitus without complications


SNOMED:  51712173


(6) Epileptic seizure, generalized


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 

not intractable, without status epilepticus


SNOMED:  20095445


(7) Altered mental status


ICD Codes:  R41.82 - Altered mental status, unspecified


SNOMED:  245229013


Qualifiers:  


   Qualified Codes:  R41.82 - Altered mental status, unspecified


(8) Hypotension


ICD Codes:  I95.9 - Hypotension, unspecified


SNOMED:  25068143


Qualifiers:  


   Qualified Codes:  I95.9 - Hypotension, unspecified


Status:  unchanged


Assessment/Plan:


o2 pulm tx abx pt diet cbc bmp am





Subjective


Constitutional:  Reports: weakness


Allergies:  


Coded Allergies:  


     No Known Allergies (Unverified , 12/15/14)


All Systems:  reviewed and negative except above


Subjective


sleepy calm in bed





Objective





Last 24 Hour Vital Signs








  Date Time  Temp Pulse Resp B/P (MAP) Pulse Ox O2 Delivery O2 Flow Rate FiO2


 


4/30/20 08:15  83  136/83    


 


4/30/20 08:00 98.3 101 20 103/65 (78) 92   


 


4/30/20 04:00 98.3 83 18 136/83 (100) 98   


 


4/30/20 00:00 98.1 82 20 127/80 (96) 94   


 


4/29/20 22:40  87  124/82    


 


4/29/20 20:00 97.2 87 24 124/82 (96) 92   


 


4/29/20 17:59 99.0       


 


4/29/20 16:00 100.8 94 25 119/77 (91) 95   


 


4/29/20 13:00 98.6 83 19 137/79 (98) 96   


 


4/29/20 12:00      Nasal Cannula 1.0 


 


4/29/20 12:00 98.6 86 26 126/64 (84) 96   


 


4/29/20 12:00  80      

















Intake and Output  


 


 4/29/20 4/30/20





 19:00 07:00


 


Intake Total 920 ml 600 ml


 


Output Total 290 ml 


 


Balance 630 ml 600 ml


 


  


 


Intake Oral 60 ml 


 


IV Total 860 ml 600 ml


 


Output Urine Total 290 ml 








Laboratory Tests


4/30/20 05:00: 


White Blood Count 8.2, Red Blood Count 4.00L, Hemoglobin 11.6L, Hematocrit 33.9L

, Mean Corpuscular Volume 85, Mean Corpuscular Hemoglobin 29.1, Mean 

Corpuscular Hemoglobin Concent 34.3, Red Cell Distribution Width 10.8L, 

Platelet Count 147L, Mean Platelet Volume 7.8, Neutrophils (%) (Auto) 75.9H, 

Lymphocytes (%) (Auto) 13.6L, Monocytes (%) (Auto) 6.8, Eosinophils (%) (Auto) 

3.1H, Basophils (%) (Auto) 0.6, Sodium Level 139, Potassium Level 3.3L, 

Chloride Level 103, Carbon Dioxide Level 28, Anion Gap 8, Blood Urea Nitrogen 15

, Creatinine 0.9, Estimat Glomerular Filtration Rate > 60, Glucose Level 142H, 

Calcium Level 8.6, C-Reactive Protein, Quantitative 28.6H


Height (Feet):  5


Height (Inches):  8.00


Weight (Pounds):  173


General Appearance:  lethargic


EENT:  normal ENT inspection


Neck:  normal alignment


Cardiovascular:  normal rate, regular rhythm


Respiratory/Chest:  no respiratory distress, no accessory muscle use


Extremities:  normal inspection


Skin:  normal pigmentation











Arron Barkley DO Apr 30, 2020 11:02

## 2020-04-30 NOTE — NUR
NURSE NOTES

Received patient resting comfortably in bed, patient awake, oriented x1 confused ,no sign of 
distress, O2 at  2 LPM via NC,no fascial grimace or  pain or discomfort noted, on going IVF 
patent and infusing well, Swenson cath to gravity, via right femural TLC, on fall precaution 
observed and maintained, plan of care was discussed verbalized understanding , 4, P's in 
progress call light w/n sapphire honeycutt RN

-------------------------------------------------------------------------------

Addendum: 04/30/20 at 1820 by BRIAN ABURTO RN RN

-------------------------------------------------------------------------------

NURSE NOTES

from 0730

Received patient resting comfortably in bed, patient awake, oriented x1 confused ,no sign of 
distress, O2 at  2 LPM via NC,no fascial grimace or  pain or discomfort noted, on going IVF 
patent and infusing well,via right femural TLC, Swenson cath to gravity, on fall precaution 
observed and maintained, plan of care was discussed verbalized understanding , 4 P's in 
progress call light w/n sapphire , Will continue to monitor patient condition



mayito honeycutt

## 2020-04-30 NOTE — CARDIAC ELECTROPHYSIOLOGY PN
Assessment/Plan


Assessment/Plan


1. S/P Shock  due to cardiomyopathy EF 40 and sepsis.  His BNP is 736.  


    On iv Abx by Dr. Lindsay  


    


2. Cardiomyopathy EF 40%.   On Coreg 3.125 bid





3. Status post right-sided TONY ICD.    





4. History of hypertension  


5. History of CVA with residual weakness.


6. COVID-19 PNA off the vent 





DW RN





Subjective


Subjective


  Covid is positive. Transferred to Med Surge





Objective





Last 24 Hour Vital Signs








  Date Time  Temp Pulse Resp B/P (MAP) Pulse Ox O2 Delivery O2 Flow Rate FiO2


 


4/30/20 12:00 99.0 68 19 130/70 (90) 98   


 


4/30/20 08:15  83  136/83    


 


4/30/20 08:00 98.3 101 20 103/65 (78) 92   


 


4/30/20 04:00 98.3 83 18 136/83 (100) 98   


 


4/30/20 00:00 98.1 82 20 127/80 (96) 94   


 


4/29/20 22:40  87  124/82    


 


4/29/20 20:00 97.2 87 24 124/82 (96) 92   


 


4/29/20 17:59 99.0       


 


4/29/20 16:00 100.8 94 25 119/77 (91) 95   

















Intake and Output  


 


 4/29/20 4/30/20





 19:00 07:00


 


Intake Total 920 ml 600 ml


 


Output Total 290 ml 


 


Balance 630 ml 600 ml


 


  


 


Intake Oral 60 ml 


 


IV Total 860 ml 600 ml


 


Output Urine Total 290 ml 











Laboratory Tests








Test


  4/30/20


05:00


 


White Blood Count


  8.2 K/UL


(4.8-10.8)


 


Red Blood Count


  4.00 M/UL


(4.70-6.10)  L


 


Hemoglobin


  11.6 G/DL


(14.2-18.0)  L


 


Hematocrit


  33.9 %


(42.0-52.0)  L


 


Mean Corpuscular Volume 85 FL (80-99)  


 


Mean Corpuscular Hemoglobin


  29.1 PG


(27.0-31.0)


 


Mean Corpuscular Hemoglobin


Concent 34.3 G/DL


(32.0-36.0)


 


Red Cell Distribution Width


  10.8 %


(11.6-14.8)  L


 


Platelet Count


  147 K/UL


(150-450)  L


 


Mean Platelet Volume


  7.8 FL


(6.5-10.1)


 


Neutrophils (%) (Auto)


  75.9 %


(45.0-75.0)  H


 


Lymphocytes (%) (Auto)


  13.6 %


(20.0-45.0)  L


 


Monocytes (%) (Auto)


  6.8 %


(1.0-10.0)


 


Eosinophils (%) (Auto)


  3.1 %


(0.0-3.0)  H


 


Basophils (%) (Auto)


  0.6 %


(0.0-2.0)


 


Sodium Level


  139 MMOL/L


(136-145)


 


Potassium Level


  3.3 MMOL/L


(3.5-5.1)  L


 


Chloride Level


  103 MMOL/L


()


 


Carbon Dioxide Level


  28 MMOL/L


(21-32)


 


Anion Gap


  8 mmol/L


(5-15)


 


Blood Urea Nitrogen


  15 mg/dL


(7-18)


 


Creatinine


  0.9 MG/DL


(0.55-1.30)


 


Estimat Glomerular Filtration


Rate > 60 mL/min


(>60)


 


Glucose Level


  142 MG/DL


()  H


 


Uric Acid


  2.8 MG/DL


(2.6-7.2)


 


Calcium Level


  8.6 MG/DL


(8.5-10.1)


 


Phosphorus Level


  2.1 MG/DL


(2.5-4.9)  L


 


Magnesium Level


  1.7 MG/DL


(1.8-2.4)  L


 


Total Bilirubin


  0.5 MG/DL


(0.2-1.0)


 


Direct Bilirubin


  0.1 MG/DL


(0.0-0.3)


 


Aspartate Amino Transf


(AST/SGOT) 42 U/L (15-37)


H


 


Alanine Aminotransferase


(ALT/SGPT) 35 U/L (12-78)


 


 


Alkaline Phosphatase


  55 U/L


()


 


C-Reactive Protein,


Quantitative 28.6 mg/dL


(0.00-0.90)  H


 


Total Protein


  6.6 G/DL


(6.4-8.2)


 


Albumin


  2.0 G/DL


(3.4-5.0)  L








Objective





HEAD AND NECK:  Mild JVD.


LUNGS:  Decreased breath sounds.


CARDIOVASCULAR:  Regular S1 and S2 with no gallop.


ABDOMEN:  Soft.


EXTREMITIES:  1+ pitting edema.  Defibrillator is in right subclavian.











Tommy Otero MD Apr 30, 2020 14:48

## 2020-04-30 NOTE — NUR
nurse notes

left message to Dr Lindsay office regarding patient PICC line , per radiolaogy unable to 
insert  PICC line to this patient , poor IV access, and they already inserted 5 x, and 
patient pulled out, and in case they will insert the PICC LINE TODAY ther's no MD to read 
the C XRAY for x ray verification



DIMA EISENBERG

## 2020-04-30 NOTE — DIAGNOSTIC IMAGING REPORT
Indication: Cough

 

Technique: One view of the chest

 

Comparison: 4/28/2020

 

Findings: Interstitial and airspace infiltrates versus edema in the bilateral mid to

lower lungs and right upper lobe appears somewhat improved. The heart remains

enlarged. There may be some pleural fluid on the left, unchanged if real. Right chest

AICD remains

 

Impression: Slightly improved bilateral interstitial and airspace infiltrates versus

edema

## 2020-04-30 NOTE — INFECTIOUS DISEASES PROG NOTE
Assessment/Plan


Assessment/Plan





Assessment:


Septic shock-off pressors now





Fever; improving


No leukocytosis


   -u/a neg


   -Bcx Neg





Probable PNA- 2ry to COVID19 (from NH with confirmed cases)


At 1l NC


  -4/30 CXR: Slightly improved bilateral interstitial and airspace infiltrates 

versus edema


  -4/28 CXR: Bilateral mid and lower lung interstitial disease, new since prior 

study.Possible developing left pleural effusion


        Ferritn >2000, CRP 22.3


  -4/24 SARS-COV2 PCR +


  -CXR: Left basilar atelectasis and/or infiltrate


 


MELVI, improving





 HTN


cadiomyopathy s/p AICD


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


seizure disorder


[facility] resident [facility] 








Plan:


-Continue monitor off abx


   -4/29SP Cefepime #6


   -4/27 SP IV Vancomycin #4





-f/u cx


-Monitor CBC/CMP, temperatures


-COVID 19 precautions


-clarify goals of care


-aspiration precautions


-inflamatory markers





Thank you for consulting [facility]. Will continue to follow along with 

you.





Discussed with RN,





Subjective


Allergies:  


Coded Allergies:  


     No Known Allergies (Unverified , 12/15/14)


Subjective


afebrile in ~24hrs


at 1l NC


no leukcoytosis





Objective


Vital Signs





Last 24 Hour Vital Signs








  Date Time  Temp Pulse Resp B/P (MAP) Pulse Ox O2 Delivery O2 Flow Rate FiO2


 


4/30/20 12:00 99.0 68 19 130/70 (90) 98   


 


4/30/20 08:15  83  136/83    


 


4/30/20 08:00 98.3 101 20 103/65 (78) 92   


 


4/30/20 04:00 98.3 83 18 136/83 (100) 98   


 


4/30/20 00:00 98.1 82 20 127/80 (96) 94   


 


4/29/20 22:40  87  124/82    


 


4/29/20 20:00 97.2 87 24 124/82 (96) 92   


 


4/29/20 17:59 99.0       


 


4/29/20 16:00 100.8 94 25 119/77 (91) 95   








Height (Feet):  5


Height (Inches):  8.00


Weight (Pounds):  173


Objective


 not examined to limit COVID19 exposure





Laboratory Tests








Test


  4/30/20


05:00


 


White Blood Count


  8.2 K/UL


(4.8-10.8)


 


Red Blood Count


  4.00 M/UL


(4.70-6.10)  L


 


Hemoglobin


  11.6 G/DL


(14.2-18.0)  L


 


Hematocrit


  33.9 %


(42.0-52.0)  L


 


Mean Corpuscular Volume 85 FL (80-99)  


 


Mean Corpuscular Hemoglobin


  29.1 PG


(27.0-31.0)


 


Mean Corpuscular Hemoglobin


Concent 34.3 G/DL


(32.0-36.0)


 


Red Cell Distribution Width


  10.8 %


(11.6-14.8)  L


 


Platelet Count


  147 K/UL


(150-450)  L


 


Mean Platelet Volume


  7.8 FL


(6.5-10.1)


 


Neutrophils (%) (Auto)


  75.9 %


(45.0-75.0)  H


 


Lymphocytes (%) (Auto)


  13.6 %


(20.0-45.0)  L


 


Monocytes (%) (Auto)


  6.8 %


(1.0-10.0)


 


Eosinophils (%) (Auto)


  3.1 %


(0.0-3.0)  H


 


Basophils (%) (Auto)


  0.6 %


(0.0-2.0)


 


Sodium Level


  139 MMOL/L


(136-145)


 


Potassium Level


  3.3 MMOL/L


(3.5-5.1)  L


 


Chloride Level


  103 MMOL/L


()


 


Carbon Dioxide Level


  28 MMOL/L


(21-32)


 


Anion Gap


  8 mmol/L


(5-15)


 


Blood Urea Nitrogen


  15 mg/dL


(7-18)


 


Creatinine


  0.9 MG/DL


(0.55-1.30)


 


Estimat Glomerular Filtration


Rate > 60 mL/min


(>60)


 


Glucose Level


  142 MG/DL


()  H


 


Uric Acid


  2.8 MG/DL


(2.6-7.2)


 


Calcium Level


  8.6 MG/DL


(8.5-10.1)


 


Phosphorus Level


  2.1 MG/DL


(2.5-4.9)  L


 


Magnesium Level


  1.7 MG/DL


(1.8-2.4)  L


 


Total Bilirubin


  0.5 MG/DL


(0.2-1.0)


 


Direct Bilirubin


  0.1 MG/DL


(0.0-0.3)


 


Aspartate Amino Transf


(AST/SGOT) 42 U/L (15-37)


H


 


Alanine Aminotransferase


(ALT/SGPT) 35 U/L (12-78)


 


 


Alkaline Phosphatase


  55 U/L


()


 


C-Reactive Protein,


Quantitative 28.6 mg/dL


(0.00-0.90)  H


 


Total Protein


  6.6 G/DL


(6.4-8.2)


 


Albumin


  2.0 G/DL


(3.4-5.0)  L











Current Medications








 Medications


  (Trade)  Dose


 Ordered  Sig/Aarti


 Route


 PRN Reason  Start Time


 Stop Time Status Last Admin


Dose Admin


 


 Acetaminophen


  (Tylenol)  650 mg  Q6H  PRN


 ORAL


 Temp >100.5  4/29/20 14:45


 5/26/20 08:44  4/29/20 17:29


 


 


 Carvedilol


  (Coreg)  3.125 mg  EVERY 12  HOURS


 ORAL


   4/29/20 21:00


 5/29/20 20:59  4/30/20 08:15


 


 


 Chlorhexidine


 Gluconate


  (Gloria-Hex 2%)  1 applic  DAILY@2000


 TOPIC


   4/29/20 20:00


 7/24/20 19:59  4/29/20 22:44


 


 


 Gabapentin


  (Neurontin)  300 mg  Q8HR


 ORAL


   4/29/20 22:00


 5/25/20 00:00  4/30/20 05:31


 


 


 Levetiracetam


  (Keppra)  1,000 mg  Q12HR


 ORAL


   4/29/20 21:00


 5/25/20 00:00  4/30/20 08:15


 


 


 Lorazepam


  (Ativan 2mg/ml


 1ml)  1 mg  Q6H  PRN


 IV


 For Anxiety  4/29/20 14:30


 5/6/20 14:29   


 


 


 Memantine


  (Namenda)  5 mg  DAILY


 ORAL


   4/30/20 09:00


 5/25/20 16:59  4/30/20 08:16


 


 


 Sodium Chloride  1,000 ml @ 


 75 mls/hr  [id number]


 IV


   4/29/20 14:15


 5/24/20 22:59  4/30/20 04:38


 

















Naila Lindsay M.D. Apr 30, 2020 14:45

## 2020-05-01 VITALS — SYSTOLIC BLOOD PRESSURE: 144 MMHG | DIASTOLIC BLOOD PRESSURE: 83 MMHG

## 2020-05-01 VITALS — SYSTOLIC BLOOD PRESSURE: 136 MMHG | DIASTOLIC BLOOD PRESSURE: 90 MMHG

## 2020-05-01 VITALS — SYSTOLIC BLOOD PRESSURE: 145 MMHG | DIASTOLIC BLOOD PRESSURE: 89 MMHG

## 2020-05-01 VITALS — DIASTOLIC BLOOD PRESSURE: 100 MMHG | SYSTOLIC BLOOD PRESSURE: 152 MMHG

## 2020-05-01 VITALS — SYSTOLIC BLOOD PRESSURE: 137 MMHG | DIASTOLIC BLOOD PRESSURE: 85 MMHG

## 2020-05-01 VITALS — DIASTOLIC BLOOD PRESSURE: 84 MMHG | SYSTOLIC BLOOD PRESSURE: 142 MMHG

## 2020-05-01 VITALS — DIASTOLIC BLOOD PRESSURE: 89 MMHG | SYSTOLIC BLOOD PRESSURE: 126 MMHG

## 2020-05-01 LAB
ADD MANUAL DIFF: NO
ANION GAP SERPL CALC-SCNC: 12 MMOL/L (ref 5–15)
BASOPHILS NFR BLD AUTO: 0.3 % (ref 0–2)
BUN SERPL-MCNC: 12 MG/DL (ref 7–18)
CALCIUM SERPL-MCNC: 6.2 MG/DL (ref 8.5–10.1)
CHLORIDE SERPL-SCNC: 116 MMOL/L (ref 98–107)
CO2 SERPL-SCNC: 20 MMOL/L (ref 21–32)
CREAT SERPL-MCNC: 0.5 MG/DL (ref 0.55–1.3)
EOSINOPHIL NFR BLD AUTO: 2.7 % (ref 0–3)
ERYTHROCYTE [DISTWIDTH] IN BLOOD BY AUTOMATED COUNT: 10.9 % (ref 11.6–14.8)
HCT VFR BLD CALC: 26.2 % (ref 42–52)
HGB BLD-MCNC: 9 G/DL (ref 14.2–18)
LYMPHOCYTES NFR BLD AUTO: 12 % (ref 20–45)
MCV RBC AUTO: 86 FL (ref 80–99)
MONOCYTES NFR BLD AUTO: 8.8 % (ref 1–10)
NEUTROPHILS NFR BLD AUTO: 76.3 % (ref 45–75)
PLATELET # BLD: 139 K/UL (ref 150–450)
POTASSIUM SERPL-SCNC: 2.4 MMOL/L (ref 3.5–5.1)
RBC # BLD AUTO: 3.06 M/UL (ref 4.7–6.1)
SODIUM SERPL-SCNC: 149 MMOL/L (ref 136–145)
WBC # BLD AUTO: 7.8 K/UL (ref 4.8–10.8)

## 2020-05-01 RX ADMIN — CHLORHEXIDINE GLUCONATE SCH APPLIC: 213 SOLUTION TOPICAL at 21:14

## 2020-05-01 NOTE — CARDIAC ELECTROPHYSIOLOGY PN
Assessment/Plan


Assessment/Plan


1. S/P Shock  due to cardiomyopathy EF 40 and sepsis.  His BNP is 736.  


    On iv Abx by Dr. Lindsay  


    


2. Cardiomyopathy EF 40%.   On Coreg 3.125 bid





3. Status post right-sided TONY ICD.    





4. History of hypertension  


5. History of CVA with residual weakness.


6. COVID-19 PNA off the vent 





DW RN





Subjective


Subjective


  Covid is positive. No new events on Med Surge





Objective





Last 24 Hour Vital Signs








  Date Time  Temp Pulse Resp B/P (MAP) Pulse Ox O2 Delivery O2 Flow Rate FiO2


 


5/1/20 08:51  91  144/83    


 


5/1/20 08:00 98.1 93 19 152/100 (117) 93   


 


5/1/20 04:09 98.1 91 22 144/83 (103) 96   


 


5/1/20 00:00 97.9 89 22 137/85 (102) 90   


 


4/30/20 21:20  87  136/83    


 


4/30/20 20:00 99.3 87 22 136/83 (100) 90   


 


4/30/20 16:00 98.9 73 20 132/75 (94) 98   

















Intake and Output  


 


 4/30/20 5/1/20





 19:00 07:00


 


Intake Total 870 ml 700 ml


 


Balance 870 ml 700 ml


 


  


 


IV Total 870 ml 700 ml











Laboratory Tests








Test


  5/1/20


05:45


 


White Blood Count


  7.8 K/UL


(4.8-10.8)


 


Red Blood Count


  3.06 M/UL


(4.70-6.10)  L


 


Hemoglobin


  9.0 G/DL


(14.2-18.0)  L


 


Hematocrit


  26.2 %


(42.0-52.0)  L


 


Mean Corpuscular Volume 86 FL (80-99)  


 


Mean Corpuscular Hemoglobin


  29.4 PG


(27.0-31.0)


 


Mean Corpuscular Hemoglobin


Concent 34.3 G/DL


(32.0-36.0)


 


Red Cell Distribution Width


  10.9 %


(11.6-14.8)  L


 


Platelet Count


  139 K/UL


(150-450)  L


 


Mean Platelet Volume


  8.3 FL


(6.5-10.1)


 


Neutrophils (%) (Auto)


  76.3 %


(45.0-75.0)  H


 


Lymphocytes (%) (Auto)


  12.0 %


(20.0-45.0)  L


 


Monocytes (%) (Auto)


  8.8 %


(1.0-10.0)


 


Eosinophils (%) (Auto)


  2.7 %


(0.0-3.0)


 


Basophils (%) (Auto)


  0.3 %


(0.0-2.0)


 


Sodium Level


  149 MMOL/L


(136-145)  H


 


Potassium Level


  2.4 MMOL/L


(3.5-5.1)  *L


 


Chloride Level


  116 MMOL/L


()  H


 


Carbon Dioxide Level


  20 MMOL/L


(21-32)  L


 


Anion Gap


  12 mmol/L


(5-15)


 


Blood Urea Nitrogen


  12 mg/dL


(7-18)


 


Creatinine


  0.5 MG/DL


(0.55-1.30)  L


 


Estimat Glomerular Filtration


Rate > 60 mL/min


(>60)


 


Glucose Level


  111 MG/DL


()  H


 


Calcium Level


  6.2 MG/DL


(8.5-10.1)  #L








Objective





HEAD AND NECK:  Mild JVD.


LUNGS:  Decreased breath sounds.


CARDIOVASCULAR:  Regular S1 and S2 with no gallop.


ABDOMEN:  Soft.


EXTREMITIES:  1+ pitting edema.  Defibrillator is in right subclavian.











Tommy Otero MD May 1, 2020 12:01

## 2020-05-01 NOTE — GENERAL PROGRESS NOTE
Assessment/Plan


Problem List:  


(1) Suspected COVID-19 virus infection


ICD Codes:  Z20.828 - Contact with and (suspected) exposure to other viral 

communicable diseases


SNOMED:  637476247


(2) Anemia


ICD Codes:  D64.9 - Anemia, unspecified


SNOMED:  557836940


(3) Weak


ICD Codes:  R53.1 - Weakness


SNOMED:  09624690


(4) COPD (chronic obstructive pulmonary disease)


ICD Codes:  J44.9 - Chronic obstructive pulmonary disease, unspecified


SNOMED:  05592427


(5) Diabetes


ICD Codes:  E11.9 - Type 2 diabetes mellitus without complications


SNOMED:  03937208


(6) Epileptic seizure, generalized


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 

not intractable, without status epilepticus


SNOMED:  44729621


(7) Altered mental status


ICD Codes:  R41.82 - Altered mental status, unspecified


SNOMED:  197573940


Qualifiers:  


   Qualified Codes:  R41.82 - Altered mental status, unspecified


(8) Hypotension


ICD Codes:  I95.9 - Hypotension, unspecified


SNOMED:  02730152


Qualifiers:  


   Qualified Codes:  I95.9 - Hypotension, unspecified


Status:  unchanged


Assessment/Plan:


o2 pulm tx abx pt diet cbc bmp am





Subjective


Constitutional:  Reports: weakness


Allergies:  


Coded Allergies:  


     No Known Allergies (Unverified , 12/15/14)


All Systems:  reviewed and negative except above


Subjective


sleepy calm in bed





Objective





Last 24 Hour Vital Signs








  Date Time  Temp Pulse Resp B/P (MAP) Pulse Ox O2 Delivery O2 Flow Rate FiO2


 


5/1/20 08:51  91  144/83    


 


5/1/20 04:09 98.1 91 22 144/83 (103) 96   


 


5/1/20 00:00 97.9 89 22 137/85 (102) 90   


 


4/30/20 21:20  87  136/83    


 


4/30/20 20:00 99.3 87 22 136/83 (100) 90   


 


4/30/20 16:00 98.9 73 20 132/75 (94) 98   


 


4/30/20 12:00 99.0 68 19 130/70 (90) 98   

















Intake and Output  


 


 4/30/20 5/1/20





 19:00 07:00


 


Intake Total 870 ml 700 ml


 


Balance 870 ml 700 ml


 


  


 


IV Total 870 ml 700 ml








Laboratory Tests


5/1/20 05:45: 


White Blood Count 7.8, Red Blood Count 3.06L, Hemoglobin 9.0L, Hematocrit 26.2L

, Mean Corpuscular Volume 86, Mean Corpuscular Hemoglobin 29.4, Mean 

Corpuscular Hemoglobin Concent 34.3, Red Cell Distribution Width 10.9L, 

Platelet Count 139L, Mean Platelet Volume 8.3, Neutrophils (%) (Auto) 76.3H, 

Lymphocytes (%) (Auto) 12.0L, Monocytes (%) (Auto) 8.8, Eosinophils (%) (Auto) 

2.7, Basophils (%) (Auto) 0.3, Sodium Level 149H, Potassium Level 2.4*L, 

Chloride Level 116H, Carbon Dioxide Level 20L, Anion Gap 12, Blood Urea 

Nitrogen 12, Creatinine 0.5L, Estimat Glomerular Filtration Rate > 60, Glucose 

Level 111H, Calcium Level 6.2#L


Height (Feet):  5


Height (Inches):  8.00


Weight (Pounds):  173


General Appearance:  lethargic


EENT:  normal ENT inspection


Neck:  normal alignment


Cardiovascular:  normal rate, regular rhythm


Respiratory/Chest:  no respiratory distress, no accessory muscle use


Extremities:  normal inspection


Skin:  normal pigmentation











Arron Barkley DO May 1, 2020 09:11

## 2020-05-01 NOTE — NUR
CASE MANAGEMENT: REVIEW







SI: SEPSIS . COVID-19 PNA. 

98.1   93   22   152/100   92% 1L NC

H/H 9.0/26.2      K+ 2.4   CA 6.2



IS;KCL IV ONCE

IVF NS BOLUS

K-DUR PO BID

COREG PO Q12 HRS

KEPPRA PO Q12 HRS

SHAZIA HEX TOP QD



*****MED SURG STATUS*****



DCP;FROM [facility]

## 2020-05-01 NOTE — NUR
*-*DISCHARGE PLANNING*-*



PATIENT HAS BEEN REFERRED TO:



[facility]

 P: [phone]

 F: [phone]



~~~~~~~~~~~~~~~~~CLINICALS FAXED~~~~~~~~~~~~~~~~

## 2020-05-01 NOTE — NUR
*-*DISCHARGE PLANNING*-*

 

 PATIENT HAS BEEN REFERRED TO:



[facility]

 P: [phone]

 F: [phone]

S/W [facility], WHO STATED THEY ARE NOT ACCEPTING ANY PATIENTS FROM MED SURG AND COID19 POSITIVE 
PATIENTS.

-------------------------------------------------------------------------------

Addendum: 05/04/20 at 0923 by MARCUS BEAN CM

-------------------------------------------------------------------------------

*-*DISCHARGE PLANNING*-*

 

 PATIENT HAS BEEN REFERRED TO:



[facility]

 P: [phone]

 F: [phone]

[facility], WHO STATED THEY ARE NOT ACCEPTING ANY PATIENTS FROM MED SURG AND COVID19 POSITIVE 
PATIENTS.

## 2020-05-01 NOTE — NUR
NURSE NOTES:



Received patient awake, non-verbal, vital signs: BP-142/84, HR-99, RR-40, O2 sat -82% at 
2L/NC.



Rapid response team called and patient placed on non-rebreather mask which improved O2 sat 
to 98%. ABG was ordered,Dr Munguia notified.



At 2020 PM, Vital signs: BP-132/87, HR-98, RR-36, O2 sat-94% with Venturi mask on.

## 2020-05-01 NOTE — PROGRESS NOTE
DATE:  05/01/2020

SUBJECTIVE:  This is a 69-year-old patient with altered mental status.

This patient continues to have some confusion, disorganized thought

process.  He  ________ pyelonephritis, COVID-19, causing him to have

altered mental status, increased mood lability, worsened by stress of his

medical illness.



DIAGNOSIS:  Major depressive disorder, mild, recurrent with psychotic

features, rule out dementia with psychosis.



PLAN:  Treat him with Namenda 5 daily, Ativan 1 every 6 hours p.r.n.

anxiety and agitation, Neurontin 300 mg every 8 hours.  A 20 minutes of

reality-based supportive psychotherapy.  A 20 minutes of insight-oriented

psychotherapy to help him understand his current physical and psychiatric

condition to help him have better impulse control and better behavior on

the unit. Chart reviewed and discussed with staff.  Seen and assessed

_____.









  ______________________________________________

  Demarcus Love M.D.





DR:  ALPA

D:  05/01/2020 05:27

T:  05/01/2020 16:09

JOB#:  [id number]

CC:

## 2020-05-01 NOTE — CONSULTATION
Consult Note


Consult Note


I am asked to evaluate the patient at the request of Dr. Barkley for electrolyte 

abnormalities


Day 7 of hospitalization for this patient


The data and notes in EMR reviewed


Patient seen and examined








69-year-old male history of hypertension, cardiomyopathy AICD presents with 

hypotension, altered mental status no known aggravating relieving factors 

severity is severe, constant started today patient was more altered, patient 

presents from [facility] via EMS





     No Known Allergies (Unverified , 12/15/14)








Contact w/high risk pt:  Yes for COVID-19





Hx Cardiac Problems:  Yes


Hx Hypertension:  Yes


Hx Cerebrovascular Accident:  Yes - X 3


Hx Seizures:  Yes


Hx Weakness:  Yes - RT SIDED WEAKNESS


Assessment/Plan





Patient's current problem from renal standpoint of view is hypokalemia


His other conditions:


Patient presented with acute renal failure however it is now resolved


Patient presented with septic shock was on pressors however now resolved


Sepsis leukocytosis improving


Patient has COVID-19 pneumonia


Hypertension


Cardiomyopathy status post AICD


Previous CVA with right residual weakness


Seizure disorders











Plan:


Patient currently on normal saline 75 cc an hour which I will stop at this time


Will start patient on oral and IV potassium


We will continue to monitor electrolytes


Continue per consultants


Per orders











Miguel Ángel Kendall MD May 1, 2020 09:43

## 2020-05-01 NOTE — NUR
NURSE NOTES:

Zaynab for the lab called and stated patient has potassium of 2.4. Left message for MD Barkley.

## 2020-05-01 NOTE — NUR
NURSE NOTES

Received patient resting comfortably in bed, patient awake, oriented x1 confused ,no sign of 
distress, denies pain or discomfort,O2 at 2 LPM via NC, on going IVF patent and infusing 
well via TLC , Swenson cath to gravity, on fall precaution observed and maintained, plan of 
care was discussed verbalized understanding , 4, P's in progress call light w/n sapphire honeycutt RN

## 2020-05-01 NOTE — PULMONOLOGY PROGRESS NOTE
Assessment/Plan


Assessment/Plan


IMPRESSION:


1. Cardiomyopathy.


2. Hypotension.


3. Left lung pneumonia.


4. Nursing home resident.


5. Positive COVID-19.





DISCUSSION:


1. Continue isolation


2. Continue current medications.


3. Off pressors.


4. Oxygen.


5. Pulmonary hygiene.


6. Broad-spectrum antibiotics.


7. I will follow.


8. Transfer to med-surg














  ______________________________________________


  Hayder Hahn M.D.





Subjective


Interval Events:  Looking better; off levophed; afebrile


Constitutional:  Reports: no symptoms


Respiratory:  Reports: no symptoms


Cardiovascular:  Reports: no symptoms


Gastrointestinal/Abdominal:  Reports: no symptoms


Genitourinary:  Reports: no symptoms


Neurologic:  Reports: no symptoms


Allergies:  


Coded Allergies:  


     No Known Allergies (Unverified , 12/15/14)


All Systems:  reviewed and negative except above





Objective





Last 24 Hour Vital Signs








  Date Time  Temp Pulse Resp B/P (MAP) Pulse Ox O2 Delivery O2 Flow Rate FiO2


 


5/1/20 08:51  91  144/83    


 


5/1/20 08:00 98.1 93 19 152/100 (117) 93   


 


5/1/20 04:09 98.1 91 22 144/83 (103) 96   


 


5/1/20 00:00 97.9 89 22 137/85 (102) 90   


 


4/30/20 21:20  87  136/83    


 


4/30/20 20:00 99.3 87 22 136/83 (100) 90   


 


4/30/20 16:00 98.9 73 20 132/75 (94) 98   


 


4/30/20 12:00 99.0 68 19 130/70 (90) 98   

















Intake and Output  


 


 4/30/20 5/1/20





 19:00 07:00


 


Intake Total 870 ml 700 ml


 


Balance 870 ml 700 ml


 


  


 


IV Total 870 ml 700 ml








General Appearance:  no acute distress


HEENT:  normocephalic


Respiratory/Chest:  chest wall non-tender, lungs clear


Cardiovascular:  normal peripheral pulses


Abdomen:  normal bowel sounds


Laboratory Tests


5/1/20 05:45: 


White Blood Count 7.8, Red Blood Count 3.06L, Hemoglobin 9.0L, Hematocrit 26.2L

, Mean Corpuscular Volume 86, Mean Corpuscular Hemoglobin 29.4, Mean 

Corpuscular Hemoglobin Concent 34.3, Red Cell Distribution Width 10.9L, 

Platelet Count 139L, Mean Platelet Volume 8.3, Neutrophils (%) (Auto) 76.3H, 

Lymphocytes (%) (Auto) 12.0L, Monocytes (%) (Auto) 8.8, Eosinophils (%) (Auto) 

2.7, Basophils (%) (Auto) 0.3, Sodium Level 149H, Potassium Level 2.4*L, 

Chloride Level 116H, Carbon Dioxide Level 20L, Anion Gap 12, Blood Urea 

Nitrogen 12, Creatinine 0.5L, Estimat Glomerular Filtration Rate > 60, Glucose 

Level 111H, Calcium Level 6.2#L





Current Medications








 Medications


  (Trade)  Dose


 Ordered  Sig/Aarti


 Route


 PRN Reason  Start Time


 Stop Time Status Last Admin


Dose Admin


 


 Acetaminophen


  (Tylenol)  650 mg  Q6H  PRN


 ORAL


 Temp >100.5  4/29/20 14:45


 5/26/20 08:44  4/29/20 17:29


 


 


 Carvedilol


  (Coreg)  3.125 mg  EVERY 12  HOURS


 ORAL


   4/29/20 21:00


 5/29/20 20:59  5/1/20 08:51


 


 


 Chlorhexidine


 Gluconate


  (Gloria-Hex 2%)  1 applic  DAILY@2000


 TOPIC


   4/29/20 20:00


 7/24/20 19:59  4/30/20 21:19


 


 


 Gabapentin


  (Neurontin)  300 mg  Q8HR


 ORAL


   4/29/20 22:00


 5/25/20 00:00  5/1/20 05:16


 


 


 Levetiracetam


  (Keppra)  1,000 mg  Q12HR


 ORAL


   4/29/20 21:00


 5/25/20 00:00  5/1/20 08:50


 


 


 Lorazepam


  (Ativan 2mg/ml


 1ml)  1 mg  Q6H  PRN


 IV


 For Anxiety  4/29/20 14:30


 5/6/20 14:29   


 


 


 Memantine


  (Namenda)  5 mg  DAILY


 ORAL


   4/30/20 09:00


 5/25/20 16:59  5/1/20 08:51


 


 


 Potassium


 Chloride 40 meq/


 Dextrose  570 ml @ 


 125 mls/hr  ONCE


 IVPB


   5/1/20 11:00


 5/1/20 12:00  5/1/20 11:14


 


 


 Potassium Chloride


  (K-Dur)  20 meq  TWICE A  DAY


 ORAL


   5/1/20 09:00


 7/30/20 08:59  5/1/20 11:20


 

















Hayder Hahn MD May 1, 2020 11:46

## 2020-05-01 NOTE — NUR
RD ASSESSMENT & RECOMMENDATIONS

SEE CARE ACTIVITY FOR COMPLETE ASSESSMENT



DAILY ESTIMATED NEEDS:

Needs based on Cardiac 74kg abw 

25-30  kcals/kg 

2588-2991  total kcals

1-1.2  g protein/kg

74-89  g total protein 

25-30  mL/kg

2972-0046  total fluid mLs



NUTRITION DIAGNOSIS:

Altered nutrition related lab values r/t clinical status as evidenced by

elev BG (146 175), febrile (tmax 101.5)



CURRENT DIET: CLD    



PO DIET RECOMMENDATIONS:

Advance as able to Low Na / texture per SLP  





ADDITIONAL RECOMMENDATIONS:

1) Check A1C -> pt w/elev BG (146-175)  

2) Add ensure Clear w/ CLD-> advance diet as able  

3) Monitor ICU status, respiratory status-> now med surg 

4) Obtain a calibrated bed scale wt 

5) Rec SLP eval prior to advancing diet

## 2020-05-02 VITALS — SYSTOLIC BLOOD PRESSURE: 125 MMHG | DIASTOLIC BLOOD PRESSURE: 76 MMHG

## 2020-05-02 VITALS — DIASTOLIC BLOOD PRESSURE: 91 MMHG | SYSTOLIC BLOOD PRESSURE: 133 MMHG

## 2020-05-02 VITALS — SYSTOLIC BLOOD PRESSURE: 130 MMHG | DIASTOLIC BLOOD PRESSURE: 79 MMHG

## 2020-05-02 VITALS — SYSTOLIC BLOOD PRESSURE: 134 MMHG | DIASTOLIC BLOOD PRESSURE: 82 MMHG

## 2020-05-02 VITALS — SYSTOLIC BLOOD PRESSURE: 137 MMHG | DIASTOLIC BLOOD PRESSURE: 85 MMHG

## 2020-05-02 VITALS — SYSTOLIC BLOOD PRESSURE: 138 MMHG | DIASTOLIC BLOOD PRESSURE: 94 MMHG

## 2020-05-02 LAB
ADD MANUAL DIFF: NO
ALBUMIN SERPL-MCNC: 1.9 G/DL (ref 3.4–5)
ALBUMIN/GLOB SERPL: 0.5 {RATIO} (ref 1–2.7)
ALP SERPL-CCNC: 52 U/L (ref 46–116)
ALT SERPL-CCNC: 20 U/L (ref 12–78)
ANION GAP SERPL CALC-SCNC: 10 MMOL/L (ref 5–15)
AST SERPL-CCNC: 26 U/L (ref 15–37)
BASOPHILS NFR BLD AUTO: 0.4 % (ref 0–2)
BILIRUB SERPL-MCNC: 0.7 MG/DL (ref 0.2–1)
BUN SERPL-MCNC: 13 MG/DL (ref 7–18)
CALCIUM SERPL-MCNC: 8.1 MG/DL (ref 8.5–10.1)
CHLORIDE SERPL-SCNC: 107 MMOL/L (ref 98–107)
CO2 SERPL-SCNC: 25 MMOL/L (ref 21–32)
CREAT SERPL-MCNC: 0.7 MG/DL (ref 0.55–1.3)
EOSINOPHIL NFR BLD AUTO: 1.7 % (ref 0–3)
ERYTHROCYTE [DISTWIDTH] IN BLOOD BY AUTOMATED COUNT: 11 % (ref 11.6–14.8)
GAMMA GLUTAMYL TRANSPEPTIDASE: 42 U/L (ref 5–85)
GLOBULIN SER-MCNC: 4.1 G/DL
HCT VFR BLD CALC: 31.7 % (ref 42–52)
HGB BLD-MCNC: 10.7 G/DL (ref 14.2–18)
LYMPHOCYTES NFR BLD AUTO: 10 % (ref 20–45)
MCV RBC AUTO: 85 FL (ref 80–99)
MONOCYTES NFR BLD AUTO: 8.3 % (ref 1–10)
NEUTROPHILS NFR BLD AUTO: 79.6 % (ref 45–75)
PHOSPHATE SERPL-MCNC: 2.1 MG/DL (ref 2.5–4.9)
PLATELET # BLD: 156 K/UL (ref 150–450)
POTASSIUM SERPL-SCNC: 3.3 MMOL/L (ref 3.5–5.1)
RBC # BLD AUTO: 3.71 M/UL (ref 4.7–6.1)
SODIUM SERPL-SCNC: 142 MMOL/L (ref 136–145)
WBC # BLD AUTO: 11.6 K/UL (ref 4.8–10.8)

## 2020-05-02 RX ADMIN — CHLORHEXIDINE GLUCONATE SCH APPLIC: 213 SOLUTION TOPICAL at 21:01

## 2020-05-02 RX ADMIN — DIBASIC SODIUM PHOSPHATE, MONOBASIC POTASSIUM PHOSPHATE AND MONOBASIC SODIUM PHOSPHATE SCH MG: 852; 155; 130 TABLET ORAL at 10:53

## 2020-05-02 RX ADMIN — DIBASIC SODIUM PHOSPHATE, MONOBASIC POTASSIUM PHOSPHATE AND MONOBASIC SODIUM PHOSPHATE SCH MG: 852; 155; 130 TABLET ORAL at 13:32

## 2020-05-02 RX ADMIN — MAGNESIUM OXIDE TAB 400 MG (241.3 MG ELEMENTAL MG) SCH MG: 400 (241.3 MG) TAB at 17:31

## 2020-05-02 RX ADMIN — DIBASIC SODIUM PHOSPHATE, MONOBASIC POTASSIUM PHOSPHATE AND MONOBASIC SODIUM PHOSPHATE SCH MG: 852; 155; 130 TABLET ORAL at 17:31

## 2020-05-02 NOTE — NUR
NURSE NOTES:

Report received from DIMA Szymanski. AAOx 2, on venturi mask @30%. Right femoral PICC line 
patent and intact. Swenson catheter intact. HOB elevated, isolation maintained. Bed low and 
locked, siderails up x2, alarm on, call light within reach, will continue to monitor.

## 2020-05-02 NOTE — NUR
NURSE NOTES:

Report received from Maureen CH. Patient seen on rounds, AxOx1, on venturi mask @30%, sats 
88-91%, mild SOB noted. Right femoral PICC line patent and intact. Swenson catheter secure and 
draining well. Dressings on sacrum intact. Bed low and locked, siderails up x2, zone alarms 
on 1, call light within reach, will continue to monitor.

## 2020-05-02 NOTE — GENERAL PROGRESS NOTE
Assessment/Plan


Problem List:  


(1) Suspected COVID-19 virus infection


ICD Codes:  Z20.828 - Contact with and (suspected) exposure to other viral 

communicable diseases


SNOMED:  714746758


(2) Anemia


ICD Codes:  D64.9 - Anemia, unspecified


SNOMED:  792613829


(3) Weak


ICD Codes:  R53.1 - Weakness


SNOMED:  22680930


(4) COPD (chronic obstructive pulmonary disease)


ICD Codes:  J44.9 - Chronic obstructive pulmonary disease, unspecified


SNOMED:  91049957


(5) Diabetes


ICD Codes:  E11.9 - Type 2 diabetes mellitus without complications


SNOMED:  18158251


(6) Epileptic seizure, generalized


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 

not intractable, without status epilepticus


SNOMED:  50206122


(7) Altered mental status


ICD Codes:  R41.82 - Altered mental status, unspecified


SNOMED:  808355735


Qualifiers:  


   Qualified Codes:  R41.82 - Altered mental status, unspecified


(8) Hypotension


ICD Codes:  I95.9 - Hypotension, unspecified


SNOMED:  50313770


Qualifiers:  


   Qualified Codes:  I95.9 - Hypotension, unspecified


Status:  unchanged


Assessment/Plan:


o2 pulm tx abx pt diet cbc bmp am aru eval





Subjective


Constitutional:  Reports: weakness


Allergies:  


Coded Allergies:  


     No Known Allergies (Unverified , 12/15/14)


All Systems:  reviewed and negative except above


Subjective


sleepy calm in bed





Objective





Last 24 Hour Vital Signs








  Date Time  Temp Pulse Resp B/P (MAP) Pulse Ox O2 Delivery O2 Flow Rate FiO2


 


5/2/20 08:00 98.8 93 22 130/79 (96) 93   


 


5/2/20 04:00 99.1 89 30 137/85 (102) 90   


 


5/2/20 00:08 99.3 92 40 138/94 (109) 87   


 


5/1/20 22:17  99 40  82   


 


5/1/20 21:15  99  126/89    


 


5/1/20 20:00 98.7 99 40 126/89 (101) 81   


 


5/1/20 16:08 97.7 90 19 136/90 (105) 92   


 


5/1/20 12:00 97.7 87 18 145/89 (107) 93   

















Intake and Output  


 


 5/1/20 5/2/20





 19:00 07:00


 


Intake Total 570 ml 500 ml


 


Balance 570 ml 500 ml


 


  


 


IV Total 570 ml 500 ml








Laboratory Tests


5/1/20 22:03: 


Arterial Blood pH 7.523H, Arterial Blood Partial Pressure CO2 24.6*L, Arterial 

Blood Partial Pressure O2 65.1L, Arterial Blood HCO3 19.8L, Arterial Blood 

Oxygen Saturation 93.3L, Arterial Blood Base Excess -1.6, Wong Test Positive


5/2/20 06:00: 


White Blood Count 11.6H, Red Blood Count 3.71L, Hemoglobin 10.7L, Hematocrit 

31.7L, Mean Corpuscular Volume 85, Mean Corpuscular Hemoglobin 28.9, Mean 

Corpuscular Hemoglobin Concent 33.9, Red Cell Distribution Width 11.0L, 

Platelet Count 156, Mean Platelet Volume 6.3L, Neutrophils (%) (Auto) 79.6H, 

Lymphocytes (%) (Auto) 10.0L, Monocytes (%) (Auto) 8.3, Eosinophils (%) (Auto) 

1.7, Basophils (%) (Auto) 0.4, Sodium Level 142, Potassium Level 3.3L, Chloride 

Level 107, Carbon Dioxide Level 25, Anion Gap 10, Blood Urea Nitrogen 13, 

Creatinine 0.7, Estimat Glomerular Filtration Rate > 60, Glucose Level 147H, 

Hemoglobin A1c 7.4H, Uric Acid 3.1, Calcium Level 8.1#L, Phosphorus Level 2.1L, 

Magnesium Level 1.5L, Total Bilirubin 0.7, Gamma Glutamyl Transpeptidase 42, 

Aspartate Amino Transf (AST/SGOT) 26, Alanine Aminotransferase (ALT/SGPT) 20, 

Alkaline Phosphatase 52, C-Reactive Protein, Quantitative 26.0H, Pro-B-Type 

Natriuretic Peptide 5368H, Total Protein 6.0L, Albumin 1.9L, Globulin 4.1, 

Albumin/Globulin Ratio 0.5L, Thyroid Stimulating Hormone (TSH) 0.472


Height (Feet):  5


Height (Inches):  8.00


Weight (Pounds):  178


General Appearance:  lethargic


EENT:  normal ENT inspection


Neck:  normal alignment


Cardiovascular:  normal rate, regular rhythm


Respiratory/Chest:  no respiratory distress, no accessory muscle use


Extremities:  normal inspection


Skin:  normal pigmentation











Arron Barkley DO May 2, 2020 09:03

## 2020-05-02 NOTE — PULMONOLOGY PROGRESS NOTE
Assessment/Plan


Assessment/Plan


IMPRESSION:


1. Cardiomyopathy.


2. Hypotension.


3. Left lung pneumonia.


4. Nursing home resident.


5. Positive COVID-19.





DISCUSSION:


1. Continue isolation


2. Continue current medications.


3. Off pressors.


4. Oxygen. Requirements increased last 24 hours


5. Pulmonary hygiene.


6. Broad-spectrum antibiotics.


7. I will follow.


8. Transferred to med-surg


9. Will check CXR an labs














  ______________________________________________


  Hayder Hahn M.D.





Subjective


Interval Events:  Tachypnic yesterday; afebrile


Constitutional:  Reports: no symptoms


Respiratory:  Reports: no symptoms


Cardiovascular:  Reports: no symptoms


Gastrointestinal/Abdominal:  Reports: no symptoms


Genitourinary:  Reports: no symptoms


Neurologic:  Reports: no symptoms


Allergies:  


Coded Allergies:  


     No Known Allergies (Unverified , 12/15/14)


All Systems:  reviewed and negative except above





Objective





Last 24 Hour Vital Signs








  Date Time  Temp Pulse Resp B/P (MAP) Pulse Ox O2 Delivery O2 Flow Rate FiO2


 


5/2/20 08:00 98.8 93 22 130/79 (96) 93   


 


5/2/20 04:00 99.1 89 30 137/85 (102) 90   


 


5/2/20 00:08 99.3 92 40 138/94 (109) 87   


 


5/1/20 22:17  99 40  82   


 


5/1/20 21:15  99  126/89    


 


5/1/20 20:00 98.7 99 40 126/89 (101) 81   


 


5/1/20 16:08 97.7 90 19 136/90 (105) 92   


 


5/1/20 12:00 97.7 87 18 145/89 (107) 93   

















Intake and Output  


 


 5/1/20 5/2/20





 19:00 07:00


 


Intake Total 570 ml 500 ml


 


Balance 570 ml 500 ml


 


  


 


IV Total 570 ml 500 ml








General Appearance:  no acute distress


HEENT:  normocephalic


Respiratory/Chest:  chest wall non-tender, lungs clear


Cardiovascular:  normal peripheral pulses


Abdomen:  normal bowel sounds


Laboratory Tests


5/1/20 22:03: 


Arterial Blood pH 7.523H, Arterial Blood Partial Pressure CO2 24.6*L, Arterial 

Blood Partial Pressure O2 65.1L, Arterial Blood HCO3 19.8L, Arterial Blood 

Oxygen Saturation 93.3L, Arterial Blood Base Excess -1.6, Wong Test Positive


5/2/20 06:00: 


White Blood Count 11.6H, Red Blood Count 3.71L, Hemoglobin 10.7L, Hematocrit 

31.7L, Mean Corpuscular Volume 85, Mean Corpuscular Hemoglobin 28.9, Mean 

Corpuscular Hemoglobin Concent 33.9, Red Cell Distribution Width 11.0L, 

Platelet Count 156, Mean Platelet Volume 6.3L, Neutrophils (%) (Auto) 79.6H, 

Lymphocytes (%) (Auto) 10.0L, Monocytes (%) (Auto) 8.3, Eosinophils (%) (Auto) 

1.7, Basophils (%) (Auto) 0.4, Sodium Level 142, Potassium Level 3.3L, Chloride 

Level 107, Carbon Dioxide Level 25, Anion Gap 10, Blood Urea Nitrogen 13, 

Creatinine 0.7, Estimat Glomerular Filtration Rate > 60, Glucose Level 147H, 

Hemoglobin A1c [Pending], Uric Acid 3.1, Calcium Level 8.1#L, Phosphorus Level 

2.1L, Magnesium Level 1.5L, Total Bilirubin 0.7, Gamma Glutamyl Transpeptidase 

42, Aspartate Amino Transf (AST/SGOT) 26, Alanine Aminotransferase (ALT/SGPT) 20

, Alkaline Phosphatase 52, C-Reactive Protein, Quantitative 26.0H, Pro-B-Type 

Natriuretic Peptide 5368H, Total Protein 6.0L, Albumin 1.9L, Globulin 4.1, 

Albumin/Globulin Ratio 0.5L, Thyroid Stimulating Hormone (TSH) 0.472





Current Medications








 Medications


  (Trade)  Dose


 Ordered  Sig/Aarti


 Route


 PRN Reason  Start Time


 Stop Time Status Last Admin


Dose Admin


 


 Acetaminophen


  (Tylenol)  650 mg  Q6H  PRN


 ORAL


 Temp >100.5  4/29/20 14:45


 5/26/20 08:44  4/29/20 17:29


 


 


 Carvedilol


  (Coreg)  3.125 mg  EVERY 12  HOURS


 ORAL


   4/29/20 21:00


 5/29/20 20:59  5/1/20 21:15


 


 


 Chlorhexidine


 Gluconate


  (Gloria-Hex 2%)  1 applic  DAILY@2000


 TOPIC


   4/29/20 20:00


 7/24/20 19:59  5/1/20 21:14


 


 


 Gabapentin


  (Neurontin)  300 mg  Q8HR


 ORAL


   4/29/20 22:00


 5/25/20 00:00  5/2/20 05:00


 


 


 Levetiracetam


  (Keppra)  1,000 mg  Q12HR


 ORAL


   4/29/20 21:00


 5/25/20 00:00  5/1/20 21:15


 


 


 Lorazepam


  (Ativan 2mg/ml


 1ml)  1 mg  Q6H  PRN


 IV


 For Anxiety  4/29/20 14:30


 5/6/20 14:29   


 


 


 Memantine


  (Namenda)  5 mg  DAILY


 ORAL


   4/30/20 09:00


 5/25/20 16:59  5/1/20 08:51


 


 


 Potassium Chloride


  (K-Dur)  20 meq  TWICE A  DAY


 ORAL


   5/1/20 09:00


 7/30/20 08:59  5/1/20 17:29


 

















Hayder Hahn MD May 2, 2020 09:00

## 2020-05-02 NOTE — NEPHROLOGY PROGRESS NOTE
Assessment/Plan


Problem List:  


(1) Electrolyte imbalance


(2) Suspected COVID-19 virus infection


(3) Hypotension


(4) Sepsis


Assessment





Patient's current problem from renal standpoint of view is hypokalemia and 

other electrolyte imbalances





His other conditions:


Patient presented with acute renal failure however it is now resolved


Patient presented with septic shock was on pressors however now resolved


Sepsis leukocytosis improving


Patient has COVID-19 pneumonia


Hypertension


Cardiomyopathy status post AICD


Previous CVA with right residual weakness


Seizure disorders


Plan





Stop IV fluids


Start oral potassium, magnesium, phosphorus supplements


We will continue to monitor electrolytes and chemistries


Continue per consultants


Per orders





Subjective


ROS Limited/Unobtainable:  No


Constitutional:  Reports: malaise, weakness





Objective


Objective





Last 24 Hour Vital Signs








  Date Time  Temp Pulse Resp B/P (MAP) Pulse Ox O2 Delivery O2 Flow Rate FiO2


 


5/2/20 12:00 98.0 92 22 134/82 (99) 94   


 


5/2/20 09:42  93  130/79    


 


5/2/20 08:00 98.8 93 22 130/79 (96) 93   


 


5/2/20 04:00 99.1 89 30 137/85 (102) 90   


 


5/2/20 00:08 99.3 92 40 138/94 (109) 87   


 


5/1/20 22:17  99 40  82   


 


5/1/20 21:15  99  126/89    


 


5/1/20 20:00 98.7 99 40 126/89 (101) 81   


 


5/1/20 16:08 97.7 90 19 136/90 (105) 92   

















Intake and Output  


 


 5/1/20 5/2/20





 19:00 07:00


 


Intake Total 570 ml 500 ml


 


Balance 570 ml 500 ml


 


  


 


IV Total 570 ml 500 ml








Laboratory Tests


5/1/20 22:03: 


Arterial Blood pH 7.523H, Arterial Blood Partial Pressure CO2 24.6*L, Arterial 

Blood Partial Pressure O2 65.1L, Arterial Blood HCO3 19.8L, Arterial Blood 

Oxygen Saturation 93.3L, Arterial Blood Base Excess -1.6, Wong Test Positive


5/2/20 06:00: 


White Blood Count 11.6H, Red Blood Count 3.71L, Hemoglobin 10.7L, Hematocrit 

31.7L, Mean Corpuscular Volume 85, Mean Corpuscular Hemoglobin 28.9, Mean 

Corpuscular Hemoglobin Concent 33.9, Red Cell Distribution Width 11.0L, 

Platelet Count 156, Mean Platelet Volume 6.3L, Neutrophils (%) (Auto) 79.6H, 

Lymphocytes (%) (Auto) 10.0L, Monocytes (%) (Auto) 8.3, Eosinophils (%) (Auto) 

1.7, Basophils (%) (Auto) 0.4, Sodium Level 142, Potassium Level 3.3L, Chloride 

Level 107, Carbon Dioxide Level 25, Anion Gap 10, Blood Urea Nitrogen 13, 

Creatinine 0.7, Estimat Glomerular Filtration Rate > 60, Glucose Level 147H, 

Hemoglobin A1c 7.4H, Uric Acid 3.1, Calcium Level 8.1#L, Phosphorus Level 2.1L, 

Magnesium Level 1.5L, Total Bilirubin 0.7, Gamma Glutamyl Transpeptidase 42, 

Aspartate Amino Transf (AST/SGOT) 26, Alanine Aminotransferase (ALT/SGPT) 20, 

Alkaline Phosphatase 52, C-Reactive Protein, Quantitative 26.0H, Pro-B-Type 

Natriuretic Peptide 5368H, Total Protein 6.0L, Albumin 1.9L, Globulin 4.1, 

Albumin/Globulin Ratio 0.5L, Thyroid Stimulating Hormone (TSH) 0.472


Height (Feet):  5


Height (Inches):  8.00


Weight (Pounds):  178











Miguel Ángel Kendall MD May 2, 2020 13:58

## 2020-05-02 NOTE — NUR
NURSE NOTES:

Administered crushed all meds with apple sauce but pt took only two spoons and refused rest 
of them. Fever 100.9 noted. Ice packs applied. Will continue to monitor.

## 2020-05-02 NOTE — NUR
NURSE NOTES:

Fever 100.4F noted. Applied ice packs but pt refused. Education given x 3 but still refused 
x3

## 2020-05-02 NOTE — CARDIAC ELECTROPHYSIOLOGY PN
Assessment/Plan


Assessment/Plan


1. S/P Shock  due to cardiomyopathy EF 40 and sepsis.  His BNP is 736.  


    On iv Abx by Dr. Lindsay  


    


2. Cardiomyopathy EF 40%.   On Coreg 3.125 bid





3. Status post right-sided TONY ICD.    





4. History of hypertension  


5. History of CVA with residual weakness.


6. COVID-19 PNA, off the vent 





DW RN





Subjective


Subjective


  Covid is positive. Had rapid response for low saturation and was placed on 

Venturi Mask.





Objective





Last 24 Hour Vital Signs








  Date Time  Temp Pulse Resp B/P (MAP) Pulse Ox O2 Delivery O2 Flow Rate FiO2


 


5/2/20 12:00 98.0 92 22 134/82 (99) 94   


 


5/2/20 09:42  93  130/79    


 


5/2/20 08:00 98.8 93 22 130/79 (96) 93   


 


5/2/20 04:00 99.1 89 30 137/85 (102) 90   


 


5/2/20 00:08 99.3 92 40 138/94 (109) 87   


 


5/1/20 22:17  99 40  82   


 


5/1/20 21:15  99  126/89    


 


5/1/20 20:00 98.7 99 40 126/89 (101) 81   


 


5/1/20 16:08 97.7 90 19 136/90 (105) 92   

















Intake and Output  


 


 5/1/20 5/2/20





 19:00 07:00


 


Intake Total 570 ml 500 ml


 


Balance 570 ml 500 ml


 


  


 


IV Total 570 ml 500 ml











Laboratory Tests








Test


  5/1/20


22:03 5/2/20


06:00


 


Arterial Blood pH


  7.523


(7.350-7.450) 


 


 


Arterial Blood Partial


Pressure CO2 24.6 mmHg


(35.0-45.0)  *L 


 


 


Arterial Blood Partial


Pressure O2 65.1 mmHg


(75.0-100.0)  L 


 


 


Arterial Blood HCO3


  19.8 mmol/L


(22.0-26.0)  L 


 


 


Arterial Blood Oxygen


Saturation 93.3 %


()  L 


 


 


Arterial Blood Base Excess -1.6 (-2-2)   


 


Wong Test Positive   


 


White Blood Count


  


  11.6 K/UL


(4.8-10.8)  H


 


Red Blood Count


  


  3.71 M/UL


(4.70-6.10)  L


 


Hemoglobin


  


  10.7 G/DL


(14.2-18.0)  L


 


Hematocrit


  


  31.7 %


(42.0-52.0)  L


 


Mean Corpuscular Volume  85 FL (80-99)  


 


Mean Corpuscular Hemoglobin


  


  28.9 PG


(27.0-31.0)


 


Mean Corpuscular Hemoglobin


Concent 


  33.9 G/DL


(32.0-36.0)


 


Red Cell Distribution Width


  


  11.0 %


(11.6-14.8)  L


 


Platelet Count


  


  156 K/UL


(150-450)


 


Mean Platelet Volume


  


  6.3 FL


(6.5-10.1)  L


 


Neutrophils (%) (Auto)


  


  79.6 %


(45.0-75.0)  H


 


Lymphocytes (%) (Auto)


  


  10.0 %


(20.0-45.0)  L


 


Monocytes (%) (Auto)


  


  8.3 %


(1.0-10.0)


 


Eosinophils (%) (Auto)


  


  1.7 %


(0.0-3.0)


 


Basophils (%) (Auto)


  


  0.4 %


(0.0-2.0)


 


Sodium Level


  


  142 MMOL/L


(136-145)


 


Potassium Level


  


  3.3 MMOL/L


(3.5-5.1)  L


 


Chloride Level


  


  107 MMOL/L


()


 


Carbon Dioxide Level


  


  25 MMOL/L


(21-32)


 


Anion Gap


  


  10 mmol/L


(5-15)


 


Blood Urea Nitrogen


  


  13 mg/dL


(7-18)


 


Creatinine


  


  0.7 MG/DL


(0.55-1.30)


 


Estimat Glomerular Filtration


Rate 


  > 60 mL/min


(>60)


 


Glucose Level


  


  147 MG/DL


()  H


 


Hemoglobin A1c


  


  7.4 %


(4.3-6.0)  H


 


Uric Acid


  


  3.1 MG/DL


(2.6-7.2)


 


Calcium Level


  


  8.1 MG/DL


(8.5-10.1)  #L


 


Phosphorus Level


  


  2.1 MG/DL


(2.5-4.9)  L


 


Magnesium Level


  


  1.5 MG/DL


(1.8-2.4)  L


 


Total Bilirubin


  


  0.7 MG/DL


(0.2-1.0)


 


Gamma Glutamyl Transpeptidase  42 U/L (5-85)  


 


Aspartate Amino Transf


(AST/SGOT) 


  26 U/L (15-37)


 


 


Alanine Aminotransferase


(ALT/SGPT) 


  20 U/L (12-78)


 


 


Alkaline Phosphatase


  


  52 U/L


()


 


C-Reactive Protein,


Quantitative 


  26.0 mg/dL


(0.00-0.90)  H


 


Pro-B-Type Natriuretic Peptide


  


  5368 pg/mL


(0-125)  H


 


Total Protein


  


  6.0 G/DL


(6.4-8.2)  L


 


Albumin


  


  1.9 G/DL


(3.4-5.0)  L


 


Globulin  4.1 g/dL  


 


Albumin/Globulin Ratio


  


  0.5 (1.0-2.7)


L


 


Thyroid Stimulating Hormone


(TSH) 


  0.472 uiU/mL


(0.358-3.740)








Objective





HEAD AND NECK:  Mild JVD.


LUNGS:  Decreased breath sounds.


CARDIOVASCULAR:  Regular S1 and S2 with no gallop.


ABDOMEN:  Soft.


EXTREMITIES:  1+ pitting edema.  Defibrillator is in right subclavian.











Tommy Otero MD May 2, 2020 14:32

## 2020-05-02 NOTE — PROGRESS NOTE
DATE:  05/02/2020

SUBJECTIVE:  This is a 69-year-old male patient with altered mental status,

hypertension, and sepsis.  He overall decline in cognition below his

baseline.  He has got some mood lability, confusion, disorganized, thought

process _____ cognition below his baseline.  That is why, his attending

has requested daily psychiatric consultation.



MENTAL STATUS EXAMINATION:  This is a 69-year-old male.  Appearance is

disheveled.  Attitude, irritable and agitated.  Affect, guarded and

restricted.  Intellect, poor.  Mood, depressed and anxious.  Motor

activity, psychomotor agitation.  Insight and judgment is poor.



DIAGNOSIS:  Major depressive disorder, mild, recurrent with psychotic

features, rule out dementia with psychosis.



PLAN:  Treat with Namenda 5 mg daily, Ativan 1 every 6 hours p.r.n. anxiety

and agitation, Neurontin 10 mg every 8 hours.  A 20 minutes of

insight-oriented psychotherapy.  Chart reviewed.  Discussed with staff.

Seen and assessed at bedside.









  ______________________________________________

  Demarcus Love M.D.





DR:  Tonny

D:  05/02/2020 09:28

T:  05/02/2020 19:45

JOB#:  [id number]

CC:

## 2020-05-03 VITALS — SYSTOLIC BLOOD PRESSURE: 113 MMHG | DIASTOLIC BLOOD PRESSURE: 78 MMHG

## 2020-05-03 VITALS — SYSTOLIC BLOOD PRESSURE: 140 MMHG | DIASTOLIC BLOOD PRESSURE: 90 MMHG

## 2020-05-03 VITALS — DIASTOLIC BLOOD PRESSURE: 89 MMHG | SYSTOLIC BLOOD PRESSURE: 139 MMHG

## 2020-05-03 VITALS — SYSTOLIC BLOOD PRESSURE: 120 MMHG | DIASTOLIC BLOOD PRESSURE: 82 MMHG

## 2020-05-03 VITALS — SYSTOLIC BLOOD PRESSURE: 127 MMHG | DIASTOLIC BLOOD PRESSURE: 89 MMHG

## 2020-05-03 VITALS — DIASTOLIC BLOOD PRESSURE: 95 MMHG | SYSTOLIC BLOOD PRESSURE: 145 MMHG

## 2020-05-03 LAB
ADD MANUAL DIFF: NO
ALBUMIN SERPL-MCNC: 2 G/DL (ref 3.4–5)
ALBUMIN/GLOB SERPL: 0.4 {RATIO} (ref 1–2.7)
ALP SERPL-CCNC: 72 U/L (ref 46–116)
ALT SERPL-CCNC: 31 U/L (ref 12–78)
ANION GAP SERPL CALC-SCNC: 10 MMOL/L (ref 5–15)
APPEARANCE UR: (no result)
APTT PPP: YELLOW S
AST SERPL-CCNC: 23 U/L (ref 15–37)
BASOPHILS NFR BLD AUTO: 0.3 % (ref 0–2)
BILIRUB SERPL-MCNC: 1 MG/DL (ref 0.2–1)
BUN SERPL-MCNC: 14 MG/DL (ref 7–18)
CALCIUM SERPL-MCNC: 8 MG/DL (ref 8.5–10.1)
CHLORIDE SERPL-SCNC: 105 MMOL/L (ref 98–107)
CO2 SERPL-SCNC: 28 MMOL/L (ref 21–32)
CREAT SERPL-MCNC: 0.8 MG/DL (ref 0.55–1.3)
EOSINOPHIL NFR BLD AUTO: 1.2 % (ref 0–3)
ERYTHROCYTE [DISTWIDTH] IN BLOOD BY AUTOMATED COUNT: 10.9 % (ref 11.6–14.8)
GLOBULIN SER-MCNC: 4.7 G/DL
GLUCOSE UR STRIP-MCNC: NEGATIVE MG/DL
HCT VFR BLD CALC: 32.7 % (ref 42–52)
HGB BLD-MCNC: 11.1 G/DL (ref 14.2–18)
KETONES UR QL STRIP: (no result)
LEUKOCYTE ESTERASE UR QL STRIP: (no result)
LYMPHOCYTES NFR BLD AUTO: 8.2 % (ref 20–45)
MCV RBC AUTO: 85 FL (ref 80–99)
MONOCYTES NFR BLD AUTO: 7.1 % (ref 1–10)
NEUTROPHILS NFR BLD AUTO: 83.3 % (ref 45–75)
NITRITE UR QL STRIP: NEGATIVE
PH UR STRIP: 5 [PH] (ref 4.5–8)
PHOSPHATE SERPL-MCNC: 3.6 MG/DL (ref 2.5–4.9)
PLATELET # BLD: 147 K/UL (ref 150–450)
POTASSIUM SERPL-SCNC: 3.6 MMOL/L (ref 3.5–5.1)
PROT UR QL STRIP: (no result)
RBC # BLD AUTO: 3.84 M/UL (ref 4.7–6.1)
SODIUM SERPL-SCNC: 143 MMOL/L (ref 136–145)
SP GR UR STRIP: 1.02 (ref 1–1.03)
UROBILINOGEN UR-MCNC: 4 MG/DL (ref 0–1)
WBC # BLD AUTO: 13.1 K/UL (ref 4.8–10.8)

## 2020-05-03 RX ADMIN — MAGNESIUM OXIDE TAB 400 MG (241.3 MG ELEMENTAL MG) SCH MG: 400 (241.3 MG) TAB at 09:00

## 2020-05-03 RX ADMIN — DIBASIC SODIUM PHOSPHATE, MONOBASIC POTASSIUM PHOSPHATE AND MONOBASIC SODIUM PHOSPHATE SCH MG: 852; 155; 130 TABLET ORAL at 09:00

## 2020-05-03 RX ADMIN — DIBASIC SODIUM PHOSPHATE, MONOBASIC POTASSIUM PHOSPHATE AND MONOBASIC SODIUM PHOSPHATE SCH MG: 852; 155; 130 TABLET ORAL at 08:50

## 2020-05-03 RX ADMIN — DEXTROSE MONOHYDRATE SCH MLS/HR: 50 INJECTION, SOLUTION INTRAVENOUS at 14:00

## 2020-05-03 RX ADMIN — DIBASIC SODIUM PHOSPHATE, MONOBASIC POTASSIUM PHOSPHATE AND MONOBASIC SODIUM PHOSPHATE SCH MG: 852; 155; 130 TABLET ORAL at 18:00

## 2020-05-03 RX ADMIN — CHLORHEXIDINE GLUCONATE SCH APPLIC: 213 SOLUTION TOPICAL at 21:10

## 2020-05-03 RX ADMIN — MAGNESIUM OXIDE TAB 400 MG (241.3 MG ELEMENTAL MG) SCH MG: 400 (241.3 MG) TAB at 18:00

## 2020-05-03 RX ADMIN — MAGNESIUM OXIDE TAB 400 MG (241.3 MG ELEMENTAL MG) SCH MG: 400 (241.3 MG) TAB at 08:49

## 2020-05-03 RX ADMIN — MAGNESIUM OXIDE TAB 400 MG (241.3 MG ELEMENTAL MG) SCH MG: 400 (241.3 MG) TAB at 13:00

## 2020-05-03 RX ADMIN — DIBASIC SODIUM PHOSPHATE, MONOBASIC POTASSIUM PHOSPHATE AND MONOBASIC SODIUM PHOSPHATE SCH MG: 852; 155; 130 TABLET ORAL at 13:00

## 2020-05-03 RX ADMIN — DEXTROSE MONOHYDRATE SCH MLS/HR: 50 INJECTION, SOLUTION INTRAVENOUS at 21:11

## 2020-05-03 NOTE — NUR
NURSE NOTES:

Report received from DIMA Szymanski. AAOx 2, on venturi mask @30%. Pt has SOB noted. Right 
femoral PICC line patent and intact. Swenson catheter intact. HOB elevated, isolation 
maintained. Bed low and locked, siderails up x2, alarm on, call light within reach, will 
continue to monitor.

## 2020-05-03 NOTE — NUR
NURSE NOTES:

Patient refused all morning medications including Tylenol (T100.8F). Pt educated on risks 
and benefits x3, patient still refused. Also refused to put Venturi mask back on. MD Dr. Barkley made aware. Clarified if ok to administer Tylenol suppository instead, awaiting 
response.

## 2020-05-03 NOTE — PULMONOLOGY PROGRESS NOTE
Assessment/Plan


Assessment/Plan


IMPRESSION:


1. Cardiomyopathy.


2. Hypotension.


3. Left lung pneumonia.


4. Nursing home resident.


5. Positive COVID-19.





DISCUSSION:


1. Continue isolation


2. Continue current medications.


3. Off pressors.


4. Oxygen. Requirements decreased now over last 24 hours


5. Pulmonary hygiene.


6. Broad-spectrum antibiotics.


7. I will follow.


8. Transferred to med-surg

















  ______________________________________________


  Hayder Hahn M.D.





Subjective


ROS Limited/Unobtainable:  No


Interval Events:  Tachypnic yesterday; afebrile


Constitutional:  Reports: no symptoms


Respiratory:  Reports: no symptoms


Cardiovascular:  Reports: no symptoms


Gastrointestinal/Abdominal:  Reports: no symptoms


Genitourinary:  Reports: no symptoms


Neurologic:  Reports: no symptoms


Allergies:  


Coded Allergies:  


     No Known Allergies (Unverified , 12/15/14)


All Systems:  reviewed and negative except above





Objective





Last 24 Hour Vital Signs








  Date Time  Temp Pulse Resp B/P (MAP) Pulse Ox O2 Delivery O2 Flow Rate FiO2


 


5/3/20 16:00 100.9 100 36 127/89 (102) 92   


 


5/3/20 12:00 99.1 97 32 120/82 (95) 92   


 


5/3/20 11:10 99.1       


 


5/3/20 08:00 100.8 91 36 139/89 (106) 88   


 


5/3/20 04:00 98.2 92 34 145/95 (112) 92   


 


5/3/20 01:00 97.9  33  94   


 


5/3/20 00:00 100.4 97 38 113/78 (90) 90   


 


5/2/20 21:31 100.4       


 


5/2/20 21:01  102  133/91    


 


5/2/20 20:00 100.9 102 35 133/91 (105) 93   


 


5/2/20 18:55     95 Venturi Mask  40

















Intake and Output  


 


 5/2/20 5/3/20





 19:00 07:00


 


Intake Total 200 ml 


 


Balance 200 ml 


 


  


 


IV Total 200 ml 








General Appearance:  no acute distress


HEENT:  normocephalic


Respiratory/Chest:  chest wall non-tender, lungs clear


Cardiovascular:  normal peripheral pulses


Abdomen:  normal bowel sounds


Laboratory Tests


5/3/20 04:00: 


White Blood Count 13.1H, Red Blood Count 3.84L, Hemoglobin 11.1L, Hematocrit 

32.7L, Mean Corpuscular Volume 85, Mean Corpuscular Hemoglobin 28.9, Mean 

Corpuscular Hemoglobin Concent 34.0, Red Cell Distribution Width 10.9L, 

Platelet Count 147L, Mean Platelet Volume 7.1, Neutrophils (%) (Auto) 83.3H, 

Lymphocytes (%) (Auto) 8.2L, Monocytes (%) (Auto) 7.1, Eosinophils (%) (Auto) 

1.2, Basophils (%) (Auto) 0.3, Sodium Level 143, Potassium Level 3.6, Chloride 

Level 105, Carbon Dioxide Level 28, Anion Gap 10, Blood Urea Nitrogen 14, 

Creatinine 0.8, Estimat Glomerular Filtration Rate > 60, Glucose Level 188H, 

Calcium Level 8.0L, Phosphorus Level 3.6, Magnesium Level 1.8, Total Bilirubin 

1.0, Aspartate Amino Transf (AST/SGOT) 23, Alanine Aminotransferase (ALT/SGPT) 

31, Alkaline Phosphatase 72, Total Protein 6.7, Albumin 2.0L, Globulin 4.7, 

Albumin/Globulin Ratio 0.4L





Current Medications








 Medications


  (Trade)  Dose


 Ordered  Sig/Aarti


 Route


 PRN Reason  Start Time


 Stop Time Status Last Admin


Dose Admin


 


 Acetaminophen


  (Tylenol)  650 mg  Q6H  PRN


 ORAL


 Temp >100.5  4/29/20 14:45


 5/26/20 08:44  5/2/20 21:01


 


 


 Acetaminophen


  (Tylenol)  650 mg  Q6H  PRN


 RECTAL


 Temp >100.5  5/3/20 10:15


 6/2/20 10:14  5/3/20 16:59


 


 


 Carvedilol


  (Coreg)  3.125 mg  EVERY 12  HOURS


 ORAL


   4/29/20 21:00


 5/29/20 20:59  5/2/20 21:01


 


 


 Chlorhexidine


 Gluconate


  (Gloria-Hex 2%)  1 applic  DAILY@2000


 TOPIC


   4/29/20 20:00


 7/24/20 19:59  5/2/20 21:01


 


 


 Gabapentin


  (Neurontin)  300 mg  Q8HR


 ORAL


   4/29/20 22:00


 5/25/20 00:00  5/3/20 14:00


 


 


 Levetiracetam


  (Keppra)  1,000 mg  Q12HR


 ORAL


   4/29/20 21:00


 5/25/20 00:00  5/2/20 21:01


 


 


 Lorazepam


  (Ativan 2mg/ml


 1ml)  1 mg  Q6H  PRN


 IV


 For Anxiety  4/29/20 14:30


 5/6/20 14:29   


 


 


 Magnesium Oxide


  (Mag-Ox 400mg)  400 mg  THREE TIMES A  DAY


 ORAL


   5/2/20 18:00


 6/1/20 17:59  5/2/20 17:31


 


 


 Memantine


  (Namenda)  5 mg  DAILY


 ORAL


   4/30/20 09:00


 5/25/20 16:59  5/2/20 09:42


 


 


 Phosphorus


  (Phospha 250


 Neutral)  500 mg  THREE TIMES A  DAY


 ORAL


   5/2/20 10:15


 6/1/20 10:14  5/2/20 17:31


 


 


 Piperacillin Sod/


 Tazobactam Sod


 3.375 gm/Sodium


 Chloride  110 ml @ 


 27.5 mls/hr  EVERY 8  HOURS


 IVPB


   5/3/20 14:00


 5/8/20 13:59  5/3/20 14:00


 


 


 Potassium Chloride


  (K-Dur)  40 meq  DAILY


 ORAL


   5/2/20 10:15


 7/31/20 10:14  5/2/20 10:53


 

















Hayder Hahn MD May 3, 2020 17:20

## 2020-05-03 NOTE — NUR
NURSE NOTES:

CXR results from 5/3 showed worsening bilateral interstitial and airspace opacities and 
possible small left pleural effusion. Relayed to Dr. Hahn and received orders to give 
Lasix 40mg IVP x 1 today. Orders noted and carried out.

## 2020-05-03 NOTE — GENERAL PROGRESS NOTE
Assessment/Plan


Problem List:  


(1) Suspected COVID-19 virus infection


ICD Codes:  Z20.828 - Contact with and (suspected) exposure to other viral 

communicable diseases


SNOMED:  373430938


(2) Anemia


ICD Codes:  D64.9 - Anemia, unspecified


SNOMED:  542443009


(3) Weak


ICD Codes:  R53.1 - Weakness


SNOMED:  56325813


(4) COPD (chronic obstructive pulmonary disease)


ICD Codes:  J44.9 - Chronic obstructive pulmonary disease, unspecified


SNOMED:  84129273


(5) Diabetes


ICD Codes:  E11.9 - Type 2 diabetes mellitus without complications


SNOMED:  76759475


(6) Epileptic seizure, generalized


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 

not intractable, without status epilepticus


SNOMED:  30958341


(7) Altered mental status


ICD Codes:  R41.82 - Altered mental status, unspecified


SNOMED:  565349909


Qualifiers:  


   Qualified Codes:  R41.82 - Altered mental status, unspecified


(8) Hypotension


ICD Codes:  I95.9 - Hypotension, unspecified


SNOMED:  54705444


Qualifiers:  


   Qualified Codes:  I95.9 - Hypotension, unspecified


Status:  unchanged


Assessment/Plan:


o2 pulm tx abx pt diet cbc bmp am aru eval





Subjective


Constitutional:  Reports: weakness


Allergies:  


Coded Allergies:  


     No Known Allergies (Unverified , 12/15/14)


All Systems:  reviewed and negative except above


Subjective


sleepy calm in bed





Objective





Last 24 Hour Vital Signs








  Date Time  Temp Pulse Resp B/P (MAP) Pulse Ox O2 Delivery O2 Flow Rate FiO2


 


5/3/20 08:49  91  139/89    


 


5/3/20 08:00 100.8 91 36 139/89 (106) 88   


 


5/3/20 04:00 98.2 92 34 145/95 (112) 92   


 


5/3/20 01:00 97.9  33  94   


 


5/3/20 00:00 100.4 97 38 113/78 (90) 90   


 


5/2/20 21:31 100.4       


 


5/2/20 21:01  102  133/91    


 


5/2/20 20:00 100.9 102 35 133/91 (105) 93   


 


5/2/20 18:55     95 Venturi Mask  40


 


5/2/20 16:00 97.2 82 19 125/76 (92) 97   


 


5/2/20 12:00 98.0 92 22 134/82 (99) 94   


 


5/2/20 09:42  93  130/79    

















Intake and Output  


 


 5/2/20 5/3/20





 19:00 07:00


 


Intake Total 200 ml 


 


Balance 200 ml 


 


  


 


IV Total 200 ml 








Laboratory Tests


5/3/20 04:00: 


White Blood Count 13.1H, Red Blood Count 3.84L, Hemoglobin 11.1L, Hematocrit 

32.7L, Mean Corpuscular Volume 85, Mean Corpuscular Hemoglobin 28.9, Mean 

Corpuscular Hemoglobin Concent 34.0, Red Cell Distribution Width 10.9L, 

Platelet Count 147L, Mean Platelet Volume 7.1, Neutrophils (%) (Auto) 83.3H, 

Lymphocytes (%) (Auto) 8.2L, Monocytes (%) (Auto) 7.1, Eosinophils (%) (Auto) 

1.2, Basophils (%) (Auto) 0.3, Sodium Level 143, Potassium Level 3.6, Chloride 

Level 105, Carbon Dioxide Level 28, Anion Gap 10, Blood Urea Nitrogen 14, 

Creatinine 0.8, Estimat Glomerular Filtration Rate > 60, Glucose Level 188H, 

Calcium Level 8.0L, Phosphorus Level 3.6, Magnesium Level 1.8, Total Bilirubin 

1.0, Aspartate Amino Transf (AST/SGOT) 23, Alanine Aminotransferase (ALT/SGPT) 

31, Alkaline Phosphatase 72, Total Protein 6.7, Albumin 2.0L, Globulin 4.7, 

Albumin/Globulin Ratio 0.4L


Height (Feet):  5


Height (Inches):  8.00


Weight (Pounds):  177


General Appearance:  lethargic


EENT:  normal ENT inspection


Neck:  normal alignment


Cardiovascular:  normal rate, regular rhythm


Respiratory/Chest:  no respiratory distress, no accessory muscle use


Extremities:  normal inspection


Skin:  normal pigmentation











Arron Barkley DO May 3, 2020 09:35

## 2020-05-03 NOTE — PROGRESS NOTE
DATE:  05/03/2020

SUBJECTIVE:  This is a male patient who is 69-year-old who has altered

mental status, hypertension, sepsis, confusion, decline in cognition below

the baseline.  That is why, his attending has requested daily psychiatric

consultation.



MENTAL STATUS EXAMINATION:  This is a 69-year-old male.  Appearance is

disheveled.  Attitude, irritable and agitated.  Affect restricted.

Intellect, poor.  Mood, depressed and anxious.  Motor activity,

psychomotor agitation.  Insight and judgment is poor.



DIAGNOSIS:  Major depressive disorder, mild, recurrent with psychotic

features, rule out dementia with psychosis.



PLAN:  Neurontin at a dose of 300 mg q.8h., Ativan 1 IV every 6 hours

p.r.n. anxiety and agitation, and Namenda 5 mg a day.  Twenty minutes of

reality-based supportive psychotherapy.  Twenty minutes of cognitive

behavioral therapy to help him identify his automatic negative thoughts,

help him convert his negative thoughts to more positive thoughts to reduce

depression, anxiety, mood lability.  Chart reviewed.  Discussed with

staff.  Seen and assessed at beside.









  ______________________________________________

  Demarcus Love M.D.





DR:  OLGA

D:  05/03/2020 10:37

T:  05/03/2020 17:28

JOB#:  [id number]

CC:

## 2020-05-03 NOTE — INFECTIOUS DISEASES PROG NOTE
Assessment/Plan


Assessment/Plan





Assessment:


Septic shock-SP





Fever; improving


Mild leukocytosis, increasing


   -u/a neg


   -Bcx Neg





Probable PNA- 2ry to COVID19 (from NH with confirmed cases)


Acute hypoxic respiratory failure- was on NC, now on VM


  -5/3 CXR: .  Worsening bilateral interstitial and airspace opacities. 

Probable small left pleural effusion.  Difficult to evaluate the right 

costophrenic angle due to overlying pacemaker.


  -4/30 CXR: Slightly improved bilateral interstitial and airspace infiltrates 

versus edema


  -4/28 CXR: Bilateral mid and lower lung interstitial disease, new since prior 

study.Possible developing left pleural effusion


        Ferritn >2000, CRP 22.3


  -4/24 SARS-COV2 PCR +


  -CXR: Left basilar atelectasis and/or infiltrate


 


MELVI, improving





 HTN


cadiomyopathy s/p AICD


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


seizure disorder


NH resident ([facility]) 








Plan:


-Start empiric Zosyn


-will consider steroids if respiratory status worsening


   -4/29 SP Cefepime #6


   -4/27 SP IV Vancomycin #4





-f/u cx


-Monitor CBC/CMP, temperatures


-COVID 19 precautions


-clarify goals of care


-aspiration precautions


-inflamatory markers


-u/a w/ reflex, sp cx, Bcx x2


-ABG am





Thank you for consulting [facility]. Will continue to follow along with 

you.





Discussed with RN,





Subjective


Allergies:  


Coded Allergies:  


     No Known Allergies (Unverified , 12/15/14)


Subjective


Tm 100.9


on venturi mask 40%


wbc increased





Objective


Vital Signs





Last 24 Hour Vital Signs








  Date Time  Temp Pulse Resp B/P (MAP) Pulse Ox O2 Delivery O2 Flow Rate FiO2


 


5/3/20 08:00 100.8 91 36 139/89 (106) 88   


 


5/3/20 04:00 98.2 92 34 145/95 (112) 92   


 


5/3/20 01:00 97.9  33  94   


 


5/3/20 00:00 100.4 97 38 113/78 (90) 90   


 


5/2/20 21:31 100.4       


 


5/2/20 21:01  102  133/91    


 


5/2/20 20:00 100.9 102 35 133/91 (105) 93   


 


5/2/20 18:55     95 Venturi Mask  40


 


5/2/20 16:00 97.2 82 19 125/76 (92) 97   








Height (Feet):  5


Height (Inches):  8.00


Weight (Pounds):  177


Objective


 not examined to limit COVID19 exposure





Laboratory Tests








Test


  5/3/20


04:00


 


White Blood Count


  13.1 K/UL


(4.8-10.8)  H


 


Red Blood Count


  3.84 M/UL


(4.70-6.10)  L


 


Hemoglobin


  11.1 G/DL


(14.2-18.0)  L


 


Hematocrit


  32.7 %


(42.0-52.0)  L


 


Mean Corpuscular Volume 85 FL (80-99)  


 


Mean Corpuscular Hemoglobin


  28.9 PG


(27.0-31.0)


 


Mean Corpuscular Hemoglobin


Concent 34.0 G/DL


(32.0-36.0)


 


Red Cell Distribution Width


  10.9 %


(11.6-14.8)  L


 


Platelet Count


  147 K/UL


(150-450)  L


 


Mean Platelet Volume


  7.1 FL


(6.5-10.1)


 


Neutrophils (%) (Auto)


  83.3 %


(45.0-75.0)  H


 


Lymphocytes (%) (Auto)


  8.2 %


(20.0-45.0)  L


 


Monocytes (%) (Auto)


  7.1 %


(1.0-10.0)


 


Eosinophils (%) (Auto)


  1.2 %


(0.0-3.0)


 


Basophils (%) (Auto)


  0.3 %


(0.0-2.0)


 


Sodium Level


  143 MMOL/L


(136-145)


 


Potassium Level


  3.6 MMOL/L


(3.5-5.1)


 


Chloride Level


  105 MMOL/L


()


 


Carbon Dioxide Level


  28 MMOL/L


(21-32)


 


Anion Gap


  10 mmol/L


(5-15)


 


Blood Urea Nitrogen


  14 mg/dL


(7-18)


 


Creatinine


  0.8 MG/DL


(0.55-1.30)


 


Estimat Glomerular Filtration


Rate > 60 mL/min


(>60)


 


Glucose Level


  188 MG/DL


()  H


 


Calcium Level


  8.0 MG/DL


(8.5-10.1)  L


 


Phosphorus Level


  3.6 MG/DL


(2.5-4.9)


 


Magnesium Level


  1.8 MG/DL


(1.8-2.4)


 


Total Bilirubin


  1.0 MG/DL


(0.2-1.0)


 


Aspartate Amino Transf


(AST/SGOT) 23 U/L (15-37)


 


 


Alanine Aminotransferase


(ALT/SGPT) 31 U/L (12-78)


 


 


Alkaline Phosphatase


  72 U/L


()


 


Total Protein


  6.7 G/DL


(6.4-8.2)


 


Albumin


  2.0 G/DL


(3.4-5.0)  L


 


Globulin 4.7 g/dL  


 


Albumin/Globulin Ratio


  0.4 (1.0-2.7)


L











Current Medications








 Medications


  (Trade)  Dose


 Ordered  Sig/Aarti


 Route


 PRN Reason  Start Time


 Stop Time Status Last Admin


Dose Admin


 


 Acetaminophen


  (Tylenol)  650 mg  Q6H  PRN


 ORAL


 Temp >100.5  4/29/20 14:45


 5/26/20 08:44  5/2/20 21:01


 


 


 Acetaminophen


  (Tylenol)  650 mg  Q6H  PRN


 RECTAL


 Temp >100.5  5/3/20 10:15


 6/2/20 10:14  5/3/20 10:40


 


 


 Carvedilol


  (Coreg)  3.125 mg  EVERY 12  HOURS


 ORAL


   4/29/20 21:00


 5/29/20 20:59  5/2/20 21:01


 


 


 Chlorhexidine


 Gluconate


  (Gloria-Hex 2%)  1 applic  DAILY@2000


 TOPIC


   4/29/20 20:00


 7/24/20 19:59  5/2/20 21:01


 


 


 Gabapentin


  (Neurontin)  300 mg  Q8HR


 ORAL


   4/29/20 22:00


 5/25/20 00:00  5/3/20 05:53


 


 


 Levetiracetam


  (Keppra)  1,000 mg  Q12HR


 ORAL


   4/29/20 21:00


 5/25/20 00:00  5/2/20 21:01


 


 


 Lorazepam


  (Ativan 2mg/ml


 1ml)  1 mg  Q6H  PRN


 IV


 For Anxiety  4/29/20 14:30


 5/6/20 14:29   


 


 


 Magnesium Oxide


  (Mag-Ox 400mg)  400 mg  THREE TIMES A  DAY


 ORAL


   5/2/20 18:00


 6/1/20 17:59  5/2/20 17:31


 


 


 Memantine


  (Namenda)  5 mg  DAILY


 ORAL


   4/30/20 09:00


 5/25/20 16:59  5/2/20 09:42


 


 


 Phosphorus


  (Phospha 250


 Neutral)  500 mg  THREE TIMES A  DAY


 ORAL


   5/2/20 10:15


 6/1/20 10:14  5/2/20 17:31


 


 


 Potassium Chloride


  (K-Dur)  40 meq  DAILY


 ORAL


   5/2/20 10:15


 7/31/20 10:14  5/2/20 10:53


 

















Naila Lindsay M.D. May 3, 2020 12:21

## 2020-05-03 NOTE — NUR
NURSE NOTES:

Pt refused crushed meds with apple sauce and ice packs for fever. Education given x 3 but 
still refused x 4. Will continue to monitor.

## 2020-05-03 NOTE — NUR
NURSE NOTES:

Patient noted with tachypnea, sats 88% on venturi mask. RT informed. RT came to see patient, 
maintained on same settings on venturi mask. Sats went up to 92%. Will continue to monitor.

## 2020-05-03 NOTE — NUR
NURSE NOTES:

Report received from DIMA Barakat. AxOx 2, on venturi mask @30%. Mild SOB noted. Nurse reports 
episode of fever overnight Tmax 100.9F.  Right femoral PICC line patent and intact. Swenson 
catheter intact. HOB elevated, isolation precautions in place. Bed low and locked, siderails 
up x2, alarm on, call light within reach, will continue to monitor.

## 2020-05-03 NOTE — NUR
NURSE NOTES:

Patient refused 1pm medications. Educated on risks and benefits x3, patient still refused.

## 2020-05-03 NOTE — NUR
NURSE NOTES:

Received orders from Dr. Barkley, may give Tylenol 650mg suppository per rectum PRN q6hrs for 
fever. Cooling measures done. Orders noted and carried out.

## 2020-05-03 NOTE — NEPHROLOGY PROGRESS NOTE
Assessment/Plan


Problem List:  


(1) Electrolyte imbalance


(2) Suspected COVID-19 virus infection


(3) Hypotension


(4) Sepsis


Assessment





Patient's current problem from renal standpoint of view is hypokalemia and 

other electrolyte imbalances





His other conditions:


Patient presented with acute renal failure however it is now resolved


Patient presented with septic shock was on pressors however now resolved


Sepsis leukocytosis improving


Patient has COVID-19 pneumonia


Hypertension


Cardiomyopathy status post AICD


Previous CVA with right residual weakness


Seizure disorders


Plan





Stop IV fluids


Start oral potassium, magnesium, phosphorus supplements


We will continue to monitor electrolytes and chemistries


Continue per consultants


Per orders





Subjective


ROS Limited/Unobtainable:  No





Objective


Objective





Last 24 Hour Vital Signs








  Date Time  Temp Pulse Resp B/P (MAP) Pulse Ox O2 Delivery O2 Flow Rate FiO2


 


5/3/20 08:00 100.8 91 36 139/89 (106) 88   


 


5/3/20 04:00 98.2 92 34 145/95 (112) 92   


 


5/3/20 01:00 97.9  33  94   


 


5/3/20 00:00 100.4 97 38 113/78 (90) 90   


 


5/2/20 21:31 100.4       


 


5/2/20 21:01  102  133/91    


 


5/2/20 20:00 100.9 102 35 133/91 (105) 93   


 


5/2/20 18:55     95 Venturi Mask  40


 


5/2/20 16:00 97.2 82 19 125/76 (92) 97   


 


5/2/20 12:00 98.0 92 22 134/82 (99) 94   

















Intake and Output  


 


 5/2/20 5/3/20





 19:00 07:00


 


Intake Total 200 ml 


 


Balance 200 ml 


 


  


 


IV Total 200 ml 








Laboratory Tests


5/3/20 04:00: 


White Blood Count 13.1H, Red Blood Count 3.84L, Hemoglobin 11.1L, Hematocrit 

32.7L, Mean Corpuscular Volume 85, Mean Corpuscular Hemoglobin 28.9, Mean 

Corpuscular Hemoglobin Concent 34.0, Red Cell Distribution Width 10.9L, 

Platelet Count 147L, Mean Platelet Volume 7.1, Neutrophils (%) (Auto) 83.3H, 

Lymphocytes (%) (Auto) 8.2L, Monocytes (%) (Auto) 7.1, Eosinophils (%) (Auto) 

1.2, Basophils (%) (Auto) 0.3, Sodium Level 143, Potassium Level 3.6, Chloride 

Level 105, Carbon Dioxide Level 28, Anion Gap 10, Blood Urea Nitrogen 14, 

Creatinine 0.8, Estimat Glomerular Filtration Rate > 60, Glucose Level 188H, 

Calcium Level 8.0L, Phosphorus Level 3.6, Magnesium Level 1.8, Total Bilirubin 

1.0, Aspartate Amino Transf (AST/SGOT) 23, Alanine Aminotransferase (ALT/SGPT) 

31, Alkaline Phosphatase 72, Total Protein 6.7, Albumin 2.0L, Globulin 4.7, 

Albumin/Globulin Ratio 0.4L


Height (Feet):  5


Height (Inches):  8.00


Weight (Pounds):  177


General Appearance:  no apparent distress


Cardiovascular:  tachycardia


Respiratory/Chest:  decreased breath sounds


Objective


No change











Miguel Ángel Kendall MD May 3, 2020 11:08

## 2020-05-03 NOTE — NUR
NURSE NOTES:

Tylenol suppository given at 1700H. Latest temp check 97.7F axillary. Patient refused 6pm 
medications. Educated of risks and benefits x2, patient still refused.

## 2020-05-03 NOTE — NUR
NURSE NOTES:

Spoke RT, they are unable to collect sputum for culture via suctioning as pt is covid (+). 
Pt is also unable to cough out sputum and follow instructions. Dr. Lindsay informed. 
Awaiting response.

## 2020-05-03 NOTE — NUR
HAND-OFF: 

Report given to DIMA Barakat. Endorsed that urine and blood specimen collected for ordered 
labs.

## 2020-05-04 VITALS — SYSTOLIC BLOOD PRESSURE: 134 MMHG | DIASTOLIC BLOOD PRESSURE: 74 MMHG

## 2020-05-04 VITALS — SYSTOLIC BLOOD PRESSURE: 136 MMHG | DIASTOLIC BLOOD PRESSURE: 62 MMHG

## 2020-05-04 VITALS — DIASTOLIC BLOOD PRESSURE: 80 MMHG | SYSTOLIC BLOOD PRESSURE: 146 MMHG

## 2020-05-04 VITALS — SYSTOLIC BLOOD PRESSURE: 149 MMHG | DIASTOLIC BLOOD PRESSURE: 88 MMHG

## 2020-05-04 VITALS — DIASTOLIC BLOOD PRESSURE: 95 MMHG | SYSTOLIC BLOOD PRESSURE: 144 MMHG

## 2020-05-04 VITALS — DIASTOLIC BLOOD PRESSURE: 84 MMHG | SYSTOLIC BLOOD PRESSURE: 147 MMHG

## 2020-05-04 LAB
ADD MANUAL DIFF: NO
ANION GAP SERPL CALC-SCNC: 8 MMOL/L (ref 5–15)
BASOPHILS NFR BLD AUTO: 0.3 % (ref 0–2)
BUN SERPL-MCNC: 18 MG/DL (ref 7–18)
CALCIUM SERPL-MCNC: 8.5 MG/DL (ref 8.5–10.1)
CHLORIDE SERPL-SCNC: 103 MMOL/L (ref 98–107)
CO2 SERPL-SCNC: 30 MMOL/L (ref 21–32)
CREAT SERPL-MCNC: 1 MG/DL (ref 0.55–1.3)
EOSINOPHIL NFR BLD AUTO: 1.2 % (ref 0–3)
ERYTHROCYTE [DISTWIDTH] IN BLOOD BY AUTOMATED COUNT: 11 % (ref 11.6–14.8)
FERRITIN SERPL-MCNC: 1468 NG/ML (ref 8–388)
HCT VFR BLD CALC: 33.8 % (ref 42–52)
HGB BLD-MCNC: 11.3 G/DL (ref 14.2–18)
LDH SERPL L TO P-CCNC: 382 U/L (ref 81–234)
LYMPHOCYTES NFR BLD AUTO: 7.2 % (ref 20–45)
MCV RBC AUTO: 85 FL (ref 80–99)
MONOCYTES NFR BLD AUTO: 6.6 % (ref 1–10)
NEUTROPHILS NFR BLD AUTO: 84.7 % (ref 45–75)
PLATELET # BLD: 145 K/UL (ref 150–450)
POTASSIUM SERPL-SCNC: 3.5 MMOL/L (ref 3.5–5.1)
RBC # BLD AUTO: 3.96 M/UL (ref 4.7–6.1)
SODIUM SERPL-SCNC: 141 MMOL/L (ref 136–145)
WBC # BLD AUTO: 15.3 K/UL (ref 4.8–10.8)

## 2020-05-04 RX ADMIN — DEXTROSE MONOHYDRATE SCH MLS/HR: 50 INJECTION, SOLUTION INTRAVENOUS at 20:51

## 2020-05-04 RX ADMIN — MAGNESIUM OXIDE TAB 400 MG (241.3 MG ELEMENTAL MG) SCH MG: 400 (241.3 MG) TAB at 18:12

## 2020-05-04 RX ADMIN — SODIUM CHLORIDE SCH MLS/HR: 9 INJECTION, SOLUTION INTRAVENOUS at 15:11

## 2020-05-04 RX ADMIN — DIBASIC SODIUM PHOSPHATE, MONOBASIC POTASSIUM PHOSPHATE AND MONOBASIC SODIUM PHOSPHATE SCH MG: 852; 155; 130 TABLET ORAL at 10:03

## 2020-05-04 RX ADMIN — METHYLPREDNISOLONE SODIUM SUCCINATE SCH MLS/HR: 40 INJECTION, POWDER, LYOPHILIZED, FOR SOLUTION INTRAMUSCULAR; INTRAVENOUS at 13:40

## 2020-05-04 RX ADMIN — MAGNESIUM OXIDE TAB 400 MG (241.3 MG ELEMENTAL MG) SCH MG: 400 (241.3 MG) TAB at 10:03

## 2020-05-04 RX ADMIN — CHLORHEXIDINE GLUCONATE SCH APPLIC: 213 SOLUTION TOPICAL at 20:45

## 2020-05-04 RX ADMIN — MAGNESIUM OXIDE TAB 400 MG (241.3 MG ELEMENTAL MG) SCH MG: 400 (241.3 MG) TAB at 13:39

## 2020-05-04 RX ADMIN — FUROSEMIDE SCH MG: 40 TABLET ORAL at 20:51

## 2020-05-04 RX ADMIN — DIBASIC SODIUM PHOSPHATE, MONOBASIC POTASSIUM PHOSPHATE AND MONOBASIC SODIUM PHOSPHATE SCH MG: 852; 155; 130 TABLET ORAL at 18:13

## 2020-05-04 RX ADMIN — DEXTROSE MONOHYDRATE SCH MLS/HR: 50 INJECTION, SOLUTION INTRAVENOUS at 13:39

## 2020-05-04 RX ADMIN — DIBASIC SODIUM PHOSPHATE, MONOBASIC POTASSIUM PHOSPHATE AND MONOBASIC SODIUM PHOSPHATE SCH MG: 852; 155; 130 TABLET ORAL at 14:45

## 2020-05-04 RX ADMIN — METHYLPREDNISOLONE SODIUM SUCCINATE SCH MLS/HR: 40 INJECTION, POWDER, LYOPHILIZED, FOR SOLUTION INTRAMUSCULAR; INTRAVENOUS at 20:45

## 2020-05-04 RX ADMIN — DEXTROSE MONOHYDRATE SCH MLS/HR: 50 INJECTION, SOLUTION INTRAVENOUS at 06:04

## 2020-05-04 NOTE — CARDIAC ELECTROPHYSIOLOGY PN
Assessment/Plan


Assessment/Plan


1. S/P Shock  due to cardiomyopathy EF 40 and sepsis  BNP is 736.  


    On iv Abx by Dr. Lindsay  


    


2. Cardiomyopathy EF 40%.   On Coreg 3.125 bid





3. Status post right-sided TONY ICD.    





4. History of hypertension  


5. History of CVA with residual weakness.


6. COVID-19 PNA, off the vent 





DW RN





Subjective


Subjective


  Covid is positive. Had rapid response over the weekend  for low saturation 

and was placed on Venturi Mask.In isolation





Objective





Last 24 Hour Vital Signs








  Date Time  Temp Pulse Resp B/P (MAP) Pulse Ox O2 Delivery O2 Flow Rate FiO2


 


5/4/20 12:00 98.3 98 32 136/62 (86) 91   


 


5/4/20 10:03  92  146/80    


 


5/4/20 08:00 98.4 92 36 146/80 (102) 91   


 


5/4/20 04:00 98.6 95 34 149/88 (108) 91   


 


5/4/20 00:00 98.6 96 33 147/84 (105) 89   


 


5/3/20 21:00  97  140/90    


 


5/3/20 20:00 98.7 91 33 140/90 (107) 91   


 


5/3/20 19:56     92 Venturi Mask 8.0 40


 


5/3/20 17:29 97.7       


 


5/3/20 16:00 100.9 100 36 127/89 (102) 92   











Laboratory Tests








Test


  5/3/20


19:30 5/4/20


04:00 5/4/20


07:25


 


Urine Color Yellow    


 


Urine Appearance Cloudy    


 


Urine pH 5 (4.5-8.0)    


 


Urine Specific Gravity


  1.025


(1.005-1.035) 


  


 


 


Urine Protein


  3+ (NEGATIVE)


H 


  


 


 


Urine Glucose (UA)


  Negative


(NEGATIVE) 


  


 


 


Urine Ketones


  1+ (NEGATIVE)


H 


  


 


 


Urine Blood


  5+ (NEGATIVE)


H 


  


 


 


Urine Nitrite


  Negative


(NEGATIVE) 


  


 


 


Urine Bilirubin


  Negative


(NEGATIVE) 


  


 


 


Urine Urobilinogen


  4 MG/DL


(0.0-1.0)  H 


  


 


 


Urine Leukocyte Esterase


  1+ (NEGATIVE)


H 


  


 


 


Urine RBC


  15-20 /HPF (0


- 0)  H 


  


 


 


Urine WBC


  2-4 /HPF (0 -


0) 


  


 


 


Urine Squamous Epithelial


Cells Occasional


/LPF 


  


 


 


Urine Bacteria


  Few /HPF


(NONE) 


  


 


 


White Blood Count


  


  15.3 K/UL


(4.8-10.8)  H 


 


 


Red Blood Count


  


  3.96 M/UL


(4.70-6.10)  L 


 


 


Hemoglobin


  


  11.3 G/DL


(14.2-18.0)  L 


 


 


Hematocrit


  


  33.8 %


(42.0-52.0)  L 


 


 


Mean Corpuscular Volume  85 FL (80-99)   


 


Mean Corpuscular Hemoglobin


  


  28.5 PG


(27.0-31.0) 


 


 


Mean Corpuscular Hemoglobin


Concent 


  33.5 G/DL


(32.0-36.0) 


 


 


Red Cell Distribution Width


  


  11.0 %


(11.6-14.8)  L 


 


 


Platelet Count


  


  145 K/UL


(150-450)  L 


 


 


Mean Platelet Volume


  


  7.5 FL


(6.5-10.1) 


 


 


Neutrophils (%) (Auto)


  


  84.7 %


(45.0-75.0)  H 


 


 


Lymphocytes (%) (Auto)


  


  7.2 %


(20.0-45.0)  L 


 


 


Monocytes (%) (Auto)


  


  6.6 %


(1.0-10.0) 


 


 


Eosinophils (%) (Auto)


  


  1.2 %


(0.0-3.0) 


 


 


Basophils (%) (Auto)


  


  0.3 %


(0.0-2.0) 


 


 


Sodium Level


  


  141 MMOL/L


(136-145) 


 


 


Potassium Level


  


  3.5 MMOL/L


(3.5-5.1) 


 


 


Chloride Level


  


  103 MMOL/L


() 


 


 


Carbon Dioxide Level


  


  30 MMOL/L


(21-32) 


 


 


Anion Gap


  


  8 mmol/L


(5-15) 


 


 


Blood Urea Nitrogen


  


  18 mg/dL


(7-18) 


 


 


Creatinine


  


  1.0 MG/DL


(0.55-1.30) 


 


 


Estimat Glomerular Filtration


Rate 


  > 60 mL/min


(>60) 


 


 


Glucose Level


  


  174 MG/DL


()  H 


 


 


Calcium Level


  


  8.5 MG/DL


(8.5-10.1) 


 


 


Ferritin


  


  1468 NG/ML


(8-388)  H 


 


 


Lactate Dehydrogenase


  


  382 U/L


()  H 


 


 


C-Reactive Protein,


Quantitative 


  41.6 mg/dL


(0.00-0.90)  H 


 


 


Fibrinogen


  


  


  377 mg/dL


(200-400)


 


D-Dimer


  


  


  > 35.20 mg/L


FEU








Objective





HEAD AND NECK:  Mild JVD.


LUNGS:  Decreased breath sounds.


CARDIOVASCULAR:  Regular S1 and S2 with no gallop.


ABDOMEN:  Soft.


EXTREMITIES:  1+ pitting edema.  Defibrillator is in right subclavian.











Tommy Otero MD May 4, 2020 13:51

## 2020-05-04 NOTE — PROGRESS NOTE
DATE:  05/04/2020

SUBJECTIVE:  The patient is a 69-year-old male with altered mental status.

Patient is confused, disorganized.  He has _____, sepsis, confusion,

altered mental status, decline in cognition below his baseline that is why

his attending has requested daily psychiatric consultation.



MENTAL STATUS EXAMINATION:  This is a 69-year-old male.  Appearance is

disheveled.  Attitude irritable and agitated.  Affect, guarded and

restricted.  Intellect poor.  Mood, depressed and anxious.  Motor

activity, psychomotor agitation.  Insight and judgment is poor.



DIAGNOSIS:  Major depressive disorder, mild, recurrent with psychotic

features, rule out dementia with psychosis.



PLAN:  Neurontin 300 mg p.o. q.8 hours, Ativan 1 IV every 6 hours p.r.n.

anxiety and agitation, Namenda 5 mg daily.  A 20 minutes of reality-based

supportive psychotherapy.  A 20 minutes of cognitive behavioral therapy to

help him identify his automatic negative thoughts, help him convert his

negative thoughts to more positive thoughts to reduce depression, anxiety,

mood lability.  Chart reviewed and discussed with staff.  Seen and

assessed at beside.









  ______________________________________________

  Demarcus Love M.D.





DR:  Bernabe

D:  05/04/2020 09:38

T:  05/04/2020 20:31

JOB#:  [id number]

CC:

## 2020-05-04 NOTE — NEPHROLOGY PROGRESS NOTE
Assessment/Plan


Problem List:  


(1) Electrolyte imbalance


(2) Suspected COVID-19 virus infection


(3) Hypotension


(4) Sepsis


Assessment





Patient's current problem from renal standpoint of view is hypokalemia and 

other electrolyte imbalances





His other conditions:


Patient presented with acute renal failure however it is now resolved


Patient presented with septic shock was on pressors however now resolved


Sepsis leukocytosis improving


Patient has COVID-19 pneumonia


Hypertension


Cardiomyopathy status post AICD


Previous CVA with right residual weakness


Seizure disorders


Plan


Stable from renal standpoint of view


Stop IV fluids


Start oral potassium, magnesium, phosphorus supplements


We will continue to monitor electrolytes and chemistries


Continue per consultants


Per orders





Subjective


ROS Limited/Unobtainable:  No


Constitutional:  Reports: malaise





Objective


Objective





Last 24 Hour Vital Signs








  Date Time  Temp Pulse Resp B/P (MAP) Pulse Ox O2 Delivery O2 Flow Rate FiO2


 


5/4/20 10:03  92  146/80    


 


5/4/20 08:00 98.4 92 36 146/80 (102) 91   


 


5/4/20 04:00 98.6 95 34 149/88 (108) 91   


 


5/4/20 00:00 98.6 96 33 147/84 (105) 89   


 


5/3/20 21:00  97  140/90    


 


5/3/20 20:00 98.7 91 33 140/90 (107) 91   


 


5/3/20 19:56     92 Venturi Mask 8.0 40


 


5/3/20 17:29 97.7       


 


5/3/20 16:00 100.9 100 36 127/89 (102) 92   


 


5/3/20 12:00 99.1 97 32 120/82 (95) 92   








Laboratory Tests


5/3/20 19:30: 


Urine Color Yellow, Urine Appearance Cloudy, Urine pH 5, Urine Specific Gravity 

1.025, Urine Protein 3+H, Urine Glucose (UA) Negative, Urine Ketones 1+H, Urine 

Blood 5+H, Urine Nitrite Negative, Urine Bilirubin Negative, Urine Urobilinogen 

4H, Urine Leukocyte Esterase 1+H, Urine RBC 15-20H, Urine WBC 2-4, Urine 

Squamous Epithelial Cells Occasional, Urine Bacteria Few


5/4/20 04:00: 


White Blood Count 15.3H, Red Blood Count 3.96L, Hemoglobin 11.3L, Hematocrit 

33.8L, Mean Corpuscular Volume 85, Mean Corpuscular Hemoglobin 28.5, Mean 

Corpuscular Hemoglobin Concent 33.5, Red Cell Distribution Width 11.0L, 

Platelet Count 145L, Mean Platelet Volume 7.5, Neutrophils (%) (Auto) 84.7H, 

Lymphocytes (%) (Auto) 7.2L, Monocytes (%) (Auto) 6.6, Eosinophils (%) (Auto) 

1.2, Basophils (%) (Auto) 0.3, Sodium Level 141, Potassium Level 3.5, Chloride 

Level 103, Carbon Dioxide Level 30, Anion Gap 8, Blood Urea Nitrogen 18, 

Creatinine 1.0, Estimat Glomerular Filtration Rate > 60, Glucose Level 174H, 

Calcium Level 8.5, Ferritin 1468H, Lactate Dehydrogenase 382H, C-Reactive 

Protein, Quantitative 41.6H


5/4/20 07:25: 


Fibrinogen 377, D-Dimer > 35.20H


Height (Feet):  5


Height (Inches):  8.00


Weight (Pounds):  177


General Appearance:  no apparent distress


Cardiovascular:  tachycardia


Abdomen:  soft


Objective


No change











Miguel Ángel Kendall MD May 4, 2020 10:22

## 2020-05-04 NOTE — INFECTIOUS DISEASES PROG NOTE
Assessment/Plan


Assessment/Plan





Assessment:


Septic shock-SP





Fever; improving


Mild leukocytosis, increasing


   -5/3 u/a wbc 15-20, nit neg, leuk +1; ucx p


          Bcx p


   -u/a neg


   -Bcx Neg





Probable PNA- 2ry to COVID19 (from [facility] with confirmed cases)


Acute hypoxic respiratory failure- was on NC, now on VM


   -5/4 CRP 41.6, ferritin 1468


  -5/3 CXR: .  Worsening bilateral interstitial and airspace opacities. 

Probable small left pleural effusion.  Difficult to evaluate the right 

costophrenic angle due to overlying pacemaker.


  -4/30 CXR: Slightly improved bilateral interstitial and airspace infiltrates 

versus edema


  -4/28 CXR: Bilateral mid and lower lung interstitial disease, new since prior 

study.Possible developing left pleural effusion


        Ferritn >2000, CRP 22.3


  -4/24 SARS-COV2 PCR +


  -CXR: Left basilar atelectasis and/or infiltrate


 


MELVI, improving





 HTN


cadiomyopathy s/p AICD


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


seizure disorder


[facility] 








Plan:


-Continue empiric Zosyn #2 and add IV Vancomycin


-will start Solumedrol 40mg IV 12hrs x 5days- (day 10 of illness and worsening; 

around this time is seen the worsening of COVID19 pts mainly driven by 

inflammatory response)


   -4/29 SP Cefepime #6


   -4/27 SP IV Vancomycin #4





-f/u cx


-Monitor CBC/CMP, temperatures


-COVID 19 precautions


-clarify goals of care


-aspiration precautions


-inflamatory markers


-f/u ucx, sp cx, Bcx x2


-f/u ABG am





Thank you for consulting [facility]. Will continue to follow along with 

you.





Discussed with RN,





Subjective


Allergies:  


Coded Allergies:  


     No Known Allergies (Unverified , 12/15/14)


Subjective


Tm 100.9


on venturi mask 40%, 8L


wbc increased





Objective


Vital Signs





Last 24 Hour Vital Signs








  Date Time  Temp Pulse Resp B/P (MAP) Pulse Ox O2 Delivery O2 Flow Rate FiO2


 


5/4/20 10:03  92  146/80    


 


5/4/20 08:00 98.4 92 36 146/80 (102) 91   


 


5/4/20 04:00 98.6 95 34 149/88 (108) 91   


 


5/4/20 00:00 98.6 96 33 147/84 (105) 89   


 


5/3/20 21:00  97  140/90    


 


5/3/20 20:00 98.7 91 33 140/90 (107) 91   


 


5/3/20 19:56     92 Venturi Mask 8.0 40


 


5/3/20 17:29 97.7       


 


5/3/20 16:00 100.9 100 36 127/89 (102) 92   








Height (Feet):  5


Height (Inches):  8.00


Weight (Pounds):  177


Objective


 not examined to limit COVID19 exposure





Laboratory Tests








Test


  5/3/20


19:30 5/4/20


04:00 5/4/20


07:25


 


Urine Color Yellow    


 


Urine Appearance Cloudy    


 


Urine pH 5 (4.5-8.0)    


 


Urine Specific Gravity


  1.025


(1.005-1.035) 


  


 


 


Urine Protein


  3+ (NEGATIVE)


H 


  


 


 


Urine Glucose (UA)


  Negative


(NEGATIVE) 


  


 


 


Urine Ketones


  1+ (NEGATIVE)


H 


  


 


 


Urine Blood


  5+ (NEGATIVE)


H 


  


 


 


Urine Nitrite


  Negative


(NEGATIVE) 


  


 


 


Urine Bilirubin


  Negative


(NEGATIVE) 


  


 


 


Urine Urobilinogen


  4 MG/DL


(0.0-1.0)  H 


  


 


 


Urine Leukocyte Esterase


  1+ (NEGATIVE)


H 


  


 


 


Urine RBC


  15-20 /HPF (0


- 0)  H 


  


 


 


Urine WBC


  2-4 /HPF (0 -


0) 


  


 


 


Urine Squamous Epithelial


Cells Occasional


/LPF 


  


 


 


Urine Bacteria


  Few /HPF


(NONE) 


  


 


 


White Blood Count


  


  15.3 K/UL


(4.8-10.8)  H 


 


 


Red Blood Count


  


  3.96 M/UL


(4.70-6.10)  L 


 


 


Hemoglobin


  


  11.3 G/DL


(14.2-18.0)  L 


 


 


Hematocrit


  


  33.8 %


(42.0-52.0)  L 


 


 


Mean Corpuscular Volume  85 FL (80-99)   


 


Mean Corpuscular Hemoglobin


  


  28.5 PG


(27.0-31.0) 


 


 


Mean Corpuscular Hemoglobin


Concent 


  33.5 G/DL


(32.0-36.0) 


 


 


Red Cell Distribution Width


  


  11.0 %


(11.6-14.8)  L 


 


 


Platelet Count


  


  145 K/UL


(150-450)  L 


 


 


Mean Platelet Volume


  


  7.5 FL


(6.5-10.1) 


 


 


Neutrophils (%) (Auto)


  


  84.7 %


(45.0-75.0)  H 


 


 


Lymphocytes (%) (Auto)


  


  7.2 %


(20.0-45.0)  L 


 


 


Monocytes (%) (Auto)


  


  6.6 %


(1.0-10.0) 


 


 


Eosinophils (%) (Auto)


  


  1.2 %


(0.0-3.0) 


 


 


Basophils (%) (Auto)


  


  0.3 %


(0.0-2.0) 


 


 


Sodium Level


  


  141 MMOL/L


(136-145) 


 


 


Potassium Level


  


  3.5 MMOL/L


(3.5-5.1) 


 


 


Chloride Level


  


  103 MMOL/L


() 


 


 


Carbon Dioxide Level


  


  30 MMOL/L


(21-32) 


 


 


Anion Gap


  


  8 mmol/L


(5-15) 


 


 


Blood Urea Nitrogen


  


  18 mg/dL


(7-18) 


 


 


Creatinine


  


  1.0 MG/DL


(0.55-1.30) 


 


 


Estimat Glomerular Filtration


Rate 


  > 60 mL/min


(>60) 


 


 


Glucose Level


  


  174 MG/DL


()  H 


 


 


Calcium Level


  


  8.5 MG/DL


(8.5-10.1) 


 


 


Ferritin


  


  1468 NG/ML


(8-388)  H 


 


 


Lactate Dehydrogenase


  


  382 U/L


()  H 


 


 


C-Reactive Protein,


Quantitative 


  41.6 mg/dL


(0.00-0.90)  H 


 


 


Fibrinogen


  


  


  377 mg/dL


(200-400)


 


D-Dimer


  


  


  > 35.20 mg/L


FEU











Current Medications








 Medications


  (Trade)  Dose


 Ordered  Sig/Aarti


 Route


 PRN Reason  Start Time


 Stop Time Status Last Admin


Dose Admin


 


 Acetaminophen


  (Tylenol)  650 mg  Q6H  PRN


 ORAL


 Temp >100.5  4/29/20 14:45


 5/26/20 08:44  5/2/20 21:01


 


 


 Acetaminophen


  (Tylenol)  650 mg  Q6H  PRN


 RECTAL


 Temp >100.5  5/3/20 10:15


 6/2/20 10:14  5/3/20 16:59


 


 


 Carvedilol


  (Coreg)  3.125 mg  EVERY 12  HOURS


 ORAL


   4/29/20 21:00


 5/29/20 20:59  5/4/20 10:03


 


 


 Chlorhexidine


 Gluconate


  (Gloria-Hex 2%)  1 applic  DAILY@2000


 TOPIC


   4/29/20 20:00


 7/24/20 19:59  5/3/20 21:10


 


 


 Furosemide


  (Lasix)  40 mg  EVERY 12  HOURS


 ORAL


   5/4/20 21:00


 6/3/20 20:59   


 


 


 Gabapentin


  (Neurontin)  300 mg  Q8HR


 ORAL


   4/29/20 22:00


 5/25/20 00:00  5/3/20 14:00


 


 


 Levetiracetam


  (Keppra)  1,000 mg  Q12HR


 ORAL


   4/29/20 21:00


 5/25/20 00:00  5/4/20 10:03


 


 


 Lorazepam


  (Ativan 2mg/ml


 1ml)  1 mg  Q6H  PRN


 IV


 For Anxiety  4/29/20 14:30


 5/6/20 14:29  5/4/20 10:04


 


 


 Magnesium Oxide


  (Mag-Ox 400mg)  400 mg  THREE TIMES A  DAY


 ORAL


   5/2/20 18:00


 6/1/20 17:59  5/4/20 10:03


 


 


 Memantine


  (Namenda)  5 mg  DAILY


 ORAL


   4/30/20 09:00


 5/25/20 16:59  5/4/20 10:03


 


 


 Phosphorus


  (Phospha 250


 Neutral)  500 mg  THREE TIMES A  DAY


 ORAL


   5/2/20 10:15


 6/1/20 10:14  5/4/20 10:03


 


 


 Piperacillin Sod/


 Tazobactam Sod


 3.375 gm/Sodium


 Chloride  110 ml @ 


 27.5 mls/hr  EVERY 8  HOURS


 IVPB


   5/3/20 14:00


 5/8/20 13:59  5/4/20 06:04


 


 


 Potassium Chloride


  (K-Dur)  40 meq  DAILY


 ORAL


   5/2/20 10:15


 7/31/20 10:14  5/4/20 10:03


 


 


 Sodium Chloride  1,000 ml @ 


 50 mls/hr  Q20H


 IV


   5/3/20 22:30


 6/2/20 22:29  5/3/20 22:39


 

















Naila Lindsay M.D. May 4, 2020 12:36

## 2020-05-04 NOTE — NUR
NURSE NOTES:

Mckay cath leaking noted. Attempted to insert new mckay but pt refused and moved a lot. 
Unable to insert it

## 2020-05-04 NOTE — NUR
NURSE NOTES:

Report received from DIMA Szymanski. AAOx 2, on venturi mask 10L. Pt has SOB noted. Right 
femoral PICC line patent and intact. New inserted Swenson earlier leaking little bit. 
Bilateral soft restraints in place. HOB elevated, isolation maintained. Bed low and locked, 
side rails up x2, alarm on, call light within reach, will continue to monitor.

## 2020-05-04 NOTE — NUR
NURSE NOTES:

O2 Sat 87% noted with venturi mask Fio2 30%. Called RT and adjusted 55%. O2 Sat 93% noted.

## 2020-05-04 NOTE — NUR
NURSE NOTES:

Report received from DIMA Barakat. AxOx 2, on venturi mask @30%. Mild SOB noted. Sats 88-91%. 
Nurse reports episode of fever overnight and refusal of medications and patient care. Right 
femoral PICC line patent and intact infusing IVF as ordered. Swenson catheter noted with leak, 
will reattempt insertion today. HOB elevated, isolation precautions in place. Bed low and 
locked, siderails up x2, alarm on, call light within reach, will continue to monitor.

## 2020-05-04 NOTE — NUR
CASE MANAGEMENT: REVIEW



SI: ****COVID-19 PNEUMONIA***

AC HYPOXIC RESPIRATORY FAILURE. SEPTIC SHOCK. 

98.7   98   36   149/88   89% 8L VENTURI MASK

WBC 15.3      FERR 1468   LAC    CRP 41.6



IS;VANCOMYCIN IV Q12 HRS

LASIX PO Q12 HRS

SOLU MEDROL IV Q12 HRS

ZOSYN IV Q8 HRS

IVF NS @ 50 ML/HR

MAG-OX PO TID

K-DUR PO QD

KEPPRA PO Q12 HRS



*****MED SURG STATUS*****



DCP;FROM [facility]

## 2020-05-04 NOTE — NUR
NURSE NOTES:

Spoke with Dr. Love (psych) notified him that patient has been taking off venturi mask, 
becoming increasingly agitated and resistive to care. Refuses to take medications. Received 
orders to place on bilateral soft wrist restraints. May administer 1 time dose of Haldol 
5mg, Benadryl 50mg, and Ativan 2mg IV PRN for agitation. Orders noted and carried out.

## 2020-05-04 NOTE — GENERAL PROGRESS NOTE
Assessment/Plan


Problem List:  


(1) Suspected COVID-19 virus infection


ICD Codes:  Z20.828 - Contact with and (suspected) exposure to other viral 

communicable diseases


SNOMED:  355815471


(2) Anemia


ICD Codes:  D64.9 - Anemia, unspecified


SNOMED:  607994926


(3) Weak


ICD Codes:  R53.1 - Weakness


SNOMED:  35285693


(4) COPD (chronic obstructive pulmonary disease)


ICD Codes:  J44.9 - Chronic obstructive pulmonary disease, unspecified


SNOMED:  92151671


(5) Diabetes


ICD Codes:  E11.9 - Type 2 diabetes mellitus without complications


SNOMED:  63414672


(6) Epileptic seizure, generalized


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 

not intractable, without status epilepticus


SNOMED:  90265865


(7) Altered mental status


ICD Codes:  R41.82 - Altered mental status, unspecified


SNOMED:  035689845


Qualifiers:  


   Qualified Codes:  R41.82 - Altered mental status, unspecified


(8) Hypotension


ICD Codes:  I95.9 - Hypotension, unspecified


SNOMED:  26677104


Qualifiers:  


   Qualified Codes:  I95.9 - Hypotension, unspecified


(9) UTI (urinary tract infection)


ICD Codes:  N39.0 - Urinary tract infection, site not specified


SNOMED:  97952744


Status:  unchanged


Assessment/Plan:


o2 pulm tx abx pt diet cbc bmp am aru eval





Subjective


Constitutional:  Reports: weakness


Allergies:  


Coded Allergies:  


     No Known Allergies (Unverified , 12/15/14)


All Systems:  reviewed and negative except above


Subjective


sleepy calm in bed





Objective





Last 24 Hour Vital Signs








  Date Time  Temp Pulse Resp B/P (MAP) Pulse Ox O2 Delivery O2 Flow Rate FiO2


 


5/4/20 08:00 98.4 92 36 146/80 (102) 91   


 


5/4/20 04:00 98.6 95 34 149/88 (108) 91   


 


5/4/20 00:00 98.6 96 33 147/84 (105) 89   


 


5/3/20 21:00  97  140/90    


 


5/3/20 20:00 98.7 91 33 140/90 (107) 91   


 


5/3/20 19:56     92 Venturi Mask 8.0 40


 


5/3/20 17:29 97.7       


 


5/3/20 16:00 100.9 100 36 127/89 (102) 92   


 


5/3/20 12:00 99.1 97 32 120/82 (95) 92   








Laboratory Tests


5/3/20 19:30: 


Urine Color Yellow, Urine Appearance Cloudy, Urine pH 5, Urine Specific Gravity 

1.025, Urine Protein 3+H, Urine Glucose (UA) Negative, Urine Ketones 1+H, Urine 

Blood 5+H, Urine Nitrite Negative, Urine Bilirubin Negative, Urine Urobilinogen 

4H, Urine Leukocyte Esterase 1+H, Urine RBC 15-20H, Urine WBC 2-4, Urine 

Squamous Epithelial Cells Occasional, Urine Bacteria Few


5/4/20 04:00: 


White Blood Count 15.3H, Red Blood Count 3.96L, Hemoglobin 11.3L, Hematocrit 

33.8L, Mean Corpuscular Volume 85, Mean Corpuscular Hemoglobin 28.5, Mean 

Corpuscular Hemoglobin Concent 33.5, Red Cell Distribution Width 11.0L, 

Platelet Count 145L, Mean Platelet Volume 7.5, Neutrophils (%) (Auto) 84.7H, 

Lymphocytes (%) (Auto) 7.2L, Monocytes (%) (Auto) 6.6, Eosinophils (%) (Auto) 

1.2, Basophils (%) (Auto) 0.3, Sodium Level 141, Potassium Level 3.5, Chloride 

Level 103, Carbon Dioxide Level 30, Anion Gap 8, Blood Urea Nitrogen 18, 

Creatinine 1.0, Estimat Glomerular Filtration Rate > 60, Glucose Level 174H, 

Calcium Level 8.5, Ferritin 1468H, Lactate Dehydrogenase 382H, C-Reactive 

Protein, Quantitative 41.6H


5/4/20 07:25: 


Fibrinogen 377, D-Dimer > 35.20H


Height (Feet):  5


Height (Inches):  8.00


Weight (Pounds):  177


General Appearance:  lethargic


EENT:  normal ENT inspection


Neck:  normal alignment


Cardiovascular:  normal rate, regular rhythm


Respiratory/Chest:  no respiratory distress, no accessory muscle use


Extremities:  normal inspection


Skin:  normal pigmentation











Arron BarkleyVioleta  May 4, 2020 09:54

## 2020-05-04 NOTE — PULMONOLOGY PROGRESS NOTE
Assessment/Plan


Assessment/Plan


IMPRESSION:


1. Cardiomyopathy.


2. Hypotension.


3. Left lung pneumonia.


4. Nursing home resident.


5. Positive COVID-19.





DISCUSSION:


1. Continue isolation


2. Continue current medications.


3. Off pressors.


4. Continue O2


5. Pulmonary hygiene.


6. Broad-spectrum antibiotics.


7. I will follow.


8. Transferred to med-surg


9. Will diurese today

















  ______________________________________________


  Hayder Hahn M.D.





Subjective


ROS Limited/Unobtainable:  No


Interval Events:  Tachypnic yesterday; afebrile


Constitutional:  Reports: no symptoms


Respiratory:  Reports: no symptoms


Cardiovascular:  Reports: no symptoms


Gastrointestinal/Abdominal:  Reports: no symptoms


Genitourinary:  Reports: no symptoms


Neurologic:  Reports: no symptoms


Allergies:  


Coded Allergies:  


     No Known Allergies (Unverified , 12/15/14)


All Systems:  reviewed and negative except above





Objective





Last 24 Hour Vital Signs








  Date Time  Temp Pulse Resp B/P (MAP) Pulse Ox O2 Delivery O2 Flow Rate FiO2


 


5/4/20 10:03  92  146/80    


 


5/4/20 08:00 98.4 92 36 146/80 (102) 91   


 


5/4/20 04:00 98.6 95 34 149/88 (108) 91   


 


5/4/20 00:00 98.6 96 33 147/84 (105) 89   


 


5/3/20 21:00  97  140/90    


 


5/3/20 20:00 98.7 91 33 140/90 (107) 91   


 


5/3/20 19:56     92 Venturi Mask 8.0 40


 


5/3/20 17:29 97.7       


 


5/3/20 16:00 100.9 100 36 127/89 (102) 92   








General Appearance:  no acute distress


HEENT:  normocephalic


Respiratory/Chest:  chest wall non-tender, lungs clear


Cardiovascular:  normal peripheral pulses


Abdomen:  normal bowel sounds


Laboratory Tests


5/3/20 19:30: 


Urine Color Yellow, Urine Appearance Cloudy, Urine pH 5, Urine Specific Gravity 

1.025, Urine Protein 3+H, Urine Glucose (UA) Negative, Urine Ketones 1+H, Urine 

Blood 5+H, Urine Nitrite Negative, Urine Bilirubin Negative, Urine Urobilinogen 

4H, Urine Leukocyte Esterase 1+H, Urine RBC 15-20H, Urine WBC 2-4, Urine 

Squamous Epithelial Cells Occasional, Urine Bacteria Few


5/4/20 04:00: 


White Blood Count 15.3H, Red Blood Count 3.96L, Hemoglobin 11.3L, Hematocrit 

33.8L, Mean Corpuscular Volume 85, Mean Corpuscular Hemoglobin 28.5, Mean 

Corpuscular Hemoglobin Concent 33.5, Red Cell Distribution Width 11.0L, 

Platelet Count 145L, Mean Platelet Volume 7.5, Neutrophils (%) (Auto) 84.7H, 

Lymphocytes (%) (Auto) 7.2L, Monocytes (%) (Auto) 6.6, Eosinophils (%) (Auto) 

1.2, Basophils (%) (Auto) 0.3, Sodium Level 141, Potassium Level 3.5, Chloride 

Level 103, Carbon Dioxide Level 30, Anion Gap 8, Blood Urea Nitrogen 18, 

Creatinine 1.0, Estimat Glomerular Filtration Rate > 60, Glucose Level 174H, 

Calcium Level 8.5, Ferritin 1468H, Lactate Dehydrogenase 382H, C-Reactive 

Protein, Quantitative 41.6H


5/4/20 07:25: 


Fibrinogen 377, D-Dimer > 35.20H





Current Medications








 Medications


  (Trade)  Dose


 Ordered  Sig/Aarti


 Route


 PRN Reason  Start Time


 Stop Time Status Last Admin


Dose Admin


 


 Acetaminophen


  (Tylenol)  650 mg  Q6H  PRN


 ORAL


 Temp >100.5  4/29/20 14:45


 5/26/20 08:44  5/2/20 21:01


 


 


 Acetaminophen


  (Tylenol)  650 mg  Q6H  PRN


 RECTAL


 Temp >100.5  5/3/20 10:15


 6/2/20 10:14  5/3/20 16:59


 


 


 Carvedilol


  (Coreg)  3.125 mg  EVERY 12  HOURS


 ORAL


   4/29/20 21:00


 5/29/20 20:59  5/4/20 10:03


 


 


 Chlorhexidine


 Gluconate


  (Gloria-Hex 2%)  1 applic  DAILY@2000


 TOPIC


   4/29/20 20:00


 7/24/20 19:59  5/3/20 21:10


 


 


 Gabapentin


  (Neurontin)  300 mg  Q8HR


 ORAL


   4/29/20 22:00


 5/25/20 00:00  5/3/20 14:00


 


 


 Levetiracetam


  (Keppra)  1,000 mg  Q12HR


 ORAL


   4/29/20 21:00


 5/25/20 00:00  5/4/20 10:03


 


 


 Lorazepam


  (Ativan 2mg/ml


 1ml)  1 mg  Q6H  PRN


 IV


 For Anxiety  4/29/20 14:30


 5/6/20 14:29  5/4/20 10:04


 


 


 Magnesium Oxide


  (Mag-Ox 400mg)  400 mg  THREE TIMES A  DAY


 ORAL


   5/2/20 18:00


 6/1/20 17:59  5/4/20 10:03


 


 


 Memantine


  (Namenda)  5 mg  DAILY


 ORAL


   4/30/20 09:00


 5/25/20 16:59  5/4/20 10:03


 


 


 Phosphorus


  (Phospha 250


 Neutral)  500 mg  THREE TIMES A  DAY


 ORAL


   5/2/20 10:15


 6/1/20 10:14  5/4/20 10:03


 


 


 Piperacillin Sod/


 Tazobactam Sod


 3.375 gm/Sodium


 Chloride  110 ml @ 


 27.5 mls/hr  EVERY 8  HOURS


 IVPB


   5/3/20 14:00


 5/8/20 13:59  5/4/20 06:04


 


 


 Potassium Chloride


  (K-Dur)  40 meq  DAILY


 ORAL


   5/2/20 10:15


 7/31/20 10:14  5/4/20 10:03


 


 


 Sodium Chloride  1,000 ml @ 


 50 mls/hr  Q20H


 IV


   5/3/20 22:30


 6/2/20 22:29  5/3/20 22:39


 

















Hayder Hahn MD May 4, 2020 12:05

## 2020-05-04 NOTE — NUR
*-*DISCHARGE PLANNING*-*



PATIENT HAS BEEN REFERRED TO:



PILI MAGDALENO

 P: [phone]

 F: [phone]

-------------------------------------------------------------------------------

Addendum: 05/04/20 at 1136 by ROSIO WOODALL LVN LVN

-------------------------------------------------------------------------------

PER YAMEL AT [facility], PATIENT DENIED ACCEPTANCE AT THIS TIME



DR MATOS INFORMED

## 2020-05-05 VITALS — DIASTOLIC BLOOD PRESSURE: 87 MMHG | SYSTOLIC BLOOD PRESSURE: 135 MMHG

## 2020-05-05 VITALS — SYSTOLIC BLOOD PRESSURE: 138 MMHG | DIASTOLIC BLOOD PRESSURE: 94 MMHG

## 2020-05-05 VITALS — SYSTOLIC BLOOD PRESSURE: 151 MMHG | DIASTOLIC BLOOD PRESSURE: 90 MMHG

## 2020-05-05 VITALS — SYSTOLIC BLOOD PRESSURE: 135 MMHG | DIASTOLIC BLOOD PRESSURE: 98 MMHG

## 2020-05-05 VITALS — DIASTOLIC BLOOD PRESSURE: 94 MMHG | SYSTOLIC BLOOD PRESSURE: 144 MMHG

## 2020-05-05 LAB
ADD MANUAL DIFF: NO
ANION GAP SERPL CALC-SCNC: 9 MMOL/L (ref 5–15)
BUN SERPL-MCNC: 19 MG/DL (ref 7–18)
CALCIUM SERPL-MCNC: 8.3 MG/DL (ref 8.5–10.1)
CHLORIDE SERPL-SCNC: 108 MMOL/L (ref 98–107)
CO2 SERPL-SCNC: 29 MMOL/L (ref 21–32)
CREAT SERPL-MCNC: 0.9 MG/DL (ref 0.55–1.3)
ERYTHROCYTE [DISTWIDTH] IN BLOOD BY AUTOMATED COUNT: 11.3 % (ref 11.6–14.8)
HCT VFR BLD CALC: 32.2 % (ref 42–52)
HGB BLD-MCNC: 10.9 G/DL (ref 14.2–18)
MCV RBC AUTO: 86 FL (ref 80–99)
PLATELET # BLD: 126 K/UL (ref 150–450)
POTASSIUM SERPL-SCNC: 3.9 MMOL/L (ref 3.5–5.1)
RBC # BLD AUTO: 3.76 M/UL (ref 4.7–6.1)
SODIUM SERPL-SCNC: 146 MMOL/L (ref 136–145)
WBC # BLD AUTO: 11.9 K/UL (ref 4.8–10.8)

## 2020-05-05 RX ADMIN — METHYLPREDNISOLONE SODIUM SUCCINATE SCH MLS/HR: 40 INJECTION, POWDER, LYOPHILIZED, FOR SOLUTION INTRAMUSCULAR; INTRAVENOUS at 21:18

## 2020-05-05 RX ADMIN — DIBASIC SODIUM PHOSPHATE, MONOBASIC POTASSIUM PHOSPHATE AND MONOBASIC SODIUM PHOSPHATE SCH MG: 852; 155; 130 TABLET ORAL at 08:50

## 2020-05-05 RX ADMIN — FUROSEMIDE SCH MG: 40 TABLET ORAL at 21:17

## 2020-05-05 RX ADMIN — CHLORHEXIDINE GLUCONATE SCH APPLIC: 213 SOLUTION TOPICAL at 19:49

## 2020-05-05 RX ADMIN — MAGNESIUM OXIDE TAB 400 MG (241.3 MG ELEMENTAL MG) SCH MG: 400 (241.3 MG) TAB at 08:50

## 2020-05-05 RX ADMIN — SODIUM CHLORIDE SCH MLS/HR: 9 INJECTION, SOLUTION INTRAVENOUS at 13:33

## 2020-05-05 RX ADMIN — DEXTROSE MONOHYDRATE SCH MLS/HR: 50 INJECTION, SOLUTION INTRAVENOUS at 06:20

## 2020-05-05 RX ADMIN — METHYLPREDNISOLONE SODIUM SUCCINATE SCH MLS/HR: 40 INJECTION, POWDER, LYOPHILIZED, FOR SOLUTION INTRAMUSCULAR; INTRAVENOUS at 08:49

## 2020-05-05 RX ADMIN — DEXTROSE MONOHYDRATE SCH MLS/HR: 50 INJECTION, SOLUTION INTRAVENOUS at 21:18

## 2020-05-05 RX ADMIN — DIBASIC SODIUM PHOSPHATE, MONOBASIC POTASSIUM PHOSPHATE AND MONOBASIC SODIUM PHOSPHATE SCH MG: 852; 155; 130 TABLET ORAL at 13:18

## 2020-05-05 RX ADMIN — DEXTROSE MONOHYDRATE SCH MLS/HR: 50 INJECTION, SOLUTION INTRAVENOUS at 13:33

## 2020-05-05 RX ADMIN — MAGNESIUM OXIDE TAB 400 MG (241.3 MG ELEMENTAL MG) SCH MG: 400 (241.3 MG) TAB at 13:18

## 2020-05-05 RX ADMIN — FUROSEMIDE SCH MG: 40 TABLET ORAL at 08:50

## 2020-05-05 RX ADMIN — DIBASIC SODIUM PHOSPHATE, MONOBASIC POTASSIUM PHOSPHATE AND MONOBASIC SODIUM PHOSPHATE SCH MG: 852; 155; 130 TABLET ORAL at 18:06

## 2020-05-05 RX ADMIN — MAGNESIUM OXIDE TAB 400 MG (241.3 MG ELEMENTAL MG) SCH MG: 400 (241.3 MG) TAB at 18:06

## 2020-05-05 NOTE — NUR
RD ASSESSMENT & RECOMMENDATIONS

SEE CARE ACTIVITY FOR COMPLETE ASSESSMENT



DAILY ESTIMATED NEEDS:

Needs based on Cardiac 74kg abw 

25-30  kcals/kg 

4057-9994  total kcals

1-1.2  g protein/kg

74-89  g total protein 

25-30  mL/kg

9050-4686  total fluid mLs



NUTRITION DIAGNOSIS:

Altered nutrition related lab values r/t clinical status as evidenced by

elev BG (174-264), febrile (tmax 101.5, now afebrile)



CURRENT DIET: CLD    



PO DIET RECOMMENDATIONS:

Advance as able to Low Na / texture per SLP  





ADDITIONAL RECOMMENDATIONS:

1) Check A1C -> pt w/elev BG (146-175)  

2) Add ensure Clear w/ CLD-> advance diet as able  

3) Monitor ICU status, respiratory status-> now med surg 

4) Obtain a calibrated bed scale wt 

5) Rec SLP eval prior to advancing diet  

    -> pt now CLD x10 days 

    -> Consult RD if non oral feeds are part of POC

## 2020-05-05 NOTE — NUR
NURSE NOTES:

pt refuses PO medications, explains the benefits of medications, pt remains non-compliant.

## 2020-05-05 NOTE — GENERAL PROGRESS NOTE
Assessment/Plan


Problem List:  


(1) Suspected COVID-19 virus infection


ICD Codes:  Z20.828 - Contact with and (suspected) exposure to other viral 

communicable diseases


SNOMED:  960988309


(2) Anemia


ICD Codes:  D64.9 - Anemia, unspecified


SNOMED:  999079392


(3) Weak


ICD Codes:  R53.1 - Weakness


SNOMED:  66759217


(4) COPD (chronic obstructive pulmonary disease)


ICD Codes:  J44.9 - Chronic obstructive pulmonary disease, unspecified


SNOMED:  18453346


(5) Diabetes


ICD Codes:  E11.9 - Type 2 diabetes mellitus without complications


SNOMED:  48064941


(6) Epileptic seizure, generalized


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 

not intractable, without status epilepticus


SNOMED:  38170978


(7) Altered mental status


ICD Codes:  R41.82 - Altered mental status, unspecified


SNOMED:  568280705


Qualifiers:  


   Qualified Codes:  R41.82 - Altered mental status, unspecified


(8) Hypotension


ICD Codes:  I95.9 - Hypotension, unspecified


SNOMED:  86775320


Qualifiers:  


   Qualified Codes:  I95.9 - Hypotension, unspecified


(9) UTI (urinary tract infection)


ICD Codes:  N39.0 - Urinary tract infection, site not specified


SNOMED:  93870125


Status:  unchanged


Assessment/Plan:


o2 pulm tx abx pt diet cbc bmp am aru eval





Subjective


Constitutional:  Reports: weakness


Allergies:  


Coded Allergies:  


     No Known Allergies (Unverified , 12/15/14)


All Systems:  reviewed and negative except above


Subjective


sleepy calm in bed





Objective





Last 24 Hour Vital Signs








  Date Time  Temp Pulse Resp B/P (MAP) Pulse Ox O2 Delivery O2 Flow Rate FiO2


 


5/5/20 12:00 97.5 80 20 135/98 (110) 92   


 


5/5/20 04:00 96.8 82 40 151/90 (110) 89   


 


5/5/20 00:00 96.6 87 40 144/94 (111) 90   


 


5/4/20 22:10     93 Venturi Mask 14.0 55


 


5/4/20 21:00      Venturi Mask 14.0 


 


5/4/20 20:46  95  144/95    


 


5/4/20 20:00 98.1 95 38 144/95 (111) 87   


 


5/4/20 16:00 98.6 88 32 134/74 (94) 94   


 


5/4/20 14:15     92 Venturi Mask 8.0 40

















Intake and Output  


 


 5/4/20 5/5/20





 19:00 07:00


 


Intake Total 1096.0 ml 


 


Balance 1096.0 ml 


 


  


 


IV Total 1096.0 ml 








Laboratory Tests


5/4/20 16:31: 


Arterial Blood pH 7.486H, Arterial Blood Partial Pressure CO2 36.4, Arterial 

Blood Partial Pressure O2 53.5L, Arterial Blood HCO3 26.9H, Arterial Blood 

Oxygen Saturation 86.3*L, Arterial Blood Base Excess 3.5H, Wong Test Positive


5/5/20 03:00: 


White Blood Count 11.9H, Red Blood Count 3.76L, Hemoglobin 10.9L, Hematocrit 

32.2L, Mean Corpuscular Volume 86, Mean Corpuscular Hemoglobin 28.9, Mean 

Corpuscular Hemoglobin Concent 33.8, Red Cell Distribution Width 11.3L, 

Platelet Count 126L, Mean Platelet Volume 8.6, Neutrophils (%) (Auto) , 

Lymphocytes (%) (Auto) , Monocytes (%) (Auto) , Eosinophils (%) (Auto) , 

Basophils (%) (Auto) , Sodium Level 146H, Potassium Level 3.9, Chloride Level 

108H, Carbon Dioxide Level 29, Anion Gap 9, Blood Urea Nitrogen 19H, Creatinine 

0.9, Estimat Glomerular Filtration Rate > 60, Glucose Level 264H, Calcium Level 

8.3L


Height (Feet):  5


Height (Inches):  8.00


Weight (Pounds):  176


General Appearance:  lethargic


EENT:  normal ENT inspection


Neck:  normal alignment


Cardiovascular:  normal rate, regular rhythm


Respiratory/Chest:  no respiratory distress, no accessory muscle use


Extremities:  normal inspection


Skin:  normal pigmentation











Arron Barkleyg  May 5, 2020 12:41

## 2020-05-05 NOTE — NUR
NURSE NOTES:

pt is in the bed asleep. respiration is even and non-labored with O2 via venturi mask. HOB 
elevated. no facial grimacing for pain and discomfort noted. no acute distress noted. place 
call light within reach, will continue to monitor pt.

## 2020-05-05 NOTE — PROGRESS NOTE
DATE:  05/05/2020

SUBJECTIVE:  This is a 69-year-old male with altered mental status,

hypertension, and sepsis _______ below his baseline.  That is why, his

attending has requested daily psychiatric consultation at this time.  This

patient is a male patient who has altered mental status, confusion,

disorganized thought process and he is in the hospital secondary to

pyelonephritis.  He also has sepsis, COPD, hypertension, rule out COVID-19

infection.



MENTAL STATUS EXAMINATION:  This is a 69-year old male.  Appearance is

disheveled.  Attitude, irritable and agitated.  Affect, guarded and

restricted.  Intellect poor.  Motor activity, psychomotor agitation.

Insight and judgment is poor.



DIAGNOSIS:  Major depressive disorder, mild, recurrent with psychotic

features, rule out dementia with psychosis.



PLAN:  Treat him with Namenda 5 mg daily and Ativan 1 mg IV every 6 hours

p.r.n. anxiety and agitation, Neurontin 300 mg q.8h.  Twenty minutes of

insight-oriented psychotherapy to help him identify his psychiatric

illness and medical conditions to help him have better impulse control and

behavior on the unit.  Twenty minutes of insight-oriented psychotherapy to

help him with the impulse control and behavioral management.  Chart

reviewed.  Discussed with staff.  Seen and assessed in his room.









  ______________________________________________

  Demarcus Love M.D.





DR:  OLGA

D:  05/05/2020 11:07

T:  05/05/2020 17:12

JOB#:  [id number]

CC:

## 2020-05-05 NOTE — NEPHROLOGY PROGRESS NOTE
Assessment/Plan


Problem List:  


(1) Electrolyte imbalance


(2) Suspected COVID-19 virus infection


(3) Hypotension


(4) Sepsis


Assessment





Patient's current problem from renal standpoint of view is hypokalemia and 

other electrolyte imbalances





His other conditions:


Patient presented with acute renal failure however it is now resolved


Patient presented with septic shock was on pressors however now resolved


Sepsis leukocytosis improving


Patient has COVID-19 pneumonia


Hypertension


Cardiomyopathy status post AICD


Previous CVA with right residual weakness


Seizure disorders


Plan





Stable from renal standpoint of view





Stop IV fluids


Start oral potassium, magnesium, phosphorus supplements


We will continue to monitor electrolytes and chemistries


Continue per consultants


Per orders





Subjective


ROS Limited/Unobtainable:  No





Objective


Objective





Last 24 Hour Vital Signs








  Date Time  Temp Pulse Resp B/P (MAP) Pulse Ox O2 Delivery O2 Flow Rate FiO2


 


5/5/20 12:00 97.5 80 20 135/98 (110) 92   


 


5/5/20 04:00 96.8 82 40 151/90 (110) 89   


 


5/5/20 00:00 96.6 87 40 144/94 (111) 90   


 


5/4/20 22:10     93 Venturi Mask 14.0 55


 


5/4/20 21:00      Venturi Mask 14.0 


 


5/4/20 20:46  95  144/95    


 


5/4/20 20:00 98.1 95 38 144/95 (111) 87   


 


5/4/20 16:00 98.6 88 32 134/74 (94) 94   


 


5/4/20 14:15     92 Venturi Mask 8.0 40

















Intake and Output  


 


 5/4/20 5/5/20





 19:00 07:00


 


Intake Total 1096.0 ml 


 


Balance 1096.0 ml 


 


  


 


IV Total 1096.0 ml 








Laboratory Tests


5/4/20 16:31: 


Arterial Blood pH 7.486H, Arterial Blood Partial Pressure CO2 36.4, Arterial 

Blood Partial Pressure O2 53.5L, Arterial Blood HCO3 26.9H, Arterial Blood 

Oxygen Saturation 86.3*L, Arterial Blood Base Excess 3.5H, Wong Test Positive


5/5/20 03:00: 


White Blood Count 11.9H, Red Blood Count 3.76L, Hemoglobin 10.9L, Hematocrit 

32.2L, Mean Corpuscular Volume 86, Mean Corpuscular Hemoglobin 28.9, Mean 

Corpuscular Hemoglobin Concent 33.8, Red Cell Distribution Width 11.3L, 

Platelet Count 126L, Mean Platelet Volume 8.6, Neutrophils (%) (Auto) , 

Lymphocytes (%) (Auto) , Monocytes (%) (Auto) , Eosinophils (%) (Auto) , 

Basophils (%) (Auto) , Sodium Level 146H, Potassium Level 3.9, Chloride Level 

108H, Carbon Dioxide Level 29, Anion Gap 9, Blood Urea Nitrogen 19H, Creatinine 

0.9, Estimat Glomerular Filtration Rate > 60, Glucose Level 264H, Calcium Level 

8.3L


Height (Feet):  5


Height (Inches):  8.00


Weight (Pounds):  176


General Appearance:  no apparent distress


Cardiovascular:  tachycardia


Respiratory/Chest:  decreased breath sounds


Abdomen:  distended


Objective


No change











Miguel Ángel Kendall MD May 5, 2020 13:40

## 2020-05-05 NOTE — PULMONOLOGY PROGRESS NOTE
Assessment/Plan


Assessment/Plan


IMPRESSION:


1. Cardiomyopathy.


2. Hypotension.


3. Left lung pneumonia.


4. Nursing home resident.


5. Positive COVID-19.





DISCUSSION:


1. Continue isolation


2. Continue current medications.


3. Off pressors.


4. Continue O2


5. Pulmonary hygiene.


6. Broad-spectrum antibiotics.


7. I will follow.


8. Transferred to med-surg


9. Will diurese today

















  ______________________________________________


  Hayder Hahn M.D.





Subjective


ROS Limited/Unobtainable:  No


Interval Events:  Tachypnic yesterday; afebrile


Constitutional:  Reports: no symptoms


Respiratory:  Reports: no symptoms


Cardiovascular:  Reports: no symptoms


Gastrointestinal/Abdominal:  Reports: no symptoms


Genitourinary:  Reports: no symptoms


Neurologic:  Reports: no symptoms


Allergies:  


Coded Allergies:  


     No Known Allergies (Unverified , 12/15/14)


All Systems:  reviewed and negative except above





Objective





Last 24 Hour Vital Signs








  Date Time  Temp Pulse Resp B/P (MAP) Pulse Ox O2 Delivery O2 Flow Rate FiO2


 


5/5/20 16:00 97.9 86 20 138/94 (109) 92   


 


5/5/20 12:00 97.5 80 20 135/98 (110) 92   


 


5/5/20 04:00 96.8 82 40 151/90 (110) 89   


 


5/5/20 00:00 96.6 87 40 144/94 (111) 90   


 


5/4/20 22:10     93 Venturi Mask 14.0 55


 


5/4/20 21:00      Venturi Mask 14.0 


 


5/4/20 20:46  95  144/95    


 


5/4/20 20:00 98.1 95 38 144/95 (111) 87   

















Intake and Output  


 


 5/4/20 5/5/20





 19:00 07:00


 


Intake Total 1096.0 ml 


 


Balance 1096.0 ml 


 


  


 


IV Total 1096.0 ml 








General Appearance:  no acute distress


HEENT:  normocephalic


Respiratory/Chest:  chest wall non-tender, lungs clear


Cardiovascular:  normal peripheral pulses


Abdomen:  normal bowel sounds





Microbiology








 Date/Time


Source Procedure


Growth Status


 


 


 5/3/20 14:45


Blood Blood Culture - Preliminary


NO GROWTH AFTER 24 HOURS Resulted


 


 5/3/20 14:45


Blood Blood Culture - Preliminary


NO GROWTH AFTER 24 HOURS Resulted








Laboratory Tests


5/5/20 03:00: 


White Blood Count 11.9H, Red Blood Count 3.76L, Hemoglobin 10.9L, Hematocrit 

32.2L, Mean Corpuscular Volume 86, Mean Corpuscular Hemoglobin 28.9, Mean 

Corpuscular Hemoglobin Concent 33.8, Red Cell Distribution Width 11.3L, 

Platelet Count 126L, Mean Platelet Volume 8.6, Neutrophils (%) (Auto) , 

Lymphocytes (%) (Auto) , Monocytes (%) (Auto) , Eosinophils (%) (Auto) , 

Basophils (%) (Auto) , Sodium Level 146H, Potassium Level 3.9, Chloride Level 

108H, Carbon Dioxide Level 29, Anion Gap 9, Blood Urea Nitrogen 19H, Creatinine 

0.9, Estimat Glomerular Filtration Rate > 60, Glucose Level 264H, Calcium Level 

8.3L





Current Medications








 Medications


  (Trade)  Dose


 Ordered  Sig/Aarti


 Route


 PRN Reason  Start Time


 Stop Time Status Last Admin


Dose Admin


 


 Acetaminophen


  (Tylenol)  650 mg  Q6H  PRN


 ORAL


 Temp >100.5  4/29/20 14:45


 5/26/20 08:44  5/2/20 21:01


 


 


 Acetaminophen


  (Tylenol)  650 mg  Q6H  PRN


 RECTAL


 Temp >100.5  5/3/20 10:15


 6/2/20 10:14  5/3/20 16:59


 


 


 Carvedilol


  (Coreg)  3.125 mg  EVERY 12  HOURS


 ORAL


   4/29/20 21:00


 5/29/20 20:59  5/4/20 20:46


 


 


 Chlorhexidine


 Gluconate


  (Glroia-Hex 2%)  1 applic  DAILY@2000


 TOPIC


   4/29/20 20:00


 7/24/20 19:59  5/4/20 20:45


 


 


 Furosemide


  (Lasix)  40 mg  EVERY 12  HOURS


 ORAL


   5/4/20 21:00


 6/3/20 20:59  5/4/20 20:51


 


 


 Gabapentin


  (Neurontin)  300 mg  Q8HR


 ORAL


   4/29/20 22:00


 5/25/20 00:00  5/5/20 13:18


 


 


 Levetiracetam


  (Keppra)  1,000 mg  Q12HR


 ORAL


   4/29/20 21:00


 5/25/20 00:00  5/4/20 20:45


 


 


 Lorazepam


  (Ativan 2mg/ml


 1ml)  1 mg  Q6H  PRN


 IV


 For Anxiety  4/29/20 14:30


 5/6/20 14:29  5/4/20 10:04


 


 


 Magnesium Oxide


  (Mag-Ox 400mg)  400 mg  THREE TIMES A  DAY


 ORAL


   5/2/20 18:00


 6/1/20 17:59  5/5/20 13:18


 


 


 Memantine


  (Namenda)  5 mg  DAILY


 ORAL


   4/30/20 09:00


 5/25/20 16:59  5/4/20 10:03


 


 


 Methylprednisolone


 Sodium Succinate


 40 mg/Sodium


 Chloride  111 ml @ 


 111 mls/hr  EVERY 12  HOURS


 IVPB


   5/4/20 14:00


 5/9/20 13:59  5/4/20 20:45


 


 


 Phosphorus


  (Phospha 250


 Neutral)  500 mg  THREE TIMES A  DAY


 ORAL


   5/2/20 10:15


 6/1/20 10:14  5/5/20 13:18


 


 


 Piperacillin Sod/


 Tazobactam Sod


 3.375 gm/Sodium


 Chloride  110 ml @ 


 27.5 mls/hr  EVERY 8  HOURS


 IVPB


   5/3/20 14:00


 5/8/20 13:59  5/5/20 13:33


 


 


 Potassium Chloride


  (K-Dur)  40 meq  DAILY


 ORAL


   5/2/20 10:15


 7/31/20 10:14  5/4/20 10:03


 


 


 Sodium Chloride  1,000 ml @ 


 50 mls/hr  Q20H


 IV


   5/3/20 22:30


 6/2/20 22:29  5/3/20 22:39


 


 


 Vancomycin HCl


  (Vanco rx to


 dose)  1 ea  DAILY  PRN


 MISC


 Per rx protocol  5/4/20 12:30


 6/3/20 12:29   


 


 


 Vancomycin HCl


 750 mg/Sodium


 Chloride  275 ml @ 


 183.333


 mls/hr  Q12H


 IVPB


   5/5/20 02:00


 5/10/20 01:59  5/5/20 13:33


 

















Hayder Hahn MD May 5, 2020 17:01

## 2020-05-05 NOTE — INFECTIOUS DISEASES PROG NOTE
Assessment/Plan


Assessment/Plan





Assessment:


Septic shock-SP





Fever; improving


Mild leukocytosis, improving


   -5/3 u/a wbc 15-20, nit neg, leuk +1; ucx p


          Bcx NTD


   -u/a neg


   -Bcx Neg





Probable PNA- 2ry to COVID19 (from [facility] with confirmed cases)


Acute hypoxic respiratory failure- was on NC, now on VM- increasing FIo2 

requiremetns


    -5/5 CXR:  Bilateral interstitial and airspace infiltrates are again 

demonstrated.Appearance is overall unchanged. There may be a small left pleural 

effusion,unchange


   -5/4 CRP 41.6, ferritin 1468


  -5/3 CXR: .  Worsening bilateral interstitial and airspace opacities. 

Probable small left pleural effusion.  Difficult to evaluate the right 

costophrenic angle due to overlying pacemaker.


  -4/30 CXR: Slightly improved bilateral interstitial and airspace infiltrates 

versus edema


  -4/28 CXR: Bilateral mid and lower lung interstitial disease, new since prior 

study.Possible developing left pleural effusion


        Ferritn >2000, CRP 22.3


  -4/24 SARS-COV2 PCR +


  -CXR: Left basilar atelectasis and/or infiltrate


 


MELVI, improving





 HTN


cadiomyopathy s/p AICD


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


seizure disorder


[facility] 








Plan:


-Continue empiric Zosyn #3 and add IV Vancomycin #2


-Continue Solumedrol 40mg IV 12hrs x 5days (5/4-) - (day 10 of illness and 

worsening; around this time is seen the worsening of COVID19 pts mainly driven 

by inflammatory response)


   -4/29 SP Cefepime #6


   -4/27 SP IV Vancomycin #4





-f/u cx


-Monitor CBC/CMP, temperatures


-COVID 19 precautions


-clarify goals of care


-aspiration precautions


-inflamatory markers


-f/u cx





Thank you for consulting [facility]. Will continue to follow along with 

you.





Discussed with RN,





Subjective


Allergies:  


Coded Allergies:  


     No Known Allergies (Unverified , 12/15/14)


Subjective


afebrile >24hrs


on venturi mask 55%, 14L


wbc improving





Objective


Vital Signs





Last 24 Hour Vital Signs








  Date Time  Temp Pulse Resp B/P (MAP) Pulse Ox O2 Delivery O2 Flow Rate FiO2


 


5/5/20 12:00 97.5 80 20 135/98 (110) 92   


 


5/5/20 04:00 96.8 82 40 151/90 (110) 89   


 


5/5/20 00:00 96.6 87 40 144/94 (111) 90   


 


5/4/20 22:10     93 Venturi Mask 14.0 55


 


5/4/20 21:00      Venturi Mask 14.0 


 


5/4/20 20:46  95  144/95    


 


5/4/20 20:00 98.1 95 38 144/95 (111) 87   


 


5/4/20 16:00 98.6 88 32 134/74 (94) 94   








Height (Feet):  5


Height (Inches):  8.00


Weight (Pounds):  176


Objective


 not examined to limit COVID19 exposure





Microbiology








 Date/Time


Source Procedure


Growth Status


 


 


 5/3/20 14:45


Blood Blood Culture - Preliminary


NO GROWTH AFTER 24 HOURS Resulted


 


 5/3/20 14:45


Blood Blood Culture - Preliminary


NO GROWTH AFTER 24 HOURS Resulted











Laboratory Tests








Test


  5/4/20


16:31 5/5/20


03:00


 


Arterial Blood pH


  7.486


(7.350-7.450) 


 


 


Arterial Blood Partial


Pressure CO2 36.4 mmHg


(35.0-45.0) 


 


 


Arterial Blood Partial


Pressure O2 53.5 mmHg


(75.0-100.0)  L 


 


 


Arterial Blood HCO3


  26.9 mmol/L


(22.0-26.0)  H 


 


 


Arterial Blood Oxygen


Saturation 86.3 %


()  *L 


 


 


Arterial Blood Base Excess 3.5 (-2-2)  H 


 


Wong Test Positive   


 


White Blood Count


  


  11.9 K/UL


(4.8-10.8)  H


 


Red Blood Count


  


  3.76 M/UL


(4.70-6.10)  L


 


Hemoglobin


  


  10.9 G/DL


(14.2-18.0)  L


 


Hematocrit


  


  32.2 %


(42.0-52.0)  L


 


Mean Corpuscular Volume  86 FL (80-99)  


 


Mean Corpuscular Hemoglobin


  


  28.9 PG


(27.0-31.0)


 


Mean Corpuscular Hemoglobin


Concent 


  33.8 G/DL


(32.0-36.0)


 


Red Cell Distribution Width


  


  11.3 %


(11.6-14.8)  L


 


Platelet Count


  


  126 K/UL


(150-450)  L


 


Mean Platelet Volume


  


  8.6 FL


(6.5-10.1)


 


Neutrophils (%) (Auto)


  


  % (45.0-75.0)


 


 


Lymphocytes (%) (Auto)


  


  % (20.0-45.0)


 


 


Monocytes (%) (Auto)   % (1.0-10.0)  


 


Eosinophils (%) (Auto)   % (0.0-3.0)  


 


Basophils (%) (Auto)   % (0.0-2.0)  


 


Sodium Level


  


  146 MMOL/L


(136-145)  H


 


Potassium Level


  


  3.9 MMOL/L


(3.5-5.1)


 


Chloride Level


  


  108 MMOL/L


()  H


 


Carbon Dioxide Level


  


  29 MMOL/L


(21-32)


 


Anion Gap


  


  9 mmol/L


(5-15)


 


Blood Urea Nitrogen


  


  19 mg/dL


(7-18)  H


 


Creatinine


  


  0.9 MG/DL


(0.55-1.30)


 


Estimat Glomerular Filtration


Rate 


  > 60 mL/min


(>60)


 


Glucose Level


  


  264 MG/DL


()  H


 


Calcium Level


  


  8.3 MG/DL


(8.5-10.1)  L











Current Medications








 Medications


  (Trade)  Dose


 Ordered  Sig/Aarti


 Route


 PRN Reason  Start Time


 Stop Time Status Last Admin


Dose Admin


 


 Acetaminophen


  (Tylenol)  650 mg  Q6H  PRN


 ORAL


 Temp >100.5  4/29/20 14:45


 5/26/20 08:44  5/2/20 21:01


 


 


 Acetaminophen


  (Tylenol)  650 mg  Q6H  PRN


 RECTAL


 Temp >100.5  5/3/20 10:15


 6/2/20 10:14  5/3/20 16:59


 


 


 Carvedilol


  (Coreg)  3.125 mg  EVERY 12  HOURS


 ORAL


   4/29/20 21:00


 5/29/20 20:59  5/4/20 20:46


 


 


 Chlorhexidine


 Gluconate


  (Gloria-Hex 2%)  1 applic  DAILY@2000


 TOPIC


   4/29/20 20:00


 7/24/20 19:59  5/4/20 20:45


 


 


 Furosemide


  (Lasix)  40 mg  EVERY 12  HOURS


 ORAL


   5/4/20 21:00


 6/3/20 20:59  5/4/20 20:51


 


 


 Gabapentin


  (Neurontin)  300 mg  Q8HR


 ORAL


   4/29/20 22:00


 5/25/20 00:00  5/5/20 13:18


 


 


 Levetiracetam


  (Keppra)  1,000 mg  Q12HR


 ORAL


   4/29/20 21:00


 5/25/20 00:00  5/4/20 20:45


 


 


 Lorazepam


  (Ativan 2mg/ml


 1ml)  1 mg  Q6H  PRN


 IV


 For Anxiety  4/29/20 14:30


 5/6/20 14:29  5/4/20 10:04


 


 


 Magnesium Oxide


  (Mag-Ox 400mg)  400 mg  THREE TIMES A  DAY


 ORAL


   5/2/20 18:00


 6/1/20 17:59  5/5/20 13:18


 


 


 Memantine


  (Namenda)  5 mg  DAILY


 ORAL


   4/30/20 09:00


 5/25/20 16:59  5/4/20 10:03


 


 


 Methylprednisolone


 Sodium Succinate


 40 mg/Sodium


 Chloride  111 ml @ 


 111 mls/hr  EVERY 12  HOURS


 IVPB


   5/4/20 14:00


 5/9/20 13:59  5/4/20 20:45


 


 


 Phosphorus


  (Phospha 250


 Neutral)  500 mg  THREE TIMES A  DAY


 ORAL


   5/2/20 10:15


 6/1/20 10:14  5/5/20 13:18


 


 


 Piperacillin Sod/


 Tazobactam Sod


 3.375 gm/Sodium


 Chloride  110 ml @ 


 27.5 mls/hr  EVERY 8  HOURS


 IVPB


   5/3/20 14:00


 5/8/20 13:59  5/5/20 13:33


 


 


 Potassium Chloride


  (K-Dur)  40 meq  DAILY


 ORAL


   5/2/20 10:15


 7/31/20 10:14  5/4/20 10:03


 


 


 Sodium Chloride  1,000 ml @ 


 50 mls/hr  Q20H


 IV


   5/3/20 22:30


 6/2/20 22:29  5/3/20 22:39


 


 


 Vancomycin HCl


  (Vanco rx to


 dose)  1 ea  DAILY  PRN


 MISC


 Per rx protocol  5/4/20 12:30


 6/3/20 12:29   


 


 


 Vancomycin HCl


 750 mg/Sodium


 Chloride  275 ml @ 


 183.333


 mls/hr  Q12H


 IVPB


   5/5/20 02:00


 5/10/20 01:59  5/5/20 13:33


 

















Naila Lindsay M.D. May 5, 2020 15:57

## 2020-05-05 NOTE — CARDIAC ELECTROPHYSIOLOGY PN
Assessment/Plan


Assessment/Plan


1. S/P Shock  due to cardiomyopathy EF 40 and sepsis  BNP is 736.  


    On iv Abx by Dr. Lindsay  


    


2. Cardiomyopathy EF 40%.   On Coreg 3.125 bid





3. Status post right-sided TONY ICD.    





4. History of hypertension  


5. History of CVA with residual weakness.


6. COVID-19 PNA, off the vent 





DW RN


DC today





Subjective


Subjective


  Covid is positive. In SR with no events in isolation





Objective





Last 24 Hour Vital Signs








  Date Time  Temp Pulse Resp B/P (MAP) Pulse Ox O2 Delivery O2 Flow Rate FiO2


 


5/5/20 04:00 96.8 82 40 151/90 (110) 89   


 


5/5/20 00:00 96.6 87 40 144/94 (111) 90   


 


5/4/20 22:10     93 Venturi Mask 14.0 55


 


5/4/20 21:00      Venturi Mask 14.0 


 


5/4/20 20:46  95  144/95    


 


5/4/20 20:00 98.1 95 38 144/95 (111) 87   


 


5/4/20 16:00 98.6 88 32 134/74 (94) 94   


 


5/4/20 14:15     92 Venturi Mask 8.0 40


 


5/4/20 12:00 98.3 98 32 136/62 (86) 91   

















Intake and Output  


 


 5/4/20 5/5/20





 19:00 07:00


 


Intake Total 1096.0 ml 


 


Balance 1096.0 ml 


 


  


 


IV Total 1096.0 ml 











Laboratory Tests








Test


  5/4/20


16:31 5/5/20


03:00


 


Arterial Blood pH


  7.486


(7.350-7.450) 


 


 


Arterial Blood Partial


Pressure CO2 36.4 mmHg


(35.0-45.0) 


 


 


Arterial Blood Partial


Pressure O2 53.5 mmHg


(75.0-100.0)  L 


 


 


Arterial Blood HCO3


  26.9 mmol/L


(22.0-26.0)  H 


 


 


Arterial Blood Oxygen


Saturation 86.3 %


()  *L 


 


 


Arterial Blood Base Excess 3.5 (-2-2)  H 


 


Wong Test Positive   


 


White Blood Count


  


  11.9 K/UL


(4.8-10.8)  H


 


Red Blood Count


  


  3.76 M/UL


(4.70-6.10)  L


 


Hemoglobin


  


  10.9 G/DL


(14.2-18.0)  L


 


Hematocrit


  


  32.2 %


(42.0-52.0)  L


 


Mean Corpuscular Volume  86 FL (80-99)  


 


Mean Corpuscular Hemoglobin


  


  28.9 PG


(27.0-31.0)


 


Mean Corpuscular Hemoglobin


Concent 


  33.8 G/DL


(32.0-36.0)


 


Red Cell Distribution Width


  


  11.3 %


(11.6-14.8)  L


 


Platelet Count


  


  126 K/UL


(150-450)  L


 


Mean Platelet Volume


  


  8.6 FL


(6.5-10.1)


 


Neutrophils (%) (Auto)


  


  % (45.0-75.0)


 


 


Lymphocytes (%) (Auto)


  


  % (20.0-45.0)


 


 


Monocytes (%) (Auto)   % (1.0-10.0)  


 


Eosinophils (%) (Auto)   % (0.0-3.0)  


 


Basophils (%) (Auto)   % (0.0-2.0)  


 


Sodium Level


  


  146 MMOL/L


(136-145)  H


 


Potassium Level


  


  3.9 MMOL/L


(3.5-5.1)


 


Chloride Level


  


  108 MMOL/L


()  H


 


Carbon Dioxide Level


  


  29 MMOL/L


(21-32)


 


Anion Gap


  


  9 mmol/L


(5-15)


 


Blood Urea Nitrogen


  


  19 mg/dL


(7-18)  H


 


Creatinine


  


  0.9 MG/DL


(0.55-1.30)


 


Estimat Glomerular Filtration


Rate 


  > 60 mL/min


(>60)


 


Glucose Level


  


  264 MG/DL


()  H


 


Calcium Level


  


  8.3 MG/DL


(8.5-10.1)  L











Microbiology








 Date/Time


Source Procedure


Growth Status


 


 


 5/3/20 14:45


Blood Blood Culture - Preliminary


NO GROWTH AFTER 24 HOURS Resulted


 


 5/3/20 14:45


Blood Blood Culture - Preliminary


NO GROWTH AFTER 24 HOURS Resulted








Objective





HEAD AND NECK:  Mild JVD.


LUNGS:  Decreased breath sounds.


CARDIOVASCULAR:  Regular S1 and S2 with no gallop.


ABDOMEN:  Soft.


EXTREMITIES:  1+ pitting edema.  Defibrillator is in right subclavian.











Tommy Otero MD May 5, 2020 11:28

## 2020-05-05 NOTE — NUR
NURSE NOTES:

Received patient awake in bed, able to make needs known, on venturi mask 15L, no s/s of 
acute distress. Bilateral soft wrist restraints on, skin intact underneath. Triple lumen 
catheter on femoral noted, patent. Bed low and locked, needs attended to at this time.

## 2020-05-05 NOTE — DIAGNOSTIC IMAGING REPORT
Indication: Abnormal chest sounds

 

Technique: One view of the chest

 

Comparison: 5/3/2020

 

Findings: Bilateral interstitial and airspace infiltrates are again demonstrated.

Appearance is overall unchanged. The heart is borderline enlarged. Right chest

pacemaker is again demonstrated. There may be a small left pleural effusion,

unchanged

 

Impression:  Unchanged, over 2 days, findings as above.

## 2020-05-06 VITALS — DIASTOLIC BLOOD PRESSURE: 93 MMHG | SYSTOLIC BLOOD PRESSURE: 142 MMHG

## 2020-05-06 VITALS — DIASTOLIC BLOOD PRESSURE: 85 MMHG | SYSTOLIC BLOOD PRESSURE: 128 MMHG

## 2020-05-06 VITALS — SYSTOLIC BLOOD PRESSURE: 148 MMHG | DIASTOLIC BLOOD PRESSURE: 72 MMHG

## 2020-05-06 VITALS — SYSTOLIC BLOOD PRESSURE: 141 MMHG | DIASTOLIC BLOOD PRESSURE: 94 MMHG

## 2020-05-06 VITALS — DIASTOLIC BLOOD PRESSURE: 100 MMHG | SYSTOLIC BLOOD PRESSURE: 139 MMHG

## 2020-05-06 VITALS — SYSTOLIC BLOOD PRESSURE: 146 MMHG | DIASTOLIC BLOOD PRESSURE: 90 MMHG

## 2020-05-06 VITALS — DIASTOLIC BLOOD PRESSURE: 92 MMHG | SYSTOLIC BLOOD PRESSURE: 141 MMHG

## 2020-05-06 LAB
ADD MANUAL DIFF: YES
ANION GAP SERPL CALC-SCNC: 13 MMOL/L (ref 5–15)
BUN SERPL-MCNC: 20 MG/DL (ref 7–18)
CALCIUM SERPL-MCNC: 8.2 MG/DL (ref 8.5–10.1)
CHLORIDE SERPL-SCNC: 108 MMOL/L (ref 98–107)
CO2 SERPL-SCNC: 27 MMOL/L (ref 21–32)
CREAT SERPL-MCNC: 0.9 MG/DL (ref 0.55–1.3)
ERYTHROCYTE [DISTWIDTH] IN BLOOD BY AUTOMATED COUNT: 11.4 % (ref 11.6–14.8)
FERRITIN SERPL-MCNC: 1563 NG/ML (ref 8–388)
HCT VFR BLD CALC: 31.4 % (ref 42–52)
HGB BLD-MCNC: 10.5 G/DL (ref 14.2–18)
MCV RBC AUTO: 87 FL (ref 80–99)
PLATELET # BLD: 134 K/UL (ref 150–450)
POTASSIUM SERPL-SCNC: 3.3 MMOL/L (ref 3.5–5.1)
RBC # BLD AUTO: 3.61 M/UL (ref 4.7–6.1)
SODIUM SERPL-SCNC: 148 MMOL/L (ref 136–145)
WBC # BLD AUTO: 14.8 K/UL (ref 4.8–10.8)

## 2020-05-06 RX ADMIN — CHLORHEXIDINE GLUCONATE SCH APPLIC: 213 SOLUTION TOPICAL at 21:06

## 2020-05-06 RX ADMIN — FUROSEMIDE SCH MG: 40 TABLET ORAL at 09:18

## 2020-05-06 RX ADMIN — MAGNESIUM OXIDE TAB 400 MG (241.3 MG ELEMENTAL MG) SCH MG: 400 (241.3 MG) TAB at 09:18

## 2020-05-06 RX ADMIN — MAGNESIUM OXIDE TAB 400 MG (241.3 MG ELEMENTAL MG) SCH MG: 400 (241.3 MG) TAB at 18:02

## 2020-05-06 RX ADMIN — DIBASIC SODIUM PHOSPHATE, MONOBASIC POTASSIUM PHOSPHATE AND MONOBASIC SODIUM PHOSPHATE SCH MG: 852; 155; 130 TABLET ORAL at 09:18

## 2020-05-06 RX ADMIN — DEXTROSE MONOHYDRATE SCH MLS/HR: 50 INJECTION, SOLUTION INTRAVENOUS at 06:05

## 2020-05-06 RX ADMIN — SODIUM CHLORIDE SCH MLS/HR: 9 INJECTION, SOLUTION INTRAVENOUS at 18:02

## 2020-05-06 RX ADMIN — MAGNESIUM OXIDE TAB 400 MG (241.3 MG ELEMENTAL MG) SCH MG: 400 (241.3 MG) TAB at 13:35

## 2020-05-06 RX ADMIN — METHYLPREDNISOLONE SODIUM SUCCINATE SCH MLS/HR: 40 INJECTION, POWDER, LYOPHILIZED, FOR SOLUTION INTRAMUSCULAR; INTRAVENOUS at 09:10

## 2020-05-06 RX ADMIN — DEXTROSE MONOHYDRATE SCH MLS/HR: 50 INJECTION, SOLUTION INTRAVENOUS at 13:36

## 2020-05-06 RX ADMIN — SODIUM CHLORIDE SCH MLS/HR: 9 INJECTION, SOLUTION INTRAVENOUS at 01:47

## 2020-05-06 RX ADMIN — DEXTROSE MONOHYDRATE SCH MLS/HR: 50 INJECTION, SOLUTION INTRAVENOUS at 21:07

## 2020-05-06 RX ADMIN — SODIUM CHLORIDE SCH MLS/HR: 9 INJECTION, SOLUTION INTRAVENOUS at 09:30

## 2020-05-06 NOTE — NEPHROLOGY PROGRESS NOTE
Assessment/Plan


Problem List:  


(1) Electrolyte imbalance


(2) Suspected COVID-19 virus infection


(3) Hypotension


Assessment:  Resolved





(4) Sepsis


Assessment





Patient's current problem from renal standpoint of view is hypokalemia and 

other electrolyte imbalances





His other conditions:


Patient presented with acute renal failure however it is now resolved


Patient presented with septic shock was on pressors however now resolved


Sepsis leukocytosis improving


Patient has COVID-19 pneumonia


Hypertension


Cardiomyopathy status post AICD


Previous CVA with right residual weakness


Seizure disorders


Plan





Stable from renal standpoint of view





Stop IV fluids


Start oral potassium, magnesium, phosphorus supplements


We will continue to monitor electrolytes and chemistries


Continue per consultants


Per orders





Subjective


ROS Limited/Unobtainable:  No





Objective


Objective





Last 24 Hour Vital Signs








  Date Time  Temp Pulse Resp B/P (MAP) Pulse Ox O2 Delivery O2 Flow Rate FiO2


 


5/6/20 12:00 97.8 91 17 146/90 (108) 95   


 


5/6/20 09:19  83  141/92    


 


5/6/20 08:00 97.9 83 18 141/92 (108) 94   


 


5/6/20 04:00 98.6 95 18 142/93 (109) 86   


 


5/6/20 00:00 97.4 95 18 128/85 (99) 86   


 


5/5/20 21:56      Venturi Mask 14.0 


 


5/5/20 21:17  86  138/94    


 


5/5/20 20:00 100.1 89 20 135/87 (103) 92   


 


5/5/20 16:00 97.9 86 20 138/94 (109) 92   








Laboratory Tests


5/6/20 01:00: Vancomycin Level Trough 5.7


5/6/20 04:00: 


White Blood Count 14.8H, Red Blood Count 3.61L, Hemoglobin 10.5L, Hematocrit 

31.4L, Mean Corpuscular Volume 87, Mean Corpuscular Hemoglobin 29.2, Mean 

Corpuscular Hemoglobin Concent 33.6, Red Cell Distribution Width 11.4L, 

Platelet Count 134L, Mean Platelet Volume 8.5, Neutrophils (%) (Auto) , 

Lymphocytes (%) (Auto) , Monocytes (%) (Auto) , Eosinophils (%) (Auto) , 

Basophils (%) (Auto) , Differential Total Cells Counted 100, Neutrophils % (

Manual) 94H, Lymphocytes % (Manual) 4L, Monocytes % (Manual) 2, Eosinophils % (

Manual) 0, Basophils % (Manual) 0, Band Neutrophils 0, Platelet Estimate 

DecreasedL, Platelet Morphology Normal, Polychromasia 1+, Hypochromasia 1+, 

Sodium Level 148H, Potassium Level 3.3L, Chloride Level 108H, Carbon Dioxide 

Level 27, Anion Gap 13, Blood Urea Nitrogen 20H, Creatinine 0.9, Estimat 

Glomerular Filtration Rate > 60, Glucose Level 182H, Calcium Level 8.2L, 

Phosphorus Level 3.0, Ferritin 1563H, C-Reactive Protein, Quantitative 25.5H


Height (Feet):  5


Height (Inches):  8.00


Weight (Pounds):  179


General Appearance:  no apparent distress


Cardiovascular:  tachycardia


Respiratory/Chest:  decreased breath sounds


Abdomen:  distended


Objective


No change











Miguel Ángel Kendall MD May 6, 2020 13:14

## 2020-05-06 NOTE — PULMONOLOGY PROGRESS NOTE
Assessment/Plan


Assessment/Plan


IMPRESSION:


1. Cardiomyopathy.


2. Hypotension.


3. Left lung pneumonia.


4. Nursing home resident.


5. Positive COVID-19.





DISCUSSION:


1. Continue isolation


2. Continue current medications.


3. Off pressors.


4. Continue O2


5. Pulmonary hygiene.


6. Broad-spectrum antibiotics.


7. I will follow.


8. Transferred to med-surg


9. Will diurese today

















  ______________________________________________


  Hayder Hahn M.D.





Subjective


ROS Limited/Unobtainable:  No


Interval Events:  Tachypnic yesterday; afebrile


Constitutional:  Reports: no symptoms


Respiratory:  Reports: no symptoms


Cardiovascular:  Reports: no symptoms


Gastrointestinal/Abdominal:  Reports: no symptoms


Genitourinary:  Reports: no symptoms


Neurologic:  Reports: no symptoms


Allergies:  


Coded Allergies:  


     No Known Allergies (Unverified , 12/15/14)


All Systems:  reviewed and negative except above





Objective





Last 24 Hour Vital Signs








  Date Time  Temp Pulse Resp B/P (MAP) Pulse Ox O2 Delivery O2 Flow Rate FiO2


 


5/6/20 09:19  83  141/92    


 


5/6/20 08:00 97.9 83 18 141/92 (108) 94   


 


5/6/20 04:00 98.6 95 18 142/93 (109) 86   


 


5/6/20 00:00 97.4 95 18 128/85 (99) 86   


 


5/5/20 21:56      Venturi Mask 14.0 


 


5/5/20 21:17  86  138/94    


 


5/5/20 20:00 100.1 89 20 135/87 (103) 92   


 


5/5/20 16:00 97.9 86 20 138/94 (109) 92   


 


5/5/20 12:00 97.5 80 20 135/98 (110) 92   








General Appearance:  no acute distress


HEENT:  normocephalic


Respiratory/Chest:  chest wall non-tender, lungs clear


Cardiovascular:  normal peripheral pulses


Abdomen:  normal bowel sounds





Microbiology








 Date/Time


Source Procedure


Growth Status


 


 


 5/3/20 14:45


Blood Blood Culture - Preliminary


NO GROWTH AFTER 48 HOURS Resulted


 


 5/3/20 14:45


Blood Blood Culture - Preliminary


NO GROWTH AFTER 48 HOURS Resulted








Laboratory Tests


5/6/20 01:00: Vancomycin Level Trough 5.7


5/6/20 04:00: 


White Blood Count 14.8H, Red Blood Count 3.61L, Hemoglobin 10.5L, Hematocrit 

31.4L, Mean Corpuscular Volume 87, Mean Corpuscular Hemoglobin 29.2, Mean 

Corpuscular Hemoglobin Concent 33.6, Red Cell Distribution Width 11.4L, 

Platelet Count 134L, Mean Platelet Volume 8.5, Neutrophils (%) (Auto) , 

Lymphocytes (%) (Auto) , Monocytes (%) (Auto) , Eosinophils (%) (Auto) , 

Basophils (%) (Auto) , Differential Total Cells Counted 100, Neutrophils % (

Manual) 94H, Lymphocytes % (Manual) 4L, Monocytes % (Manual) 2, Eosinophils % (

Manual) 0, Basophils % (Manual) 0, Band Neutrophils 0, Platelet Estimate 

DecreasedL, Platelet Morphology Normal, Polychromasia 1+, Hypochromasia 1+, 

Sodium Level 148H, Potassium Level 3.3L, Chloride Level 108H, Carbon Dioxide 

Level 27, Anion Gap 13, Blood Urea Nitrogen 20H, Creatinine 0.9, Estimat 

Glomerular Filtration Rate > 60, Glucose Level 182H, Calcium Level 8.2L, 

Phosphorus Level 3.0, Ferritin 1563H, C-Reactive Protein, Quantitative 25.5H





Current Medications








 Medications


  (Trade)  Dose


 Ordered  Sig/Aarti


 Route


 PRN Reason  Start Time


 Stop Time Status Last Admin


Dose Admin


 


 Acetaminophen


  (Tylenol)  650 mg  Q6H  PRN


 ORAL


 Temp >100.5  4/29/20 14:45


 5/26/20 08:44  5/2/20 21:01


 


 


 Acetaminophen


  (Tylenol)  650 mg  Q6H  PRN


 RECTAL


 Temp >100.5  5/3/20 10:15


 6/2/20 10:14  5/3/20 16:59


 


 


 Carvedilol


  (Coreg)  3.125 mg  EVERY 12  HOURS


 ORAL


   4/29/20 21:00


 5/29/20 20:59  5/6/20 09:19


 


 


 Chlorhexidine


 Gluconate


  (Gloria-Hex 2%)  1 applic  DAILY@2000


 TOPIC


   4/29/20 20:00


 7/24/20 19:59  5/5/20 19:49


 


 


 Furosemide


  (Lasix)  40 mg  EVERY 12  HOURS


 ORAL


   5/4/20 21:00


 6/3/20 20:59  5/6/20 09:18


 


 


 Gabapentin


  (Neurontin)  300 mg  Q8HR


 ORAL


   4/29/20 22:00


 5/25/20 00:00  5/5/20 21:17


 


 


 Levetiracetam


  (Keppra)  1,000 mg  Q12HR


 ORAL


   4/29/20 21:00


 5/25/20 00:00  5/6/20 09:18


 


 


 Lorazepam


  (Ativan 2mg/ml


 1ml)  1 mg  Q6H  PRN


 IV


 For Anxiety  4/29/20 14:30


 5/6/20 14:29  5/4/20 10:04


 


 


 Magnesium Oxide


  (Mag-Ox 400mg)  400 mg  THREE TIMES A  DAY


 ORAL


   5/2/20 18:00


 6/1/20 17:59  5/6/20 09:18


 


 


 Memantine


  (Namenda)  5 mg  DAILY


 ORAL


   4/30/20 09:00


 5/25/20 16:59  5/6/20 09:19


 


 


 Methylprednisolone


 Sodium Succinate


 40 mg/Sodium


 Chloride  111 ml @ 


 111 mls/hr  EVERY 12  HOURS


 IVPB


   5/4/20 14:00


 5/9/20 13:59  5/6/20 09:10


 


 


 Piperacillin Sod/


 Tazobactam Sod


 3.375 gm/Sodium


 Chloride  110 ml @ 


 27.5 mls/hr  EVERY 8  HOURS


 IVPB


   5/3/20 14:00


 5/8/20 13:59  5/6/20 06:05


 


 


 Potassium Chloride


  (K-Dur)  40 meq  BID


 ORAL


   5/6/20 18:00


 7/31/20 10:14   


 


 


 Sodium Chloride  1,000 ml @ 


 50 mls/hr  Q20H


 IV


   5/3/20 22:30


 6/2/20 22:29  5/3/20 22:39


 


 


 Vancomycin HCl


  (Vanco rx to


 dose)  1 ea  DAILY  PRN


 MISC


 Per rx protocol  5/4/20 12:30


 6/3/20 12:29   


 


 


 Vancomycin HCl 1


 gm/Dextrose  275 ml @ 


 183.708


 mls/hr  Q8H


 IVPB


   5/6/20 10:00


 5/11/20 09:59  5/6/20 09:30


 

















Hayder Hahn MD May 6, 2020 11:20

## 2020-05-06 NOTE — NUR
CASE MANAGEMENT:REVIEW



SI;****COVID-19 PNEUMONIA***

AC HYPOXIC RESPIRATORY FAILURE. SEPTIC SHOCK. 

100.1  91   18   146/90   86% 14L VENTURI MASK   FIO2 @ 55%

WBC 14.8      K+ 3.3   BUN 20   CA 8.2   FERRITIN 1563   CRP 25.5



IS;LASIX IV Q12 HRS

K-DUR PO BID

VANCOMYCIN IV Q8 HRS

SOLU MEDROL IV Q12 HRS

IVF NS @ 50 ML/HR

ZOSYN IV Q8 HRS

MAG-OX PO TID

COREG PO Q12 HRS

SHAZIA-HEX TOP QD



****MED SURG STATUS****



DCP;FROM [facility]

## 2020-05-06 NOTE — PROGRESS NOTE
DATE:  05/06/2020

SUBJECTIVE:  This is a 69-year-old male with altered mental status.  He has

some confusion, disorganized thought process, decline in cognition below

his baseline.  That is why, his attending has requested daily psychiatric

consultation.



DIAGNOSIS:  Major depressive disorder, mild, recurrent with psychotic

features, rule out dementia with psychosis.



PLAN:  Treat him with psychotropic medications to stabilize his mood.

Twenty minutes of cognitive behavioral therapy to help him identify his

automatic negative thoughts, help him convert his negative thoughts to

more positive thoughts to reduce depression, anxiety, and mood lability.

Chart reviewed.  Discussed with staff.  Seen and assessed in his room.









  ______________________________________________

  Demracus Love M.D.





DR:  OLGA

D:  05/06/2020 11:05

T:  05/06/2020 19:05

JOB#:  [id number]

CC:

## 2020-05-06 NOTE — NUR
NURSE NOTES:

AM rounds made, pt in the bed awake, and verbally responsive to 1-2 words . no SOB noted at 
this time with O2 via non-rebreather mask. HOB elevated. No coughing and fever noted. skin 
is warm and dry to to touch. Call light is within reach.

## 2020-05-06 NOTE — CARDIAC ELECTROPHYSIOLOGY PN
Assessment/Plan


Assessment/Plan


1. S/P Shock  due to cardiomyopathy EF 40 and sepsis.  BNP is 736.  


    On iv Abx by Dr. Lindsay  


    


2. CMP EF 40%.   On Coreg 3.125 bid and decrease Lasix to 40 po daily. Add 

Lisinopril 10 daily





3. Status post right-sided TONY ICD.    





4. History of hypertension  


5. History of CVA with residual weakness.


6. COVID-19 PNA, off the vent 


7. Hypernatremia. Change iv lasix to po





DW RN





Subjective


Subjective


 In Covid isolation. No events





Objective





Last 24 Hour Vital Signs








  Date Time  Temp Pulse Resp B/P (MAP) Pulse Ox O2 Delivery O2 Flow Rate FiO2


 


5/6/20 12:00 97.8 91 17 146/90 (108) 95   


 


5/6/20 09:19  83  141/92    


 


5/6/20 08:00 97.9 83 18 141/92 (108) 94   


 


5/6/20 04:00 98.6 95 18 142/93 (109) 86   


 


5/6/20 00:00 97.4 95 18 128/85 (99) 86   


 


5/5/20 21:56      Venturi Mask 14.0 


 


5/5/20 21:17  86  138/94    


 


5/5/20 20:00 100.1 89 20 135/87 (103) 92   


 


5/5/20 16:00 97.9 86 20 138/94 (109) 92   











Laboratory Tests








Test


  5/6/20


01:00 5/6/20


04:00


 


Vancomycin Level Trough


  5.7 ug/mL


(5.0-12.0) 


 


 


White Blood Count


  


  14.8 K/UL


(4.8-10.8)  H


 


Red Blood Count


  


  3.61 M/UL


(4.70-6.10)  L


 


Hemoglobin


  


  10.5 G/DL


(14.2-18.0)  L


 


Hematocrit


  


  31.4 %


(42.0-52.0)  L


 


Mean Corpuscular Volume  87 FL (80-99)  


 


Mean Corpuscular Hemoglobin


  


  29.2 PG


(27.0-31.0)


 


Mean Corpuscular Hemoglobin


Concent 


  33.6 G/DL


(32.0-36.0)


 


Red Cell Distribution Width


  


  11.4 %


(11.6-14.8)  L


 


Platelet Count


  


  134 K/UL


(150-450)  L


 


Mean Platelet Volume


  


  8.5 FL


(6.5-10.1)


 


Neutrophils (%) (Auto)


  


  % (45.0-75.0)


 


 


Lymphocytes (%) (Auto)


  


  % (20.0-45.0)


 


 


Monocytes (%) (Auto)   % (1.0-10.0)  


 


Eosinophils (%) (Auto)   % (0.0-3.0)  


 


Basophils (%) (Auto)   % (0.0-2.0)  


 


Differential Total Cells


Counted 


  100  


 


 


Neutrophils % (Manual)  94 % (45-75)  H


 


Lymphocytes % (Manual)  4 % (20-45)  L


 


Monocytes % (Manual)  2 % (1-10)  


 


Eosinophils % (Manual)  0 % (0-3)  


 


Basophils % (Manual)  0 % (0-2)  


 


Band Neutrophils  0 % (0-8)  


 


Platelet Estimate  Decreased  L


 


Platelet Morphology  Normal  


 


Polychromasia  1+  


 


Hypochromasia  1+  


 


Sodium Level


  


  148 MMOL/L


(136-145)  H


 


Potassium Level


  


  3.3 MMOL/L


(3.5-5.1)  L


 


Chloride Level


  


  108 MMOL/L


()  H


 


Carbon Dioxide Level


  


  27 MMOL/L


(21-32)


 


Anion Gap


  


  13 mmol/L


(5-15)


 


Blood Urea Nitrogen


  


  20 mg/dL


(7-18)  H


 


Creatinine


  


  0.9 MG/DL


(0.55-1.30)


 


Estimat Glomerular Filtration


Rate 


  > 60 mL/min


(>60)


 


Glucose Level


  


  182 MG/DL


()  H


 


Calcium Level


  


  8.2 MG/DL


(8.5-10.1)  L


 


Phosphorus Level


  


  3.0 MG/DL


(2.5-4.9)


 


Ferritin


  


  1563 NG/ML


(8-388)  H


 


C-Reactive Protein,


Quantitative 


  25.5 mg/dL


(0.00-0.90)  H











Microbiology








 Date/Time


Source Procedure


Growth Status


 


 


 5/3/20 14:45


Blood Blood Culture - Preliminary


NO GROWTH AFTER 48 HOURS Resulted


 


 5/3/20 14:45


Blood Blood Culture - Preliminary


NO GROWTH AFTER 48 HOURS Resulted








Objective





HEAD AND NECK:  Mild JVD.


LUNGS:  Decreased breath sounds.


CARDIOVASCULAR:  Regular S1 and S2 with no gallop.


ABDOMEN:  Soft.


EXTREMITIES:  1+ pitting edema.  Defibrillator is in right subclavian.











Tommy Otero MD May 6, 2020 13:39

## 2020-05-06 NOTE — GENERAL PROGRESS NOTE
Assessment/Plan


Problem List:  


(1) Suspected COVID-19 virus infection


ICD Codes:  Z20.828 - Contact with and (suspected) exposure to other viral 

communicable diseases


SNOMED:  640219560


(2) Anemia


ICD Codes:  D64.9 - Anemia, unspecified


SNOMED:  515356455


(3) Weak


ICD Codes:  R53.1 - Weakness


SNOMED:  97987560


(4) COPD (chronic obstructive pulmonary disease)


ICD Codes:  J44.9 - Chronic obstructive pulmonary disease, unspecified


SNOMED:  37961067


(5) Diabetes


ICD Codes:  E11.9 - Type 2 diabetes mellitus without complications


SNOMED:  61029908


(6) Epileptic seizure, generalized


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 

not intractable, without status epilepticus


SNOMED:  32943933


(7) Altered mental status


ICD Codes:  R41.82 - Altered mental status, unspecified


SNOMED:  188996978


Qualifiers:  


   Qualified Codes:  R41.82 - Altered mental status, unspecified


(8) Hypotension


ICD Codes:  I95.9 - Hypotension, unspecified


SNOMED:  32359574


Qualifiers:  


   Qualified Codes:  I95.9 - Hypotension, unspecified


(9) UTI (urinary tract infection)


ICD Codes:  N39.0 - Urinary tract infection, site not specified


SNOMED:  49121465


Status:  unchanged


Assessment/Plan:


o2 pulm tx abx pt diet cbc bmp am





Subjective


Constitutional:  Reports: weakness


Allergies:  


Coded Allergies:  


     No Known Allergies (Unverified , 12/15/14)


All Systems:  reviewed and negative except above


Subjective


sleepy calm in bed





Objective





Last 24 Hour Vital Signs








  Date Time  Temp Pulse Resp B/P (MAP) Pulse Ox O2 Delivery O2 Flow Rate FiO2


 


5/6/20 08:00 97.9 83 18 141/92 (108) 94   


 


5/6/20 04:00 98.6 95 18 142/93 (109) 86   


 


5/6/20 00:00 97.4 95 18 128/85 (99) 86   


 


5/5/20 21:56      Venturi Mask 14.0 


 


5/5/20 21:17  86  138/94    


 


5/5/20 20:00 100.1 89 20 135/87 (103) 92   


 


5/5/20 16:00 97.9 86 20 138/94 (109) 92   


 


5/5/20 12:00 97.5 80 20 135/98 (110) 92   








Laboratory Tests


5/6/20 01:00: Vancomycin Level Trough 5.7


5/6/20 04:00: 


White Blood Count 14.8H, Red Blood Count 3.61L, Hemoglobin 10.5L, Hematocrit 

31.4L, Mean Corpuscular Volume 87, Mean Corpuscular Hemoglobin 29.2, Mean 

Corpuscular Hemoglobin Concent 33.6, Red Cell Distribution Width 11.4L, 

Platelet Count 134L, Mean Platelet Volume 8.5, Neutrophils (%) (Auto) , 

Lymphocytes (%) (Auto) , Monocytes (%) (Auto) , Eosinophils (%) (Auto) , 

Basophils (%) (Auto) , Neutrophils % (Manual) [Pending], Lymphocytes % (Manual) 

[Pending], Platelet Estimate [Pending], Platelet Morphology [Pending], Sodium 

Level 148H, Potassium Level 3.3L, Chloride Level 108H, Carbon Dioxide Level 27, 

Anion Gap 13, Blood Urea Nitrogen 20H, Creatinine 0.9, Estimat Glomerular 

Filtration Rate > 60, Glucose Level 182H, Calcium Level 8.2L, Ferritin [Pending]

, C-Reactive Protein, Quantitative 25.5H


Height (Feet):  5


Height (Inches):  8.00


Weight (Pounds):  179


General Appearance:  lethargic


EENT:  normal ENT inspection


Neck:  normal alignment


Cardiovascular:  normal rate, regular rhythm


Respiratory/Chest:  no respiratory distress, no accessory muscle use


Extremities:  normal inspection


Skin:  normal pigmentation











Arron Barkley DO May 6, 2020 09:09

## 2020-05-06 NOTE — INFECTIOUS DISEASES PROG NOTE
Assessment/Plan


Assessment/Plan





Assessment:


Septic shock-SP





Fever; improving


Mild leukocytosis, increased (s/p steroisd)


   -5/3 u/a wbc 15-20, nit neg, leuk +1; ucx p


          Bcx NTD


   -u/a neg


   -Bcx Neg





Probable PNA- 2ry to COVID19 (from NH with confirmed cases)


Acute hypoxic respiratory failure- was on NC, now on VM- increasing FIo2 

requiremetns


    -5/5 CXR:  Bilateral interstitial and airspace infiltrates are again 

demonstrated.Appearance is overall unchanged. There may be a small left pleural 

effusion,unchange


   -5/4 CRP 41.6, ferritin 1468


  -5/3 CXR: .  Worsening bilateral interstitial and airspace opacities. 

Probable small left pleural effusion.  Difficult to evaluate the right 

costophrenic angle due to overlying pacemaker.


  -4/30 CXR: Slightly improved bilateral interstitial and airspace infiltrates 

versus edema


  -4/28 CXR: Bilateral mid and lower lung interstitial disease, new since prior 

study.Possible developing left pleural effusion


        Ferritn >2000, CRP 22.3


  -4/24 SARS-COV2 PCR +


  -CXR: Left basilar atelectasis and/or infiltrate


 


MELVI, improving





 HTN


cadiomyopathy s/p AICD


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


seizure disorder


NH resident ([facility]) 








Plan:


-Continue empiric Zosyn #4 and add IV Vancomycin #3


   - SPSolumedrol 40mg IV 12hrs x 5days (5/4-5/6); dc'ed by pulmonologist - (

day 10 of illness and worsening; around this time is seen the worsening of 

COVID19 pts mainly driven by inflammatory response) 


   -4/29 SP Cefepime #6


   -4/27 SP IV Vancomycin #4  





-f/u cx


-Monitor CBC/CMP, temperatures


-COVID 19 precautions


-clarify goals of care


-aspiration precautions


-inflamatory markers


-f/u cx





Thank you for consulting [facility]. Will continue to follow along with 

you.





Discussed with RN,





Subjective


Allergies:  


Coded Allergies:  


     No Known Allergies (Unverified , 12/15/14)


Subjective


.4


on venturi mask 55%, 14L


wbc increased


steroisd were dc





Objective


Vital Signs





Last 24 Hour Vital Signs








  Date Time  Temp Pulse Resp B/P (MAP) Pulse Ox O2 Delivery O2 Flow Rate FiO2


 


5/6/20 16:00 97.8 82 18 148/72 (97) 95   


 


5/6/20 12:00 97.8 91 17 146/90 (108) 95   


 


5/6/20 09:19  83  141/92    


 


5/6/20 08:00 97.9 83 18 141/92 (108) 94   


 


5/6/20 04:00 98.6 95 18 142/93 (109) 86   


 


5/6/20 00:00 97.4 95 18 128/85 (99) 86   


 


5/5/20 21:56      Venturi Mask 14.0 


 


5/5/20 21:17  86  138/94    


 


5/5/20 20:00 100.1 89 20 135/87 (103) 92   








Height (Feet):  5


Height (Inches):  8.00


Weight (Pounds):  179


Objective


 not examined to limit COVID19 exposure





Laboratory Tests








Test


  5/6/20


01:00 5/6/20


04:00


 


Vancomycin Level Trough


  5.7 ug/mL


(5.0-12.0) 


 


 


White Blood Count


  


  14.8 K/UL


(4.8-10.8)  H


 


Red Blood Count


  


  3.61 M/UL


(4.70-6.10)  L


 


Hemoglobin


  


  10.5 G/DL


(14.2-18.0)  L


 


Hematocrit


  


  31.4 %


(42.0-52.0)  L


 


Mean Corpuscular Volume  87 FL (80-99)  


 


Mean Corpuscular Hemoglobin


  


  29.2 PG


(27.0-31.0)


 


Mean Corpuscular Hemoglobin


Concent 


  33.6 G/DL


(32.0-36.0)


 


Red Cell Distribution Width


  


  11.4 %


(11.6-14.8)  L


 


Platelet Count


  


  134 K/UL


(150-450)  L


 


Mean Platelet Volume


  


  8.5 FL


(6.5-10.1)


 


Neutrophils (%) (Auto)


  


  % (45.0-75.0)


 


 


Lymphocytes (%) (Auto)


  


  % (20.0-45.0)


 


 


Monocytes (%) (Auto)   % (1.0-10.0)  


 


Eosinophils (%) (Auto)   % (0.0-3.0)  


 


Basophils (%) (Auto)   % (0.0-2.0)  


 


Differential Total Cells


Counted 


  100  


 


 


Neutrophils % (Manual)  94 % (45-75)  H


 


Lymphocytes % (Manual)  4 % (20-45)  L


 


Monocytes % (Manual)  2 % (1-10)  


 


Eosinophils % (Manual)  0 % (0-3)  


 


Basophils % (Manual)  0 % (0-2)  


 


Band Neutrophils  0 % (0-8)  


 


Platelet Estimate  Decreased  L


 


Platelet Morphology  Normal  


 


Polychromasia  1+  


 


Hypochromasia  1+  


 


Sodium Level


  


  148 MMOL/L


(136-145)  H


 


Potassium Level


  


  3.3 MMOL/L


(3.5-5.1)  L


 


Chloride Level


  


  108 MMOL/L


()  H


 


Carbon Dioxide Level


  


  27 MMOL/L


(21-32)


 


Anion Gap


  


  13 mmol/L


(5-15)


 


Blood Urea Nitrogen


  


  20 mg/dL


(7-18)  H


 


Creatinine


  


  0.9 MG/DL


(0.55-1.30)


 


Estimat Glomerular Filtration


Rate 


  > 60 mL/min


(>60)


 


Glucose Level


  


  182 MG/DL


()  H


 


Calcium Level


  


  8.2 MG/DL


(8.5-10.1)  L


 


Phosphorus Level


  


  3.0 MG/DL


(2.5-4.9)


 


Ferritin


  


  1563 NG/ML


(8-388)  H


 


C-Reactive Protein,


Quantitative 


  25.5 mg/dL


(0.00-0.90)  H











Current Medications








 Medications


  (Trade)  Dose


 Ordered  Sig/Aarti


 Route


 PRN Reason  Start Time


 Stop Time Status Last Admin


Dose Admin


 


 Acetaminophen


  (Tylenol)  650 mg  Q6H  PRN


 ORAL


 Temp >100.5  4/29/20 14:45


 5/26/20 08:44  5/2/20 21:01


 


 


 Acetaminophen


  (Tylenol)  650 mg  Q6H  PRN


 RECTAL


 Temp >100.5  5/3/20 10:15


 6/2/20 10:14  5/3/20 16:59


 


 


 Carvedilol


  (Coreg)  3.125 mg  EVERY 12  HOURS


 ORAL


   4/29/20 21:00


 5/29/20 20:59  5/6/20 09:19


 


 


 Chlorhexidine


 Gluconate


  (Gloria-Hex 2%)  1 applic  DAILY@2000


 TOPIC


   4/29/20 20:00


 7/24/20 19:59  5/5/20 19:49


 


 


 Furosemide


  (Lasix)  40 mg  DAILY


 ORAL


   5/7/20 09:00


 6/6/20 08:59   


 


 


 Gabapentin


  (Neurontin)  300 mg  Q8HR


 ORAL


   4/29/20 22:00


 5/25/20 00:00  5/6/20 13:35


 


 


 Levetiracetam


  (Keppra)  1,000 mg  Q12HR


 ORAL


   4/29/20 21:00


 5/25/20 00:00  5/6/20 09:18


 


 


 Lisinopril


  (ZestriL)  10 mg  DAILY


 ORAL


   5/7/20 09:00


 6/6/20 08:59   


 


 


 Magnesium Oxide


  (Mag-Ox 400mg)  400 mg  THREE TIMES A  DAY


 ORAL


   5/2/20 18:00


 6/1/20 17:59  5/6/20 13:35


 


 


 Memantine


  (Namenda)  5 mg  DAILY


 ORAL


   4/30/20 09:00


 5/25/20 16:59  5/6/20 09:19


 


 


 Piperacillin Sod/


 Tazobactam Sod


 3.375 gm/Sodium


 Chloride  110 ml @ 


 27.5 mls/hr  EVERY 8  HOURS


 IVPB


   5/3/20 14:00


 5/8/20 13:59  5/6/20 13:36


 


 


 Potassium Chloride


  (K-Dur)  40 meq  BID


 ORAL


   5/6/20 18:00


 7/31/20 10:14   


 


 


 Vancomycin HCl


  (Vanco rx to


 dose)  1 ea  DAILY  PRN


 MISC


 Per rx protocol  5/4/20 12:30


 6/3/20 12:29   


 


 


 Vancomycin HCl 1


 gm/Dextrose  275 ml @ 


 183.708


 mls/hr  Q8H


 IVPB


   5/6/20 10:00


 5/11/20 09:59  5/6/20 09:30


 

















Naila Lindsay M.D. May 6, 2020 17:34

## 2020-05-07 VITALS — SYSTOLIC BLOOD PRESSURE: 151 MMHG | DIASTOLIC BLOOD PRESSURE: 93 MMHG

## 2020-05-07 VITALS — DIASTOLIC BLOOD PRESSURE: 92 MMHG | SYSTOLIC BLOOD PRESSURE: 138 MMHG

## 2020-05-07 VITALS — DIASTOLIC BLOOD PRESSURE: 101 MMHG | SYSTOLIC BLOOD PRESSURE: 141 MMHG

## 2020-05-07 VITALS — SYSTOLIC BLOOD PRESSURE: 137 MMHG | DIASTOLIC BLOOD PRESSURE: 94 MMHG

## 2020-05-07 VITALS — SYSTOLIC BLOOD PRESSURE: 140 MMHG | DIASTOLIC BLOOD PRESSURE: 87 MMHG

## 2020-05-07 LAB
ADD MANUAL DIFF: YES
ANION GAP SERPL CALC-SCNC: 8 MMOL/L (ref 5–15)
BUN SERPL-MCNC: 17 MG/DL (ref 7–18)
CALCIUM SERPL-MCNC: 7.8 MG/DL (ref 8.5–10.1)
CHLORIDE SERPL-SCNC: 111 MMOL/L (ref 98–107)
CO2 SERPL-SCNC: 29 MMOL/L (ref 21–32)
CREAT SERPL-MCNC: 0.8 MG/DL (ref 0.55–1.3)
ERYTHROCYTE [DISTWIDTH] IN BLOOD BY AUTOMATED COUNT: 11.8 % (ref 11.6–14.8)
HCT VFR BLD CALC: 30.3 % (ref 42–52)
HGB BLD-MCNC: 9.9 G/DL (ref 14.2–18)
MCV RBC AUTO: 87 FL (ref 80–99)
PLATELET # BLD: 101 K/UL (ref 150–450)
POTASSIUM SERPL-SCNC: 3.3 MMOL/L (ref 3.5–5.1)
RBC # BLD AUTO: 3.47 M/UL (ref 4.7–6.1)
SODIUM SERPL-SCNC: 148 MMOL/L (ref 136–145)
WBC # BLD AUTO: 13.2 K/UL (ref 4.8–10.8)

## 2020-05-07 RX ADMIN — SODIUM CHLORIDE SCH MLS/HR: 9 INJECTION, SOLUTION INTRAVENOUS at 01:50

## 2020-05-07 RX ADMIN — DEXTROSE MONOHYDRATE SCH MLS/HR: 50 INJECTION, SOLUTION INTRAVENOUS at 20:47

## 2020-05-07 RX ADMIN — SODIUM CHLORIDE SCH MLS/HR: 9 INJECTION, SOLUTION INTRAVENOUS at 17:45

## 2020-05-07 RX ADMIN — DEXTROSE MONOHYDRATE SCH MLS/HR: 50 INJECTION, SOLUTION INTRAVENOUS at 04:56

## 2020-05-07 RX ADMIN — SODIUM CHLORIDE SCH MLS/HR: 9 INJECTION, SOLUTION INTRAVENOUS at 10:00

## 2020-05-07 RX ADMIN — MAGNESIUM OXIDE TAB 400 MG (241.3 MG ELEMENTAL MG) SCH MG: 400 (241.3 MG) TAB at 17:45

## 2020-05-07 RX ADMIN — DEXTROSE MONOHYDRATE SCH MLS/HR: 50 INJECTION, SOLUTION INTRAVENOUS at 14:15

## 2020-05-07 RX ADMIN — FUROSEMIDE SCH MG: 40 TABLET ORAL at 09:00

## 2020-05-07 RX ADMIN — CHLORHEXIDINE GLUCONATE SCH APPLIC: 213 SOLUTION TOPICAL at 20:46

## 2020-05-07 RX ADMIN — LISINOPRIL SCH MG: 10 TABLET ORAL at 09:00

## 2020-05-07 RX ADMIN — MAGNESIUM OXIDE TAB 400 MG (241.3 MG ELEMENTAL MG) SCH MG: 400 (241.3 MG) TAB at 13:00

## 2020-05-07 RX ADMIN — MAGNESIUM OXIDE TAB 400 MG (241.3 MG ELEMENTAL MG) SCH MG: 400 (241.3 MG) TAB at 09:00

## 2020-05-07 NOTE — GENERAL PROGRESS NOTE
Assessment/Plan


Problem List:  


(1) Suspected COVID-19 virus infection


ICD Codes:  Z20.828 - Contact with and (suspected) exposure to other viral 

communicable diseases


SNOMED:  579477573


(2) Anemia


ICD Codes:  D64.9 - Anemia, unspecified


SNOMED:  005272986


(3) Weak


ICD Codes:  R53.1 - Weakness


SNOMED:  60890189


(4) COPD (chronic obstructive pulmonary disease)


ICD Codes:  J44.9 - Chronic obstructive pulmonary disease, unspecified


SNOMED:  94465947


(5) Diabetes


ICD Codes:  E11.9 - Type 2 diabetes mellitus without complications


SNOMED:  40642179


(6) Epileptic seizure, generalized


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 

not intractable, without status epilepticus


SNOMED:  53824775


(7) Altered mental status


ICD Codes:  R41.82 - Altered mental status, unspecified


SNOMED:  415209061


Qualifiers:  


   Qualified Codes:  R41.82 - Altered mental status, unspecified


(8) Hypotension


ICD Codes:  I95.9 - Hypotension, unspecified


SNOMED:  39419259


Qualifiers:  


   Qualified Codes:  I95.9 - Hypotension, unspecified


(9) UTI (urinary tract infection)


ICD Codes:  N39.0 - Urinary tract infection, site not specified


SNOMED:  22538292


Status:  unchanged


Assessment/Plan:


o2 pulm tx abx pt diet cbc bmp am





Subjective


Constitutional:  Reports: weakness


Allergies:  


Coded Allergies:  


     No Known Allergies (Unverified , 12/15/14)


All Systems:  reviewed and negative except above


Subjective


sleepy calm in bed





Objective





Last 24 Hour Vital Signs








  Date Time  Temp Pulse Resp B/P (MAP) Pulse Ox O2 Delivery O2 Flow Rate FiO2


 


5/7/20 09:00    140/87    


 


5/7/20 09:00  92  140/87    


 


5/7/20 09:00      Non-Rebreather  


 


5/7/20 08:00 98.2 92 24 140/87 (104) 94   


 


5/7/20 04:04 98.6 98  138/92 (107)    


 


5/6/20 23:52 98.9 103 17 141/94 (110) 93   


 


5/6/20 21:06  82  148/72    


 


5/6/20 20:16      Non-Rebreather  


 


5/6/20 20:00     92 Non-Rebreather 15.0 100


 


5/6/20 20:00 99.4 100 20 139/100 (113) 94   


 


5/6/20 16:00 97.8 82 18 148/72 (97) 95   


 


5/6/20 12:00 97.8 91 17 146/90 (108) 95   

















Intake and Output  


 


 5/6/20 5/7/20





 19:00 07:00


 


Intake Total  640.000 ml


 


Output Total  600 ml


 


Balance  40.000 ml


 


  


 


Intake Oral  200 ml


 


IV Total  440.000 ml


 


Output Urine Total  600 ml








Laboratory Tests


5/7/20 06:00: 


White Blood Count 13.2H, Red Blood Count 3.47L, Hemoglobin 9.9L, Hematocrit 

30.3L, Mean Corpuscular Volume 87, Mean Corpuscular Hemoglobin 28.6, Mean 

Corpuscular Hemoglobin Concent 32.8, Red Cell Distribution Width 11.8, Platelet 

Count 101L, Mean Platelet Volume 7.7, Neutrophils (%) (Auto) , Lymphocytes (%) (

Auto) , Monocytes (%) (Auto) , Eosinophils (%) (Auto) , Basophils (%) (Auto) , 

Differential Total Cells Counted 100, Neutrophils % (Manual) 92H, Lymphocytes % 

(Manual) 6L, Monocytes % (Manual) 2, Eosinophils % (Manual) 0, Basophils % (

Manual) 0, Band Neutrophils 0, Platelet Estimate DecreasedL, Platelet 

Morphology Normal, Hypochromasia 2+, Anisocytosis 1+, Sodium Level 148H, 

Potassium Level 3.3L, Chloride Level 111H, Carbon Dioxide Level 29, Anion Gap 8

, Blood Urea Nitrogen 17, Creatinine 0.8, Estimat Glomerular Filtration Rate > 

60, Glucose Level 180H, Calcium Level 7.8L


5/7/20 09:00: Vancomycin Level Trough 15.3H


Height (Feet):  5


Height (Inches):  8.00


Weight (Pounds):  178


General Appearance:  lethargic


EENT:  normal ENT inspection


Neck:  normal alignment


Cardiovascular:  normal rate, regular rhythm


Respiratory/Chest:  no respiratory distress, no accessory muscle use


Extremities:  normal inspection


Skin:  normal pigmentation











Arron Barkley DO May 7, 2020 11:43

## 2020-05-07 NOTE — PULMONOLOGY PROGRESS NOTE
Subjective


ROS Limited/Unobtainable:  No


Interval Events:  on NRBM


Constitutional:  Reports: no symptoms


Respiratory:  Reports: dry cough, shortness of breath


Cardiovascular:  Reports: no symptoms


Gastrointestinal/Abdominal:  Reports: no symptoms


Genitourinary:  Reports: no symptoms


Neurologic:  Reports: no symptoms


Allergies:  


Coded Allergies:  


     No Known Allergies (Unverified , 12/15/14)


All Systems:  reviewed and negative except above





Objective





Last 24 Hour Vital Signs








  Date Time  Temp Pulse Resp B/P (MAP) Pulse Ox O2 Delivery O2 Flow Rate FiO2


 


5/7/20 09:00    140/87    


 


5/7/20 09:00  92  140/87    


 


5/7/20 08:00 98.2 92 24 140/87 (104) 94   


 


5/7/20 04:04 98.6 98  138/92 (107)    


 


5/6/20 23:52 98.9 103 17 141/94 (110) 93   


 


5/6/20 21:06  82  148/72    


 


5/6/20 20:16      Non-Rebreather  


 


5/6/20 20:00     92 Non-Rebreather 15.0 100


 


5/6/20 20:00 99.4 100 20 139/100 (113) 94   


 


5/6/20 16:00 97.8 82 18 148/72 (97) 95   


 


5/6/20 12:00 97.8 91 17 146/90 (108) 95   

















Intake and Output  


 


 5/6/20 5/7/20





 19:00 07:00


 


Intake Total  640.000 ml


 


Output Total  600 ml


 


Balance  40.000 ml


 


  


 


Intake Oral  200 ml


 


IV Total  440.000 ml


 


Output Urine Total  600 ml








General Appearance:  no acute distress


HEENT:  normocephalic


Respiratory/Chest:  chest wall non-tender, lungs clear


Cardiovascular:  normal peripheral pulses


Abdomen:  normal bowel sounds


Laboratory Tests


5/7/20 06:00: 


White Blood Count 13.2H, Red Blood Count 3.47L, Hemoglobin 9.9L, Hematocrit 

30.3L, Mean Corpuscular Volume 87, Mean Corpuscular Hemoglobin 28.6, Mean 

Corpuscular Hemoglobin Concent 32.8, Red Cell Distribution Width 11.8, Platelet 

Count 101L, Mean Platelet Volume 7.7, Neutrophils (%) (Auto) , Lymphocytes (%) (

Auto) , Monocytes (%) (Auto) , Eosinophils (%) (Auto) , Basophils (%) (Auto) , 

Differential Total Cells Counted 100, Neutrophils % (Manual) 92H, Lymphocytes % 

(Manual) 6L, Monocytes % (Manual) 2, Eosinophils % (Manual) 0, Basophils % (

Manual) 0, Band Neutrophils 0, Platelet Estimate DecreasedL, Platelet 

Morphology Normal, Hypochromasia 2+, Anisocytosis 1+, Sodium Level 148H, 

Potassium Level 3.3L, Chloride Level 111H, Carbon Dioxide Level 29, Anion Gap 8

, Blood Urea Nitrogen 17, Creatinine 0.8, Estimat Glomerular Filtration Rate > 

60, Glucose Level 180H, Calcium Level 7.8L


5/7/20 09:00: Vancomycin Level Trough 15.3H





Current Medications








 Medications


  (Trade)  Dose


 Ordered  Sig/Aarti


 Route


 PRN Reason  Start Time


 Stop Time Status Last Admin


Dose Admin


 


 Acetaminophen


  (Tylenol)  650 mg  Q6H  PRN


 ORAL


 Temp >100.5  4/29/20 14:45


 5/26/20 08:44  5/2/20 21:01


 


 


 Acetaminophen


  (Tylenol)  650 mg  Q6H  PRN


 RECTAL


 Temp >100.5  5/3/20 10:15


 6/2/20 10:14  5/3/20 16:59


 


 


 Carvedilol


  (Coreg)  3.125 mg  EVERY 12  HOURS


 ORAL


   4/29/20 21:00


 5/29/20 20:59  5/7/20 09:00


 


 


 Chlorhexidine


 Gluconate


  (Gloria-Hex 2%)  1 applic  DAILY@2000


 TOPIC


   4/29/20 20:00


 7/24/20 19:59  5/6/20 21:06


 


 


 Furosemide


  (Lasix)  40 mg  DAILY


 ORAL


   5/7/20 09:00


 6/6/20 08:59  5/7/20 09:00


 


 


 Gabapentin


  (Neurontin)  300 mg  Q8HR


 ORAL


   4/29/20 22:00


 5/25/20 00:00  5/7/20 04:55


 


 


 Levetiracetam


  (Keppra)  1,000 mg  Q12HR


 ORAL


   4/29/20 21:00


 5/25/20 00:00  5/7/20 09:00


 


 


 Lisinopril


  (ZestriL)  10 mg  DAILY


 ORAL


   5/7/20 09:00


 6/6/20 08:59  5/7/20 09:00


 


 


 Magnesium Oxide


  (Mag-Ox 400mg)  400 mg  THREE TIMES A  DAY


 ORAL


   5/2/20 18:00


 6/1/20 17:59  5/7/20 09:00


 


 


 Memantine


  (Namenda)  5 mg  DAILY


 ORAL


   4/30/20 09:00


 5/25/20 16:59  5/7/20 09:00


 


 


 Piperacillin Sod/


 Tazobactam Sod


 3.375 gm/Sodium


 Chloride  110 ml @ 


 27.5 mls/hr  EVERY 8  HOURS


 IVPB


   5/3/20 14:00


 5/8/20 13:59  5/7/20 04:56


 


 


 Potassium Chloride


  (K-Dur)  40 meq  BID


 ORAL


   5/6/20 18:00


 7/31/20 10:14  5/7/20 09:00


 


 


 Vancomycin HCl


  (Vanco rx to


 dose)  1 ea  DAILY  PRN


 MISC


 Per rx protocol  5/4/20 12:30


 6/3/20 12:29   


 


 


 Vancomycin HCl 1


 gm/Dextrose  275 ml @ 


 183.708


 mls/hr  Q8H


 IVPB


   5/6/20 10:00


 5/11/20 09:59  5/7/20 10:00


 











Assessment/Plan


Assessment/Plan


IMPRESSION:


1. Cardiomyopathy.


2. Hypotension.


3. Left lung pneumonia.


4. Nursing home resident.


5. Positive COVID-19.





DISCUSSION:


1. Continue isolation


2. Continue current medications.


3. Off pressors.


4. Continue O2/NRBM


5. Pulmonary hygiene.


6. Broad-spectrum antibiotics.


7. I will follow.


8. Transferred to med-surg




















  ______________________________________________


  LESIA Dempsey Omar Syed MD May 7, 2020 11:26

## 2020-05-07 NOTE — PROGRESS NOTE
DATE:  05/07/2020

SUBJECTIVE:  This is a 69-year-old male patient.  He has sepsis, he has

hypotension, and he also has COPD, pyelonephritis, sepsis, anemia,

diabetes, COVID-19 infection causing him to have decline in cognition

below his baseline that is why his attending has requested daily

psychiatric consultation.



MENTAL STATUS EXAMINATION:  This is a 69-year-old male.  Appearance is

disheveled.  Attitude, irritable and agitated.  Affect, guarded and

restricted.  Intellect poor.  Mood, depressed and anxious.  Motor

activity, psychomotor agitation. Insight and judgment is poor.



DIAGNOSIS:  Major depressive disorder, mild, recurrent with psychotic

features, rule out dementia with psychosis.



PLAN:  Treat him with medication regimen consisting of Namenda 5 mg daily,

Ativan 1 mg IV every 6 hours for anxiety and agitation, Neurontin 300 mg

q.8 hours.  20-minutes of insight-oriented psychotherapy to help him

recognize his psychiatric and mental condition so that he has better

impulse control and behavior and recognition on the unit _____ in his

mood.  Chart reviewed and discussed with staff.  Seen and assessed in his

room.









  ______________________________________________

  Demarcus Love M.D.





DR:  Bernabe

D:  05/07/2020 09:26

T:  05/07/2020 19:06

JOB#:  [id number]

CC:

## 2020-05-07 NOTE — NUR
AM report received, RN made rounds, pt is awake in bed, no SOB noted with O2 via venturi 
mask. pt denies any pain. HOB elevated. no acute distress noted at this time. place call 
light within reach.

## 2020-05-07 NOTE — PROGRESS NOTE
DATE:  05/06/2020



NOTE:  POOR AUDIO.



PSYCHOTHERAPY CONSULTATION PROGRESS NOTE



TREATING ATTENDING PHYSICIAN:  Arron Barkley D.O.



HISTORY OF PRESENT ILLNESS:  _____ in the chart.  The patient is a

69-year-old male patient.  The patient was brought into the hospital due

to confusion, disorganized, agitation, and for these reasons, he was

referred for psychotherapeutic services.  He was also altered in his

mental status _____.  The patient is confused, disorganized, helpless, and

poor historian.  He has no logical or viable plan for safety or self care.

The patient has been living in [facility], apparently

_____ taken to the hospital for treatment.  At this time, there is no

indication of auditory or visual hallucinations.  There is no indication

of suicidal or homicidal thoughts of ideation, an extensive review of

records _____.  The patient is altered in his mental status, confused.



PAST MEDICAL HISTORY:  The patient has COPD and diabetes.



ALLERGIES:  The patient has no known drug allergies.



SUBSTANCE ABUSE HISTORY:  There is no indication of alcohol use, illicit

substance use, or smoking cigarettes.



PSYCHIATRIC HISTORY:  The patient has a history of psychiatric history.

The patient has a history of depression.



SOCIAL HISTORY:  The patient is a 69-year-old male, the patient is from

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

[employer].



MENTAL STATUS EXAMINATION:  He is alert and oriented to person.  His mood

is depressed.  Affect blunted.  Thought process, disorganized with poor

attention and concentration.  Poor insight, judgment, impulse control.



DIAGNOSIS:  Rule out major depressive disorder, recurrent moderate without

psychotic features.



PLAN:  Provide the patient with,



1. Reality orientation, which is focused on improving cognitive function

of the patient, who is very confused and disorganized.  _____ to person,

place, time, and situation.

2. Provided the patient with supportive psychotherapy, encouraging the

patient to communicate and articulate thoughts utilizing positive

communication skills.  _____.  At this time, _____ is a poor historian and

_____.  Plan is to maintain medication compliance with positive coping

skills and stabilizing the thoughts and behavior.



PLAN:  This clinician has reviewed the patient's chart and discussed

treatment with treatment team. Psychotherapy services for the patient

provided is 45 minutes.









  ______________________________________________

  Qi Watson PsyD.





DR:  Hubert

D:  05/06/2020 21:10

T:  05/07/2020 04:30

JOB#:  [id number]

CC:

## 2020-05-07 NOTE — PROGRESS NOTE
DATE:  05/04/2020

NOTE:  INCOMPLETE DICTATION



PSYCHOTHERAPY CONSULTATION PROGRESS NOTE



TREATING ATTENDING PHYSICIAN:  Arron Barkley D.O.



HISTORY OF PRESENT ILLNESS:  This is a 69-year-old male patient who was

brought to the hospital for altered mental status.  The patient was

referred for psychotherapeutic services as a result.  The patient is

confused.  Thought process is disorganized.









  ______________________________________________

  Qi Watson PsyD.





DR:  Hubert

D:  05/06/2020 21:02

T:  05/07/2020 03:08

JOB#:  [id number]

CC:

## 2020-05-07 NOTE — INFECTIOUS DISEASES PROG NOTE
Assessment/Plan


Assessment/Plan





Assessment:


Septic shock-SP





Fever; improving


Mild leukocytosis, increased, now improving (s/p steroids)


   -5/3 u/a wbc 15-20, nit neg, leuk +1; ucx p


          Bcx NTD


   -u/a neg


   -Bcx Neg





Probable PNA- 2ry to COVID19 (from NH with confirmed cases)


Acute hypoxic respiratory failure- was on NC, now on NRB- increasing FIo2 

requiremetns


    -5/5 CXR:  Bilateral interstitial and airspace infiltrates are again 

demonstrated.Appearance is overall unchanged. There may be a small left pleural 

effusion,unchange


   -5/4 CRP 41.6, ferritin 1468


  -5/3 CXR: .  Worsening bilateral interstitial and airspace opacities. 

Probable small left pleural effusion.  Difficult to evaluate the right 

costophrenic angle due to overlying pacemaker.


  -4/30 CXR: Slightly improved bilateral interstitial and airspace infiltrates 

versus edema


  -4/28 CXR: Bilateral mid and lower lung interstitial disease, new since prior 

study.Possible developing left pleural effusion


        Ferritn >2000, CRP 22.3


  -4/24 SARS-COV2 PCR +


  -CXR: Left basilar atelectasis and/or infiltrate


 


MELVI, improving





 HTN


cadiomyopathy s/p AICD


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


seizure disorder


NH resident ([facility]) 








Plan:


-Continue empiric Zosyn #5/5 and add IV Vancomycin #4/5


   - SPSolumedrol 40mg IV 12hrs x 5days (5/4-5/6); dc'ed by pulmonologist - (

day 10 of illness and worsening; around this time is seen the worsening of 

COVID19 pts mainly driven by inflammatory response) 


   -4/29 SP Cefepime #6


   -4/27 SP IV Vancomycin #4  





-f/u cx


-Monitor CBC/CMP, temperatures


-COVID 19 precautions


-clarify goals of care


-aspiration precautions


-inflamatory markers


-f/u cx


-CXR am 





Thank you for consulting [facility]. Will continue to follow along with 

you.





Discussed with RN,





Subjective


Allergies:  


Coded Allergies:  


     No Known Allergies (Unverified , 12/15/14)


Subjective


afebrile >48hrs


on NRB now


wbc improving





Objective


Vital Signs





Last 24 Hour Vital Signs








  Date Time  Temp Pulse Resp B/P (MAP) Pulse Ox O2 Delivery O2 Flow Rate FiO2


 


5/7/20 12:00 98.2 101 24 151/93 (112) 94   


 


5/7/20 09:00    140/87    


 


5/7/20 09:00  92  140/87    


 


5/7/20 09:00      Non-Rebreather  


 


5/7/20 08:00 98.2 92 24 140/87 (104) 94   


 


5/7/20 04:04 98.6 98  138/92 (107)    


 


5/6/20 23:52 98.9 103 17 141/94 (110) 93   


 


5/6/20 21:06  82  148/72    


 


5/6/20 20:16      Non-Rebreather  


 


5/6/20 20:00     92 Non-Rebreather 15.0 100


 


5/6/20 20:00 99.4 100 20 139/100 (113) 94   


 


5/6/20 16:00 97.8 82 18 148/72 (97) 95   








Height (Feet):  5


Height (Inches):  8.00


Weight (Pounds):  178


Objective


 not examined to limit COVID19 exposure





Laboratory Tests








Test


  5/7/20


06:00 5/7/20


09:00


 


White Blood Count


  13.2 K/UL


(4.8-10.8)  H 


 


 


Red Blood Count


  3.47 M/UL


(4.70-6.10)  L 


 


 


Hemoglobin


  9.9 G/DL


(14.2-18.0)  L 


 


 


Hematocrit


  30.3 %


(42.0-52.0)  L 


 


 


Mean Corpuscular Volume 87 FL (80-99)   


 


Mean Corpuscular Hemoglobin


  28.6 PG


(27.0-31.0) 


 


 


Mean Corpuscular Hemoglobin


Concent 32.8 G/DL


(32.0-36.0) 


 


 


Red Cell Distribution Width


  11.8 %


(11.6-14.8) 


 


 


Platelet Count


  101 K/UL


(150-450)  L 


 


 


Mean Platelet Volume


  7.7 FL


(6.5-10.1) 


 


 


Neutrophils (%) (Auto)


  % (45.0-75.0)


  


 


 


Lymphocytes (%) (Auto)


  % (20.0-45.0)


  


 


 


Monocytes (%) (Auto)  % (1.0-10.0)   


 


Eosinophils (%) (Auto)  % (0.0-3.0)   


 


Basophils (%) (Auto)  % (0.0-2.0)   


 


Differential Total Cells


Counted 100  


  


 


 


Neutrophils % (Manual) 92 % (45-75)  H 


 


Lymphocytes % (Manual) 6 % (20-45)  L 


 


Monocytes % (Manual) 2 % (1-10)   


 


Eosinophils % (Manual) 0 % (0-3)   


 


Basophils % (Manual) 0 % (0-2)   


 


Band Neutrophils 0 % (0-8)   


 


Platelet Estimate Decreased  L 


 


Platelet Morphology Normal   


 


Hypochromasia 2+   


 


Anisocytosis 1+   


 


Sodium Level


  148 MMOL/L


(136-145)  H 


 


 


Potassium Level


  3.3 MMOL/L


(3.5-5.1)  L 


 


 


Chloride Level


  111 MMOL/L


()  H 


 


 


Carbon Dioxide Level


  29 MMOL/L


(21-32) 


 


 


Anion Gap


  8 mmol/L


(5-15) 


 


 


Blood Urea Nitrogen


  17 mg/dL


(7-18) 


 


 


Creatinine


  0.8 MG/DL


(0.55-1.30) 


 


 


Estimat Glomerular Filtration


Rate > 60 mL/min


(>60) 


 


 


Glucose Level


  180 MG/DL


()  H 


 


 


Calcium Level


  7.8 MG/DL


(8.5-10.1)  L 


 


 


Vancomycin Level Trough


  


  15.3 ug/mL


(5.0-12.0)  H











Current Medications








 Medications


  (Trade)  Dose


 Ordered  Sig/Aarti


 Route


 PRN Reason  Start Time


 Stop Time Status Last Admin


Dose Admin


 


 Acetaminophen


  (Tylenol)  650 mg  Q6H  PRN


 ORAL


 Temp >100.5  4/29/20 14:45


 5/26/20 08:44  5/2/20 21:01


 


 


 Acetaminophen


  (Tylenol)  650 mg  Q6H  PRN


 RECTAL


 Temp >100.5  5/3/20 10:15


 6/2/20 10:14  5/3/20 16:59


 


 


 Carvedilol


  (Coreg)  3.125 mg  EVERY 12  HOURS


 ORAL


   4/29/20 21:00


 5/29/20 20:59  5/7/20 09:00


 


 


 Chlorhexidine


 Gluconate


  (Gloria-Hex 2%)  1 applic  DAILY@2000


 TOPIC


   4/29/20 20:00


 7/24/20 19:59  5/6/20 21:06


 


 


 Furosemide


  (Lasix)  40 mg  DAILY


 ORAL


   5/7/20 09:00


 6/6/20 08:59  5/7/20 09:00


 


 


 Gabapentin


  (Neurontin)  300 mg  Q8HR


 ORAL


   4/29/20 22:00


 5/25/20 00:00  5/7/20 04:55


 


 


 Levetiracetam


  (Keppra)  1,000 mg  Q12HR


 ORAL


   4/29/20 21:00


 5/25/20 00:00  5/7/20 09:00


 


 


 Lisinopril


  (ZestriL)  10 mg  DAILY


 ORAL


   5/7/20 09:00


 6/6/20 08:59  5/7/20 09:00


 


 


 Magnesium Oxide


  (Mag-Ox 400mg)  400 mg  THREE TIMES A  DAY


 ORAL


   5/2/20 18:00


 6/1/20 17:59  5/7/20 09:00


 


 


 Memantine


  (Namenda)  5 mg  DAILY


 ORAL


   4/30/20 09:00


 5/25/20 16:59  5/7/20 09:00


 


 


 Piperacillin Sod/


 Tazobactam Sod


 3.375 gm/Sodium


 Chloride  110 ml @ 


 27.5 mls/hr  EVERY 8  HOURS


 IVPB


   5/3/20 14:00


 5/8/20 13:59  5/7/20 04:56


 


 


 Potassium Chloride


  (K-Dur)  40 meq  BID


 ORAL


   5/6/20 18:00


 7/31/20 10:14  5/7/20 09:00


 


 


 Vancomycin HCl


  (Vanco rx to


 dose)  1 ea  DAILY  PRN


 MISC


 Per rx protocol  5/4/20 12:30


 6/3/20 12:29   


 


 


 Vancomycin HCl 1


 gm/Dextrose  275 ml @ 


 183.708


 mls/hr  Q8H


 IVPB


   5/6/20 10:00


 5/11/20 09:59  5/7/20 10:00


 

















Naila Lindsay M.D. May 7, 2020 13:36

## 2020-05-07 NOTE — CARDIAC ELECTROPHYSIOLOGY PN
Assessment/Plan


Assessment/Plan


1. S/P Shock  due to cardiomyopathy EF 40 and sepsis.  BNP is 736.  


    On Abx by Dr. Lindsay  


    


2. CHF EF 40%.   On Coreg 3.125 bid ,Lasix 40 po daily and Lisinopril 10 daily





3. Status post right-sided TONY ICD.    





4. History of hypertension  


5. History of CVA with residual weakness.


6. COVID-19 PNA, off the vent 


7. Hypernatremia.  





ODILIA RN





Subjective


Subjective


 In Covid isolation. No new events





Objective





Last 24 Hour Vital Signs








  Date Time  Temp Pulse Resp B/P (MAP) Pulse Ox O2 Delivery O2 Flow Rate FiO2


 


5/7/20 12:00 98.2 101 24 151/93 (112) 94   


 


5/7/20 09:00    140/87    


 


5/7/20 09:00  92  140/87    


 


5/7/20 09:00      Non-Rebreather  


 


5/7/20 08:00 98.2 92 24 140/87 (104) 94   


 


5/7/20 04:04 98.6 98  138/92 (107)    


 


5/6/20 23:52 98.9 103 17 141/94 (110) 93   


 


5/6/20 21:06  82  148/72    


 


5/6/20 20:16      Non-Rebreather  


 


5/6/20 20:00     92 Non-Rebreather 15.0 100


 


5/6/20 20:00 99.4 100 20 139/100 (113) 94   


 


5/6/20 16:00 97.8 82 18 148/72 (97) 95   

















Intake and Output  


 


 5/6/20 5/7/20





 19:00 07:00


 


Intake Total  640.000 ml


 


Output Total  600 ml


 


Balance  40.000 ml


 


  


 


Intake Oral  200 ml


 


IV Total  440.000 ml


 


Output Urine Total  600 ml











Laboratory Tests








Test


  5/7/20


06:00 5/7/20


09:00


 


White Blood Count


  13.2 K/UL


(4.8-10.8)  H 


 


 


Red Blood Count


  3.47 M/UL


(4.70-6.10)  L 


 


 


Hemoglobin


  9.9 G/DL


(14.2-18.0)  L 


 


 


Hematocrit


  30.3 %


(42.0-52.0)  L 


 


 


Mean Corpuscular Volume 87 FL (80-99)   


 


Mean Corpuscular Hemoglobin


  28.6 PG


(27.0-31.0) 


 


 


Mean Corpuscular Hemoglobin


Concent 32.8 G/DL


(32.0-36.0) 


 


 


Red Cell Distribution Width


  11.8 %


(11.6-14.8) 


 


 


Platelet Count


  101 K/UL


(150-450)  L 


 


 


Mean Platelet Volume


  7.7 FL


(6.5-10.1) 


 


 


Neutrophils (%) (Auto)


  % (45.0-75.0)


  


 


 


Lymphocytes (%) (Auto)


  % (20.0-45.0)


  


 


 


Monocytes (%) (Auto)  % (1.0-10.0)   


 


Eosinophils (%) (Auto)  % (0.0-3.0)   


 


Basophils (%) (Auto)  % (0.0-2.0)   


 


Differential Total Cells


Counted 100  


  


 


 


Neutrophils % (Manual) 92 % (45-75)  H 


 


Lymphocytes % (Manual) 6 % (20-45)  L 


 


Monocytes % (Manual) 2 % (1-10)   


 


Eosinophils % (Manual) 0 % (0-3)   


 


Basophils % (Manual) 0 % (0-2)   


 


Band Neutrophils 0 % (0-8)   


 


Platelet Estimate Decreased  L 


 


Platelet Morphology Normal   


 


Hypochromasia 2+   


 


Anisocytosis 1+   


 


Sodium Level


  148 MMOL/L


(136-145)  H 


 


 


Potassium Level


  3.3 MMOL/L


(3.5-5.1)  L 


 


 


Chloride Level


  111 MMOL/L


()  H 


 


 


Carbon Dioxide Level


  29 MMOL/L


(21-32) 


 


 


Anion Gap


  8 mmol/L


(5-15) 


 


 


Blood Urea Nitrogen


  17 mg/dL


(7-18) 


 


 


Creatinine


  0.8 MG/DL


(0.55-1.30) 


 


 


Estimat Glomerular Filtration


Rate > 60 mL/min


(>60) 


 


 


Glucose Level


  180 MG/DL


()  H 


 


 


Calcium Level


  7.8 MG/DL


(8.5-10.1)  L 


 


 


Vancomycin Level Trough


  


  15.3 ug/mL


(5.0-12.0)  H








Objective





HEAD AND NECK:  Mild JVD.


LUNGS:  Decreased breath sounds.


CARDIOVASCULAR:  Regular S1 and S2 with no gallop.


ABDOMEN:  Soft.


EXTREMITIES:  1+ pitting edema.  Defibrillator is in right subclavian.











Tommy Otero MD May 7, 2020 13:29

## 2020-05-07 NOTE — NEPHROLOGY PROGRESS NOTE
Assessment/Plan


Problem List:  


(1) Electrolyte imbalance


(2) Suspected COVID-19 virus infection


(3) Hypotension


Assessment:  Resolved





(4) Sepsis


Assessment





Patient's current problem from renal standpoint of view is hypokalemia and 

other electrolyte imbalances





His other conditions:


Patient presented with acute renal failure however it is now resolved


Patient presented with septic shock was on pressors however now resolved


Sepsis leukocytosis improving


Patient has COVID-19 pneumonia


Hypertension


Cardiomyopathy status post AICD


Previous CVA with right residual weakness


Seizure disorders


Plan





Stable from renal standpoint of view





Stop IV fluids


Start oral potassium, magnesium, phosphorus supplements


We will continue to monitor electrolytes and chemistries


Continue per consultants


Per orders





Subjective


ROS Limited/Unobtainable:  No


Constitutional:  Reports: malaise, weakness





Objective


Objective





Last 24 Hour Vital Signs








  Date Time  Temp Pulse Resp B/P (MAP) Pulse Ox O2 Delivery O2 Flow Rate FiO2


 


5/7/20 09:00    140/87    


 


5/7/20 09:00  92  140/87    


 


5/7/20 09:00      Non-Rebreather  


 


5/7/20 08:00 98.2 92 24 140/87 (104) 94   


 


5/7/20 04:04 98.6 98  138/92 (107)    


 


5/6/20 23:52 98.9 103 17 141/94 (110) 93   


 


5/6/20 21:06  82  148/72    


 


5/6/20 20:16      Non-Rebreather  


 


5/6/20 20:00     92 Non-Rebreather 15.0 100


 


5/6/20 20:00 99.4 100 20 139/100 (113) 94   


 


5/6/20 16:00 97.8 82 18 148/72 (97) 95   

















Intake and Output  


 


 5/6/20 5/7/20





 19:00 07:00


 


Intake Total  640.000 ml


 


Output Total  600 ml


 


Balance  40.000 ml


 


  


 


Intake Oral  200 ml


 


IV Total  440.000 ml


 


Output Urine Total  600 ml








Laboratory Tests


5/7/20 06:00: 


White Blood Count 13.2H, Red Blood Count 3.47L, Hemoglobin 9.9L, Hematocrit 

30.3L, Mean Corpuscular Volume 87, Mean Corpuscular Hemoglobin 28.6, Mean 

Corpuscular Hemoglobin Concent 32.8, Red Cell Distribution Width 11.8, Platelet 

Count 101L, Mean Platelet Volume 7.7, Neutrophils (%) (Auto) , Lymphocytes (%) (

Auto) , Monocytes (%) (Auto) , Eosinophils (%) (Auto) , Basophils (%) (Auto) , 

Differential Total Cells Counted 100, Neutrophils % (Manual) 92H, Lymphocytes % 

(Manual) 6L, Monocytes % (Manual) 2, Eosinophils % (Manual) 0, Basophils % (

Manual) 0, Band Neutrophils 0, Platelet Estimate DecreasedL, Platelet 

Morphology Normal, Hypochromasia 2+, Anisocytosis 1+, Sodium Level 148H, 

Potassium Level 3.3L, Chloride Level 111H, Carbon Dioxide Level 29, Anion Gap 8

, Blood Urea Nitrogen 17, Creatinine 0.8, Estimat Glomerular Filtration Rate > 

60, Glucose Level 180H, Calcium Level 7.8L


5/7/20 09:00: Vancomycin Level Trough 15.3H


Height (Feet):  5


Height (Inches):  8.00


Weight (Pounds):  178


General Appearance:  no apparent distress, lethargic


Cardiovascular:  tachycardia


Respiratory/Chest:  decreased breath sounds


Abdomen:  soft


Objective


No change











Miguel Ángel Kendall MD May 7, 2020 12:37

## 2020-05-07 NOTE — NUR
NURSE NOTES:



Received patient awake, non-verbal, on non-rebreather O2 mask, impulsive, on bilateral soft 
wrist restraints.

## 2020-05-08 VITALS — SYSTOLIC BLOOD PRESSURE: 136 MMHG | DIASTOLIC BLOOD PRESSURE: 92 MMHG

## 2020-05-08 VITALS — DIASTOLIC BLOOD PRESSURE: 98 MMHG | SYSTOLIC BLOOD PRESSURE: 135 MMHG

## 2020-05-08 VITALS — SYSTOLIC BLOOD PRESSURE: 125 MMHG | DIASTOLIC BLOOD PRESSURE: 77 MMHG

## 2020-05-08 VITALS — SYSTOLIC BLOOD PRESSURE: 142 MMHG | DIASTOLIC BLOOD PRESSURE: 96 MMHG

## 2020-05-08 VITALS — SYSTOLIC BLOOD PRESSURE: 139 MMHG | DIASTOLIC BLOOD PRESSURE: 92 MMHG

## 2020-05-08 VITALS — DIASTOLIC BLOOD PRESSURE: 90 MMHG | SYSTOLIC BLOOD PRESSURE: 149 MMHG

## 2020-05-08 LAB
ADD MANUAL DIFF: YES
ALBUMIN SERPL-MCNC: 1.5 G/DL (ref 3.4–5)
ALP SERPL-CCNC: 71 U/L (ref 46–116)
ALT SERPL-CCNC: 20 U/L (ref 12–78)
ANION GAP SERPL CALC-SCNC: 9 MMOL/L (ref 5–15)
AST SERPL-CCNC: 40 U/L (ref 15–37)
BILIRUB DIRECT SERPL-MCNC: < 0.1 MG/DL (ref 0–0.3)
BILIRUB SERPL-MCNC: 0.9 MG/DL (ref 0.2–1)
BUN SERPL-MCNC: 14 MG/DL (ref 7–18)
CALCIUM SERPL-MCNC: 8 MG/DL (ref 8.5–10.1)
CHLORIDE SERPL-SCNC: 111 MMOL/L (ref 98–107)
CO2 SERPL-SCNC: 29 MMOL/L (ref 21–32)
CREAT SERPL-MCNC: 0.8 MG/DL (ref 0.55–1.3)
ERYTHROCYTE [DISTWIDTH] IN BLOOD BY AUTOMATED COUNT: 12 % (ref 11.6–14.8)
FERRITIN SERPL-MCNC: 1184 NG/ML (ref 8–388)
HCT VFR BLD CALC: 29.1 % (ref 42–52)
HGB BLD-MCNC: 9.8 G/DL (ref 14.2–18)
LDH SERPL L TO P-CCNC: 609 U/L (ref 81–234)
MCV RBC AUTO: 87 FL (ref 80–99)
PHOSPHATE SERPL-MCNC: 2.5 MG/DL (ref 2.5–4.9)
PLATELET # BLD: 127 K/UL (ref 150–450)
POTASSIUM SERPL-SCNC: 3.8 MMOL/L (ref 3.5–5.1)
RBC # BLD AUTO: 3.36 M/UL (ref 4.7–6.1)
SODIUM SERPL-SCNC: 149 MMOL/L (ref 136–145)
WBC # BLD AUTO: 14.7 K/UL (ref 4.8–10.8)

## 2020-05-08 RX ADMIN — MAGNESIUM OXIDE TAB 400 MG (241.3 MG ELEMENTAL MG) SCH MG: 400 (241.3 MG) TAB at 12:08

## 2020-05-08 RX ADMIN — DEXTROSE MONOHYDRATE SCH MLS/HR: 50 INJECTION, SOLUTION INTRAVENOUS at 04:15

## 2020-05-08 RX ADMIN — FUROSEMIDE SCH MG: 40 TABLET ORAL at 09:03

## 2020-05-08 RX ADMIN — SODIUM CHLORIDE SCH MLS/HR: 9 INJECTION, SOLUTION INTRAVENOUS at 09:10

## 2020-05-08 RX ADMIN — MAGNESIUM OXIDE TAB 400 MG (241.3 MG ELEMENTAL MG) SCH MG: 400 (241.3 MG) TAB at 17:08

## 2020-05-08 RX ADMIN — CHLORHEXIDINE GLUCONATE SCH APPLIC: 213 SOLUTION TOPICAL at 21:32

## 2020-05-08 RX ADMIN — SODIUM CHLORIDE SCH MLS/HR: 9 INJECTION, SOLUTION INTRAVENOUS at 01:56

## 2020-05-08 RX ADMIN — SODIUM CHLORIDE SCH MLS/HR: 9 INJECTION, SOLUTION INTRAVENOUS at 17:12

## 2020-05-08 RX ADMIN — LISINOPRIL SCH MG: 10 TABLET ORAL at 09:09

## 2020-05-08 RX ADMIN — MAGNESIUM OXIDE TAB 400 MG (241.3 MG ELEMENTAL MG) SCH MG: 400 (241.3 MG) TAB at 09:03

## 2020-05-08 NOTE — NEPHROLOGY PROGRESS NOTE
Assessment/Plan


Problem List:  


(1) Electrolyte imbalance


(2) Suspected COVID-19 virus infection


(3) Hypotension


Assessment:  Resolved





(4) Sepsis


Assessment





Patient's current problem from renal standpoint of view is hypokalemia and 

other electrolyte imbalances





His other conditions:


Patient presented with acute renal failure however it is now resolved


Patient presented with septic shock was on pressors however now resolved


Sepsis leukocytosis improving


Patient has COVID-19 pneumonia


Hypertension


Cardiomyopathy status post AICD


Previous CVA with right residual weakness


Seizure disorders


Plan


Mag sulfate IV today and as needed


Stable from renal standpoint of view





Stop IV fluids


Start oral potassium, magnesium, phosphorus supplements


We will continue to monitor electrolytes and chemistries


Continue per consultants


Per orders





Subjective


ROS Limited/Unobtainable:  No


Constitutional:  Reports: malaise, weakness





Objective


Objective





Last 24 Hour Vital Signs








  Date Time  Temp Pulse Resp B/P (MAP) Pulse Ox O2 Delivery O2 Flow Rate FiO2


 


5/8/20 09:09    136/92    


 


5/8/20 09:09  96  136/92    


 


5/8/20 09:00      Non-Rebreather  


 


5/8/20 08:00 99.6 96 24 136/92 (107) 96   


 


5/8/20 07:58     94 Non-Rebreather 15.0 100


 


5/8/20 04:00 98.0 102 40 142/96 (111) 93   


 


5/8/20 00:06 98.7 105 32 139/92 (108) 90   


 


5/7/20 20:46  110  141/101    


 


5/7/20 20:39 98.9 110 32 141/101 (114) 87   


 


5/7/20 20:12      Non-Rebreather  


 


5/7/20 19:41     93 Non-Rebreather 15.0 100


 


5/7/20 16:00 97.7 94 24 137/94 (108) 92   


 


5/7/20 12:00 98.2 101 24 151/93 (112) 94   

















Intake and Output  


 


 5/7/20 5/8/20





 19:00 07:00


 


Intake Total 240 ml 467.500 ml


 


Output Total 1100 ml 800 ml


 


Balance -860 ml -332.500 ml


 


  


 


Intake Oral 240 ml 


 


IV Total  467.500 ml


 


Output Urine Total 1100 ml 800 ml


 


# Bowel Movements 1 1








Laboratory Tests


5/8/20 05:40: 


White Blood Count 14.7H, Red Blood Count 3.36L, Hemoglobin 9.8L, Hematocrit 

29.1L, Mean Corpuscular Volume 87, Mean Corpuscular Hemoglobin 29.3, Mean 

Corpuscular Hemoglobin Concent 33.7, Red Cell Distribution Width 12.0, Platelet 

Count 127L, Mean Platelet Volume 8.3, Neutrophils (%) (Auto) , Lymphocytes (%) (

Auto) , Monocytes (%) (Auto) , Eosinophils (%) (Auto) , Basophils (%) (Auto) , 

Differential Total Cells Counted 100, Neutrophils % (Manual) 87H, Lymphocytes % 

(Manual) 8L, Monocytes % (Manual) 4, Eosinophils % (Manual) 1, Basophils % (

Manual) 0, Band Neutrophils 0, Platelet Estimate DecreasedL, Platelet 

Morphology Normal, Hypochromasia 2+, Anisocytosis 1+, Sodium Level 149H, 

Potassium Level 3.8, Chloride Level 111H, Carbon Dioxide Level 29, Anion Gap 9, 

Blood Urea Nitrogen 14, Creatinine 0.8, Estimat Glomerular Filtration Rate > 60

, Glucose Level 186H, Calcium Level 8.0L, Phosphorus Level 2.5, Magnesium Level 

1.7L, Ferritin 1184H, Total Bilirubin 0.9, Direct Bilirubin < 0.1, Aspartate 

Amino Transf (AST/SGOT) 40H, Alanine Aminotransferase (ALT/SGPT) 20, Alkaline 

Phosphatase 71, Lactate Dehydrogenase 609H, C-Reactive Protein, Quantitative [

Pending], Total Protein 5.3L, Albumin 1.5L


Height (Feet):  5


Height (Inches):  8.00


Weight (Pounds):  177


General Appearance:  no apparent distress


Cardiovascular:  tachycardia


Respiratory/Chest:  decreased breath sounds


Abdomen:  distended


Objective


No change











Miguel Ángel Kendall MD May 8, 2020 12:01

## 2020-05-08 NOTE — NUR
RD ASSESSMENT & RECOMMENDATIONS

SEE CARE ACTIVITY FOR COMPLETE ASSESSMENT



DAILY ESTIMATED NEEDS:

Needs based on Cardiac 74kg abw 

25-30  kcals/kg 

6616-8130  total kcals

1-1.2  g protein/kg

74-89  g total protein 

25-30  mL/kg

4524-6511  total fluid mLs



NUTRITION DIAGNOSIS:

Altered nutrition related lab values r/t clinical status as evidenced by

elev BG (174-264), febrile (tmax 99.6)



CURRENT DIET: CLD    



PO DIET RECOMMENDATIONS:

Advance as able to liberalized Regular / texture per SLP  



ENTERAL NUTRITION RECOMMENDATIONS:

CONSULT RD FOR NON ORAL TF RECS OF PART OF POC  



ADDITIONAL RECOMMENDATIONS:

1) Check A1C -> pt w/elev BG (180-186); a1c 7.4 

2) Add Ensure Clear w/ CLD-> advance diet as able  

3) Monitor ICU status, repsiratory status-> now med surg 

4) Obtain a calibrated bed scale wt 

5) Rec SLP eval prior to advancing diet  

    -> pt now CLD from adm 

    -> Consult RD if non oral feeds are part of POC

## 2020-05-08 NOTE — GENERAL PROGRESS NOTE
Assessment/Plan


Problem List:  


(1) Suspected COVID-19 virus infection


ICD Codes:  Z20.828 - Contact with and (suspected) exposure to other viral 

communicable diseases


SNOMED:  772175338


(2) Anemia


ICD Codes:  D64.9 - Anemia, unspecified


SNOMED:  886881525


(3) Weak


ICD Codes:  R53.1 - Weakness


SNOMED:  22954021


(4) COPD (chronic obstructive pulmonary disease)


ICD Codes:  J44.9 - Chronic obstructive pulmonary disease, unspecified


SNOMED:  25437960


(5) Diabetes


ICD Codes:  E11.9 - Type 2 diabetes mellitus without complications


SNOMED:  83651864


(6) Epileptic seizure, generalized


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 

not intractable, without status epilepticus


SNOMED:  72029785


(7) Altered mental status


ICD Codes:  R41.82 - Altered mental status, unspecified


SNOMED:  131429904


Qualifiers:  


   Qualified Codes:  R41.82 - Altered mental status, unspecified


(8) Hypotension


ICD Codes:  I95.9 - Hypotension, unspecified


SNOMED:  04225190


Qualifiers:  


   Qualified Codes:  I95.9 - Hypotension, unspecified


(9) UTI (urinary tract infection)


ICD Codes:  N39.0 - Urinary tract infection, site not specified


SNOMED:  13730771


Status:  unchanged


Assessment/Plan:


o2 pulm tx abx pt diet cbc bmp am





Subjective


Constitutional:  Reports: weakness


Allergies:  


Coded Allergies:  


     No Known Allergies (Unverified , 12/15/14)


All Systems:  reviewed and negative except above


Subjective


sleepy calm in bed





Objective





Last 24 Hour Vital Signs








  Date Time  Temp Pulse Resp B/P (MAP) Pulse Ox O2 Delivery O2 Flow Rate FiO2


 


5/8/20 09:09    136/92    


 


5/8/20 09:09  96  136/92    


 


5/8/20 07:58     94 Non-Rebreather 15.0 100


 


5/8/20 04:00 98.0 102 40 142/96 (111) 93   


 


5/8/20 00:06 98.7 105 32 139/92 (108) 90   


 


5/7/20 20:46  110  141/101    


 


5/7/20 20:39 98.9 110 32 141/101 (114) 87   


 


5/7/20 20:12      Non-Rebreather  


 


5/7/20 19:41     93 Non-Rebreather 15.0 100


 


5/7/20 16:00 97.7 94 24 137/94 (108) 92   


 


5/7/20 12:00 98.2 101 24 151/93 (112) 94   

















Intake and Output  


 


 5/7/20 5/8/20





 19:00 07:00


 


Intake Total 240 ml 467.500 ml


 


Output Total 1100 ml 800 ml


 


Balance -860 ml -332.500 ml


 


  


 


Intake Oral 240 ml 


 


IV Total  467.500 ml


 


Output Urine Total 1100 ml 800 ml


 


# Bowel Movements 1 1








Laboratory Tests


5/8/20 05:40: 


White Blood Count 14.7H, Red Blood Count 3.36L, Hemoglobin 9.8L, Hematocrit 

29.1L, Mean Corpuscular Volume 87, Mean Corpuscular Hemoglobin 29.3, Mean 

Corpuscular Hemoglobin Concent 33.7, Red Cell Distribution Width 12.0, Platelet 

Count 127L, Mean Platelet Volume 8.3, Neutrophils (%) (Auto) , Lymphocytes (%) (

Auto) , Monocytes (%) (Auto) , Eosinophils (%) (Auto) , Basophils (%) (Auto) , 

Neutrophils % (Manual) [Pending], Lymphocytes % (Manual) [Pending], Platelet 

Estimate [Pending], Platelet Morphology [Pending], Sodium Level 149H, Potassium 

Level 3.8, Chloride Level 111H, Carbon Dioxide Level 29, Anion Gap 9, Blood 

Urea Nitrogen 14, Creatinine 0.8, Estimat Glomerular Filtration Rate > 60, 

Glucose Level 186H, Calcium Level 8.0L, Phosphorus Level 2.5, Magnesium Level 

1.7L, Ferritin 1184H, Total Bilirubin 0.9, Direct Bilirubin < 0.1, Aspartate 

Amino Transf (AST/SGOT) 40H, Alanine Aminotransferase (ALT/SGPT) 20, Alkaline 

Phosphatase 71, Lactate Dehydrogenase 609H, C-Reactive Protein, Quantitative [

Pending], Total Protein 5.3L, Albumin 1.5L


Height (Feet):  5


Height (Inches):  8.00


Weight (Pounds):  177


General Appearance:  lethargic


EENT:  normal ENT inspection


Neck:  normal alignment


Cardiovascular:  normal rate, regular rhythm


Respiratory/Chest:  no respiratory distress, no accessory muscle use


Extremities:  normal inspection


Skin:  normal pigmentation











Arron Barkley DO May 8, 2020 09:43

## 2020-05-08 NOTE — DIAGNOSTIC IMAGING REPORT
Indication: Shortness of breath

 

Technique: One view of the chest

 

Comparison: 5/5/2020

 

Findings: Bilateral diffuse infiltrates appear unchanged. The heart is borderline

enlarged. Right chest AICD again demonstrated.

 

Impression: Unchanged bilateral infiltrates, likely pneumonia, since prior exam of 3

days earlier.

## 2020-05-08 NOTE — PROGRESS NOTE
DATE:  05/08/2020

SUBJECTIVE:  A 69-year-old male with altered mental status.  He is very

confused, disorganized, mood labile.  He has got no logical plan for his

own self-care, and got feelings of helplessness, hopelessness, low energy,

poor appetite, and loss of interest in activity.  That is why, he does

require acute psychiatric inpatient treatment at this time.



MENTAL STATUS EXAMINATION:  This is a 69-year-old male.  Appearance is

disheveled.  Attitude, irritable and agitated.  Affect, guarded and

restricted.  Intellect poor.  Mood depressed and anxious.  Motor activity,

psychomotor agitation.  Attention span is poor.  Orientation x2.  Speech

is pressured.  Thought process, disorganized and illogical.  Insight and

judgment are poor.



DIAGNOSIS:  Major depressive disorder, mild, recurrent, with psychotic

features.



PLAN:  Continue treatment with psychotropic medications to stabilize his

mood.  Twenty minutes of insight-oriented psychotherapy to help him

recognize his psychiatric illness and help him convert his _____ have

recognition of his psychiatric illness so that he has better impulse

control and recognition on the unit and better behavior with improved

mood.  Chart reviewed and discussed with staff.  Seen and assessed in his

room.









  ______________________________________________

  Demarcus Love M.D.





:  [facility]

D:  05/08/2020 08:32

T:  05/08/2020 18:39

JOB#:  [id number]

CC:

## 2020-05-08 NOTE — NUR
NURSE NOTES:

RECEIVED PATIENT FROM [facility]LLAD[facility]. PATIENT IS AWAKE, AAOX1, ON NON-BREATHER AT 15L, O2 SAT 
94%, NO ACUTE DISTRESS NOTED. PACEMAKER PRESENT. BILATERAL SOFT WRIST RESTRAINTS PRESENT, 
PULSES PRESENT, NO  REDNESS. PATIENT HAS A RIGHT FEMORAL TRIPLE LUMEN CATH, INTACT AND 
PATENT. DRESSING IS CLEAN AND INTACT. HERNADEZ CATH IN PLACE, DRAINING WELL, HERNADEZ ANCHOR 
PRESENT. BED IS LOCKED AND LOW, BED ALARMS ACTIVE, SIDE RAILS UP X2 AND CALL LIGHT IS WITHIN 
REACH. WILL CONTINUE TO MONITOR.

## 2020-05-08 NOTE — NUR
CASE MANAGEMENT:REVIEW



SI;****COVID-19 PNEUMONIA***

AC HYPOXIC RESPIRATORY FAILURE. SEPTIC SHOCK. 

99.6   108   24   149/90   94% 15 L NRB   FIO2 @ 100%

WBC 14.7         CA 8.0   MAG 1.7   FERRITIN 1184

AST 40      CRP 27.0   ALB 1.5



IS;LASIX IV Q12 HRS

MAG SULFATE IV ONCE

ZESTRIL PO QD

K-DUR PO BID

VANCOMYCIN IV Q8 HRS

ZOSYN IV Q8 HRS

MAG-OX PO TID

COREG PO Q12 HRS



****MED SURG STATUS****



DCP; FROM [facility]

## 2020-05-08 NOTE — NUR
NURSE NOTES:

Patient awake, non-verbal; on non Rebreather mast; Bilateral soft Wrist restrains in place, 
good circulation, and skin warm to touch; Triple lumen catheter on the Right Femoral; side 
rails padded for seizure percussion, side rails up x2, breaks engaged, bed at lowest 
position, bed alarm on; call light within reach; will keep monitoring.

## 2020-05-08 NOTE — INFECTIOUS DISEASES PROG NOTE
Assessment/Plan


Assessment/Plan





Assessment:


Septic shock-SP





Fever; improving


Mild leukocytosis, increased, now improving (s/p steroids)


   -5/3 u/a wbc 15-20, nit neg, leuk +1; ucx p


          Bcx NTD


   -u/a neg


   -Bcx Neg





Probable PNA- 2ry to COVID19 (from NH with confirmed cases)


Acute hypoxic respiratory failure- was on NC, now on NRB- increasing FIo2 

requiremetns


    -5/8 CXR: Unchanged bilateral infiltrates, likely pneumonia, since prior 

exam of 3 days earlier.


    -5/5 CXR:  Bilateral interstitial and airspace infiltrates are again 

demonstrated.Appearance is overall unchanged. There may be a small left pleural 

effusion,unchange


   -5/4 CRP 41.6, ferritin 1468


  -5/3 CXR: .  Worsening bilateral interstitial and airspace opacities. 

Probable small left pleural effusion.  Difficult to evaluate the right 

costophrenic angle due to overlying pacemaker.


  -4/30 CXR: Slightly improved bilateral interstitial and airspace infiltrates 

versus edema


  -4/28 CXR: Bilateral mid and lower lung interstitial disease, new since prior 

study.Possible developing left pleural effusion


        Ferritn >2000, CRP 22.3


  -4/24 SARS-COV2 PCR +


  -CXR: Left basilar atelectasis and/or infiltrate


 


MELVI, improving





 HTN


cadiomyopathy s/p AICD


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


seizure disorder


NH resident ([facility]) 








Plan:


-Continue empiric IV Vancomycin #4/5


   -5/7 SP ZOsyn #5


   - SPSolumedrol 40mg IV 12hrs x 5days (5/4-5/6); dc'ed by pulmonologist - (

day 10 of illness and worsening; around this time is seen the worsening of 

COVID19 pts mainly driven by inflammatory response) 


   -4/29 SP Cefepime #6


   -4/27 SP IV Vancomycin #4  





-f/u cx


-Monitor CBC/CMP, temperatures


-COVID 19 precautions


-clarify goals of care


-aspiration precautions


-inflamatory markers


-f/u cx


-CXR am 





Thank you for consulting [facility]. Will continue to follow along with 

you.





Discussed with RN,





Subjective


Allergies:  


Coded Allergies:  


     No Known Allergies (Unverified , 12/15/14)


Subjective


afebrile >48hrs


on NRB now





Objective


Vital Signs





Last 24 Hour Vital Signs








  Date Time  Temp Pulse Resp B/P (MAP) Pulse Ox O2 Delivery O2 Flow Rate FiO2


 


5/8/20 12:00 98.1 90 24 149/90 (109) 94   


 


5/8/20 09:09    136/92    


 


5/8/20 09:09  96  136/92    


 


5/8/20 09:00      Non-Rebreather  


 


5/8/20 08:00 99.6 96 24 136/92 (107) 96   


 


5/8/20 07:58     94 Non-Rebreather 15.0 100


 


5/8/20 04:00 98.0 102 40 142/96 (111) 93   


 


5/8/20 00:06 98.7 105 32 139/92 (108) 90   


 


5/7/20 20:46  110  141/101    


 


5/7/20 20:39 98.9 110 32 141/101 (114) 87   


 


5/7/20 20:12      Non-Rebreather  


 


5/7/20 19:41     93 Non-Rebreather 15.0 100


 


5/7/20 16:00 97.7 94 24 137/94 (108) 92   








Height (Feet):  5


Height (Inches):  8.00


Weight (Pounds):  177


Objective


 not examined to limit COVID19 exposure





Laboratory Tests








Test


  5/8/20


05:40


 


White Blood Count


  14.7 K/UL


(4.8-10.8)  H


 


Red Blood Count


  3.36 M/UL


(4.70-6.10)  L


 


Hemoglobin


  9.8 G/DL


(14.2-18.0)  L


 


Hematocrit


  29.1 %


(42.0-52.0)  L


 


Mean Corpuscular Volume 87 FL (80-99)  


 


Mean Corpuscular Hemoglobin


  29.3 PG


(27.0-31.0)


 


Mean Corpuscular Hemoglobin


Concent 33.7 G/DL


(32.0-36.0)


 


Red Cell Distribution Width


  12.0 %


(11.6-14.8)


 


Platelet Count


  127 K/UL


(150-450)  L


 


Mean Platelet Volume


  8.3 FL


(6.5-10.1)


 


Neutrophils (%) (Auto)


  % (45.0-75.0)


 


 


Lymphocytes (%) (Auto)


  % (20.0-45.0)


 


 


Monocytes (%) (Auto)  % (1.0-10.0)  


 


Eosinophils (%) (Auto)  % (0.0-3.0)  


 


Basophils (%) (Auto)  % (0.0-2.0)  


 


Differential Total Cells


Counted 100  


 


 


Neutrophils % (Manual) 87 % (45-75)  H


 


Lymphocytes % (Manual) 8 % (20-45)  L


 


Monocytes % (Manual) 4 % (1-10)  


 


Eosinophils % (Manual) 1 % (0-3)  


 


Basophils % (Manual) 0 % (0-2)  


 


Band Neutrophils 0 % (0-8)  


 


Platelet Estimate Decreased  L


 


Platelet Morphology Normal  


 


Hypochromasia 2+  


 


Anisocytosis 1+  


 


Sodium Level


  149 MMOL/L


(136-145)  H


 


Potassium Level


  3.8 MMOL/L


(3.5-5.1)


 


Chloride Level


  111 MMOL/L


()  H


 


Carbon Dioxide Level


  29 MMOL/L


(21-32)


 


Anion Gap


  9 mmol/L


(5-15)


 


Blood Urea Nitrogen


  14 mg/dL


(7-18)


 


Creatinine


  0.8 MG/DL


(0.55-1.30)


 


Estimat Glomerular Filtration


Rate > 60 mL/min


(>60)


 


Glucose Level


  186 MG/DL


()  H


 


Calcium Level


  8.0 MG/DL


(8.5-10.1)  L


 


Phosphorus Level


  2.5 MG/DL


(2.5-4.9)


 


Magnesium Level


  1.7 MG/DL


(1.8-2.4)  L


 


Ferritin


  1184 NG/ML


(8-388)  H


 


Total Bilirubin


  0.9 MG/DL


(0.2-1.0)


 


Direct Bilirubin


  < 0.1 MG/DL


(0.0-0.3)


 


Aspartate Amino Transf


(AST/SGOT) 40 U/L (15-37)


H


 


Alanine Aminotransferase


(ALT/SGPT) 20 U/L (12-78)


 


 


Alkaline Phosphatase


  71 U/L


()


 


Lactate Dehydrogenase


  609 U/L


()  H


 


C-Reactive Protein,


Quantitative 27.0 mg/dL


(0.00-0.90)  H


 


Total Protein


  5.3 G/DL


(6.4-8.2)  L


 


Albumin


  1.5 G/DL


(3.4-5.0)  L











Current Medications








 Medications


  (Trade)  Dose


 Ordered  Sig/Aarti


 Route


 PRN Reason  Start Time


 Stop Time Status Last Admin


Dose Admin


 


 Acetaminophen


  (Tylenol)  650 mg  Q6H  PRN


 ORAL


 Temp >100.5  4/29/20 14:45


 5/26/20 08:44  5/2/20 21:01


 


 


 Acetaminophen


  (Tylenol)  650 mg  Q6H  PRN


 RECTAL


 Temp >100.5  5/3/20 10:15


 6/2/20 10:14  5/3/20 16:59


 


 


 Carvedilol


  (Coreg)  3.125 mg  EVERY 12  HOURS


 ORAL


   4/29/20 21:00


 5/29/20 20:59  5/8/20 09:09


 


 


 Chlorhexidine


 Gluconate


  (Gloria-Hex 2%)  1 applic  DAILY@2000


 TOPIC


   4/29/20 20:00


 7/24/20 19:59  5/7/20 20:46


 


 


 Furosemide


  (Lasix)  40 mg  EVERY 12  HOURS


 IV


   5/8/20 12:00


 6/7/20 11:59  5/8/20 12:08


 


 


 Gabapentin


  (Neurontin)  300 mg  Q8HR


 ORAL


   4/29/20 22:00


 5/25/20 00:00  5/8/20 13:37


 


 


 Levetiracetam


  (Keppra)  1,000 mg  Q12HR


 ORAL


   4/29/20 21:00


 5/25/20 00:00  5/8/20 09:03


 


 


 Lisinopril


  (ZestriL)  10 mg  DAILY


 ORAL


   5/7/20 09:00


 6/6/20 08:59  5/8/20 09:09


 


 


 Magnesium Oxide


  (Mag-Ox 400mg)  400 mg  THREE TIMES A  DAY


 ORAL


   5/2/20 18:00


 6/1/20 17:59  5/8/20 12:08


 


 


 Memantine


  (Namenda)  5 mg  DAILY


 ORAL


   4/30/20 09:00


 5/25/20 16:59  5/8/20 09:03


 


 


 Potassium Chloride


  (K-Dur)  40 meq  BID


 ORAL


   5/6/20 18:00


 7/31/20 10:14  5/8/20 09:02


 


 


 Vancomycin HCl


  (Vanco rx to


 dose)  1 ea  DAILY  PRN


 MISC


 Per rx protocol  5/4/20 12:30


 6/3/20 12:29   


 


 


 Vancomycin HCl 1


 gm/Dextrose  275 ml @ 


 183.708


 mls/hr  Q8H


 IVPB


   5/6/20 10:00


 5/11/20 09:59  5/8/20 09:10


 

















Naila Lindsay M.D. May 8, 2020 15:21

## 2020-05-08 NOTE — CARDIAC ELECTROPHYSIOLOGY PN
Assessment/Plan


Assessment/Plan


1. S/P Shock  due to cardiomyopathy EF 40 and sepsis.  BNP is 736.  


    On Abx by Dr. Lindsay  


    


2. CHF EF 40%.   On Coreg 3.125 bid , Lasix 40 iv bid and Lisinopril 10 daily





3. Status post right-sided TONY ICD.    





4. Hypertension  


5. History of CVA with residual weakness.


6. COVID-19 PNA, off the vent 


7. Hypernatremia.  





ODILIA RN





Subjective


Subjective


 In Covid isolation. No new events. Awaiting COvid negativity





Objective





Last 24 Hour Vital Signs








  Date Time  Temp Pulse Resp B/P (MAP) Pulse Ox O2 Delivery O2 Flow Rate FiO2


 


5/8/20 12:00 98.1 90 24 149/90 (109) 94   


 


5/8/20 09:09    136/92    


 


5/8/20 09:09  96  136/92    


 


5/8/20 09:00      Non-Rebreather  


 


5/8/20 08:00 99.6 96 24 136/92 (107) 96   


 


5/8/20 07:58     94 Non-Rebreather 15.0 100


 


5/8/20 04:00 98.0 102 40 142/96 (111) 93   


 


5/8/20 00:06 98.7 105 32 139/92 (108) 90   


 


5/7/20 20:46  110  141/101    


 


5/7/20 20:39 98.9 110 32 141/101 (114) 87   


 


5/7/20 20:12      Non-Rebreather  


 


5/7/20 19:41     93 Non-Rebreather 15.0 100


 


5/7/20 16:00 97.7 94 24 137/94 (108) 92   

















Intake and Output  


 


 5/7/20 5/8/20





 19:00 07:00


 


Intake Total 240 ml 467.500 ml


 


Output Total 1100 ml 800 ml


 


Balance -860 ml -332.500 ml


 


  


 


Intake Oral 240 ml 


 


IV Total  467.500 ml


 


Output Urine Total 1100 ml 800 ml


 


# Bowel Movements 1 1











Laboratory Tests








Test


  5/8/20


05:40


 


White Blood Count


  14.7 K/UL


(4.8-10.8)  H


 


Red Blood Count


  3.36 M/UL


(4.70-6.10)  L


 


Hemoglobin


  9.8 G/DL


(14.2-18.0)  L


 


Hematocrit


  29.1 %


(42.0-52.0)  L


 


Mean Corpuscular Volume 87 FL (80-99)  


 


Mean Corpuscular Hemoglobin


  29.3 PG


(27.0-31.0)


 


Mean Corpuscular Hemoglobin


Concent 33.7 G/DL


(32.0-36.0)


 


Red Cell Distribution Width


  12.0 %


(11.6-14.8)


 


Platelet Count


  127 K/UL


(150-450)  L


 


Mean Platelet Volume


  8.3 FL


(6.5-10.1)


 


Neutrophils (%) (Auto)


  % (45.0-75.0)


 


 


Lymphocytes (%) (Auto)


  % (20.0-45.0)


 


 


Monocytes (%) (Auto)  % (1.0-10.0)  


 


Eosinophils (%) (Auto)  % (0.0-3.0)  


 


Basophils (%) (Auto)  % (0.0-2.0)  


 


Differential Total Cells


Counted 100  


 


 


Neutrophils % (Manual) 87 % (45-75)  H


 


Lymphocytes % (Manual) 8 % (20-45)  L


 


Monocytes % (Manual) 4 % (1-10)  


 


Eosinophils % (Manual) 1 % (0-3)  


 


Basophils % (Manual) 0 % (0-2)  


 


Band Neutrophils 0 % (0-8)  


 


Platelet Estimate Decreased  L


 


Platelet Morphology Normal  


 


Hypochromasia 2+  


 


Anisocytosis 1+  


 


Sodium Level


  149 MMOL/L


(136-145)  H


 


Potassium Level


  3.8 MMOL/L


(3.5-5.1)


 


Chloride Level


  111 MMOL/L


()  H


 


Carbon Dioxide Level


  29 MMOL/L


(21-32)


 


Anion Gap


  9 mmol/L


(5-15)


 


Blood Urea Nitrogen


  14 mg/dL


(7-18)


 


Creatinine


  0.8 MG/DL


(0.55-1.30)


 


Estimat Glomerular Filtration


Rate > 60 mL/min


(>60)


 


Glucose Level


  186 MG/DL


()  H


 


Calcium Level


  8.0 MG/DL


(8.5-10.1)  L


 


Phosphorus Level


  2.5 MG/DL


(2.5-4.9)


 


Magnesium Level


  1.7 MG/DL


(1.8-2.4)  L


 


Ferritin


  1184 NG/ML


(8-388)  H


 


Total Bilirubin


  0.9 MG/DL


(0.2-1.0)


 


Direct Bilirubin


  < 0.1 MG/DL


(0.0-0.3)


 


Aspartate Amino Transf


(AST/SGOT) 40 U/L (15-37)


H


 


Alanine Aminotransferase


(ALT/SGPT) 20 U/L (12-78)


 


 


Alkaline Phosphatase


  71 U/L


()


 


Lactate Dehydrogenase


  609 U/L


()  H


 


C-Reactive Protein,


Quantitative 27.0 mg/dL


(0.00-0.90)  H


 


Total Protein


  5.3 G/DL


(6.4-8.2)  L


 


Albumin


  1.5 G/DL


(3.4-5.0)  L








Objective


HEAD AND NECK:  Mild JVD.


LUNGS:  Decreased breath sounds.


CARDIOVASCULAR:  Regular S1 and S2 with no gallop.


ABDOMEN:  Soft.


EXTREMITIES:  1+ pitting edema.  Defibrillator is in right subclavian.











Tommy Otero MD May 8, 2020 14:31

## 2020-05-09 VITALS — SYSTOLIC BLOOD PRESSURE: 132 MMHG | DIASTOLIC BLOOD PRESSURE: 79 MMHG

## 2020-05-09 VITALS — DIASTOLIC BLOOD PRESSURE: 61 MMHG | SYSTOLIC BLOOD PRESSURE: 110 MMHG

## 2020-05-09 VITALS — SYSTOLIC BLOOD PRESSURE: 126 MMHG | DIASTOLIC BLOOD PRESSURE: 72 MMHG

## 2020-05-09 VITALS — DIASTOLIC BLOOD PRESSURE: 73 MMHG | SYSTOLIC BLOOD PRESSURE: 120 MMHG

## 2020-05-09 VITALS — SYSTOLIC BLOOD PRESSURE: 112 MMHG | DIASTOLIC BLOOD PRESSURE: 71 MMHG

## 2020-05-09 VITALS — SYSTOLIC BLOOD PRESSURE: 130 MMHG | DIASTOLIC BLOOD PRESSURE: 87 MMHG

## 2020-05-09 LAB
ADD MANUAL DIFF: YES
ANION GAP SERPL CALC-SCNC: 8 MMOL/L (ref 5–15)
BUN SERPL-MCNC: 19 MG/DL (ref 7–18)
CALCIUM SERPL-MCNC: 8.7 MG/DL (ref 8.5–10.1)
CHLORIDE SERPL-SCNC: 104 MMOL/L (ref 98–107)
CO2 SERPL-SCNC: 34 MMOL/L (ref 21–32)
CREAT SERPL-MCNC: 1.1 MG/DL (ref 0.55–1.3)
ERYTHROCYTE [DISTWIDTH] IN BLOOD BY AUTOMATED COUNT: 12.2 % (ref 11.6–14.8)
HCT VFR BLD CALC: 31.8 % (ref 42–52)
HGB BLD-MCNC: 10.4 G/DL (ref 14.2–18)
MCV RBC AUTO: 87 FL (ref 80–99)
PLATELET # BLD: 126 K/UL (ref 150–450)
POTASSIUM SERPL-SCNC: 3.6 MMOL/L (ref 3.5–5.1)
RBC # BLD AUTO: 3.63 M/UL (ref 4.7–6.1)
SODIUM SERPL-SCNC: 146 MMOL/L (ref 136–145)
WBC # BLD AUTO: 16.3 K/UL (ref 4.8–10.8)

## 2020-05-09 RX ADMIN — SODIUM CHLORIDE SCH MLS/HR: 9 INJECTION, SOLUTION INTRAVENOUS at 09:04

## 2020-05-09 RX ADMIN — SODIUM CHLORIDE SCH MLS/HR: 9 INJECTION, SOLUTION INTRAVENOUS at 02:39

## 2020-05-09 RX ADMIN — SODIUM CHLORIDE SCH MLS/HR: 9 INJECTION, SOLUTION INTRAVENOUS at 17:02

## 2020-05-09 RX ADMIN — MAGNESIUM OXIDE TAB 400 MG (241.3 MG ELEMENTAL MG) SCH MG: 400 (241.3 MG) TAB at 13:14

## 2020-05-09 RX ADMIN — CHLORHEXIDINE GLUCONATE SCH APPLIC: 213 SOLUTION TOPICAL at 20:53

## 2020-05-09 RX ADMIN — LISINOPRIL SCH MG: 10 TABLET ORAL at 08:50

## 2020-05-09 RX ADMIN — MAGNESIUM OXIDE TAB 400 MG (241.3 MG ELEMENTAL MG) SCH MG: 400 (241.3 MG) TAB at 08:49

## 2020-05-09 RX ADMIN — MAGNESIUM OXIDE TAB 400 MG (241.3 MG ELEMENTAL MG) SCH MG: 400 (241.3 MG) TAB at 17:03

## 2020-05-09 NOTE — GENERAL PROGRESS NOTE
Assessment/Plan


Problem List:  


(1) Suspected COVID-19 virus infection


ICD Codes:  Z20.828 - Contact with and (suspected) exposure to other viral 

communicable diseases


SNOMED:  629994431


(2) Anemia


ICD Codes:  D64.9 - Anemia, unspecified


SNOMED:  467708189


(3) Weak


ICD Codes:  R53.1 - Weakness


SNOMED:  79879147


(4) COPD (chronic obstructive pulmonary disease)


ICD Codes:  J44.9 - Chronic obstructive pulmonary disease, unspecified


SNOMED:  66184703


(5) Diabetes


ICD Codes:  E11.9 - Type 2 diabetes mellitus without complications


SNOMED:  44643943


(6) Epileptic seizure, generalized


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 

not intractable, without status epilepticus


SNOMED:  84060556


(7) Altered mental status


ICD Codes:  R41.82 - Altered mental status, unspecified


SNOMED:  784300931


Qualifiers:  


   Qualified Codes:  R41.82 - Altered mental status, unspecified


(8) Hypotension


ICD Codes:  I95.9 - Hypotension, unspecified


SNOMED:  27337079


Qualifiers:  


   Qualified Codes:  I95.9 - Hypotension, unspecified


(9) UTI (urinary tract infection)


ICD Codes:  N39.0 - Urinary tract infection, site not specified


SNOMED:  90311738


Status:  unchanged


Assessment/Plan:


o2 pulm tx abx pt diet cbc bmp am





Subjective


Constitutional:  Reports: weakness


Allergies:  


Coded Allergies:  


     No Known Allergies (Unverified , 12/15/14)


All Systems:  reviewed and negative except above


Subjective


sleepy calm in bed





Objective





Last 24 Hour Vital Signs








  Date Time  Temp Pulse Resp B/P (MAP) Pulse Ox O2 Delivery O2 Flow Rate FiO2


 


5/9/20 09:00      Non-Rebreather  


 


5/9/20 08:50    120/73    


 


5/9/20 08:49  92  120/73    


 


5/9/20 08:00 98.1 92 20 120/73 (89) 94   


 


5/9/20 04:00 98.1 97 21 132/79 (96) 99   


 


5/9/20 00:33     97 Non-Rebreather 15.0 100


 


5/9/20 00:00 98.1 104 20 130/87 (101) 97   


 


5/8/20 21:00      Non-Rebreather  


 


5/8/20 20:00 98.1 105 21 135/98 (110) 94   


 


5/8/20 16:00 98.2 108 24 125/77 (93) 96   


 


5/8/20 12:00 98.1 90 24 149/90 (109) 94   

















Intake and Output  


 


 5/8/20 5/9/20





 19:00 07:00


 


Intake Total 551.124 ml 367.416 ml


 


Output Total 1400 ml 


 


Balance -848.876 ml 367.416 ml


 


  


 


IV Total 551.124 ml 367.416 ml


 


Output Urine Total 1400 ml 








Laboratory Tests


5/9/20 04:00: 


White Blood Count 16.3H, Red Blood Count 3.63L, Hemoglobin 10.4L, Hematocrit 

31.8L, Mean Corpuscular Volume 87, Mean Corpuscular Hemoglobin 28.7, Mean 

Corpuscular Hemoglobin Concent 32.8, Red Cell Distribution Width 12.2, Platelet 

Count 126L, Mean Platelet Volume 8.2, Neutrophils (%) (Auto) , Lymphocytes (%) (

Auto) , Monocytes (%) (Auto) , Eosinophils (%) (Auto) , Basophils (%) (Auto) , 

Neutrophils % (Manual) [Pending], Lymphocytes % (Manual) [Pending], Platelet 

Estimate [Pending], Platelet Morphology [Pending], Sodium Level 146H, Potassium 

Level 3.6, Chloride Level 104, Carbon Dioxide Level 34H, Anion Gap 8, Blood 

Urea Nitrogen 19H, Creatinine 1.1, Estimat Glomerular Filtration Rate > 60, 

Glucose Level 220H, Calcium Level 8.7


Height (Feet):  5


Height (Inches):  8.00


Weight (Pounds):  178


General Appearance:  lethargic


EENT:  normal ENT inspection


Neck:  normal alignment


Cardiovascular:  normal rate, regular rhythm


Respiratory/Chest:  no respiratory distress, no accessory muscle use


Extremities:  normal inspection


Skin:  normal pigmentation











Arron Barkley DO May 9, 2020 09:51

## 2020-05-09 NOTE — NEPHROLOGY PROGRESS NOTE
Assessment/Plan


Problem List:  


(1) Electrolyte imbalance


(2) Suspected COVID-19 virus infection


(3) Hypotension


Assessment:  Resolved





(4) Sepsis


Assessment





Patient's current problem from renal standpoint of view is hypokalemia and 

other electrolyte imbalances





His other conditions:


Patient presented with acute renal failure however it is now resolved


Patient presented with septic shock was on pressors however now resolved


Sepsis leukocytosis improving


Patient has COVID-19 pneumonia


Hypertension


Cardiomyopathy status post AICD


Previous CVA with right residual weakness


Seizure disorders


Plan





Mag sulfate IV today and as needed


Stable from renal standpoint of view





Stop IV fluids


Start oral potassium, magnesium, phosphorus supplements


We will continue to monitor electrolytes and chemistries


Continue per consultants


Per orders





Subjective


ROS Limited/Unobtainable:  No


Constitutional:  Reports: malaise, weakness





Objective


Objective





Last 24 Hour Vital Signs








  Date Time  Temp Pulse Resp B/P (MAP) Pulse Ox O2 Delivery O2 Flow Rate FiO2


 


5/9/20 09:00      Non-Rebreather  


 


5/9/20 08:50    120/73    


 


5/9/20 08:49  92  120/73    


 


5/9/20 08:00 98.1 92 20 120/73 (89) 94   


 


5/9/20 04:00 98.1 97 21 132/79 (96) 99   


 


5/9/20 00:33     97 Non-Rebreather 15.0 100


 


5/9/20 00:00 98.1 104 20 130/87 (101) 97   


 


5/8/20 21:00      Non-Rebreather  


 


5/8/20 20:00 98.1 105 21 135/98 (110) 94   


 


5/8/20 16:00 98.2 108 24 125/77 (93) 96   

















Intake and Output  


 


 5/8/20 5/9/20





 19:00 07:00


 


Intake Total 551.124 ml 367.416 ml


 


Output Total 1400 ml 


 


Balance -848.876 ml 367.416 ml


 


  


 


IV Total 551.124 ml 367.416 ml


 


Output Urine Total 1400 ml 








Laboratory Tests


5/9/20 04:00: 


White Blood Count 16.3H, Red Blood Count 3.63L, Hemoglobin 10.4L, Hematocrit 

31.8L, Mean Corpuscular Volume 87, Mean Corpuscular Hemoglobin 28.7, Mean 

Corpuscular Hemoglobin Concent 32.8, Red Cell Distribution Width 12.2, Platelet 

Count 126L, Mean Platelet Volume 8.2, Neutrophils (%) (Auto) , Lymphocytes (%) (

Auto) , Monocytes (%) (Auto) , Eosinophils (%) (Auto) , Basophils (%) (Auto) , 

Differential Total Cells Counted 100, Neutrophils % (Manual) 86H, Lymphocytes % 

(Manual) 6L, Monocytes % (Manual) 5, Eosinophils % (Manual) 2, Basophils % (

Manual) 1, Band Neutrophils 0, Platelet Estimate DecreasedL, Platelet 

Morphology Normal, Hypochromasia 1+, Anisocytosis 1+, Sodium Level 146H, 

Potassium Level 3.6, Chloride Level 104, Carbon Dioxide Level 34H, Anion Gap 8, 

Blood Urea Nitrogen 19H, Creatinine 1.1, Estimat Glomerular Filtration Rate > 60

, Glucose Level 220H, Calcium Level 8.7


Height (Feet):  5


Height (Inches):  8.00


Weight (Pounds):  178


Cardiovascular:  tachycardia


Respiratory/Chest:  decreased breath sounds


Abdomen:  distended


Objective


No change











Miguel Ángel Kendall MD May 9, 2020 12:35

## 2020-05-09 NOTE — NUR
NURSE NOTES:

I received order from MD Hahn to put patient on Venturi mask; Order carried out as order 
given;

## 2020-05-09 NOTE — NUR
NURSE NOTES:

Patient awake; on Non Rebreather mask, no sing of distress and shortness of breath; no sing 
of chest pain; Triple Lumen catheter on the right foot, TKO; side rails up x2, breaks 
engaged, bed at lowest position; call light within reach; will keep monitoring.

## 2020-05-09 NOTE — PULMONOLOGY PROGRESS NOTE
Subjective


ROS Limited/Unobtainable:  No


Interval Events:  on NRBM


Constitutional:  Reports: no symptoms


Respiratory:  Reports: dry cough, shortness of breath


Cardiovascular:  Reports: no symptoms


Gastrointestinal/Abdominal:  Reports: no symptoms


Genitourinary:  Reports: no symptoms


Neurologic:  Reports: no symptoms


Allergies:  


Coded Allergies:  


     No Known Allergies (Unverified , 12/15/14)


All Systems:  reviewed and negative except above





Objective





Last 24 Hour Vital Signs








  Date Time  Temp Pulse Resp B/P (MAP) Pulse Ox O2 Delivery O2 Flow Rate FiO2


 


5/9/20 09:00      Non-Rebreather  


 


5/9/20 08:50    120/73    


 


5/9/20 08:49  92  120/73    


 


5/9/20 08:00 98.1 92 20 120/73 (89) 94   


 


5/9/20 04:00 98.1 97 21 132/79 (96) 99   


 


5/9/20 00:33     97 Non-Rebreather 15.0 100


 


5/9/20 00:00 98.1 104 20 130/87 (101) 97   


 


5/8/20 21:00      Non-Rebreather  


 


5/8/20 20:00 98.1 105 21 135/98 (110) 94   


 


5/8/20 16:00 98.2 108 24 125/77 (93) 96   


 


5/8/20 12:00 98.1 90 24 149/90 (109) 94   

















Intake and Output  


 


 5/8/20 5/9/20





 19:00 07:00


 


Intake Total 551.124 ml 367.416 ml


 


Output Total 1400 ml 


 


Balance -848.876 ml 367.416 ml


 


  


 


IV Total 551.124 ml 367.416 ml


 


Output Urine Total 1400 ml 








General Appearance:  no acute distress


HEENT:  normocephalic


Respiratory/Chest:  chest wall non-tender, decreased breath sounds


Cardiovascular:  normal peripheral pulses


Abdomen:  normal bowel sounds


Laboratory Tests


5/9/20 04:00: 


White Blood Count 16.3H, Red Blood Count 3.63L, Hemoglobin 10.4L, Hematocrit 

31.8L, Mean Corpuscular Volume 87, Mean Corpuscular Hemoglobin 28.7, Mean 

Corpuscular Hemoglobin Concent 32.8, Red Cell Distribution Width 12.2, Platelet 

Count 126L, Mean Platelet Volume 8.2, Neutrophils (%) (Auto) , Lymphocytes (%) (

Auto) , Monocytes (%) (Auto) , Eosinophils (%) (Auto) , Basophils (%) (Auto) , 

Neutrophils % (Manual) [Pending], Lymphocytes % (Manual) [Pending], Platelet 

Estimate [Pending], Platelet Morphology [Pending], Sodium Level 146H, Potassium 

Level 3.6, Chloride Level 104, Carbon Dioxide Level 34H, Anion Gap 8, Blood 

Urea Nitrogen 19H, Creatinine 1.1, Estimat Glomerular Filtration Rate > 60, 

Glucose Level 220H, Calcium Level 8.7





Current Medications








 Medications


  (Trade)  Dose


 Ordered  Sig/Aarti


 Route


 PRN Reason  Start Time


 Stop Time Status Last Admin


Dose Admin


 


 Acetaminophen


  (Tylenol)  650 mg  Q6H  PRN


 ORAL


 Temp >100.5  4/29/20 14:45


 5/26/20 08:44  5/2/20 21:01


 


 


 Acetaminophen


  (Tylenol)  650 mg  Q6H  PRN


 RECTAL


 Temp >100.5  5/3/20 10:15


 6/2/20 10:14  5/3/20 16:59


 


 


 Carvedilol


  (Coreg)  3.125 mg  EVERY 12  HOURS


 ORAL


   4/29/20 21:00


 5/29/20 20:59  5/9/20 08:49


 


 


 Chlorhexidine


 Gluconate


  (Gloria-Hex 2%)  1 applic  DAILY@2000


 TOPIC


   4/29/20 20:00


 7/24/20 19:59  5/8/20 21:32


 


 


 Furosemide


  (Lasix)  40 mg  EVERY 12  HOURS


 IV


   5/8/20 12:00


 6/7/20 11:59  5/9/20 08:49


 


 


 Gabapentin


  (Neurontin)  300 mg  Q8HR


 ORAL


   4/29/20 22:00


 5/25/20 00:00  5/8/20 13:37


 


 


 Levetiracetam


  (Keppra)  1,000 mg  Q12HR


 ORAL


   4/29/20 21:00


 5/25/20 00:00  5/9/20 08:49


 


 


 Lisinopril


  (ZestriL)  10 mg  DAILY


 ORAL


   5/7/20 09:00


 6/6/20 08:59  5/9/20 08:50


 


 


 Magnesium Oxide


  (Mag-Ox 400mg)  400 mg  THREE TIMES A  DAY


 ORAL


   5/2/20 18:00


 6/1/20 17:59  5/9/20 08:49


 


 


 Memantine


  (Namenda)  5 mg  BID


 ORAL


   5/9/20 18:00


 5/25/20 16:59   


 


 


 Potassium Chloride


  (K-Dur)  40 meq  BID


 ORAL


   5/6/20 18:00


 7/31/20 10:14  5/9/20 08:49


 


 


 Vancomycin HCl


  (Vanco rx to


 dose)  1 ea  DAILY  PRN


 MISC


 Per rx protocol  5/4/20 12:30


 6/3/20 12:29   


 


 


 Vancomycin HCl 1


 gm/Dextrose  275 ml @ 


 183.708


 mls/hr  Q8H


 IVPB


   5/6/20 10:00


 5/11/20 09:59  5/9/20 09:04


 











Assessment/Plan


Assessment/Plan


IMPRESSION:


1. Cardiomyopathy.


2. Hypotension.


3. Left lung pneumonia.


4. Nursing home resident.


5. Positive COVID-19.





DISCUSSION:


1. Continue isolation


2. Continue current medications.


3. Off pressors.


4. Continue O2/NRBM


5. Pulmonary hygiene.


6. Broad-spectrum antibiotics.


7. I will follow.


8. Continue to wean down O2; 


9. Continue diuresis, on Lasix 40 mg IV BID




















  ______________________________________________


  LESIA Dempsey Omar Syed MD May 9, 2020 11:01

## 2020-05-09 NOTE — INFECTIOUS DISEASES PROG NOTE
Assessment/Plan


Assessment/Plan





Assessment:


Septic shock-SP





Fever; improving


Mild leukocytosis, fluctuating


   -5/3 u/a wbc 15-20, nit neg, leuk +1; ucx p


          Bcx NTD


   -u/a neg


   -Bcx Neg





Probable PNA- 2ry to COVID19 (from NH with confirmed cases)


Acute hypoxic respiratory failure- was on NC, now on NRB- increasing FIo2 

requiremetns


    -5/8 CXR: Unchanged bilateral infiltrates, likely pneumonia, since prior 

exam of 3 days earlier.


    -5/5 CXR:  Bilateral interstitial and airspace infiltrates are again 

demonstrated.Appearance is overall unchanged. There may be a small left pleural 

effusion,unchange


   -5/4 CRP 41.6, ferritin 1468


  -5/3 CXR: .  Worsening bilateral interstitial and airspace opacities. 

Probable small left pleural effusion.  Difficult to evaluate the right 

costophrenic angle due to overlying pacemaker.


  -4/30 CXR: Slightly improved bilateral interstitial and airspace infiltrates 

versus edema


  -4/28 CXR: Bilateral mid and lower lung interstitial disease, new since prior 

study.Possible developing left pleural effusion


        Ferritn >2000, CRP 22.3


  -4/24 SARS-COV2 PCR +


  -CXR: Left basilar atelectasis and/or infiltrate


 


MELVI, improving





 HTN


cadiomyopathy s/p AICD


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


seizure disorder


NH resident ([facility]) 








Plan:


-Continue empiric IV Vancomycin #5/5


   -5/7 SP Zosyn #5


   - SPSolumedrol 40mg IV 12hrs x 5days (5/4-5/6); dc'ed by pulmonologist - (

day 10 of illness and worsening; around this time is seen the worsening of 

COVID19 pts mainly driven by inflammatory response) 


   -4/29 SP Cefepime #6


   -4/27 SP IV Vancomycin #4  





-f/u cx


-Monitor CBC/CMP, temperatures


-COVID 19 precautions


-clarify goals of care


-aspiration precautions


-inflammatory markers


-f/u cx


-monitor CXR





Thank you for consulting [facility]. Will continue to follow along with 

you.





Discussed with RN,





Subjective


Allergies:  


Coded Allergies:  


     No Known Allergies (Unverified , 12/15/14)


Subjective


Afebrile. on NRB. Pt reports feeling comfortable.





Objective


Vital Signs





Last 24 Hour Vital Signs








  Date Time  Temp Pulse Resp B/P (MAP) Pulse Ox O2 Delivery O2 Flow Rate FiO2


 


5/9/20 20:20 99.0 99 21 110/61 (77) 99   


 


5/9/20 20:12      Non-Rebreather  


 


5/9/20 16:00 99.0 92 20 126/72 (90) 98   


 


5/9/20 12:00 97.7 96 20 112/71 (85) 96   


 


5/9/20 09:00      Non-Rebreather  


 


5/9/20 08:50    120/73    


 


5/9/20 08:49  92  120/73    


 


5/9/20 08:00 98.1 92 20 120/73 (89) 94   


 


5/9/20 04:00 98.1 97 21 132/79 (96) 99   


 


5/9/20 00:33     97 Non-Rebreather 15.0 100


 


5/9/20 00:00 98.1 104 20 130/87 (101) 97   


 


5/8/20 21:00      Non-Rebreather  








Height (Feet):  5


Height (Inches):  8.00


Weight (Pounds):  178


General Appearance:  no acute distress


HEENT:  atraumatic, anicteric


Respiratory/Chest:  no respiratory distress, no accessory muscle use


Abdomen:  soft, non tender, non distended


Neurologic/Psychiatric:  alert, responsive





Laboratory Tests








Test


  5/9/20


04:00


 


White Blood Count


  16.3 K/UL


(4.8-10.8)  H


 


Red Blood Count


  3.63 M/UL


(4.70-6.10)  L


 


Hemoglobin


  10.4 G/DL


(14.2-18.0)  L


 


Hematocrit


  31.8 %


(42.0-52.0)  L


 


Mean Corpuscular Volume 87 FL (80-99)  


 


Mean Corpuscular Hemoglobin


  28.7 PG


(27.0-31.0)


 


Mean Corpuscular Hemoglobin


Concent 32.8 G/DL


(32.0-36.0)


 


Red Cell Distribution Width


  12.2 %


(11.6-14.8)


 


Platelet Count


  126 K/UL


(150-450)  L


 


Mean Platelet Volume


  8.2 FL


(6.5-10.1)


 


Neutrophils (%) (Auto)


  % (45.0-75.0)


 


 


Lymphocytes (%) (Auto)


  % (20.0-45.0)


 


 


Monocytes (%) (Auto)  % (1.0-10.0)  


 


Eosinophils (%) (Auto)  % (0.0-3.0)  


 


Basophils (%) (Auto)  % (0.0-2.0)  


 


Differential Total Cells


Counted 100  


 


 


Neutrophils % (Manual) 86 % (45-75)  H


 


Lymphocytes % (Manual) 6 % (20-45)  L


 


Monocytes % (Manual) 5 % (1-10)  


 


Eosinophils % (Manual) 2 % (0-3)  


 


Basophils % (Manual) 1 % (0-2)  


 


Band Neutrophils 0 % (0-8)  


 


Platelet Estimate Decreased  L


 


Platelet Morphology Normal  


 


Hypochromasia 1+  


 


Anisocytosis 1+  


 


Sodium Level


  146 MMOL/L


(136-145)  H


 


Potassium Level


  3.6 MMOL/L


(3.5-5.1)


 


Chloride Level


  104 MMOL/L


()


 


Carbon Dioxide Level


  34 MMOL/L


(21-32)  H


 


Anion Gap


  8 mmol/L


(5-15)


 


Blood Urea Nitrogen


  19 mg/dL


(7-18)  H


 


Creatinine


  1.1 MG/DL


(0.55-1.30)


 


Estimat Glomerular Filtration


Rate > 60 mL/min


(>60)


 


Glucose Level


  220 MG/DL


()  H


 


Calcium Level


  8.7 MG/DL


(8.5-10.1)











Current Medications








 Medications


  (Trade)  Dose


 Ordered  Sig/Aarti


 Route


 PRN Reason  Start Time


 Stop Time Status Last Admin


Dose Admin


 


 Acetaminophen


  (Tylenol)  650 mg  Q6H  PRN


 ORAL


 Temp >100.5  4/29/20 14:45


 5/26/20 08:44  5/2/20 21:01


 


 


 Acetaminophen


  (Tylenol)  650 mg  Q6H  PRN


 RECTAL


 Temp >100.5  5/3/20 10:15


 6/2/20 10:14  5/3/20 16:59


 


 


 Carvedilol


  (Coreg)  3.125 mg  EVERY 12  HOURS


 ORAL


   4/29/20 21:00


 5/29/20 20:59  5/9/20 08:49


 


 


 Chlorhexidine


 Gluconate


  (Gloria-Hex 2%)  1 applic  DAILY@2000


 TOPIC


   4/29/20 20:00


 7/24/20 19:59  5/8/20 21:32


 


 


 Furosemide


  (Lasix)  40 mg  DAILY


 ORAL


   5/10/20 09:00


 6/9/20 08:59   


 


 


 Gabapentin


  (Neurontin)  300 mg  Q8HR


 ORAL


   4/29/20 22:00


 5/25/20 00:00  5/9/20 13:14


 


 


 Levetiracetam


  (Keppra)  1,000 mg  Q12HR


 ORAL


   4/29/20 21:00


 5/25/20 00:00  5/9/20 08:49


 


 


 Lisinopril


  (ZestriL)  10 mg  DAILY


 ORAL


   5/7/20 09:00


 6/6/20 08:59  5/9/20 08:50


 


 


 Magnesium Oxide


  (Mag-Ox 400mg)  400 mg  THREE TIMES A  DAY


 ORAL


   5/2/20 18:00


 6/1/20 17:59  5/9/20 17:03


 


 


 Memantine


  (Namenda)  5 mg  BID


 ORAL


   5/9/20 18:00


 5/25/20 16:59  5/9/20 17:04


 


 


 Potassium Chloride


  (K-Dur)  40 meq  BID


 ORAL


   5/6/20 18:00


 7/31/20 10:14  5/9/20 17:03


 


 


 Vancomycin HCl


  (Vanco rx to


 dose)  1 ea  DAILY  PRN


 MISC


 Per rx protocol  5/4/20 12:30


 6/3/20 12:29   


 


 


 Vancomycin HCl 1


 gm/Dextrose  275 ml @ 


 183.708


 mls/hr  Q8H


 IVPB


   5/6/20 10:00


 5/11/20 09:59  5/9/20 17:02


 

















Rolando Farias MD May 9, 2020 20:56

## 2020-05-09 NOTE — CARDIAC ELECTROPHYSIOLOGY PN
Assessment/Plan


Assessment/Plan


1. S/P Shock  due to cardiomyopathy EF 40 and sepsis.  BNP is 736.  


    On Abx by Dr. Lindsay. Resolved


    


2. CHF EF 40%.   On Coreg 3.125 bid , change Lasix 40 iv bid to 40 po daily and 

Lisinopril 10 daily





3. Status post right-sided TONY ICD.    





4. Hypertension  


5. History of CVA with residual weakness.


6. COVID-19 PNA, off the vent 


7. Hypernatremia.  Change iv Lasix to  po





DW RN





Subjective


Subjective


In Covid isolation. No new events.





Objective





Last 24 Hour Vital Signs








  Date Time  Temp Pulse Resp B/P (MAP) Pulse Ox O2 Delivery O2 Flow Rate FiO2


 


5/9/20 12:00 97.7 96 20 112/71 (85) 96   


 


5/9/20 09:00      Non-Rebreather  


 


5/9/20 08:50    120/73    


 


5/9/20 08:49  92  120/73    


 


5/9/20 08:00 98.1 92 20 120/73 (89) 94   


 


5/9/20 04:00 98.1 97 21 132/79 (96) 99   


 


5/9/20 00:33     97 Non-Rebreather 15.0 100


 


5/9/20 00:00 98.1 104 20 130/87 (101) 97   


 


5/8/20 21:00      Non-Rebreather  


 


5/8/20 20:00 98.1 105 21 135/98 (110) 94   


 


5/8/20 16:00 98.2 108 24 125/77 (93) 96   

















Intake and Output  


 


 5/8/20 5/9/20





 19:00 07:00


 


Intake Total 551.124 ml 367.416 ml


 


Output Total 1400 ml 


 


Balance -848.876 ml 367.416 ml


 


  


 


IV Total 551.124 ml 367.416 ml


 


Output Urine Total 1400 ml 











Laboratory Tests








Test


  5/9/20


04:00


 


White Blood Count


  16.3 K/UL


(4.8-10.8)  H


 


Red Blood Count


  3.63 M/UL


(4.70-6.10)  L


 


Hemoglobin


  10.4 G/DL


(14.2-18.0)  L


 


Hematocrit


  31.8 %


(42.0-52.0)  L


 


Mean Corpuscular Volume 87 FL (80-99)  


 


Mean Corpuscular Hemoglobin


  28.7 PG


(27.0-31.0)


 


Mean Corpuscular Hemoglobin


Concent 32.8 G/DL


(32.0-36.0)


 


Red Cell Distribution Width


  12.2 %


(11.6-14.8)


 


Platelet Count


  126 K/UL


(150-450)  L


 


Mean Platelet Volume


  8.2 FL


(6.5-10.1)


 


Neutrophils (%) (Auto)


  % (45.0-75.0)


 


 


Lymphocytes (%) (Auto)


  % (20.0-45.0)


 


 


Monocytes (%) (Auto)  % (1.0-10.0)  


 


Eosinophils (%) (Auto)  % (0.0-3.0)  


 


Basophils (%) (Auto)  % (0.0-2.0)  


 


Differential Total Cells


Counted 100  


 


 


Neutrophils % (Manual) 86 % (45-75)  H


 


Lymphocytes % (Manual) 6 % (20-45)  L


 


Monocytes % (Manual) 5 % (1-10)  


 


Eosinophils % (Manual) 2 % (0-3)  


 


Basophils % (Manual) 1 % (0-2)  


 


Band Neutrophils 0 % (0-8)  


 


Platelet Estimate Decreased  L


 


Platelet Morphology Normal  


 


Hypochromasia 1+  


 


Anisocytosis 1+  


 


Sodium Level


  146 MMOL/L


(136-145)  H


 


Potassium Level


  3.6 MMOL/L


(3.5-5.1)


 


Chloride Level


  104 MMOL/L


()


 


Carbon Dioxide Level


  34 MMOL/L


(21-32)  H


 


Anion Gap


  8 mmol/L


(5-15)


 


Blood Urea Nitrogen


  19 mg/dL


(7-18)  H


 


Creatinine


  1.1 MG/DL


(0.55-1.30)


 


Estimat Glomerular Filtration


Rate > 60 mL/min


(>60)


 


Glucose Level


  220 MG/DL


()  H


 


Calcium Level


  8.7 MG/DL


(8.5-10.1)








Objective


HEAD AND NECK:  Mild JVD.


LUNGS:  Decreased breath sounds.


CARDIOVASCULAR:  Regular S1 and S2 with no gallop.


ABDOMEN:  Soft.


EXTREMITIES:  1+ pitting edema.  


         Defibrillator is in right subclavian.











Tommy Otero MD May 9, 2020 16:00

## 2020-05-09 NOTE — PROGRESS NOTE
DATE:  05/09/2020

SUBJECTIVE:  The patient is a 69-year-old male patient who came in to the

hospital _____ the reason why he is in the hospital is because of rule out

COVID-19, but also has COPD, sepsis, pyelonephritis, altered mental

status, diabetes, and electrolyte imbalance.  He has got altered mental

status, confusion, decline in cognition below his baseline, and feelings

of helplessness, hopelessness, low energy.  That is why, he does require

daily psychiatric consultation.  His attending has requested daily

psychiatric consultation.



MENTAL STATUS EXAMINATION:  This is a 69-year-old male.  Appearance is

disheveled.  Attitude, irritable and agitated.  Affect, guarded and

restricted.  Intellect poor.  Mood, depressed and anxious.  Motor

activity, psychomotor agitation.  Insight and judgment are poor.



DIAGNOSIS:  Major depressive disorder, mild, recurrent with psychotic

features.



PLAN:  Treat this patient _____ consisting of Namenda 5 mg daily and

Neurontin 300 mg q.8h.  Twenty minutes of insight-oriented psychotherapy

_____ to help him to have understanding of psychiatric and medical

conditions so that he will have better recognition and impulse control on

the unit and improve his mood _____.  Continue Neurontin 300 mg q.8h. and

then also continue Namenda 5 mg daily, which I am going to actually

increase to 5 mg twice a day.  Twenty minutes of insight-oriented

psychotherapy provided.  Chart was reviewed.  Discussed with staff.  The

patient was seen and assessed _____ in his room.









  ______________________________________________

  Demarcus Love M.D.





DR:  ALPA

D:  05/09/2020 10:35

T:  05/09/2020 23:33

JOB#:  [id number]

CC:

## 2020-05-10 VITALS — DIASTOLIC BLOOD PRESSURE: 67 MMHG | SYSTOLIC BLOOD PRESSURE: 105 MMHG

## 2020-05-10 VITALS — SYSTOLIC BLOOD PRESSURE: 111 MMHG | DIASTOLIC BLOOD PRESSURE: 63 MMHG

## 2020-05-10 VITALS — DIASTOLIC BLOOD PRESSURE: 55 MMHG | SYSTOLIC BLOOD PRESSURE: 98 MMHG

## 2020-05-10 VITALS — SYSTOLIC BLOOD PRESSURE: 110 MMHG | DIASTOLIC BLOOD PRESSURE: 64 MMHG

## 2020-05-10 VITALS — SYSTOLIC BLOOD PRESSURE: 132 MMHG | DIASTOLIC BLOOD PRESSURE: 78 MMHG

## 2020-05-10 VITALS — DIASTOLIC BLOOD PRESSURE: 70 MMHG | SYSTOLIC BLOOD PRESSURE: 115 MMHG

## 2020-05-10 LAB
ADD MANUAL DIFF: YES
ANION GAP SERPL CALC-SCNC: 4 MMOL/L (ref 5–15)
BUN SERPL-MCNC: 22 MG/DL (ref 7–18)
CALCIUM SERPL-MCNC: 7.2 MG/DL (ref 8.5–10.1)
CHLORIDE SERPL-SCNC: 110 MMOL/L (ref 98–107)
CO2 SERPL-SCNC: 31 MMOL/L (ref 21–32)
CREAT SERPL-MCNC: 1.1 MG/DL (ref 0.55–1.3)
ERYTHROCYTE [DISTWIDTH] IN BLOOD BY AUTOMATED COUNT: 12 % (ref 11.6–14.8)
FERRITIN SERPL-MCNC: 1339 NG/ML (ref 8–388)
HCT VFR BLD CALC: 25.9 % (ref 42–52)
HGB BLD-MCNC: 8.7 G/DL (ref 14.2–18)
LDH SERPL L TO P-CCNC: 415 U/L (ref 81–234)
MCV RBC AUTO: 86 FL (ref 80–99)
PLATELET # BLD: 122 K/UL (ref 150–450)
POTASSIUM SERPL-SCNC: 3.2 MMOL/L (ref 3.5–5.1)
RBC # BLD AUTO: 3.01 M/UL (ref 4.7–6.1)
SODIUM SERPL-SCNC: 145 MMOL/L (ref 136–145)
WBC # BLD AUTO: 13 K/UL (ref 4.8–10.8)

## 2020-05-10 RX ADMIN — SODIUM CHLORIDE SCH MLS/HR: 9 INJECTION, SOLUTION INTRAVENOUS at 09:13

## 2020-05-10 RX ADMIN — SODIUM CHLORIDE SCH MLS/HR: 9 INJECTION, SOLUTION INTRAVENOUS at 02:30

## 2020-05-10 RX ADMIN — CHLORHEXIDINE GLUCONATE SCH APPLIC: 213 SOLUTION TOPICAL at 20:45

## 2020-05-10 RX ADMIN — MAGNESIUM OXIDE TAB 400 MG (241.3 MG ELEMENTAL MG) SCH MG: 400 (241.3 MG) TAB at 17:18

## 2020-05-10 RX ADMIN — MAGNESIUM OXIDE TAB 400 MG (241.3 MG ELEMENTAL MG) SCH MG: 400 (241.3 MG) TAB at 09:13

## 2020-05-10 RX ADMIN — MAGNESIUM OXIDE TAB 400 MG (241.3 MG ELEMENTAL MG) SCH MG: 400 (241.3 MG) TAB at 13:06

## 2020-05-10 RX ADMIN — FUROSEMIDE SCH MG: 40 TABLET ORAL at 09:13

## 2020-05-10 RX ADMIN — LISINOPRIL SCH MG: 10 TABLET ORAL at 09:13

## 2020-05-10 NOTE — CARDIAC ELECTROPHYSIOLOGY PN
Assessment/Plan


Assessment/Plan


1. S/P Shock  due to  EF 40% and sepsis.  BNP is 736.  


    On Abx by Dr. Lindsay. Resolved


    


2. CHF EF 40%.   On Coreg 3.125 bid , LAisx 40 po daily and Lisinopril 10 daily





3. Status post right-sided TONY ICD.    





4. Hypertension  


5. History of CVA with residual weakness.


6. COVID-19 PNA, off the vent 


7. Hypernatremia.  On po Naseem HARTLEY RN





Subjective


Subjective


In Covid isolation. No new events.In NAD





Objective





Last 24 Hour Vital Signs








  Date Time  Temp Pulse Resp B/P (MAP) Pulse Ox O2 Delivery O2 Flow Rate FiO2


 


5/10/20 20:45  97  111/63    


 


5/10/20 20:33 98.1 97 36 111/63 (79) 95   


 


5/10/20 20:11      Non-Rebreather  


 


5/10/20 16:00 98.7 97 20 115/70 (85) 98   


 


5/10/20 12:00 98.6 92 17 110/64 (79) 97   


 


5/10/20 09:13    132/78    


 


5/10/20 09:13  98  132/78    


 


5/10/20 09:00      Non-Rebreather  


 


5/10/20 08:00 99.7 98 19 132/78 (96) 97   


 


5/10/20 04:41 97.4 96 22 105/67 (80) 96   


 


5/10/20 00:00 97.7 101 21 98/55 (69) 96   

















Intake and Output  


 


 5/9/20 5/10/20





 19:00 07:00


 


Intake Total 760.416 ml 275.000 ml


 


Output Total 500 ml 


 


Balance 260.416 ml 275.000 ml


 


  


 


Intake Oral 118 ml 


 


IV Total 642.416 ml 275.000 ml


 


Output Urine Total 500 ml 











Laboratory Tests








Test


  5/10/20


05:20


 


White Blood Count


  13.0 K/UL


(4.8-10.8)  H


 


Red Blood Count


  3.01 M/UL


(4.70-6.10)  L


 


Hemoglobin


  8.7 G/DL


(14.2-18.0)  L


 


Hematocrit


  25.9 %


(42.0-52.0)  L


 


Mean Corpuscular Volume 86 FL (80-99)  


 


Mean Corpuscular Hemoglobin


  29.0 PG


(27.0-31.0)


 


Mean Corpuscular Hemoglobin


Concent 33.7 G/DL


(32.0-36.0)


 


Red Cell Distribution Width


  12.0 %


(11.6-14.8)


 


Platelet Count


  122 K/UL


(150-450)  L


 


Mean Platelet Volume


  8.5 FL


(6.5-10.1)


 


Neutrophils (%) (Auto)


  % (45.0-75.0)


 


 


Lymphocytes (%) (Auto)


  % (20.0-45.0)


 


 


Monocytes (%) (Auto)  % (1.0-10.0)  


 


Eosinophils (%) (Auto)  % (0.0-3.0)  


 


Basophils (%) (Auto)  % (0.0-2.0)  


 


Differential Total Cells


Counted 100  


 


 


Neutrophils % (Manual) 81 % (45-75)  H


 


Lymphocytes % (Manual) 9 % (20-45)  L


 


Monocytes % (Manual) 7 % (1-10)  


 


Eosinophils % (Manual) 3 % (0-3)  


 


Basophils % (Manual) 0 % (0-2)  


 


Band Neutrophils 0 % (0-8)  


 


Platelet Estimate Decreased  L


 


Platelet Morphology Normal  


 


Polychromasia 1+  


 


Hypochromasia 1+  


 


Erythrocyte Sedimentation Rate


  58 MM/HR


(0-20)  H


 


Sodium Level


  145 MMOL/L


(136-145)


 


Potassium Level


  3.2 MMOL/L


(3.5-5.1)  L


 


Chloride Level


  110 MMOL/L


()  H


 


Carbon Dioxide Level


  31 MMOL/L


(21-32)


 


Anion Gap


  4 mmol/L


(5-15)  L


 


Blood Urea Nitrogen


  22 mg/dL


(7-18)  H


 


Creatinine


  1.1 MG/DL


(0.55-1.30)


 


Estimat Glomerular Filtration


Rate > 60 mL/min


(>60)


 


Glucose Level


  195 MG/DL


()  H


 


Calcium Level


  7.2 MG/DL


(8.5-10.1)  L


 


Ferritin


  1339 NG/ML


(8-388)  H


 


Lactate Dehydrogenase


  415 U/L


()  H


 


C-Reactive Protein,


Quantitative 20.1 mg/dL


(0.00-0.90)  H








Objective


HEAD AND NECK:  Mild JVD.


LUNGS:  Decreased breath sounds.


CARDIOVASCULAR:  Regular S1 and S2 with no gallop.


ABDOMEN:  Soft.


EXTREMITIES:  1+ pitting edema.  


         Defibrillator is in right subclavian.











Tommy Otero MD May 10, 2020 23:19

## 2020-05-10 NOTE — PROGRESS NOTE
DATE:  05/10/2020

SUBJECTIVE:  This is a 69-year-old male patient.  He has COPD, sepsis,

pyelonephritis, rule out COVID-19.  He has diabetes, electrolyte

imbalance, altered mental status, confusion, decline in cognition below

his baseline, and feelings of helplessness, hopelessness, low energy, and

poor appetite.  That is why, he does require daily psychiatric

consultation.  Attending has requested daily psychiatric consultation

because of his mood lability, agitation, and altered mental status, which

is worsened by stress of his medical illness.  That is why, his attending

has requested daily psychiatric consultation.



MENTAL STATUS EXAMINATION:  This is a 69-year-old male.  Appearance is

disheveled.  Attitude, irritable and agitated.  Affect, guarded and

restricted.  Intellect poor.  Mood, depressed and anxious.  Motor

activity, psychomotor agitation.  Insight and judgment is poor.



DIAGNOSIS:  Major depressive disorder, mild, recurrent with psychotic

features.



PLAN:  Namenda 5 daily and Neurontin 300 mg q.8h.  A 20 minutes of

insight-oriented psychotherapy to help him recognize his cognitive

physical condition so that he also have better recognition and better

impulse control on the unit and continued to be followed throughout

hospital course by Psychiatry.  Chart reviewed.  Discussed with staff.

Seen and assessed at bedside.









  ______________________________________________

  Demarcus Love M.D. DR:  ALPA

D:  05/10/2020 09:25

T:  05/10/2020 23:37

JOB#:  [id number]

CC:

## 2020-05-10 NOTE — NEPHROLOGY PROGRESS NOTE
Assessment/Plan


Problem List:  


(1) Electrolyte imbalance


(2) Suspected COVID-19 virus infection


(3) Hypotension


Assessment:  Resolved





(4) Sepsis


Assessment





Patient's current problem from renal standpoint of view is hypokalemia and 

other electrolyte imbalances





His other conditions:


Patient presented with acute renal failure however it is now resolved


Patient presented with septic shock was on pressors however now resolved


Sepsis leukocytosis improving


Patient has COVID-19 pneumonia


Hypertension


Cardiomyopathy status post AICD


Previous CVA with right residual weakness


Seizure disorders


Plan





Mag sulfate IV as needed


Potassium supplements


Stable from renal standpoint of view


Continue per consultants





We will continue to monitor electrolytes and chemistries





Subjective


ROS Limited/Unobtainable:  No


Constitutional:  Reports: malaise, weakness





Objective


Objective





Last 24 Hour Vital Signs








  Date Time  Temp Pulse Resp B/P (MAP) Pulse Ox O2 Delivery O2 Flow Rate FiO2


 


5/10/20 09:13    132/78    


 


5/10/20 09:13  98  132/78    


 


5/10/20 08:00 99.7 98 19 132/78 (96) 97   


 


5/10/20 04:41 97.4 96 22 105/67 (80) 96   


 


5/10/20 00:00 97.7 101 21 98/55 (69) 96   


 


5/9/20 20:53  99  110/61    


 


5/9/20 20:20 99.0 99 21 110/61 (77) 99   


 


5/9/20 20:18     96 Non-Rebreather 15.0 100


 


5/9/20 20:12      Non-Rebreather  


 


5/9/20 16:00 99.0 92 20 126/72 (90) 98   


 


5/9/20 12:00 97.7 96 20 112/71 (85) 96   

















Intake and Output  


 


 5/9/20 5/10/20





 19:00 07:00


 


Intake Total 760.416 ml 275.000 ml


 


Output Total 500 ml 


 


Balance 260.416 ml 275.000 ml


 


  


 


Intake Oral 118 ml 


 


IV Total 642.416 ml 275.000 ml


 


Output Urine Total 500 ml 








Laboratory Tests


5/10/20 05:20: 


White Blood Count 13.0H, Red Blood Count 3.01L, Hemoglobin 8.7L, Hematocrit 

25.9L, Mean Corpuscular Volume 86, Mean Corpuscular Hemoglobin 29.0, Mean 

Corpuscular Hemoglobin Concent 33.7, Red Cell Distribution Width 12.0, Platelet 

Count 122L, Mean Platelet Volume 8.5, Neutrophils (%) (Auto) , Lymphocytes (%) (

Auto) , Monocytes (%) (Auto) , Eosinophils (%) (Auto) , Basophils (%) (Auto) , 

Neutrophils % (Manual) [Pending], Lymphocytes % (Manual) [Pending], Platelet 

Estimate [Pending], Platelet Morphology [Pending], Erythrocyte Sedimentation 

Rate 58H, Sodium Level 145, Potassium Level 3.2L, Chloride Level 110H, Carbon 

Dioxide Level 31, Anion Gap 4L, Blood Urea Nitrogen 22H, Creatinine 1.1, 

Estimat Glomerular Filtration Rate > 60, Glucose Level 195H, Calcium Level 7.2L

, Ferritin 1339H, Lactate Dehydrogenase 415H, C-Reactive Protein, Quantitative 

20.1H


Height (Feet):  5


Height (Inches):  8.00


Weight (Pounds):  173


General Appearance:  no apparent distress


Cardiovascular:  tachycardia


Respiratory/Chest:  decreased breath sounds


Abdomen:  distended


Objective


No change











Miguel Ángel Kendall MD May 10, 2020 09:45

## 2020-05-10 NOTE — PULMONOLOGY PROGRESS NOTE
Subjective


ROS Limited/Unobtainable:  No


Interval Events:  on NRBM; no change from yesterday


Constitutional:  Reports: no symptoms


Respiratory:  Reports: dry cough, shortness of breath


Cardiovascular:  Reports: no symptoms


Gastrointestinal/Abdominal:  Reports: no symptoms


Genitourinary:  Reports: no symptoms


Neurologic:  Reports: no symptoms


Allergies:  


Coded Allergies:  


     No Known Allergies (Unverified , 12/15/14)


All Systems:  reviewed and negative except above





Objective





Last 24 Hour Vital Signs








  Date Time  Temp Pulse Resp B/P (MAP) Pulse Ox O2 Delivery O2 Flow Rate FiO2


 


5/10/20 08:00 99.7 98 19 132/78 (96) 97   


 


5/10/20 04:41 97.4 96 22 105/67 (80) 96   


 


5/10/20 00:00 97.7 101 21 98/55 (69) 96   


 


5/9/20 20:53  99  110/61    


 


5/9/20 20:20 99.0 99 21 110/61 (77) 99   


 


5/9/20 20:18     96 Non-Rebreather 15.0 100


 


5/9/20 20:12      Non-Rebreather  


 


5/9/20 16:00 99.0 92 20 126/72 (90) 98   


 


5/9/20 12:00 97.7 96 20 112/71 (85) 96   

















Intake and Output  


 


 5/9/20 5/10/20





 19:00 07:00


 


Intake Total 760.416 ml 275.000 ml


 


Output Total 500 ml 


 


Balance 260.416 ml 275.000 ml


 


  


 


Intake Oral 118 ml 


 


IV Total 642.416 ml 275.000 ml


 


Output Urine Total 500 ml 








General Appearance:  no acute distress


HEENT:  atraumatic, anicteric


Respiratory/Chest:  chest wall non-tender, decreased breath sounds


Cardiovascular:  normal peripheral pulses


Abdomen:  soft, non tender, non distended


Neurologic/Psychiatric:  alert, responsive


Laboratory Tests


5/10/20 05:20: 


White Blood Count 13.0H, Red Blood Count 3.01L, Hemoglobin 8.7L, Hematocrit 

25.9L, Mean Corpuscular Volume 86, Mean Corpuscular Hemoglobin 29.0, Mean 

Corpuscular Hemoglobin Concent 33.7, Red Cell Distribution Width 12.0, Platelet 

Count 122L, Mean Platelet Volume 8.5, Neutrophils (%) (Auto) , Lymphocytes (%) (

Auto) , Monocytes (%) (Auto) , Eosinophils (%) (Auto) , Basophils (%) (Auto) , 

Neutrophils % (Manual) [Pending], Lymphocytes % (Manual) [Pending], Platelet 

Estimate [Pending], Platelet Morphology [Pending], Erythrocyte Sedimentation 

Rate 58H, Sodium Level 145, Potassium Level 3.2L, Chloride Level 110H, Carbon 

Dioxide Level 31, Anion Gap 4L, Blood Urea Nitrogen 22H, Creatinine 1.1, 

Estimat Glomerular Filtration Rate > 60, Glucose Level 195H, Calcium Level 7.2L

, Ferritin 1339H, Lactate Dehydrogenase 415H, C-Reactive Protein, Quantitative 

20.1H





Current Medications








 Medications


  (Trade)  Dose


 Ordered  Sig/Aarti


 Route


 PRN Reason  Start Time


 Stop Time Status Last Admin


Dose Admin


 


 Acetaminophen


  (Tylenol)  650 mg  Q6H  PRN


 ORAL


 Temp >100.5  4/29/20 14:45


 5/26/20 08:44  5/2/20 21:01


 


 


 Acetaminophen


  (Tylenol)  650 mg  Q6H  PRN


 RECTAL


 Temp >100.5  5/3/20 10:15


 6/2/20 10:14  5/3/20 16:59


 


 


 Carvedilol


  (Coreg)  3.125 mg  EVERY 12  HOURS


 ORAL


   4/29/20 21:00


 5/29/20 20:59  5/9/20 08:49


 


 


 Chlorhexidine


 Gluconate


  (Gloria-Hex 2%)  1 applic  DAILY@2000


 TOPIC


   4/29/20 20:00


 7/24/20 19:59  5/9/20 20:53


 


 


 Furosemide


  (Lasix)  40 mg  DAILY


 ORAL


   5/10/20 09:00


 6/9/20 08:59   


 


 


 Gabapentin


  (Neurontin)  300 mg  Q8HR


 ORAL


   4/29/20 22:00


 5/25/20 00:00  5/10/20 05:13


 


 


 Levetiracetam


  (Keppra)  1,000 mg  Q12HR


 ORAL


   4/29/20 21:00


 5/25/20 00:00  5/9/20 20:53


 


 


 Lisinopril


  (ZestriL)  10 mg  DAILY


 ORAL


   5/7/20 09:00


 6/6/20 08:59  5/9/20 08:50


 


 


 Magnesium Oxide


  (Mag-Ox 400mg)  400 mg  THREE TIMES A  DAY


 ORAL


   5/2/20 18:00


 6/1/20 17:59  5/9/20 17:03


 


 


 Memantine


  (Namenda)  5 mg  BID


 ORAL


   5/9/20 18:00


 5/25/20 16:59  5/9/20 17:04


 


 


 Potassium Chloride


  (K-Dur)  40 meq  BID


 ORAL


   5/6/20 18:00


 7/31/20 10:14  5/9/20 17:03


 


 


 Vancomycin HCl


  (Vanco rx to


 dose)  1 ea  DAILY  PRN


 MISC


 Per rx protocol  5/4/20 12:30


 6/3/20 12:29   


 


 


 Vancomycin HCl 1


 gm/Dextrose  275 ml @ 


 183.708


 mls/hr  Q8H


 IVPB


   5/6/20 10:00


 5/11/20 09:59  5/10/20 02:30


 











Assessment/Plan


Assessment/Plan


IMPRESSION:


1. Cardiomyopathy.


2. Hypotension.


3. Left lung pneumonia.


4. Nursing home resident.


5. Positive COVID-19.





DISCUSSION:


1. Continue isolation


2. Continue current medications.


3. Off pressors.


4. Continue O2/NRBM


5. Pulmonary hygiene.


6. Broad-spectrum antibiotics.


7. I will follow.


8. Continue to wean down O2; 


9. Continue diuresis, on Lasix 40 mg IV BID




















  ______________________________________________


  Hayedr Hahn M.D.











Hayder Hahn MD May 10, 2020 09:14

## 2020-05-10 NOTE — NUR
NURSE NOTES:

Patient awake, on re-breather mask, per Night shift nurse RT is aware that patient should be 
on Venturi mask, waiting for RT to change the mask; Swenson in place, drains yellow urine; 
side rails up x2, breaks engaged, bed at lowest position, bed alarm on; call light within 
reach; will keep monitoring.

## 2020-05-10 NOTE — NUR
RESPIRATORY NOTES

Per doctor's order, patient was switched from 100% non-rebreather mask to venturi mask.Ho

-------------------------------------------------------------------------------

Addendum: 05/10/20 at 0841 by REBEKAH SEWELL RT

-------------------------------------------------------------------------------

Continuation:

However, oxygen saturation quickly fell to 83%.

Patient was placed back on non-rebreather mask.

Oxygen saturations are now back up in the 90s.

RN aware. 

Will continue to monitor.

## 2020-05-10 NOTE — NUR
NURSE NOTES:



Received patient comfortably resting in bed, no SOB, on non-rebreather mask, and on 
bilateral soft wrist restraints.

## 2020-05-10 NOTE — GENERAL PROGRESS NOTE
Assessment/Plan


Problem List:  


(1) Suspected COVID-19 virus infection


ICD Codes:  Z20.828 - Contact with and (suspected) exposure to other viral 

communicable diseases


SNOMED:  106980187


(2) Anemia


ICD Codes:  D64.9 - Anemia, unspecified


SNOMED:  839951232


(3) Weak


ICD Codes:  R53.1 - Weakness


SNOMED:  95979409


(4) COPD (chronic obstructive pulmonary disease)


ICD Codes:  J44.9 - Chronic obstructive pulmonary disease, unspecified


SNOMED:  21875059


(5) Diabetes


ICD Codes:  E11.9 - Type 2 diabetes mellitus without complications


SNOMED:  84248784


(6) Epileptic seizure, generalized


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 

not intractable, without status epilepticus


SNOMED:  36846018


(7) Altered mental status


ICD Codes:  R41.82 - Altered mental status, unspecified


SNOMED:  223689440


Qualifiers:  


   Qualified Codes:  R41.82 - Altered mental status, unspecified


(8) Hypotension


ICD Codes:  I95.9 - Hypotension, unspecified


SNOMED:  32076316


Qualifiers:  


   Qualified Codes:  I95.9 - Hypotension, unspecified


(9) UTI (urinary tract infection)


ICD Codes:  N39.0 - Urinary tract infection, site not specified


SNOMED:  60772615


Status:  unchanged


Assessment/Plan:


o2 pulm tx abx pt diet cbc bmp am





Subjective


Constitutional:  Reports: weakness


Allergies:  


Coded Allergies:  


     No Known Allergies (Unverified , 12/15/14)


All Systems:  reviewed and negative except above


Subjective


sleepy calm in bed





Objective





Last 24 Hour Vital Signs








  Date Time  Temp Pulse Resp B/P (MAP) Pulse Ox O2 Delivery O2 Flow Rate FiO2


 


5/10/20 09:13    132/78    


 


5/10/20 09:13  98  132/78    


 


5/10/20 08:00 99.7 98 19 132/78 (96) 97   


 


5/10/20 04:41 97.4 96 22 105/67 (80) 96   


 


5/10/20 00:00 97.7 101 21 98/55 (69) 96   


 


5/9/20 20:53  99  110/61    


 


5/9/20 20:20 99.0 99 21 110/61 (77) 99   


 


5/9/20 20:18     96 Non-Rebreather 15.0 100


 


5/9/20 20:12      Non-Rebreather  


 


5/9/20 16:00 99.0 92 20 126/72 (90) 98   


 


5/9/20 12:00 97.7 96 20 112/71 (85) 96   

















Intake and Output  


 


 5/9/20 5/10/20





 19:00 07:00


 


Intake Total 760.416 ml 275.000 ml


 


Output Total 500 ml 


 


Balance 260.416 ml 275.000 ml


 


  


 


Intake Oral 118 ml 


 


IV Total 642.416 ml 275.000 ml


 


Output Urine Total 500 ml 








Laboratory Tests


5/10/20 05:20: 


White Blood Count 13.0H, Red Blood Count 3.01L, Hemoglobin 8.7L, Hematocrit 

25.9L, Mean Corpuscular Volume 86, Mean Corpuscular Hemoglobin 29.0, Mean 

Corpuscular Hemoglobin Concent 33.7, Red Cell Distribution Width 12.0, Platelet 

Count 122L, Mean Platelet Volume 8.5, Neutrophils (%) (Auto) , Lymphocytes (%) (

Auto) , Monocytes (%) (Auto) , Eosinophils (%) (Auto) , Basophils (%) (Auto) , 

Differential Total Cells Counted 100, Neutrophils % (Manual) 81H, Lymphocytes % 

(Manual) 9L, Monocytes % (Manual) 7, Eosinophils % (Manual) 3, Basophils % (

Manual) 0, Band Neutrophils 0, Platelet Estimate DecreasedL, Platelet 

Morphology Normal, Polychromasia 1+, Hypochromasia 1+, Erythrocyte 

Sedimentation Rate 58H, Sodium Level 145, Potassium Level 3.2L, Chloride Level 

110H, Carbon Dioxide Level 31, Anion Gap 4L, Blood Urea Nitrogen 22H, 

Creatinine 1.1, Estimat Glomerular Filtration Rate > 60, Glucose Level 195H, 

Calcium Level 7.2L, Ferritin 1339H, Lactate Dehydrogenase 415H, C-Reactive 

Protein, Quantitative 20.1H


Height (Feet):  5


Height (Inches):  8.00


Weight (Pounds):  173


General Appearance:  lethargic


EENT:  normal ENT inspection


Neck:  normal alignment


Cardiovascular:  normal rate, regular rhythm


Respiratory/Chest:  no respiratory distress, no accessory muscle use


Extremities:  normal inspection


Skin:  normal pigmentation











Arron Barkley ItaloVioelta CARTER May 10, 2020 10:31

## 2020-05-11 VITALS — DIASTOLIC BLOOD PRESSURE: 76 MMHG | SYSTOLIC BLOOD PRESSURE: 121 MMHG

## 2020-05-11 VITALS — DIASTOLIC BLOOD PRESSURE: 79 MMHG | SYSTOLIC BLOOD PRESSURE: 112 MMHG

## 2020-05-11 VITALS — DIASTOLIC BLOOD PRESSURE: 71 MMHG | SYSTOLIC BLOOD PRESSURE: 128 MMHG

## 2020-05-11 VITALS — DIASTOLIC BLOOD PRESSURE: 70 MMHG | SYSTOLIC BLOOD PRESSURE: 107 MMHG

## 2020-05-11 VITALS — DIASTOLIC BLOOD PRESSURE: 68 MMHG | SYSTOLIC BLOOD PRESSURE: 123 MMHG

## 2020-05-11 VITALS — SYSTOLIC BLOOD PRESSURE: 123 MMHG | DIASTOLIC BLOOD PRESSURE: 68 MMHG

## 2020-05-11 LAB
ADD MANUAL DIFF: NO
ANION GAP SERPL CALC-SCNC: 7 MMOL/L (ref 5–15)
BASOPHILS NFR BLD AUTO: 0.6 % (ref 0–2)
BUN SERPL-MCNC: 39 MG/DL (ref 7–18)
CALCIUM SERPL-MCNC: 8.5 MG/DL (ref 8.5–10.1)
CHLORIDE SERPL-SCNC: 106 MMOL/L (ref 98–107)
CO2 SERPL-SCNC: 33 MMOL/L (ref 21–32)
CREAT SERPL-MCNC: 1.8 MG/DL (ref 0.55–1.3)
EOSINOPHIL NFR BLD AUTO: 1.9 % (ref 0–3)
ERYTHROCYTE [DISTWIDTH] IN BLOOD BY AUTOMATED COUNT: 12.3 % (ref 11.6–14.8)
HCT VFR BLD CALC: 28.5 % (ref 42–52)
HGB BLD-MCNC: 9.2 G/DL (ref 14.2–18)
LYMPHOCYTES NFR BLD AUTO: 6.4 % (ref 20–45)
MCV RBC AUTO: 88 FL (ref 80–99)
MONOCYTES NFR BLD AUTO: 6.5 % (ref 1–10)
NEUTROPHILS NFR BLD AUTO: 84.6 % (ref 45–75)
PLATELET # BLD: 147 K/UL (ref 150–450)
POTASSIUM SERPL-SCNC: 4.3 MMOL/L (ref 3.5–5.1)
RBC # BLD AUTO: 3.24 M/UL (ref 4.7–6.1)
SODIUM SERPL-SCNC: 146 MMOL/L (ref 136–145)
WBC # BLD AUTO: 14 K/UL (ref 4.8–10.8)

## 2020-05-11 RX ADMIN — CHLORHEXIDINE GLUCONATE SCH APPLIC: 213 SOLUTION TOPICAL at 22:04

## 2020-05-11 RX ADMIN — MAGNESIUM OXIDE TAB 400 MG (241.3 MG ELEMENTAL MG) SCH MG: 400 (241.3 MG) TAB at 08:53

## 2020-05-11 RX ADMIN — FUROSEMIDE SCH MG: 40 TABLET ORAL at 08:54

## 2020-05-11 RX ADMIN — LISINOPRIL SCH MG: 10 TABLET ORAL at 08:53

## 2020-05-11 NOTE — CARDIAC ELECTROPHYSIOLOGY PN
Assessment/Plan


Assessment/Plan


1. S/P Shock  due to  EF 40% and sepsis.  BNP is 736.  


    On Abx by Dr. Lindsay. Resolved


    


2. CHF EF 40%.   On Coreg 3.125 bid. DC  LAisx and Lisinopril for ARF





3. Status post right-sided TONY ICD.    





4. Hypertension  


5. History of CVA with residual weakness.


6. COVID-19 PNA, off the vent 


7. Hypernatremia.  DC Lasix 


8. Acute renal failure.Cr up from 0.9 to 1.8. Hold Lasix and Lisinopril





DW RN and Dr Kendall





Subjective


Subjective


In Covid isolation. No new events.In NAD. Creatinine is rising. Awaiting 

negative Covid to go back to [facility]





Objective





Last 24 Hour Vital Signs








  Date Time  Temp Pulse Resp B/P (MAP) Pulse Ox O2 Delivery O2 Flow Rate FiO2


 


5/11/20 12:00 97.9 93 18 112/79 (90) 98   


 


5/11/20 09:00      Non-Rebreather  


 


5/11/20 08:53    123/68    


 


5/11/20 08:53  94  123/68    


 


5/11/20 08:00 97.9 94 20 123/68 (86) 100   


 


5/11/20 04:00 97.9 92 28 123/68 (86) 96   


 


5/11/20 00:18 97.9 100 28 107/70 (82) 98   


 


5/10/20 21:36     95 Non-Rebreather 15.0 100


 


5/10/20 20:45  97  111/63    


 


5/10/20 20:33 98.1 97 36 111/63 (79) 95   


 


5/10/20 20:11      Non-Rebreather  


 


5/10/20 16:00 98.7 97 20 115/70 (85) 98   

















Intake and Output  


 


 5/10/20 5/11/20





 19:00 07:00


 


Intake Total 367.416 ml 


 


Balance 367.416 ml 


 


  


 


IV Total 367.416 ml 











Laboratory Tests








Test


  5/11/20


05:21


 


White Blood Count


  14.0 K/UL


(4.8-10.8)  H


 


Red Blood Count


  3.24 M/UL


(4.70-6.10)  L


 


Hemoglobin


  9.2 G/DL


(14.2-18.0)  L


 


Hematocrit


  28.5 %


(42.0-52.0)  L


 


Mean Corpuscular Volume 88 FL (80-99)  


 


Mean Corpuscular Hemoglobin


  28.4 PG


(27.0-31.0)


 


Mean Corpuscular Hemoglobin


Concent 32.3 G/DL


(32.0-36.0)


 


Red Cell Distribution Width


  12.3 %


(11.6-14.8)


 


Platelet Count


  147 K/UL


(150-450)  L


 


Mean Platelet Volume


  7.6 FL


(6.5-10.1)


 


Neutrophils (%) (Auto)


  84.6 %


(45.0-75.0)  H


 


Lymphocytes (%) (Auto)


  6.4 %


(20.0-45.0)  L


 


Monocytes (%) (Auto)


  6.5 %


(1.0-10.0)


 


Eosinophils (%) (Auto)


  1.9 %


(0.0-3.0)


 


Basophils (%) (Auto)


  0.6 %


(0.0-2.0)


 


Sodium Level


  146 MMOL/L


(136-145)  H


 


Potassium Level


  4.3 MMOL/L


(3.5-5.1)


 


Chloride Level


  106 MMOL/L


()


 


Carbon Dioxide Level


  33 MMOL/L


(21-32)  H


 


Anion Gap


  7 mmol/L


(5-15)


 


Blood Urea Nitrogen


  39 mg/dL


(7-18)  H


 


Creatinine


  1.8 MG/DL


(0.55-1.30)  #H


 


Estimat Glomerular Filtration


Rate 45.3 mL/min


(>60)


 


Glucose Level


  224 MG/DL


()  H


 


Calcium Level


  8.5 MG/DL


(8.5-10.1)








Objective


HEAD AND NECK:  Mild JVD.


LUNGS:  Decreased breath sounds.


CARDIOVASCULAR:  Regular S1 and S2 with no gallop.


ABDOMEN:  Soft.


EXTREMITIES:  1+ pitting edema.  


         Defibrillator is in right subclavian.











Tommy Otero MD May 11, 2020 14:01

## 2020-05-11 NOTE — PULMONOLOGY PROGRESS NOTE
Subjective


ROS Limited/Unobtainable:  No


Interval Events:  on NRBM; no change from yesterday


Constitutional:  Reports: no symptoms


Respiratory:  Reports: dry cough, shortness of breath


Cardiovascular:  Reports: no symptoms


Gastrointestinal/Abdominal:  Reports: no symptoms


Genitourinary:  Reports: no symptoms


Neurologic:  Reports: no symptoms


Allergies:  


Coded Allergies:  


     No Known Allergies (Unverified , 12/15/14)


All Systems:  reviewed and negative except above





Objective





Last 24 Hour Vital Signs








  Date Time  Temp Pulse Resp B/P (MAP) Pulse Ox O2 Delivery O2 Flow Rate FiO2


 


5/11/20 12:00 97.9 93 18 112/79 (90) 98   


 


5/11/20 09:00      Non-Rebreather  


 


5/11/20 08:53    123/68    


 


5/11/20 08:53  94  123/68    


 


5/11/20 08:00 97.9 94 20 123/68 (86) 100   


 


5/11/20 04:00 97.9 92 28 123/68 (86) 96   


 


5/11/20 00:18 97.9 100 28 107/70 (82) 98   


 


5/10/20 21:36     95 Non-Rebreather 15.0 100


 


5/10/20 20:45  97  111/63    


 


5/10/20 20:33 98.1 97 36 111/63 (79) 95   


 


5/10/20 20:11      Non-Rebreather  

















Intake and Output  


 


 5/10/20 5/11/20





 19:00 07:00


 


Intake Total 367.416 ml 


 


Balance 367.416 ml 


 


  


 


IV Total 367.416 ml 








General Appearance:  no acute distress


HEENT:  atraumatic, anicteric


Respiratory/Chest:  chest wall non-tender, decreased breath sounds


Cardiovascular:  normal peripheral pulses


Abdomen:  soft, non tender, non distended


Neurologic/Psychiatric:  alert, responsive


Laboratory Tests


5/11/20 05:21: 


White Blood Count 14.0H, Red Blood Count 3.24L, Hemoglobin 9.2L, Hematocrit 

28.5L, Mean Corpuscular Volume 88, Mean Corpuscular Hemoglobin 28.4, Mean 

Corpuscular Hemoglobin Concent 32.3, Red Cell Distribution Width 12.3, Platelet 

Count 147L, Mean Platelet Volume 7.6, Neutrophils (%) (Auto) 84.6H, Lymphocytes 

(%) (Auto) 6.4L, Monocytes (%) (Auto) 6.5, Eosinophils (%) (Auto) 1.9, 

Basophils (%) (Auto) 0.6, Sodium Level 146H, Potassium Level 4.3, Chloride 

Level 106, Carbon Dioxide Level 33H, Anion Gap 7, Blood Urea Nitrogen 39H, 

Creatinine 1.8#H, Estimat Glomerular Filtration Rate 45.3, Glucose Level 224H, 

Calcium Level 8.5





Current Medications








 Medications


  (Trade)  Dose


 Ordered  Sig/Aarti


 Route


 PRN Reason  Start Time


 Stop Time Status Last Admin


Dose Admin


 


 Acetaminophen


  (Tylenol)  650 mg  Q6H  PRN


 ORAL


 Temp >100.5  4/29/20 14:45


 5/26/20 08:44  5/2/20 21:01


 


 


 Acetaminophen


  (Tylenol)  650 mg  Q6H  PRN


 RECTAL


 Temp >100.5  5/3/20 10:15


 6/2/20 10:14  5/3/20 16:59


 


 


 Carvedilol


  (Coreg)  3.125 mg  EVERY 12  HOURS


 ORAL


   4/29/20 21:00


 5/29/20 20:59  5/11/20 08:53


 


 


 Chlorhexidine


 Gluconate


  (Gloria-Hex 2%)  1 applic  DAILY@2000


 TOPIC


   4/29/20 20:00


 7/24/20 19:59  5/10/20 20:45


 


 


 Gabapentin


  (Neurontin)  300 mg  Q8HR


 ORAL


   4/29/20 22:00


 5/25/20 00:00  5/11/20 13:07


 


 


 Levetiracetam


  (Keppra)  1,000 mg  Q12HR


 ORAL


   4/29/20 21:00


 5/25/20 00:00  5/11/20 08:53


 


 


 Memantine


  (Namenda)  5 mg  BID


 ORAL


   5/9/20 18:00


 5/25/20 16:59  5/11/20 08:53


 











Assessment/Plan


Assessment/Plan


IMPRESSION:


1. Cardiomyopathy.


2. Hypotension.


3. Left lung pneumonia.


4. Nursing home resident.


5. Positive COVID-19.





DISCUSSION:


1. Continue isolation


2. Continue current medications.


3. Off pressors.


4. Continue O2/NRBM


5. Pulmonary hygiene.


6. Broad-spectrum antibiotics.


7. I will follow.


8. Continue to wean down O2; 


9. Continue diuresis, on Lasix 40 mg IV BID; 





CXR unchanged




















  ______________________________________________


  LESIA Dempsey Omar Syed MD May 11, 2020 16:28

## 2020-05-11 NOTE — INFECTIOUS DISEASES PROG NOTE
Assessment/Plan


Assessment/Plan





Assessment:


Septic shock-SP





Fever;SP


Mild leukocytosis, fluctuating


   -5/3 u/a wbc 15-20, nit neg, leuk +1


          Bcx Neg


   -u/a neg


   -Bcx Neg





Probable PNA- 2ry to COVID19 (from NH with confirmed cases)


Acute hypoxic respiratory failure- was on NC, now on NRB- increasing FIo2 

requiremetns


    -5/11 CXR:  Minimally improved left, unchanged right infiltrates, since 

prior exam of 3 days earlier


    -5/8 CXR: Unchanged bilateral infiltrates, likely pneumonia, since prior 

exam of 3 days earlier.


    -5/5 CXR:  Bilateral interstitial and airspace infiltrates are again 

demonstrated.Appearance is overall unchanged. There may be a small left pleural 

effusion,unchange


   -5/4 CRP 41.6, ferritin 1468


  -5/3 CXR: .  Worsening bilateral interstitial and airspace opacities. 

Probable small left pleural effusion.  Difficult to evaluate the right 

costophrenic angle due to overlying pacemaker.


  -4/30 CXR: Slightly improved bilateral interstitial and airspace infiltrates 

versus edema


  -4/28 CXR: Bilateral mid and lower lung interstitial disease, new since prior 

study.Possible developing left pleural effusion


        Ferritn >2000, CRP 22.3


  -4/24 SARS-COV2 PCR +


  -CXR: Left basilar atelectasis and/or infiltrate


 


MELVI, improving





 HTN


cadiomyopathy s/p AICD


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


seizure disorder


NH resident ([facility]) 








Plan:


-Continue to monitor off abx


   -5/10 SP IV Vancomycin #5


   -5/7 SP Zosyn #5


   - SPSolumedrol 40mg IV 12hrs x 5days (5/4-5/6); dc'ed by pulmonologist - (

day 10 of illness and worsening; around this time is seen the worsening of 

COVID19 pts mainly driven by inflammatory response) 


   -4/29 SP Cefepime #6


   -4/27 SP IV Vancomycin #4  





-f/u cx


-Monitor CBC/CMP, temperatures


-COVID 19 precautions


-clarify goals of care


-aspiration precautions


-inflammatory markers


-f/u cx


-monitor CXR


-u/a w/ reflex, Bcx x2





Thank you for consulting [facility]. Will continue to follow along with 

you.





Discussed with RN,





Subjective


Allergies:  


Coded Allergies:  


     No Known Allergies (Unverified , 12/15/14)


Subjective


afebrile >48hrs


on NRB now





Objective


Vital Signs





Last 24 Hour Vital Signs








  Date Time  Temp Pulse Resp B/P (MAP) Pulse Ox O2 Delivery O2 Flow Rate FiO2


 


5/11/20 12:00 97.9 93 18 112/79 (90) 98   


 


5/11/20 09:00      Non-Rebreather  


 


5/11/20 08:53    123/68    


 


5/11/20 08:53  94  123/68    


 


5/11/20 08:00 97.9 94 20 123/68 (86) 100   


 


5/11/20 04:00 97.9 92 28 123/68 (86) 96   


 


5/11/20 00:18 97.9 100 28 107/70 (82) 98   


 


5/10/20 21:36     95 Non-Rebreather 15.0 100


 


5/10/20 20:45  97  111/63    


 


5/10/20 20:33 98.1 97 36 111/63 (79) 95   


 


5/10/20 20:11      Non-Rebreather  


 


5/10/20 16:00 98.7 97 20 115/70 (85) 98   








Height (Feet):  5


Height (Inches):  8.00


Weight (Pounds):  178


Objective


 not examined to limit COVID19 exposure





Laboratory Tests








Test


  5/11/20


05:21


 


White Blood Count


  14.0 K/UL


(4.8-10.8)  H


 


Red Blood Count


  3.24 M/UL


(4.70-6.10)  L


 


Hemoglobin


  9.2 G/DL


(14.2-18.0)  L


 


Hematocrit


  28.5 %


(42.0-52.0)  L


 


Mean Corpuscular Volume 88 FL (80-99)  


 


Mean Corpuscular Hemoglobin


  28.4 PG


(27.0-31.0)


 


Mean Corpuscular Hemoglobin


Concent 32.3 G/DL


(32.0-36.0)


 


Red Cell Distribution Width


  12.3 %


(11.6-14.8)


 


Platelet Count


  147 K/UL


(150-450)  L


 


Mean Platelet Volume


  7.6 FL


(6.5-10.1)


 


Neutrophils (%) (Auto)


  84.6 %


(45.0-75.0)  H


 


Lymphocytes (%) (Auto)


  6.4 %


(20.0-45.0)  L


 


Monocytes (%) (Auto)


  6.5 %


(1.0-10.0)


 


Eosinophils (%) (Auto)


  1.9 %


(0.0-3.0)


 


Basophils (%) (Auto)


  0.6 %


(0.0-2.0)


 


Sodium Level


  146 MMOL/L


(136-145)  H


 


Potassium Level


  4.3 MMOL/L


(3.5-5.1)


 


Chloride Level


  106 MMOL/L


()


 


Carbon Dioxide Level


  33 MMOL/L


(21-32)  H


 


Anion Gap


  7 mmol/L


(5-15)


 


Blood Urea Nitrogen


  39 mg/dL


(7-18)  H


 


Creatinine


  1.8 MG/DL


(0.55-1.30)  #H


 


Estimat Glomerular Filtration


Rate 45.3 mL/min


(>60)


 


Glucose Level


  224 MG/DL


()  H


 


Calcium Level


  8.5 MG/DL


(8.5-10.1)











Current Medications








 Medications


  (Trade)  Dose


 Ordered  Sig/Aarti


 Route


 PRN Reason  Start Time


 Stop Time Status Last Admin


Dose Admin


 


 Acetaminophen


  (Tylenol)  650 mg  Q6H  PRN


 ORAL


 Temp >100.5  4/29/20 14:45


 5/26/20 08:44  5/2/20 21:01


 


 


 Acetaminophen


  (Tylenol)  650 mg  Q6H  PRN


 RECTAL


 Temp >100.5  5/3/20 10:15


 6/2/20 10:14  5/3/20 16:59


 


 


 Carvedilol


  (Coreg)  3.125 mg  EVERY 12  HOURS


 ORAL


   4/29/20 21:00


 5/29/20 20:59  5/11/20 08:53


 


 


 Chlorhexidine


 Gluconate


  (Gloria-Hex 2%)  1 applic  DAILY@2000


 TOPIC


   4/29/20 20:00


 7/24/20 19:59  5/10/20 20:45


 


 


 Gabapentin


  (Neurontin)  300 mg  Q8HR


 ORAL


   4/29/20 22:00


 5/25/20 00:00  5/11/20 13:07


 


 


 Levetiracetam


  (Keppra)  1,000 mg  Q12HR


 ORAL


   4/29/20 21:00


 5/25/20 00:00  5/11/20 08:53


 


 


 Memantine


  (Namenda)  5 mg  BID


 ORAL


   5/9/20 18:00


 5/25/20 16:59  5/11/20 08:53


 

















Naila Lindsay M.D. May 11, 2020 13:53

## 2020-05-11 NOTE — NUR
NOTE



CALL MADE TO PATIENTS INSURANCE, [employer] @ [phone]. VOICEMAIL LEFT. WILL FOLLOW 
UP.

## 2020-05-11 NOTE — NEPHROLOGY PROGRESS NOTE
Assessment/Plan


Problem List:  


(1) Electrolyte imbalance


(2) Suspected COVID-19 virus infection


(3) Hypotension


Assessment:  Resolved





(4) Sepsis


Assessment





Patient's current problem from renal standpoint of view is hypokalemia and 

other electrolyte imbalances





His other conditions:


Patient presented with acute renal failure however it is now resolved


Patient presented with septic shock was on pressors however now resolved


Sepsis leukocytosis improving


Patient has COVID-19 pneumonia


Hypertension


Cardiomyopathy status post AICD


Previous CVA with right residual weakness


Seizure disorders


Plan


In view of worsening renal failure and rise of creatinine to 1.8 will 

temporarily hold Lasix and lisinopril


Discussed with Dr. Henderson


Continue rest





Previously:


Magnesium and potassium as needed


Stable from renal standpoint of view


Continue per consultants





We will continue to monitor electrolytes and chemistries





Subjective


ROS Limited/Unobtainable:  No


Constitutional:  Reports: malaise





Objective


Objective





Last 24 Hour Vital Signs








  Date Time  Temp Pulse Resp B/P (MAP) Pulse Ox O2 Delivery O2 Flow Rate FiO2


 


5/11/20 09:00      Non-Rebreather  


 


5/11/20 08:53    123/68    


 


5/11/20 08:53  94  123/68    


 


5/11/20 08:00 97.9 94 20 123/68 (86) 100   


 


5/11/20 04:00 97.9 92 28 123/68 (86) 96   


 


5/11/20 00:18 97.9 100 28 107/70 (82) 98   


 


5/10/20 21:36     95 Non-Rebreather 15.0 100


 


5/10/20 20:45  97  111/63    


 


5/10/20 20:33 98.1 97 36 111/63 (79) 95   


 


5/10/20 20:11      Non-Rebreather  


 


5/10/20 16:00 98.7 97 20 115/70 (85) 98   

















Intake and Output  


 


 5/10/20 5/11/20





 19:00 07:00


 


Intake Total 367.416 ml 


 


Balance 367.416 ml 


 


  


 


IV Total 367.416 ml 








Laboratory Tests


5/11/20 05:21: 


White Blood Count 14.0H, Red Blood Count 3.24L, Hemoglobin 9.2L, Hematocrit 

28.5L, Mean Corpuscular Volume 88, Mean Corpuscular Hemoglobin 28.4, Mean 

Corpuscular Hemoglobin Concent 32.3, Red Cell Distribution Width 12.3, Platelet 

Count 147L, Mean Platelet Volume 7.6, Neutrophils (%) (Auto) 84.6H, Lymphocytes 

(%) (Auto) 6.4L, Monocytes (%) (Auto) 6.5, Eosinophils (%) (Auto) 1.9, 

Basophils (%) (Auto) 0.6, Sodium Level 146H, Potassium Level 4.3, Chloride 

Level 106, Carbon Dioxide Level 33H, Anion Gap 7, Blood Urea Nitrogen 39H, 

Creatinine 1.8#H, Estimat Glomerular Filtration Rate 45.3, Glucose Level 224H, 

Calcium Level 8.5


Height (Feet):  5


Height (Inches):  8.00


Weight (Pounds):  178


General Appearance:  no apparent distress, lethargic


Cardiovascular:  tachycardia


Respiratory/Chest:  decreased breath sounds


Abdomen:  soft


Objective


No change











Miguel Ángel Kendall MD May 11, 2020 12:23

## 2020-05-11 NOTE — NUR
NURSE NOTES:

Receive pt in the bed . Patient is awake, alert x1; on non rebreather mask. Pt has Right 
Femoral triple lumen in place, dressing dry and intact. SCD in place and pt has Swenson cath.  
Bed in the lower position, locked and alarm on. Call light within reach. We will keep 
monitoring the pt

## 2020-05-11 NOTE — GENERAL PROGRESS NOTE
Assessment/Plan


Problem List:  


(1) Suspected COVID-19 virus infection


ICD Codes:  Z20.828 - Contact with and (suspected) exposure to other viral 

communicable diseases


SNOMED:  019284639


(2) Anemia


ICD Codes:  D64.9 - Anemia, unspecified


SNOMED:  620106031


(3) Weak


ICD Codes:  R53.1 - Weakness


SNOMED:  91845069


(4) COPD (chronic obstructive pulmonary disease)


ICD Codes:  J44.9 - Chronic obstructive pulmonary disease, unspecified


SNOMED:  35067863


(5) Diabetes


ICD Codes:  E11.9 - Type 2 diabetes mellitus without complications


SNOMED:  58634124


(6) Epileptic seizure, generalized


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 

not intractable, without status epilepticus


SNOMED:  77634779


(7) Altered mental status


ICD Codes:  R41.82 - Altered mental status, unspecified


SNOMED:  562217331


Qualifiers:  


   Qualified Codes:  R41.82 - Altered mental status, unspecified


(8) Hypotension


ICD Codes:  I95.9 - Hypotension, unspecified


SNOMED:  33036575


Qualifiers:  


   Qualified Codes:  I95.9 - Hypotension, unspecified


(9) UTI (urinary tract infection)


ICD Codes:  N39.0 - Urinary tract infection, site not specified


SNOMED:  18651540


Status:  unchanged


Assessment/Plan:


o2 pulm tx abx pt diet cbc bmp am





Subjective


Constitutional:  Reports: weakness


Allergies:  


Coded Allergies:  


     No Known Allergies (Unverified , 12/15/14)


All Systems:  reviewed and negative except above


Subjective


sleepy calm in bed





Objective





Last 24 Hour Vital Signs








  Date Time  Temp Pulse Resp B/P (MAP) Pulse Ox O2 Delivery O2 Flow Rate FiO2


 


5/11/20 09:00      Non-Rebreather  


 


5/11/20 08:53    123/68    


 


5/11/20 08:53  94  123/68    


 


5/11/20 08:00 97.9 94 20 123/68 (86) 100   


 


5/11/20 04:00 97.9 92 28 123/68 (86) 96   


 


5/11/20 00:18 97.9 100 28 107/70 (82) 98   


 


5/10/20 21:36     95 Non-Rebreather 15.0 100


 


5/10/20 20:45  97  111/63    


 


5/10/20 20:33 98.1 97 36 111/63 (79) 95   


 


5/10/20 20:11      Non-Rebreather  


 


5/10/20 16:00 98.7 97 20 115/70 (85) 98   


 


5/10/20 12:00 98.6 92 17 110/64 (79) 97   

















Intake and Output  


 


 5/10/20 5/11/20





 19:00 07:00


 


Intake Total 367.416 ml 


 


Balance 367.416 ml 


 


  


 


IV Total 367.416 ml 








Laboratory Tests


5/11/20 05:21: 


White Blood Count 14.0H, Red Blood Count 3.24L, Hemoglobin 9.2L, Hematocrit 

28.5L, Mean Corpuscular Volume 88, Mean Corpuscular Hemoglobin 28.4, Mean 

Corpuscular Hemoglobin Concent 32.3, Red Cell Distribution Width 12.3, Platelet 

Count 147L, Mean Platelet Volume 7.6, Neutrophils (%) (Auto) 84.6H, Lymphocytes 

(%) (Auto) 6.4L, Monocytes (%) (Auto) 6.5, Eosinophils (%) (Auto) 1.9, 

Basophils (%) (Auto) 0.6, Sodium Level 146H, Potassium Level 4.3, Chloride 

Level 106, Carbon Dioxide Level 33H, Anion Gap 7, Blood Urea Nitrogen 39H, 

Creatinine 1.8#H, Estimat Glomerular Filtration Rate 45.3, Glucose Level 224H, 

Calcium Level 8.5


Height (Feet):  5


Height (Inches):  8.00


Weight (Pounds):  178


General Appearance:  lethargic


EENT:  normal ENT inspection


Neck:  normal alignment


Cardiovascular:  normal rate, regular rhythm


Respiratory/Chest:  no respiratory distress, no accessory muscle use


Extremities:  normal inspection


Skin:  normal pigmentation











Arron Barkley ItaloVioleta CARTER May 11, 2020 10:12

## 2020-05-11 NOTE — DIAGNOSTIC IMAGING REPORT
Indication: Shortness of breath

 

Technique: One view of the chest

 

Comparison: 5/8/2020

 

Findings: Right chest AICD is again demonstrated. Bilateral infiltrates appear

unchanged on the right, may be minimally improved on the left. The heart remains

enlarged.

 

Impression: Minimally improved left, unchanged right infiltrates, since prior exam of

3 days earlier

## 2020-05-11 NOTE — NUR
P.T Weekly Progress Notes:

Pt being seen this past week of skilled P.T  services. Pt. responded poorly from the  tx due 
 to lack of participation characterized by  uncooperative behavior. i.e pt would refuse and 
becomes resistive upon attempt to functional mobility training. Poor progress has achieved 
since P.T evaluation. Pt currently is dependent/MAX A for all aspects of bed mobilities and 
bed positioning. Plan: will DC skilled P.T as pt is no longer benefiting from the service. 
Recommend DC to [facility].

## 2020-05-11 NOTE — NUR
NURSE NOTES:

Patient awake, alert x1; on non rebreather mask; Swenson in place, drains yellow urine; Right 
Femoral triple lumen in place, dressing dry and intact; SCD in place; side rails up x2, 
breaks engaged, bed at lowest position; call light within reach; will keep monitoring.

## 2020-05-12 VITALS — SYSTOLIC BLOOD PRESSURE: 97 MMHG | DIASTOLIC BLOOD PRESSURE: 60 MMHG

## 2020-05-12 VITALS — SYSTOLIC BLOOD PRESSURE: 103 MMHG | DIASTOLIC BLOOD PRESSURE: 54 MMHG

## 2020-05-12 VITALS — DIASTOLIC BLOOD PRESSURE: 58 MMHG | SYSTOLIC BLOOD PRESSURE: 91 MMHG

## 2020-05-12 VITALS — SYSTOLIC BLOOD PRESSURE: 90 MMHG | DIASTOLIC BLOOD PRESSURE: 58 MMHG

## 2020-05-12 VITALS — SYSTOLIC BLOOD PRESSURE: 118 MMHG | DIASTOLIC BLOOD PRESSURE: 72 MMHG

## 2020-05-12 VITALS — SYSTOLIC BLOOD PRESSURE: 97 MMHG | DIASTOLIC BLOOD PRESSURE: 62 MMHG

## 2020-05-12 VITALS — DIASTOLIC BLOOD PRESSURE: 56 MMHG | SYSTOLIC BLOOD PRESSURE: 93 MMHG

## 2020-05-12 VITALS — SYSTOLIC BLOOD PRESSURE: 89 MMHG | DIASTOLIC BLOOD PRESSURE: 53 MMHG

## 2020-05-12 VITALS — DIASTOLIC BLOOD PRESSURE: 53 MMHG | SYSTOLIC BLOOD PRESSURE: 89 MMHG

## 2020-05-12 VITALS — DIASTOLIC BLOOD PRESSURE: 59 MMHG | SYSTOLIC BLOOD PRESSURE: 95 MMHG

## 2020-05-12 VITALS — SYSTOLIC BLOOD PRESSURE: 99 MMHG | DIASTOLIC BLOOD PRESSURE: 61 MMHG

## 2020-05-12 VITALS — DIASTOLIC BLOOD PRESSURE: 60 MMHG | SYSTOLIC BLOOD PRESSURE: 98 MMHG

## 2020-05-12 VITALS — SYSTOLIC BLOOD PRESSURE: 92 MMHG | DIASTOLIC BLOOD PRESSURE: 62 MMHG

## 2020-05-12 VITALS — SYSTOLIC BLOOD PRESSURE: 94 MMHG | DIASTOLIC BLOOD PRESSURE: 64 MMHG

## 2020-05-12 VITALS — DIASTOLIC BLOOD PRESSURE: 63 MMHG | SYSTOLIC BLOOD PRESSURE: 100 MMHG

## 2020-05-12 VITALS — DIASTOLIC BLOOD PRESSURE: 59 MMHG | SYSTOLIC BLOOD PRESSURE: 97 MMHG

## 2020-05-12 VITALS — DIASTOLIC BLOOD PRESSURE: 51 MMHG | SYSTOLIC BLOOD PRESSURE: 86 MMHG

## 2020-05-12 VITALS — DIASTOLIC BLOOD PRESSURE: 63 MMHG | SYSTOLIC BLOOD PRESSURE: 104 MMHG

## 2020-05-12 VITALS — DIASTOLIC BLOOD PRESSURE: 57 MMHG | SYSTOLIC BLOOD PRESSURE: 95 MMHG

## 2020-05-12 VITALS — DIASTOLIC BLOOD PRESSURE: 50 MMHG | SYSTOLIC BLOOD PRESSURE: 104 MMHG

## 2020-05-12 VITALS — DIASTOLIC BLOOD PRESSURE: 51 MMHG | SYSTOLIC BLOOD PRESSURE: 103 MMHG

## 2020-05-12 VITALS — DIASTOLIC BLOOD PRESSURE: 57 MMHG | SYSTOLIC BLOOD PRESSURE: 92 MMHG

## 2020-05-12 VITALS — DIASTOLIC BLOOD PRESSURE: 55 MMHG | SYSTOLIC BLOOD PRESSURE: 99 MMHG

## 2020-05-12 VITALS — SYSTOLIC BLOOD PRESSURE: 100 MMHG | DIASTOLIC BLOOD PRESSURE: 61 MMHG

## 2020-05-12 VITALS — SYSTOLIC BLOOD PRESSURE: 101 MMHG

## 2020-05-12 VITALS — SYSTOLIC BLOOD PRESSURE: 110 MMHG | DIASTOLIC BLOOD PRESSURE: 60 MMHG

## 2020-05-12 VITALS — SYSTOLIC BLOOD PRESSURE: 90 MMHG | DIASTOLIC BLOOD PRESSURE: 61 MMHG

## 2020-05-12 VITALS — DIASTOLIC BLOOD PRESSURE: 60 MMHG | SYSTOLIC BLOOD PRESSURE: 97 MMHG

## 2020-05-12 VITALS — DIASTOLIC BLOOD PRESSURE: 50 MMHG | SYSTOLIC BLOOD PRESSURE: 95 MMHG

## 2020-05-12 LAB
ADD MANUAL DIFF: NO
ALBUMIN SERPL-MCNC: 2.3 G/DL (ref 3.4–5)
ALP SERPL-CCNC: 81 U/L (ref 46–116)
ALT SERPL-CCNC: 28 U/L (ref 12–78)
ANION GAP SERPL CALC-SCNC: 8 MMOL/L (ref 5–15)
AST SERPL-CCNC: 19 U/L (ref 15–37)
BASOPHILS NFR BLD AUTO: 0.5 % (ref 0–2)
BILIRUB DIRECT SERPL-MCNC: 0.2 MG/DL (ref 0–0.3)
BILIRUB SERPL-MCNC: 0.4 MG/DL (ref 0.2–1)
BUN SERPL-MCNC: 67 MG/DL (ref 7–18)
CALCIUM SERPL-MCNC: 9.6 MG/DL (ref 8.5–10.1)
CHLORIDE SERPL-SCNC: 105 MMOL/L (ref 98–107)
CO2 SERPL-SCNC: 34 MMOL/L (ref 21–32)
CREAT SERPL-MCNC: 2.8 MG/DL (ref 0.55–1.3)
EOSINOPHIL NFR BLD AUTO: 1 % (ref 0–3)
ERYTHROCYTE [DISTWIDTH] IN BLOOD BY AUTOMATED COUNT: 12.4 % (ref 11.6–14.8)
HCT VFR BLD CALC: 30.3 % (ref 42–52)
HGB BLD-MCNC: 9.6 G/DL (ref 14.2–18)
LYMPHOCYTES NFR BLD AUTO: 5.6 % (ref 20–45)
MCV RBC AUTO: 88 FL (ref 80–99)
MONOCYTES NFR BLD AUTO: 8.7 % (ref 1–10)
NEUTROPHILS NFR BLD AUTO: 84.1 % (ref 45–75)
PHOSPHATE SERPL-MCNC: 5.9 MG/DL (ref 2.5–4.9)
PLATELET # BLD: 176 K/UL (ref 150–450)
POTASSIUM SERPL-SCNC: 4.7 MMOL/L (ref 3.5–5.1)
RBC # BLD AUTO: 3.45 M/UL (ref 4.7–6.1)
SODIUM SERPL-SCNC: 147 MMOL/L (ref 136–145)
WBC # BLD AUTO: 14.3 K/UL (ref 4.8–10.8)

## 2020-05-12 RX ADMIN — INSULIN ASPART SCH UNITS: 100 INJECTION, SOLUTION INTRAVENOUS; SUBCUTANEOUS at 23:18

## 2020-05-12 RX ADMIN — INSULIN ASPART SCH UNITS: 100 INJECTION, SOLUTION INTRAVENOUS; SUBCUTANEOUS at 17:05

## 2020-05-12 NOTE — NUR
NURSE NOTES:

received report from mila edmond pt covid +  pt b-pap 15/5 with o2 sat 100%   iv infusing rt 
femoral  site good dressing dry and intact  mckay at bedside drainage with good urinary 
output

## 2020-05-12 NOTE — NUR
RESPIRATORY NOTE:

placed pt on Bipap with facial foam tape in place. skin intact prior to placing on bipap 
mask. bipap settings are as followed.

## 2020-05-12 NOTE — NUR
NURSE NOTES:

Noted pt BP of 89/53 and started levophed drip. No other distress noted at this time. Will 
continue to monitor.

## 2020-05-12 NOTE — NUR
NURSE NOTES:

pt appears asleep / lethargic, arousable to voice, pt on nrb 99%, attempted to wean to 
venturi mask 10L, but pt desat to 84%, resumed pt on nrb , back to 93-95%, repositioned pt, 
turned towards left side hob elevated bed in lowest position, locked. mckay patent intact, 
tlc right groin intact. will monitor.

## 2020-05-12 NOTE — NUR
PATIENT CLEANED AND REPOSITIONED. PERINEAL CARE DONE. NGT PLACED IN LEFT NARE; 70CM. 
AWAITING KUB FOR PLACEMENT CONFIRMATION. 

-------------------------------------------------------------------------------

Addendum: 05/12/20 at 1633 by Yesica Ortega RN RN

-------------------------------------------------------------------------------

NURSES NOTES:

PATIENT CLEANED AND REPOSITIONED. PERINEAL CARE DONE. NGT PLACED IN LEFT NARE; 70CM. 
AWAITING KUB FOR PLACEMENT CONFIRMATION. 

-------------------------------------------------------------------------------

Addendum: 05/12/20 at 1635 by Yesica Ortega RN RN

-------------------------------------------------------------------------------

NURSES NOTES:

PATIENT CLEANED AND REPOSITIONED TO RIGHT LYING POSITION. PERINEAL CARE DONE. NGT PLACED IN 
LEFT NARE; 70CM. AWAITING KUB FOR PLACEMENT CONFIRMATION.

## 2020-05-12 NOTE — NEPHROLOGY PROGRESS NOTE
Assessment/Plan


Problem List:  


(1) MELVI (acute kidney injury)


(2) Electrolyte imbalance


(3) Suspected COVID-19 virus infection


(4) Hypotension


Assessment:  Resolved





(5) Sepsis


Assessment





Patient's current problem from renal standpoint of view is hypokalemia and 

other electrolyte imbalances





His other conditions:


Patient presented with acute renal failure however it is now resolved


Patient presented with septic shock was on pressors however now resolved


Sepsis leukocytosis improving


Patient has COVID-19 pneumonia


Hypertension


Cardiomyopathy status post AICD


Previous CVA with right residual weakness


Seizure disorders


Plan





COVID-19 positive








May 12: 


Patient is clinically deteriorating 


Respiratory status worsened 


Blood pressure low


Serum creatinine brian 


ABG indicative of CO2 retention 


Discussed with a pulmonologist 


Patient due transfer to monitored bed for initiation of BiPAP 


Meanwhile we will give albumin  bolus and start half-normal saline IV hydration 


May require pressors


Overall prognosis poor


I favor holding the mind altering medications


Per orders





May 11: 


In view of worsening renal failure and rise of creatinine to 1.8 will 

temporarily hold Lasix and lisinopril


Discussed with Dr. Henderson


Continue rest





Previously:


Magnesium and potassium as needed


Stable from renal standpoint of view


Continue per consultants





We will continue to monitor electrolytes and chemistries





Subjective


ROS Limited/Unobtainable:  Yes





Objective


Objective





Last 24 Hour Vital Signs








  Date Time  Temp Pulse Resp B/P (MAP) Pulse Ox O2 Delivery O2 Flow Rate FiO2


 


5/12/20 08:50  91  101/60    


 


5/12/20 08:00 98.2 91 24 101/ 96   


 


5/12/20 04:00 98.2 96 40 110/60 (77) 96   


 


5/12/20 00:00 98.4 54 36 97/59 (72) 96   


 


5/11/20 23:06     96 Non-Rebreather 15.0 100


 


5/11/20 22:03  66  128/71    


 


5/11/20 21:00      Non-Rebreather  


 


5/11/20 21:00 98.2 66 36 128/71 (90) 94   


 


5/11/20 16:00 98.2 52 19 121/76 (91) 93   


 


5/11/20 12:00 97.9 93 18 112/79 (90) 98   








Laboratory Tests


5/12/20 04:49: 


Arterial Blood pH 7.322L, Arterial Blood Partial Pressure CO2 68.0*H, Arterial 

Blood Partial Pressure O2 106.9H, Arterial Blood HCO3 34.4H, Arterial Blood 

Oxygen Saturation 97.1, Arterial Blood Base Excess 6.7H, Wong Test Positive


5/12/20 06:00: 


White Blood Count 14.3H, Red Blood Count 3.45L, Hemoglobin 9.6L, Hematocrit 

30.3L, Mean Corpuscular Volume 88, Mean Corpuscular Hemoglobin 27.9, Mean 

Corpuscular Hemoglobin Concent 31.8L, Red Cell Distribution Width 12.4, 

Platelet Count 176, Mean Platelet Volume 8.0, Neutrophils (%) (Auto) 84.1H, 

Lymphocytes (%) (Auto) 5.6L, Monocytes (%) (Auto) 8.7, Eosinophils (%) (Auto) 

1.0, Basophils (%) (Auto) 0.5, Sodium Level 147H, Potassium Level 4.7, Chloride 

Level 105, Carbon Dioxide Level 34H, Anion Gap 8, Blood Urea Nitrogen 67H, 

Creatinine 2.8#H, Estimat Glomerular Filtration Rate 27.3, Glucose Level 300H, 

Calcium Level 9.6


Height (Feet):  5


Height (Inches):  8.00


Weight (Pounds):  177


General Appearance:  mild distress


EENT:  other - On O2 mask


Cardiovascular:  tachycardia


Respiratory/Chest:  decreased breath sounds


Abdomen:  distended


Genitourinary/Rectal:  other - Swenson in place


Objective


No change











Miguel Ángel Kendall MD May 12, 2020 09:28

## 2020-05-12 NOTE — NUR
NURSE NOTES:

Received patient from DIM[facility] Hoff. Patient transferred to ICU for low saturation and 
respiratory distress. Patient drowsy and unable to follow commands at this time. Blood 
pressure 92/56, 100 in sinus tachycardia, SpO2 95% on 100% non-rebreather, and RR 33. 
Patient has right chest AICD. Patient showing sinus tachycardia on the cardiac monitor. 
Patient has right side weakness. Patient has left hand soft wrist restraint as patient 
witnessed trying to remove medical devices. Patient non-verbal at this time. Patient has 
left nares NGT at 55cm at the nares. Awaiting confirmation with KUB. Patient skin intact at 
this time. Bilateral heels elevated. Patient has right femoral TLC that is patent, 
asymptomatic, dressing dry and intact and running 0.45% normal saline at 75mL/hr at this 
time. Patient bed in low position with bed alarm on and call light in reach at this time. 
Will continue to monitor. Oral care and repositioning done at this time. Bilateral heels 
floated.

## 2020-05-12 NOTE — NUR
NURSE NOTES:

Report received from DIMA Vee. Observed pt lying in the bed, lethargic, SR on cardiac 
monitor. On Bipap 15/5, 100%, sat at 100%. Pt has NGT, on R N, NPO status for now. F/C 
intact and draining yellow urine. R Femoral TLC, asymptomatic , running 1/2 NS at 75cc/hr. L 
wrist restraint noted, no distress noted. Bed in the lowest position. Side rails padded and 
up x3. Call light within reach. Will continue to monitor.

## 2020-05-12 NOTE — NUR
NURSE NOTES:

AWAIT BED FOR TRANSFER TO [facility] PRESSURE ROOM W BIPAP 15/5 PER DR SARGENT

## 2020-05-12 NOTE — PULMONOLOGY PROGRESS NOTE
Subjective


ROS Limited/Unobtainable:  Yes


Interval Events:  Doing poorly; now on BiPAP


Constitutional:  Reports: no symptoms


HEENT:  Repors: no symptoms


Respiratory:  Reports: dry cough, shortness of breath


Cardiovascular:  Reports: no symptoms


Gastrointestinal/Abdominal:  Reports: no symptoms


Genitourinary:  Reports: no symptoms


Neurologic:  Reports: no symptoms


Allergies:  


Coded Allergies:  


     No Known Allergies (Unverified , 12/15/14)


All Systems:  reviewed and negative except above





Objective





Last 24 Hour Vital Signs








  Date Time  Temp Pulse Resp B/P (MAP) Pulse Ox O2 Delivery O2 Flow Rate FiO2


 


5/12/20 11:54  97 30  100 Bi-Pap  100


 


5/12/20 11:51  97 30  100   100


 


5/12/20 11:50     100 Bi-Pap  100


 


5/12/20 08:50  91  101/60    


 


5/12/20 08:00 98.2 91 24 101/ 96   


 


5/12/20 04:00 98.2 96 40 110/60 (77) 96   


 


5/12/20 00:00 98.4 54 36 97/59 (72) 96   


 


5/11/20 23:06     96 Non-Rebreather 15.0 100


 


5/11/20 22:03  66  128/71    


 


5/11/20 21:00      Non-Rebreather  


 


5/11/20 21:00 98.2 66 36 128/71 (90) 94   


 


5/11/20 16:00 98.2 52 19 121/76 (91) 93   








HEENT:  atraumatic, anicteric


Respiratory/Chest:  chest wall non-tender, decreased breath sounds


Cardiovascular:  normal peripheral pulses


Abdomen:  soft, non tender, non distended


Neurologic/Psychiatric:  alert, responsive


Laboratory Tests


5/12/20 04:49: 


Arterial Blood pH 7.322L, Arterial Blood Partial Pressure CO2 68.0*H, Arterial 

Blood Partial Pressure O2 106.9H, Arterial Blood HCO3 34.4H, Arterial Blood 

Oxygen Saturation 97.1, Arterial Blood Base Excess 6.7H, Wong Test Positive


5/12/20 06:00: 


White Blood Count 14.3H, Red Blood Count 3.45L, Hemoglobin 9.6L, Hematocrit 

30.3L, Mean Corpuscular Volume 88, Mean Corpuscular Hemoglobin 27.9, Mean 

Corpuscular Hemoglobin Concent 31.8L, Red Cell Distribution Width 12.4, 

Platelet Count 176, Mean Platelet Volume 8.0, Neutrophils (%) (Auto) 84.1H, 

Lymphocytes (%) (Auto) 5.6L, Monocytes (%) (Auto) 8.7, Eosinophils (%) (Auto) 

1.0, Basophils (%) (Auto) 0.5, Sodium Level 147H, Potassium Level 4.7, Chloride 

Level 105, Carbon Dioxide Level 34H, Anion Gap 8, Blood Urea Nitrogen 67H, 

Creatinine 2.8#H, Estimat Glomerular Filtration Rate 27.3, Glucose Level 300H, 

Calcium Level 9.6, Phosphorus Level 5.9H, Magnesium Level 2.9H, Total Bilirubin 

0.4, Direct Bilirubin 0.2, Aspartate Amino Transf (AST/SGOT) 19, Alanine 

Aminotransferase (ALT/SGPT) 28, Alkaline Phosphatase 81, Total Protein 7.7, 

Albumin 2.3L





Current Medications








 Medications


  (Trade)  Dose


 Ordered  Sig/Aarti


 Route


 PRN Reason  Start Time


 Stop Time Status Last Admin


Dose Admin


 


 Acetaminophen


  (Tylenol)  650 mg  Q6H  PRN


 ORAL


 Temp >100.5  4/29/20 14:45


 5/26/20 08:44  5/2/20 21:01


 


 


 Acetaminophen


  (Tylenol)  650 mg  Q6H  PRN


 RECTAL


 Temp >100.5  5/3/20 10:15


 6/2/20 10:14  5/3/20 16:59


 


 


 Albumin Human  250 ml @ 0


 mls/hr  Q0M ONCE


 IV


   5/12/20 13:00


 5/12/20 13:01   


 


 


 Carvedilol


  (Coreg)  3.125 mg  EVERY 12  HOURS


 ORAL


   4/29/20 21:00


 5/29/20 20:59  5/11/20 22:03


 


 


 Chlorhexidine


 Gluconate


  (Gloria-Hex 2%)  1 applic  DAILY@2000


 TOPIC


   4/29/20 20:00


 7/24/20 19:59  5/11/20 22:04


 


 


 Levetiracetam


  (Keppra)  1,000 mg  Q12HR


 ORAL


   4/29/20 21:00


 5/25/20 00:00  5/11/20 22:04


 


 


 Sodium Chloride  1,000 ml @ 


 75 mls/hr  [id number]


 IV


   5/12/20 09:30


 6/11/20 09:29  5/12/20 09:45


 











Assessment/Plan


Assessment/Plan


IMPRESSION:


1. Cardiomyopathy.


2. Hypotension.


3. Left lung pneumonia.


4. Nursing home resident.


5. Positive COVID-19.





DISCUSSION:


1. Continue isolation


2. Continue current medications.


3. Off pressors.


4. ContinueBiPAP


5. Pulmonary hygiene.


6. Broad-spectrum antibiotics.


7. I will follow.


8. Pulmonary hygiene


9. Continue diuresis, on Lasix 40 mg IV BID; 





CXR unchanged




















  ______________________________________________


  Hayder Hahn M.D.











Hayder Hahn MD May 12, 2020 12:23

## 2020-05-12 NOTE — NUR
NURSE NOTES:

Notified Dr Barkley via telephone call that patient had serum glucose of 300 this morning, 
patient is diabetic, and patient is NPO due to BiPAP. Received order for NovoLOG moderate 
sliding scale and bedside blood glucose check Q6Hr. Order read back, verified, and placed.

## 2020-05-12 NOTE — NUR
RD ASSESSMENT & RECOMMENDATIONS

SEE CARE ACTIVITY FOR COMPLETE ASSESSMENT



DAILY ESTIMATED NEEDS:

Needs based on Cardiac, DM, Pulmonary 72kg abw 

25-30  kcals/kg 

6116-0031  total kcals

1-1.5  g protein/kg

  g total protein 

20-25  mL/kg

1128-9871  total fluid mLs



NUTRITION DIAGNOSIS:

* Inadequate oral intake R/T poor appetite, clincal condition, respiratory

status as evidenced by refusing most meals since adm, now on BIPAP.

* Altered nutrition related lab values r/t clinical status, DM, dehydration

as evidenced by elev BG (300, 224), A1C 7.4, elev BUN/creat (67/2.8),

febrile-> now afebrile, critically elevated pCO2 (68.0).



CURRENT DIET: CLD    



 



PO DIET RECOMMENDATIONS:

IF SAFE FOR PO ->  ADVANCE DIET to liberalized Regular / texture per SLP  





ENTERAL NUTRITION RECOMMENDATIONS:

WHEN OFF BIPAP AND IF PART OF POC -> Glucerna 1.5 @ 50ml/hr x 24 hrs  to provide 1200ml, 
1800kcal, 99g prot, 911ml free water 



* WHEN OFF BIPAP AND IF PART OF POC, obtain GI access (minimal-no PO intake x 18 days)

* Initiate Glucerna 1.5 @ 10ml/hr x 6 hrs, advance 10ml q 4-6 hrs as tolerated to goal rate

* HOB over 30 degrees/ water flush of 150ml q 6 hrs

-----

**PT AT HIGH RISK FOR REFEEDING SYNDROME, START TF LOW AND INCREASE SLOWLY, CHECK LYTES 
CLOSELY, REPLETE AS NEEDED **



 



ADDITIONAL RECOMMENDATIONS:

1) Check A1C -> pt w/elev BG (300, 224); a1c 7.4 

             -> Consider NISS  

2) Monitor ability to feed: No/minimal intake x 18 days, pt now on BIPAP 

      -> consider PARENTERAL NUTRITION if pt unable to feed due to BIPAP 

3) Obtain a calibrated bedscale wt 

4) Monitor renal fxn- worsening at this time, consider gentle IVF 

5) Monitor lytes, replete as needed

## 2020-05-12 NOTE — GENERAL PROGRESS NOTE
Assessment/Plan


Problem List:  


(1) Suspected COVID-19 virus infection


ICD Codes:  Z20.828 - Contact with and (suspected) exposure to other viral 

communicable diseases


SNOMED:  277710919


(2) Anemia


ICD Codes:  D64.9 - Anemia, unspecified


SNOMED:  270990117


(3) Weak


ICD Codes:  R53.1 - Weakness


SNOMED:  49996630


(4) COPD (chronic obstructive pulmonary disease)


ICD Codes:  J44.9 - Chronic obstructive pulmonary disease, unspecified


SNOMED:  39394413


(5) Diabetes


ICD Codes:  E11.9 - Type 2 diabetes mellitus without complications


SNOMED:  33460358


(6) Epileptic seizure, generalized


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 

not intractable, without status epilepticus


SNOMED:  16989014


(7) Altered mental status


ICD Codes:  R41.82 - Altered mental status, unspecified


SNOMED:  513565410


Qualifiers:  


   Qualified Codes:  R41.82 - Altered mental status, unspecified


(8) Hypotension


ICD Codes:  I95.9 - Hypotension, unspecified


SNOMED:  36704726


Qualifiers:  


   Qualified Codes:  I95.9 - Hypotension, unspecified


(9) UTI (urinary tract infection)


ICD Codes:  N39.0 - Urinary tract infection, site not specified


SNOMED:  65815201


Status:  unchanged


Assessment/Plan:


o2 pulm tx abx pt diet cbc bmp am





Subjective


Allergies:  


Coded Allergies:  


     No Known Allergies (Unverified , 12/15/14)


All Systems:  reviewed and negative except above


Subjective


bipap sleepy in icu





Objective





Last 24 Hour Vital Signs








  Date Time  Temp Pulse Resp B/P (MAP) Pulse Ox O2 Delivery O2 Flow Rate FiO2


 


5/12/20 11:54  97 30  100 Bi-Pap  100


 


5/12/20 11:51  97 30  100   100


 


5/12/20 11:50     100 Bi-Pap  100


 


5/12/20 09:00      Non-Rebreather  


 


5/12/20 08:50  91  101/60    


 


5/12/20 08:00 98.2 91 24 101/ 96   


 


5/12/20 04:00 98.2 96 40 110/60 (77) 96   


 


5/12/20 00:00 98.4 54 36 97/59 (72) 96   


 


5/11/20 23:06     96 Non-Rebreather 15.0 100


 


5/11/20 22:03  66  128/71    


 


5/11/20 21:00      Non-Rebreather  


 


5/11/20 21:00 98.2 66 36 128/71 (90) 94   


 


5/11/20 16:00 98.2 52 19 121/76 (91) 93   








Laboratory Tests


5/12/20 04:49: 


Arterial Blood pH 7.322L, Arterial Blood Partial Pressure CO2 68.0*H, Arterial 

Blood Partial Pressure O2 106.9H, Arterial Blood HCO3 34.4H, Arterial Blood 

Oxygen Saturation 97.1, Arterial Blood Base Excess 6.7H, Wong Test Positive


5/12/20 06:00: 


White Blood Count 14.3H, Red Blood Count 3.45L, Hemoglobin 9.6L, Hematocrit 

30.3L, Mean Corpuscular Volume 88, Mean Corpuscular Hemoglobin 27.9, Mean 

Corpuscular Hemoglobin Concent 31.8L, Red Cell Distribution Width 12.4, 

Platelet Count 176, Mean Platelet Volume 8.0, Neutrophils (%) (Auto) 84.1H, 

Lymphocytes (%) (Auto) 5.6L, Monocytes (%) (Auto) 8.7, Eosinophils (%) (Auto) 

1.0, Basophils (%) (Auto) 0.5, Sodium Level 147H, Potassium Level 4.7, Chloride 

Level 105, Carbon Dioxide Level 34H, Anion Gap 8, Blood Urea Nitrogen 67H, 

Creatinine 2.8#H, Estimat Glomerular Filtration Rate 27.3, Glucose Level 300H, 

Calcium Level 9.6, Phosphorus Level 5.9H, Magnesium Level 2.9H, Total Bilirubin 

0.4, Direct Bilirubin 0.2, Aspartate Amino Transf (AST/SGOT) 19, Alanine 

Aminotransferase (ALT/SGPT) 28, Alkaline Phosphatase 81, Total Protein 7.7, 

Albumin 2.3L


5/12/20 13:10: 


Arterial Blood pH 7.298L, Arterial Blood Partial Pressure CO2 64.7*H, Arterial 

Blood Partial Pressure O2 124.8H, Arterial Blood HCO3 31.0H, Arterial Blood 

Oxygen Saturation 97.1, Arterial Blood Base Excess 3.4H, Wong Test Positive


Height (Feet):  5


Height (Inches):  8.00


Weight (Pounds):  177


General Appearance:  lethargic


EENT:  normal ENT inspection


Neck:  normal alignment


Cardiovascular:  normal rate, regular rhythm


Respiratory/Chest:  no respiratory distress, no accessory muscle use


Extremities:  normal inspection


Skin:  normal pigmentation











Arron Barkley DO May 12, 2020 13:27

## 2020-05-12 NOTE — INFECTIOUS DISEASES PROG NOTE
Assessment/Plan


Assessment/Plan





Assessment:


Septic shock-SP





Fever;SP


Mild leukocytosis, fluctuating; improving


   -5/3 u/a wbc 15-20, nit neg, leuk +1


          Bcx Neg


   -u/a neg


   -Bcx Neg





Probable PNA- 2ry to COVID19 (from NH with confirmed cases)


Acute hypoxic respiratory failure- was on NC, now on NRB- increasing FIo2 

requiremetns


    -5/11 CXR:  Minimally improved left, unchanged right infiltrates, since 

prior exam of 3 days earlier


    -5/8 CXR: Unchanged bilateral infiltrates, likely pneumonia, since prior 

exam of 3 days earlier.


    -5/5 CXR:  Bilateral interstitial and airspace infiltrates are again 

demonstrated.Appearance is overall unchanged. There may be a small left pleural 

effusion,unchange


   -5/4 CRP 41.6, ferritin 1468


  -5/3 CXR: .  Worsening bilateral interstitial and airspace opacities. 

Probable small left pleural effusion.  Difficult to evaluate the right 

costophrenic angle due to overlying pacemaker.


  -4/30 CXR: Slightly improved bilateral interstitial and airspace infiltrates 

versus edema


  -4/28 CXR: Bilateral mid and lower lung interstitial disease, new since prior 

study.Possible developing left pleural effusion


        Ferritn >2000, CRP 22.3


  -4/24 SARS-COV2 PCR +


  -CXR: Left basilar atelectasis and/or infiltrate


 


MELVI, improving





 HTN


cadiomyopathy s/p AICD


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


seizure disorder


[facility] resident ([facility]) 








Plan:


-Continue to monitor off abx


   -5/10 SP IV Vancomycin #5


   -5/7 SP Zosyn #5


   - SPSolumedrol 40mg IV 12hrs x 5days (5/4-5/6); dc'ed by pulmonologist - (

day 10 of illness and worsening; around this time is seen the worsening of 

COVID19 pts mainly driven by inflammatory response) 


   -4/29 SP Cefepime #6


   -4/27 SP IV Vancomycin #4  





-f/u cx


-Monitor CBC/CMP, temperatures


-COVID 19 precautions


-clarify goals of care


-aspiration precautions


-inflammatory markers


-f/u cx


-monitor CXR


-u/a w/ reflex, Bcx x2





Thank you for consulting [facility]. Will continue to follow along with 

you.





Discussed with RN,





Subjective


Allergies:  


Coded Allergies:  


     No Known Allergies (Unverified , 12/15/14)


Subjective


afebrile 


remains on NRB 


mild leukocytosis





Objective


Vital Signs





Last 24 Hour Vital Signs








  Date Time  Temp Pulse Resp B/P (MAP) Pulse Ox O2 Delivery O2 Flow Rate FiO2


 


5/12/20 08:50  91  101/60    


 


5/12/20 08:00 98.2 91 24 101/ 96   


 


5/12/20 04:00 98.2 96 40 110/60 (77) 96   


 


5/12/20 00:00 98.4 54 36 97/59 (72) 96   


 


5/11/20 23:06     96 Non-Rebreather 15.0 100


 


5/11/20 22:03  66  128/71    


 


5/11/20 21:00      Non-Rebreather  


 


5/11/20 21:00 98.2 66 36 128/71 (90) 94   


 


5/11/20 16:00 98.2 52 19 121/76 (91) 93   


 


5/11/20 12:00 97.9 93 18 112/79 (90) 98   








Height (Feet):  5


Height (Inches):  8.00


Weight (Pounds):  177


Objective


 not examined to limit COVID19 exposure





Laboratory Tests








Test


  5/12/20


04:49 5/12/20


06:00


 


Arterial Blood pH


  7.322


(7.350-7.450) 


 


 


Arterial Blood Partial


Pressure CO2 68.0 mmHg


(35.0-45.0)  *H 


 


 


Arterial Blood Partial


Pressure O2 106.9 mmHg


(75.0-100.0)  H 


 


 


Arterial Blood HCO3


  34.4 mmol/L


(22.0-26.0)  H 


 


 


Arterial Blood Oxygen


Saturation 97.1 %


() 


 


 


Arterial Blood Base Excess 6.7 (-2-2)  H 


 


Wong Test Positive   


 


White Blood Count


  


  14.3 K/UL


(4.8-10.8)  H


 


Red Blood Count


  


  3.45 M/UL


(4.70-6.10)  L


 


Hemoglobin


  


  9.6 G/DL


(14.2-18.0)  L


 


Hematocrit


  


  30.3 %


(42.0-52.0)  L


 


Mean Corpuscular Volume  88 FL (80-99)  


 


Mean Corpuscular Hemoglobin


  


  27.9 PG


(27.0-31.0)


 


Mean Corpuscular Hemoglobin


Concent 


  31.8 G/DL


(32.0-36.0)  L


 


Red Cell Distribution Width


  


  12.4 %


(11.6-14.8)


 


Platelet Count


  


  176 K/UL


(150-450)


 


Mean Platelet Volume


  


  8.0 FL


(6.5-10.1)


 


Neutrophils (%) (Auto)


  


  84.1 %


(45.0-75.0)  H


 


Lymphocytes (%) (Auto)


  


  5.6 %


(20.0-45.0)  L


 


Monocytes (%) (Auto)


  


  8.7 %


(1.0-10.0)


 


Eosinophils (%) (Auto)


  


  1.0 %


(0.0-3.0)


 


Basophils (%) (Auto)


  


  0.5 %


(0.0-2.0)


 


Sodium Level


  


  147 MMOL/L


(136-145)  H


 


Potassium Level


  


  4.7 MMOL/L


(3.5-5.1)


 


Chloride Level


  


  105 MMOL/L


()


 


Carbon Dioxide Level


  


  34 MMOL/L


(21-32)  H


 


Anion Gap


  


  8 mmol/L


(5-15)


 


Blood Urea Nitrogen


  


  67 mg/dL


(7-18)  H


 


Creatinine


  


  2.8 MG/DL


(0.55-1.30)  #H


 


Estimat Glomerular Filtration


Rate 


  27.3 mL/min


(>60)


 


Glucose Level


  


  300 MG/DL


()  H


 


Calcium Level


  


  9.6 MG/DL


(8.5-10.1)


 


Phosphorus Level


  


  5.9 MG/DL


(2.5-4.9)  H


 


Magnesium Level


  


  2.9 MG/DL


(1.8-2.4)  H


 


Total Bilirubin


  


  0.4 MG/DL


(0.2-1.0)


 


Direct Bilirubin


  


  0.2 MG/DL


(0.0-0.3)


 


Aspartate Amino Transf


(AST/SGOT) 


  19 U/L (15-37)


 


 


Alanine Aminotransferase


(ALT/SGPT) 


  28 U/L (12-78)


 


 


Alkaline Phosphatase


  


  81 U/L


()


 


Total Protein


  


  7.7 G/DL


(6.4-8.2)


 


Albumin


  


  2.3 G/DL


(3.4-5.0)  L











Current Medications








 Medications


  (Trade)  Dose


 Ordered  Sig/Aarti


 Route


 PRN Reason  Start Time


 Stop Time Status Last Admin


Dose Admin


 


 Acetaminophen


  (Tylenol)  650 mg  Q6H  PRN


 ORAL


 Temp >100.5  4/29/20 14:45


 5/26/20 08:44  5/2/20 21:01


 


 


 Acetaminophen


  (Tylenol)  650 mg  Q6H  PRN


 RECTAL


 Temp >100.5  5/3/20 10:15


 6/2/20 10:14  5/3/20 16:59


 


 


 Carvedilol


  (Coreg)  3.125 mg  EVERY 12  HOURS


 ORAL


   4/29/20 21:00


 5/29/20 20:59  5/11/20 22:03


 


 


 Chlorhexidine


 Gluconate


  (Gloria-Hex 2%)  1 applic  DAILY@2000


 TOPIC


   4/29/20 20:00


 7/24/20 19:59  5/11/20 22:04


 


 


 Levetiracetam


  (Keppra)  1,000 mg  Q12HR


 ORAL


   4/29/20 21:00


 5/25/20 00:00  5/11/20 22:04


 


 


 Sodium Chloride  1,000 ml @ 


 75 mls/hr  [id number]


 IV


   5/12/20 09:30


 6/11/20 09:29  5/12/20 09:45


 

















Naila Lindsay M.D. May 12, 2020 11:19

## 2020-05-12 NOTE — NUR
NURSE NOTES:

Patient drowsy but easily aroused to name and touch.  Patient on BiPAP 15/5 with 100% FiO2. 
Patient showing sinus tachycardia on the cardiac monitor. Left hand remains in soft wrist 
restraint with pulses present and no skin breakdown noted. NGT tube inserted. Patient yelled 
"no" when NGT inserted in left nares but does not speak otherwise. Awaiting confirmation 
with KUB. Patient skin intact remains intact with dry/flaking skin noted on perineal/sacral 
area. Bilateral heels elevated. Right femoral TLC remains patent, asymptomatic, dressing dry 
and intact and running 0.45% normal saline at 75mL/hr at this time. Patient bed in low 
position with bed alarm on and call light in reach at this time. Will continue to monitor. 
Oral care and repositioning done at this time.

## 2020-05-12 NOTE — NUR
CASE MANAGEMENT: REVIEW







SI: SEPSIS . COVID-19 

T 98.4 HR 54 RR 36 BP 97/59 % BIPAP FIO2 100

WBC 14.3 H/H 9.6/30.3  BUN 67 CR 2.8 

ABG: PH 7.322 PCO2 98.0 PO2 106.9 HCO3 34.3 BASE 6.7 







IS: NS IVF @ 75ML/HR 

ALBUMIN HUMAN IV X1

COREG PO Q12HR 

KEPPRA PO Q12HR 

GABAPENTIN PO Q8HR 

INSERT NGT

 







***ICU STATUS***

DCP: PATIENT IS FROM [facility]

## 2020-05-12 NOTE — NUR
CASE MANAGEMENT:REVIEW



5/11/20



SI;***COVID-19 PNEUMONIA. UTI. COPD. SEPTIC SHOCK.

T 98.2  P 52  RR 36   /70   93% 15 L NRB   FIO2 @ 100%

WBC 14.0   H/H 9.2/28.5     CO2 34   BUN 39   CR 1.8    



IS;LASIX PO QD

MAG-OX PO TID

COREG PO Q12 HR

KEPPRA PO Q12 HR

K-DUR PO BID

ZESTRIL PO QD



****MED SURG STATUS****



DCPLFROM [facility]



***************************************************************************************



CASE MANAGEMENT:REVIEW (CONCURRENT)



5/12/20



SI;***COVID-19 PNEUMONIA. SEPTIC SHOCK. MELVI. AC HYPOXIC RESP FAILURE.

T 98.4   P 97   RR 40   /60   O2 100% ON Bi-PAP

WBC 14.3   H/H 9.6/30.3      CO2 34   BUN 67   CR 2.8     MAG 2.9   ALB 2.3



IS;ALBUMIN HUMAN IV ONCE

IVF NS @ 75 ML/HE

LASIX PO QD

ZESTRIL PO QD

K-DUR PO BID

MAG-OC PO TID

COREG PO Q12 HR

KEPPRA PO Q12 HR



****TRANSFERRED TO ICU @ 0954 ON 5/12/20****



*****ICU STATUS*****



DCP;PATIENT IS FROM [facility]

## 2020-05-12 NOTE — NUR
BELA COMPLETED, NGT PLACEMENT CONFIRMED

-------------------------------------------------------------------------------

Addendum: 05/12/20 at 1635 by Yesica Ortega RN RN

-------------------------------------------------------------------------------

NURSES NOTES:

BELA COMPLETED, NGT PLACEMENT CONFIRMED.

## 2020-05-12 NOTE — NUR
NURSE NOTES:

LEFT MSG FOR DR POWELL FOR TRIAL OFF RESTRAINTS AS NO INDICATION AT THIS TIME. PT WEAK AND 
NOT ATTEMPTING TO REMOVE NRBM

## 2020-05-12 NOTE — NUR
HAND-OFF: 

Report given to DIMA Elliott.

-------------------------------------------------------------------------------

Addendum: 05/12/20 at 0728 by STEPHANE PAGE RN

-------------------------------------------------------------------------------

Endorsed the pt about RR and what RT did.

## 2020-05-12 NOTE — NUR
NURSE NOTES:

pt is hyperventilating. I called RT to check the pt, Rt came and increased his oxygen. We 
will keep monitoring the pt

## 2020-05-12 NOTE — PROGRESS NOTE
DATE:  05/12/2020

SUBJECTIVE:  This is a 69-year-old male patient. He has altered mental

status.  He has ______ , sepsis, decline in cognition below his baseline

that is why the attending has requested daily psychiatric consultation.



MENTAL STATUS EXAMINATION:  A 69-year-old male.  Appearance is disheveled.

Attitude, irritable and agitated.  Affect is labile.  Intellect, poor

because he does not know current events, does not know last four

presidents.  Mood, depressed and anxious.  Motor activity, psychomotor

agitation.  Attention span is poor because he cannot do serial 7s, spell

world backwards.  Orientation x2.  He is oriented to person and place, not

time or situation.  Speech is pressured and nonsensical.  Thought process,

disorganized and illogical.  Thought content, auditory hallucinations and

paranoid delusions.  Perception is poor because he has perceptual

disturbance such as auditory hallucinations and paranoid delusions.

Abstract reasoning is poor because he does not understand proverbs, only

has concrete thinking.  Insight and judgement is poor.



DIAGNOSIS:  Major depressive disorder, severe, recurrent with psychotic

features, rule out dementia with psychosis.



PLAN:  Continue treatment with psychotropic medications to stabilize his

mood, reduce depression, anxiety, and clear him from disorganized thought

process.  Provided him with 20 minutes of insight-oriented psychotherapy

to help him have better understanding of his psychiatric and physical

condition so that he have better control over his symptoms.  Chart

reviewed and discussed with nursing staff.









  ______________________________________________

  Demarcus Love M.D.





DR:  Bernabe

D:  05/12/2020 11:45

T:  05/12/2020 17:08

JOB#:  [id number]

CC:

## 2020-05-12 NOTE — NUR
NURSE NOTES:

Followed up with radiology regarding KUB result for NGT placement. Received notification 
that the NGT is safe to use.

## 2020-05-12 NOTE — NUR
NURSE NOTES:

NGT INSERTED 55CM, AUSCULTATED AIR PLACEMENT, AWAIT BELA. PT HAS ATTEMPTS TO REMOVE NGT, LEFT 
SOFT WRIST RESTRAINT APPLIED MD AWARE 

-------------------------------------------------------------------------------

Addendum: 05/12/20 at 1144 by TAMMI RIOJAS RN

-------------------------------------------------------------------------------

suctioned pt nasotracheal, w moderate output , white-brownish sputum. pt on 90% nrbm

## 2020-05-12 NOTE — PROGRESS NOTE
DATE:  05/11/2020

SUBJECTIVE:  A 69-year-old male with altered mental status.  He is still

very confused, disorganized, with some respiratory insufficiency

_____00:07 decline in cognition below his baseline.



MENTAL STATUS EXAMINATION:  A 69-year-old male.  Appearance is disheveled.

Attitude, irritable and agitated.  Affect, guarded and restricted.

Intellect poor.  Mood, depressed and anxious.  Motor activity, psychomotor

agitation.  Insight and judgment is poor.



DIAGNOSIS:  Major depressive disorder, mild, recurrent with psychotic

features, rule out dementia with psychosis.



PLAN:  I am going to continue titrating up on his medications to stabilize

his mood.  A 20 minutes of insight-oriented psychotherapy _____00:53

patient to help him better understanding of his psychiatric condition, so

that he will have better impulse control, behavior, and orientation on the

unit.  Chart reviewed.  Discussed with staff.  Seen and assessed at the

bedside.  A 20 minutes of insight-oriented psychotherapy provided.









  ______________________________________________

  Demarcus Love M.D.





DR:  ALPA

D:  05/11/2020 09:05

T:  05/11/2020 19:20

JOB#:  [id number]

CC:

## 2020-05-12 NOTE — NUR
NURSE NOTES:

Notified Dr Otero that the patient blood pressure trending low. Asked for PRN medication 
for blood pressure. Received order for Levophed drip PRN for hypotension. Order read back, 
verified, and placed.

## 2020-05-12 NOTE — CARDIAC ELECTROPHYSIOLOGY PN
Assessment/Plan


Assessment/Plan


1. S/P Shock  due to  EF 40% and sepsis.  BNP is 736.  


    On Abx by Dr. Lindsay. Resolved


    


2. CHF EF 40%.   On Coreg 3.125 bid. DC  LAisx and Lisinopril for ARF





3. Status post right-sided TONY ICD.    





4. Hypertension  


5. History of CVA with residual weakness.


6. COVID-19 PNA, now is going on BIPAP


7. Hypernatremia.   


8. Acute renal failure.Cr up from 0.9 to 2.8. Off Lasix and Lisinopril





ODILIA RN and Dr Kendall





Subjective


Subjective


In Covid isolation.   Creatinine is rising. Is more SOB and is being 

transferred to ICU to be put on BIPAP.





Objective





Last 24 Hour Vital Signs








  Date Time  Temp Pulse Resp B/P (MAP) Pulse Ox O2 Delivery O2 Flow Rate FiO2


 


5/12/20 11:54  97 30  100 Bi-Pap  100


 


5/12/20 11:51  97 30  100   100


 


5/12/20 11:50     100 Bi-Pap  100


 


5/12/20 08:50  91  101/60    


 


5/12/20 08:00 98.2 91 24 101/ 96   


 


5/12/20 04:00 98.2 96 40 110/60 (77) 96   


 


5/12/20 00:00 98.4 54 36 97/59 (72) 96   


 


5/11/20 23:06     96 Non-Rebreather 15.0 100


 


5/11/20 22:03  66  128/71    


 


5/11/20 21:00      Non-Rebreather  


 


5/11/20 21:00 98.2 66 36 128/71 (90) 94   


 


5/11/20 16:00 98.2 52 19 121/76 (91) 93   











Laboratory Tests








Test


  5/12/20


04:49 5/12/20


06:00


 


Arterial Blood pH


  7.322


(7.350-7.450) 


 


 


Arterial Blood Partial


Pressure CO2 68.0 mmHg


(35.0-45.0)  *H 


 


 


Arterial Blood Partial


Pressure O2 106.9 mmHg


(75.0-100.0)  H 


 


 


Arterial Blood HCO3


  34.4 mmol/L


(22.0-26.0)  H 


 


 


Arterial Blood Oxygen


Saturation 97.1 %


() 


 


 


Arterial Blood Base Excess 6.7 (-2-2)  H 


 


Wong Test Positive   


 


White Blood Count


  


  14.3 K/UL


(4.8-10.8)  H


 


Red Blood Count


  


  3.45 M/UL


(4.70-6.10)  L


 


Hemoglobin


  


  9.6 G/DL


(14.2-18.0)  L


 


Hematocrit


  


  30.3 %


(42.0-52.0)  L


 


Mean Corpuscular Volume  88 FL (80-99)  


 


Mean Corpuscular Hemoglobin


  


  27.9 PG


(27.0-31.0)


 


Mean Corpuscular Hemoglobin


Concent 


  31.8 G/DL


(32.0-36.0)  L


 


Red Cell Distribution Width


  


  12.4 %


(11.6-14.8)


 


Platelet Count


  


  176 K/UL


(150-450)


 


Mean Platelet Volume


  


  8.0 FL


(6.5-10.1)


 


Neutrophils (%) (Auto)


  


  84.1 %


(45.0-75.0)  H


 


Lymphocytes (%) (Auto)


  


  5.6 %


(20.0-45.0)  L


 


Monocytes (%) (Auto)


  


  8.7 %


(1.0-10.0)


 


Eosinophils (%) (Auto)


  


  1.0 %


(0.0-3.0)


 


Basophils (%) (Auto)


  


  0.5 %


(0.0-2.0)


 


Sodium Level


  


  147 MMOL/L


(136-145)  H


 


Potassium Level


  


  4.7 MMOL/L


(3.5-5.1)


 


Chloride Level


  


  105 MMOL/L


()


 


Carbon Dioxide Level


  


  34 MMOL/L


(21-32)  H


 


Anion Gap


  


  8 mmol/L


(5-15)


 


Blood Urea Nitrogen


  


  67 mg/dL


(7-18)  H


 


Creatinine


  


  2.8 MG/DL


(0.55-1.30)  #H


 


Estimat Glomerular Filtration


Rate 


  27.3 mL/min


(>60)


 


Glucose Level


  


  300 MG/DL


()  H


 


Calcium Level


  


  9.6 MG/DL


(8.5-10.1)


 


Phosphorus Level


  


  5.9 MG/DL


(2.5-4.9)  H


 


Magnesium Level


  


  2.9 MG/DL


(1.8-2.4)  H


 


Total Bilirubin


  


  0.4 MG/DL


(0.2-1.0)


 


Direct Bilirubin


  


  0.2 MG/DL


(0.0-0.3)


 


Aspartate Amino Transf


(AST/SGOT) 


  19 U/L (15-37)


 


 


Alanine Aminotransferase


(ALT/SGPT) 


  28 U/L (12-78)


 


 


Alkaline Phosphatase


  


  81 U/L


()


 


Total Protein


  


  7.7 G/DL


(6.4-8.2)


 


Albumin


  


  2.3 G/DL


(3.4-5.0)  L








Objective


HEAD AND NECK:  Mild JVD.


LUNGS:  Decreased breath sounds and rhonchi


CARDIOVASCULAR:  Regular S1 and S2 with no gallop.


ABDOMEN:  Soft.


EXTREMITIES:  1+ pitting edema.  


         Defibrillator is in right subclavian.











Tommy Otero MD May 12, 2020 12:17

## 2020-05-13 VITALS — SYSTOLIC BLOOD PRESSURE: 99 MMHG | DIASTOLIC BLOOD PRESSURE: 49 MMHG

## 2020-05-13 VITALS — DIASTOLIC BLOOD PRESSURE: 62 MMHG | SYSTOLIC BLOOD PRESSURE: 100 MMHG

## 2020-05-13 VITALS — SYSTOLIC BLOOD PRESSURE: 99 MMHG | DIASTOLIC BLOOD PRESSURE: 60 MMHG

## 2020-05-13 VITALS — DIASTOLIC BLOOD PRESSURE: 59 MMHG | SYSTOLIC BLOOD PRESSURE: 95 MMHG

## 2020-05-13 VITALS — DIASTOLIC BLOOD PRESSURE: 55 MMHG | SYSTOLIC BLOOD PRESSURE: 91 MMHG

## 2020-05-13 VITALS — SYSTOLIC BLOOD PRESSURE: 108 MMHG | DIASTOLIC BLOOD PRESSURE: 62 MMHG

## 2020-05-13 VITALS — DIASTOLIC BLOOD PRESSURE: 64 MMHG | SYSTOLIC BLOOD PRESSURE: 107 MMHG

## 2020-05-13 VITALS — DIASTOLIC BLOOD PRESSURE: 63 MMHG | SYSTOLIC BLOOD PRESSURE: 115 MMHG

## 2020-05-13 VITALS — DIASTOLIC BLOOD PRESSURE: 61 MMHG | SYSTOLIC BLOOD PRESSURE: 108 MMHG

## 2020-05-13 VITALS — SYSTOLIC BLOOD PRESSURE: 112 MMHG | DIASTOLIC BLOOD PRESSURE: 58 MMHG

## 2020-05-13 VITALS — SYSTOLIC BLOOD PRESSURE: 96 MMHG | DIASTOLIC BLOOD PRESSURE: 61 MMHG

## 2020-05-13 VITALS — DIASTOLIC BLOOD PRESSURE: 53 MMHG | SYSTOLIC BLOOD PRESSURE: 101 MMHG

## 2020-05-13 VITALS — DIASTOLIC BLOOD PRESSURE: 54 MMHG | SYSTOLIC BLOOD PRESSURE: 89 MMHG

## 2020-05-13 VITALS — DIASTOLIC BLOOD PRESSURE: 63 MMHG | SYSTOLIC BLOOD PRESSURE: 107 MMHG

## 2020-05-13 VITALS — DIASTOLIC BLOOD PRESSURE: 61 MMHG | SYSTOLIC BLOOD PRESSURE: 114 MMHG

## 2020-05-13 VITALS — SYSTOLIC BLOOD PRESSURE: 100 MMHG | DIASTOLIC BLOOD PRESSURE: 55 MMHG

## 2020-05-13 VITALS — DIASTOLIC BLOOD PRESSURE: 60 MMHG | SYSTOLIC BLOOD PRESSURE: 105 MMHG

## 2020-05-13 VITALS — DIASTOLIC BLOOD PRESSURE: 57 MMHG | SYSTOLIC BLOOD PRESSURE: 109 MMHG

## 2020-05-13 VITALS — SYSTOLIC BLOOD PRESSURE: 119 MMHG | DIASTOLIC BLOOD PRESSURE: 64 MMHG

## 2020-05-13 VITALS — DIASTOLIC BLOOD PRESSURE: 65 MMHG | SYSTOLIC BLOOD PRESSURE: 114 MMHG

## 2020-05-13 VITALS — DIASTOLIC BLOOD PRESSURE: 55 MMHG | SYSTOLIC BLOOD PRESSURE: 112 MMHG

## 2020-05-13 VITALS — DIASTOLIC BLOOD PRESSURE: 55 MMHG | SYSTOLIC BLOOD PRESSURE: 97 MMHG

## 2020-05-13 VITALS — SYSTOLIC BLOOD PRESSURE: 104 MMHG | DIASTOLIC BLOOD PRESSURE: 58 MMHG

## 2020-05-13 VITALS — DIASTOLIC BLOOD PRESSURE: 58 MMHG | SYSTOLIC BLOOD PRESSURE: 93 MMHG

## 2020-05-13 VITALS — DIASTOLIC BLOOD PRESSURE: 64 MMHG | SYSTOLIC BLOOD PRESSURE: 94 MMHG

## 2020-05-13 VITALS — SYSTOLIC BLOOD PRESSURE: 99 MMHG | DIASTOLIC BLOOD PRESSURE: 64 MMHG

## 2020-05-13 VITALS — SYSTOLIC BLOOD PRESSURE: 106 MMHG | DIASTOLIC BLOOD PRESSURE: 67 MMHG

## 2020-05-13 VITALS — SYSTOLIC BLOOD PRESSURE: 112 MMHG | DIASTOLIC BLOOD PRESSURE: 63 MMHG

## 2020-05-13 VITALS — DIASTOLIC BLOOD PRESSURE: 57 MMHG | SYSTOLIC BLOOD PRESSURE: 106 MMHG

## 2020-05-13 VITALS — SYSTOLIC BLOOD PRESSURE: 101 MMHG | DIASTOLIC BLOOD PRESSURE: 57 MMHG

## 2020-05-13 VITALS — DIASTOLIC BLOOD PRESSURE: 59 MMHG | SYSTOLIC BLOOD PRESSURE: 109 MMHG

## 2020-05-13 VITALS — SYSTOLIC BLOOD PRESSURE: 96 MMHG | DIASTOLIC BLOOD PRESSURE: 53 MMHG

## 2020-05-13 VITALS — DIASTOLIC BLOOD PRESSURE: 61 MMHG | SYSTOLIC BLOOD PRESSURE: 102 MMHG

## 2020-05-13 VITALS — SYSTOLIC BLOOD PRESSURE: 94 MMHG | DIASTOLIC BLOOD PRESSURE: 65 MMHG

## 2020-05-13 VITALS — DIASTOLIC BLOOD PRESSURE: 58 MMHG | SYSTOLIC BLOOD PRESSURE: 103 MMHG

## 2020-05-13 VITALS — SYSTOLIC BLOOD PRESSURE: 123 MMHG | DIASTOLIC BLOOD PRESSURE: 64 MMHG

## 2020-05-13 VITALS — SYSTOLIC BLOOD PRESSURE: 95 MMHG | DIASTOLIC BLOOD PRESSURE: 64 MMHG

## 2020-05-13 VITALS — SYSTOLIC BLOOD PRESSURE: 101 MMHG | DIASTOLIC BLOOD PRESSURE: 59 MMHG

## 2020-05-13 VITALS — SYSTOLIC BLOOD PRESSURE: 102 MMHG | DIASTOLIC BLOOD PRESSURE: 61 MMHG

## 2020-05-13 VITALS — SYSTOLIC BLOOD PRESSURE: 110 MMHG | DIASTOLIC BLOOD PRESSURE: 67 MMHG

## 2020-05-13 VITALS — DIASTOLIC BLOOD PRESSURE: 64 MMHG | SYSTOLIC BLOOD PRESSURE: 105 MMHG

## 2020-05-13 VITALS — SYSTOLIC BLOOD PRESSURE: 115 MMHG | DIASTOLIC BLOOD PRESSURE: 59 MMHG

## 2020-05-13 VITALS — SYSTOLIC BLOOD PRESSURE: 105 MMHG | DIASTOLIC BLOOD PRESSURE: 59 MMHG

## 2020-05-13 VITALS — SYSTOLIC BLOOD PRESSURE: 111 MMHG | DIASTOLIC BLOOD PRESSURE: 63 MMHG

## 2020-05-13 VITALS — DIASTOLIC BLOOD PRESSURE: 59 MMHG | SYSTOLIC BLOOD PRESSURE: 115 MMHG

## 2020-05-13 VITALS — DIASTOLIC BLOOD PRESSURE: 61 MMHG | SYSTOLIC BLOOD PRESSURE: 99 MMHG

## 2020-05-13 VITALS — DIASTOLIC BLOOD PRESSURE: 67 MMHG | SYSTOLIC BLOOD PRESSURE: 113 MMHG

## 2020-05-13 VITALS — DIASTOLIC BLOOD PRESSURE: 59 MMHG | SYSTOLIC BLOOD PRESSURE: 107 MMHG

## 2020-05-13 VITALS — SYSTOLIC BLOOD PRESSURE: 101 MMHG | DIASTOLIC BLOOD PRESSURE: 61 MMHG

## 2020-05-13 VITALS — SYSTOLIC BLOOD PRESSURE: 96 MMHG | DIASTOLIC BLOOD PRESSURE: 60 MMHG

## 2020-05-13 VITALS — SYSTOLIC BLOOD PRESSURE: 91 MMHG | DIASTOLIC BLOOD PRESSURE: 56 MMHG

## 2020-05-13 VITALS — SYSTOLIC BLOOD PRESSURE: 98 MMHG | DIASTOLIC BLOOD PRESSURE: 58 MMHG

## 2020-05-13 VITALS — SYSTOLIC BLOOD PRESSURE: 102 MMHG | DIASTOLIC BLOOD PRESSURE: 63 MMHG

## 2020-05-13 VITALS — DIASTOLIC BLOOD PRESSURE: 61 MMHG | SYSTOLIC BLOOD PRESSURE: 107 MMHG

## 2020-05-13 VITALS — DIASTOLIC BLOOD PRESSURE: 85 MMHG | SYSTOLIC BLOOD PRESSURE: 133 MMHG

## 2020-05-13 VITALS — SYSTOLIC BLOOD PRESSURE: 108 MMHG | DIASTOLIC BLOOD PRESSURE: 65 MMHG

## 2020-05-13 VITALS — SYSTOLIC BLOOD PRESSURE: 123 MMHG | DIASTOLIC BLOOD PRESSURE: 62 MMHG

## 2020-05-13 VITALS — DIASTOLIC BLOOD PRESSURE: 61 MMHG | SYSTOLIC BLOOD PRESSURE: 97 MMHG

## 2020-05-13 VITALS — SYSTOLIC BLOOD PRESSURE: 108 MMHG | DIASTOLIC BLOOD PRESSURE: 59 MMHG

## 2020-05-13 VITALS — SYSTOLIC BLOOD PRESSURE: 93 MMHG | DIASTOLIC BLOOD PRESSURE: 61 MMHG

## 2020-05-13 LAB
ADD MANUAL DIFF: YES
ADD MANUAL DIFF: YES
ALBUMIN SERPL-MCNC: 2.5 G/DL (ref 3.4–5)
ALP SERPL-CCNC: 90 U/L (ref 46–116)
ALT SERPL-CCNC: 24 U/L (ref 12–78)
ANION GAP SERPL CALC-SCNC: 10 MMOL/L (ref 5–15)
AST SERPL-CCNC: 20 U/L (ref 15–37)
BILIRUB DIRECT SERPL-MCNC: 0.3 MG/DL (ref 0–0.3)
BILIRUB SERPL-MCNC: 0.6 MG/DL (ref 0.2–1)
BUN SERPL-MCNC: 59 MG/DL (ref 7–18)
CALCIUM SERPL-MCNC: 6.2 MG/DL (ref 8.5–10.1)
CHLORIDE SERPL-SCNC: 108 MMOL/L (ref 98–107)
CO2 SERPL-SCNC: 25 MMOL/L (ref 21–32)
CREAT SERPL-MCNC: 1.9 MG/DL (ref 0.55–1.3)
ERYTHROCYTE [DISTWIDTH] IN BLOOD BY AUTOMATED COUNT: 12.5 % (ref 11.6–14.8)
ERYTHROCYTE [DISTWIDTH] IN BLOOD BY AUTOMATED COUNT: 13 % (ref 11.6–14.8)
FERRITIN SERPL-MCNC: 1007 NG/ML (ref 8–388)
HCT VFR BLD CALC: 20.2 % (ref 42–52)
HCT VFR BLD CALC: 31.2 % (ref 42–52)
HGB BLD-MCNC: 6.4 G/DL (ref 14.2–18)
HGB BLD-MCNC: 9.8 G/DL (ref 14.2–18)
LDH SERPL L TO P-CCNC: 390 U/L (ref 81–234)
MCV RBC AUTO: 88 FL (ref 80–99)
MCV RBC AUTO: 89 FL (ref 80–99)
PHOSPHATE SERPL-MCNC: 4.7 MG/DL (ref 2.5–4.9)
PLATELET # BLD: 178 K/UL (ref 150–450)
PLATELET # BLD: 99 K/UL (ref 150–450)
POTASSIUM SERPL-SCNC: 3.2 MMOL/L (ref 3.5–5.1)
RBC # BLD AUTO: 2.27 M/UL (ref 4.7–6.1)
RBC # BLD AUTO: 3.55 M/UL (ref 4.7–6.1)
SODIUM SERPL-SCNC: 143 MMOL/L (ref 136–145)
WBC # BLD AUTO: 13.8 K/UL (ref 4.8–10.8)
WBC # BLD AUTO: 9.5 K/UL (ref 4.8–10.8)

## 2020-05-13 RX ADMIN — INSULIN ASPART SCH UNITS: 100 INJECTION, SOLUTION INTRAVENOUS; SUBCUTANEOUS at 23:42

## 2020-05-13 RX ADMIN — MEROPENEM SCH MLS/HR: 1 INJECTION INTRAVENOUS at 20:22

## 2020-05-13 RX ADMIN — INSULIN ASPART SCH UNITS: 100 INJECTION, SOLUTION INTRAVENOUS; SUBCUTANEOUS at 05:58

## 2020-05-13 RX ADMIN — INSULIN ASPART SCH UNITS: 100 INJECTION, SOLUTION INTRAVENOUS; SUBCUTANEOUS at 11:20

## 2020-05-13 RX ADMIN — MEROPENEM SCH MLS/HR: 1 INJECTION INTRAVENOUS at 13:36

## 2020-05-13 RX ADMIN — CHLORHEXIDINE GLUCONATE SCH APPLIC: 213 SOLUTION TOPICAL at 20:22

## 2020-05-13 RX ADMIN — INSULIN ASPART SCH UNITS: 100 INJECTION, SOLUTION INTRAVENOUS; SUBCUTANEOUS at 18:20

## 2020-05-13 NOTE — NUR
NURSE NOTES:

Talked with Nato/Radiology Tech if they can do PICC placement today. He said it will be done 
tomorrow.

## 2020-05-13 NOTE — NUR
NURSE NOTES:

Seen by Dr. Barkley and ordered PICC placement, He will document for necessity of PICC. Will 
follow up.

## 2020-05-13 NOTE — NUR
NURSE NOTES:

Informed Dr. Hahn of patient's ABG result. No new order at this time. Will continue plan 
of care.

## 2020-05-13 NOTE — NUR
NURSE NOTES:

Levophed continues to run at 4mcgs. BP:96/60 MAP:68. Tylenol given for elevated temp. 
Patient is sleeping.

## 2020-05-13 NOTE — NUR
NURSE NOTES:

Note BP of 87/55, titrate Levophed to 4mcg/min. No acute change noted. Will continue to 
monitor.

## 2020-05-13 NOTE — NUR
NURSE NOTES:

No acute distress noted at this time. Pt tolerating well with current bipap setting, 15/5, 
100%. Will continue to monitor.

## 2020-05-13 NOTE — NUR
CASE MANAGEMENT: DCP



ORDER FOR [facility] K[facility]BALL NOTED 



[facility] UNABLE TO ACCEPT COVID-19 PATIENTS



[facility] 



CM NOTED NO FAMILY LISTED ON FS; CALLED [facility] TO LOCATE FAMILY. PER [facility] 
NO FAMILY OR NEXT OF KIN LISTED. 



[facility] UNABLE TO ACCEPT THE PATIENT WITHOUT FAMILY REPRESENTATION. DR. MATOS MADE AWARE.

## 2020-05-13 NOTE — NUR
NURSE NOTES:

Bed bath given. Still on bipap 15/5 with FiO2 100%. O2 sat 100% on the monitor. Will 
continue plan of care.

## 2020-05-13 NOTE — GENERAL PROGRESS NOTE
Assessment/Plan


Problem List:  


(1) Suspected COVID-19 virus infection


ICD Codes:  Z20.828 - Contact with and (suspected) exposure to other viral 

communicable diseases


SNOMED:  146736097


(2) Anemia


ICD Codes:  D64.9 - Anemia, unspecified


SNOMED:  398897290


(3) Weak


ICD Codes:  R53.1 - Weakness


SNOMED:  96996713


(4) COPD (chronic obstructive pulmonary disease)


ICD Codes:  J44.9 - Chronic obstructive pulmonary disease, unspecified


SNOMED:  93911515


(5) Diabetes


ICD Codes:  E11.9 - Type 2 diabetes mellitus without complications


SNOMED:  25126035


(6) Epileptic seizure, generalized


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 

not intractable, without status epilepticus


SNOMED:  93006942


(7) Altered mental status


ICD Codes:  R41.82 - Altered mental status, unspecified


SNOMED:  126259189


Qualifiers:  


   Qualified Codes:  R41.82 - Altered mental status, unspecified


(8) Hypotension


ICD Codes:  I95.9 - Hypotension, unspecified


SNOMED:  45124562


Qualifiers:  


   Qualified Codes:  I95.9 - Hypotension, unspecified


(9) UTI (urinary tract infection)


ICD Codes:  N39.0 - Urinary tract infection, site not specified


SNOMED:  38227007


Status:  unchanged


Assessment/Plan:


o2 pulm tx abx pt diet cbc bmp am needs picc, ltach eval





Subjective


Constitutional:  Reports: weakness


Allergies:  


Coded Allergies:  


     No Known Allergies (Unverified , 12/15/14)


All Systems:  reviewed and negative except above


Subjective


bipap sleepy in icu





Objective





Last 24 Hour Vital Signs








  Date Time  Temp Pulse Resp B/P (MAP) Pulse Ox O2 Delivery O2 Flow Rate FiO2


 


5/13/20 09:00  78  103/58    


 


5/13/20 08:23     99 Bi-Pap  


 


5/13/20 07:38  78 25  100   100


 


5/13/20 07:00  82 22 103/58 (73) 100   


 


5/13/20 06:34  86 25 107/59 (75) 100   


 


5/13/20 06:14    107/58    


 


5/13/20 06:00  87 24 105/59 (74) 100   


 


5/13/20 05:30  82 26 101/53 (69) 98   


 


5/13/20 05:14    99/49    


 


5/13/20 05:00  81 21 99/49 (66) 96   


 


5/13/20 04:30  82 23 96/53 (67) 97   


 


5/13/20 04:14    100/55    


 


5/13/20 04:00        100


 


5/13/20 04:00 98.2 83 23 100/55 (70) 100   


 


5/13/20 04:00      Bi-pap  


 


5/13/20 04:00  76      


 


5/13/20 03:48  84 27  99   100


 


5/13/20 03:30  88 28 97/55 (69) 98   


 


5/13/20 03:14    96/61    


 


5/13/20 03:00  88 24 96/61 (73) 100   


 


5/13/20 02:30  88 23 99/61 (74) 100   


 


5/13/20 02:15  85 24 95/59 (71) 100   


 


5/13/20 02:14    95/59    


 


5/13/20 02:00  88 21 100/62 (75) 100   


 


5/13/20 01:45  86 26 91/56 (68) 100   


 


5/13/20 01:30  87 23 101/57 (72) 100   


 


5/13/20 01:15  90 23 106/67 (80) 100   


 


5/13/20 01:14    101/57    


 


5/13/20 01:00  87 22 101/61 (74) 100   


 


5/13/20 00:45  86 21 97/61 (73) 100   


 


5/13/20 00:30  92 22 107/63 (78) 100   


 


5/13/20 00:15  87 25 89/54 (66) 100   


 


5/13/20 00:14    87/55    


 


5/13/20 00:00        100


 


5/13/20 00:00      Bi-pap  


 


5/13/20 00:00  92      


 


5/13/20 00:00 98.7 87 25 91/55 (67) 100   


 


5/12/20 23:45  87 23 92/57 (69) 100   


 


5/12/20 23:36  93 25  99   100


 


5/12/20 23:30  90 24 98/60 (73) 100   


 


5/12/20 23:15  92 24 97/60 (72) 100   


 


5/12/20 23:14    89/53    


 


5/12/20 23:00  89 24 89/53 (65) 100   


 


5/12/20 22:30  95 23 93/56 (68) 100   


 


5/12/20 22:00  91 22 99/55 (70) 100   


 


5/12/20 21:30  91 21 97/62 (74) 100   


 


5/12/20 21:00  91 20 100/63 (75) 100   


 


5/12/20 20:59  91  86/50    


 


5/12/20 20:30  87 23 86/51 (63) 100   


 


5/12/20 20:18  90 22 89/53 (65) 100   


 


5/12/20 20:00      Bi-pap  


 


5/12/20 20:00 98.5 88 21 95/57 (70)    


 


5/12/20 20:00  91      


 


5/12/20 19:45  89 18 100/61 (74)    


 


5/12/20 19:34  93 20  99   100


 


5/12/20 19:34     99 Bi-Pap  100


 


5/12/20 19:30  89 21 99/61 (74) 97   


 


5/12/20 19:00        100


 


5/12/20 19:00  93 22 97/60 (72) 100   


 


5/12/20 18:00  90 23 90/61 (71) 100   


 


5/12/20 17:30  94 23 99/61 (74) 100   


 


5/12/20 17:00  92 22 90/58 (69) 100   


 


5/12/20 16:00      Bi-pap  


 


5/12/20 16:00 98.6 88 24 92/62 (72) 100   


 


5/12/20 15:15  98 27 118/72 (87) 100   


 


5/12/20 15:12  94 27  100   100


 


5/12/20 15:00  94 25 91/58 (69) 100   


 


5/12/20 14:45  93 24 103/51 (68) 100   


 


5/12/20 14:30  96 29 95/59 (71) 100   


 


5/12/20 14:00  96 29 94/64 (74) 100   


 


5/12/20 13:30  97 30 103/54 (70) 100   


 


5/12/20 13:00  103 30 104/63 (77) 100   


 


5/12/20 12:00  99 30 95/50 (65) 95   


 


5/12/20 12:00      Bi-pap  


 


5/12/20 11:54  97 30  100 Bi-Pap  100


 


5/12/20 11:51  97 30  100   100


 


5/12/20 11:50     100 Bi-Pap  100


 


5/12/20 11:30 99.1 91 24 104/50 (68) 96   

















Intake and Output  


 


 5/12/20 5/13/20





 19:00 07:00


 


Intake Total 868.75 ml 922.5 ml


 


Output Total 200 ml 


 


Balance 668.75 ml 922.5 ml


 


  


 


IV Total 868.75 ml 922.5 ml


 


Output Urine Total 200 ml 


 


# Voids  595








Laboratory Tests


5/12/20 13:10: 


Arterial Blood pH 7.298L, Arterial Blood Partial Pressure CO2 64.7*H, Arterial 

Blood Partial Pressure O2 124.8H, Arterial Blood HCO3 31.0H, Arterial Blood 

Oxygen Saturation 97.1, Arterial Blood Base Excess 3.4H, Wong Test Positive


5/13/20 04:00: 


White Blood Count 9.5, Red Blood Count 2.27L, Hemoglobin 6.4#*L, Hematocrit 20.2

#L, Mean Corpuscular Volume 89, Mean Corpuscular Hemoglobin 28.3, Mean 

Corpuscular Hemoglobin Concent 31.8L, Red Cell Distribution Width 13.0, 

Platelet Count 99L, Mean Platelet Volume 7.5, Neutrophils (%) (Auto) , 

Lymphocytes (%) (Auto) , Monocytes (%) (Auto) , Eosinophils (%) (Auto) , 

Basophils (%) (Auto) , Neutrophils % (Manual) [Pending], Lymphocytes % (Manual) 

[Pending], Platelet Estimate [Pending], Platelet Morphology [Pending], Sodium 

Level 143, Potassium Level 3.2L, Chloride Level 108H, Carbon Dioxide Level 25, 

Anion Gap 10, Blood Urea Nitrogen 59H, Creatinine 1.9H, Estimat Glomerular 

Filtration Rate 42.5, Glucose Level 347H, Calcium Level 6.2#L, Ferritin 1007H, 

Lactate Dehydrogenase 390H, C-Reactive Protein, Quantitative 21.6H


5/13/20 08:30: 


White Blood Count 13.8H, Red Blood Count 3.55L, Hemoglobin 9.8#L, Hematocrit 

31.2#L, Mean Corpuscular Volume 88, Mean Corpuscular Hemoglobin 27.5, Mean 

Corpuscular Hemoglobin Concent 31.3L, Red Cell Distribution Width 12.5, 

Platelet Count 178#, Mean Platelet Volume 7.3, Neutrophils (%) (Auto) , 

Lymphocytes (%) (Auto) , Monocytes (%) (Auto) , Eosinophils (%) (Auto) , 

Basophils (%) (Auto) , Neutrophils % (Manual) 83H, Lymphocytes % (Manual) 10L, 

Platelet Estimate Adequate, Platelet Morphology Normal, Differential Total 

Cells Counted 100, Monocytes % (Manual) 6, Eosinophils % (Manual) 1, Basophils 

% (Manual) 0, Band Neutrophils 0, Hypochromasia 2+, Anisocytosis 1+, Fibrinogen 

249, D-Dimer > 35.20H, Phosphorus Level 4.7, Magnesium Level 3.0H, Total 

Bilirubin 0.6, Direct Bilirubin 0.3, Aspartate Amino Transf (AST/SGOT) 20, 

Alanine Aminotransferase (ALT/SGPT) 24, Alkaline Phosphatase 90, Total Protein 

7.2, Albumin 2.5L


Height (Feet):  5


Height (Inches):  8.00


Weight (Pounds):  173


General Appearance:  lethargic


EENT:  normal ENT inspection


Neck:  normal alignment


Cardiovascular:  normal rate, regular rhythm


Respiratory/Chest:  no respiratory distress, no accessory muscle use


Extremities:  normal inspection


Skin:  normal pigmentation











Arron Barkleykyle CARTER May 13, 2020 09:24

## 2020-05-13 NOTE — NUR
NURSE NOTES:

Patient is resting and response to stimuli. On BiPAP 15/5 @100%. BP:105/64, HR:91, RESP:31, 
TEMP:100.0 axiillary. L NGT in place but NPO status at the moment. Swenson catheter in place 
and draining. Right femoral TLC running 1/2 NS @ 75ml/hr and Levophed @ 4mcgs/min. Safety 
measures in place. Will continue plan of care.

## 2020-05-13 NOTE — CARDIAC ELECTROPHYSIOLOGY PN
Assessment/Plan


Assessment/Plan


1. Septic Shock  due to  EF 40% and sepsis.  BNP is 736.  


    On Abx by Dr. Lindsay. On Levophed


    


2. CHF EF 40%.   On Coreg 3.125 bid.  





3. Status post right-sided TONY ICD.    





4. Hypertension  


5. History of CVA with residual weakness.


6. COVID-19 PNA, now  on BIPAP


7. Hypernatremia.   


8. Acute renal failure.Cr up from 0.9 to 2.8. Off Lasix and Lisinopril





ODILIA RN and Dr Kendall





Subjective


Subjective


In Covid isolation in ICU On levo 4 and BIPAP 15/5





Objective





Last 24 Hour Vital Signs








  Date Time  Temp Pulse Resp B/P (MAP) Pulse Ox O2 Delivery O2 Flow Rate FiO2


 


5/13/20 12:00      Bi-pap  


 


5/13/20 12:00        100


 


5/13/20 11:26  76 24  100   100


 


5/13/20 11:00    114/60    


 


5/13/20 10:00    109/63    


 


5/13/20 09:30  80 22 111/63 (79) 100   


 


5/13/20 09:00  79 23 110/67 (81) 100   


 


5/13/20 09:00    114/63    


 


5/13/20 09:00  78  103/58    


 


5/13/20 08:30  79 24 106/57 (73) 100   


 


5/13/20 08:23     99 Bi-Pap  


 


5/13/20 08:00        100


 


5/13/20 08:00      Bi-pap  


 


5/13/20 08:00    102/65    


 


5/13/20 08:00 97.8 87 26 107/61 (76) 100   


 


5/13/20 07:38  78 25  100   100


 


5/13/20 07:30  81 22 105/60 (75) 100   


 


5/13/20 07:00  82 22 103/58 (73) 100   


 


5/13/20 07:00    99/61    


 


5/13/20 06:34  86 25 107/59 (75) 100   


 


5/13/20 06:14    107/58    


 


5/13/20 06:00  87 24 105/59 (74) 100   


 


5/13/20 05:30  82 26 101/53 (69) 98   


 


5/13/20 05:14    99/49    


 


5/13/20 05:00  81 21 99/49 (66) 96   


 


5/13/20 04:30  82 23 96/53 (67) 97   


 


5/13/20 04:14    100/55    


 


5/13/20 04:00        100


 


5/13/20 04:00 98.2 83 23 100/55 (70) 100   


 


5/13/20 04:00      Bi-pap  


 


5/13/20 04:00  76      


 


5/13/20 03:48  84 27  99   100


 


5/13/20 03:30  88 28 97/55 (69) 98   


 


5/13/20 03:14    96/61    


 


5/13/20 03:00  88 24 96/61 (73) 100   


 


5/13/20 02:30  88 23 99/61 (74) 100   


 


5/13/20 02:15  85 24 95/59 (71) 100   


 


5/13/20 02:14    95/59    


 


5/13/20 02:00  88 21 100/62 (75) 100   


 


5/13/20 01:45  86 26 91/56 (68) 100   


 


5/13/20 01:30  87 23 101/57 (72) 100   


 


5/13/20 01:15  90 23 106/67 (80) 100   


 


5/13/20 01:14    101/57    


 


5/13/20 01:00  87 22 101/61 (74) 100   


 


5/13/20 00:45  86 21 97/61 (73) 100   


 


5/13/20 00:30  92 22 107/63 (78) 100   


 


5/13/20 00:15  87 25 89/54 (66) 100   


 


5/13/20 00:14    87/55    


 


5/13/20 00:00        100


 


5/13/20 00:00      Bi-pap  


 


5/13/20 00:00  92      


 


5/13/20 00:00 98.7 87 25 91/55 (67) 100   


 


5/12/20 23:45  87 23 92/57 (69) 100   


 


5/12/20 23:36  93 25  99   100


 


5/12/20 23:30  90 24 98/60 (73) 100   


 


5/12/20 23:15  92 24 97/60 (72) 100   


 


5/12/20 23:14    89/53    


 


5/12/20 23:00  89 24 89/53 (65) 100   


 


5/12/20 22:30  95 23 93/56 (68) 100   


 


5/12/20 22:00  91 22 99/55 (70) 100   


 


5/12/20 21:30  91 21 97/62 (74) 100   


 


5/12/20 21:00  91 20 100/63 (75) 100   


 


5/12/20 20:59  91  86/50    


 


5/12/20 20:30  87 23 86/51 (63) 100   


 


5/12/20 20:18  90 22 89/53 (65) 100   


 


5/12/20 20:00      Bi-pap  


 


5/12/20 20:00 98.5 88 21 95/57 (70)    


 


5/12/20 20:00  91      


 


5/12/20 19:45  89 18 100/61 (74)    


 


5/12/20 19:34  93 20  99   100


 


5/12/20 19:34     99 Bi-Pap  100


 


5/12/20 19:30  89 21 99/61 (74) 97   


 


5/12/20 19:00        100


 


5/12/20 19:00  93 22 97/60 (72) 100   


 


5/12/20 18:00  90 23 90/61 (71) 100   


 


5/12/20 17:30  94 23 99/61 (74) 100   


 


5/12/20 17:00  92 22 90/58 (69) 100   


 


5/12/20 16:00      Bi-pap  


 


5/12/20 16:00 98.6 88 24 92/62 (72) 100   


 


5/12/20 15:15  98 27 118/72 (87) 100   


 


5/12/20 15:12  94 27  100   100


 


5/12/20 15:00  94 25 91/58 (69) 100   


 


5/12/20 14:45  93 24 103/51 (68) 100   


 


5/12/20 14:30  96 29 95/59 (71) 100   


 


5/12/20 14:00  96 29 94/64 (74) 100   

















Intake and Output  


 


 5/12/20 5/13/20





 19:00 07:00


 


Intake Total 868.75 ml 1009.0 ml


 


Output Total 200 ml 


 


Balance 668.75 ml 1009.0 ml


 


  


 


IV Total 868.75 ml 1009.0 ml


 


Output Urine Total 200 ml 


 


# Voids  595











Laboratory Tests








Test


  5/13/20


04:00 5/13/20


08:30 5/13/20


10:29


 


White Blood Count


  9.5 K/UL


(4.8-10.8) 13.8 K/UL


(4.8-10.8)  H 


 


 


Red Blood Count


  2.27 M/UL


(4.70-6.10)  L 3.55 M/UL


(4.70-6.10)  L 


 


 


Hemoglobin


  6.4 G/DL


(14.2-18.0) 9.8 G/DL


(14.2-18.0)  #L 


 


 


Hematocrit


  20.2 %


(42.0-52.0)  #L 31.2 %


(42.0-52.0)  #L 


 


 


Mean Corpuscular Volume 89 FL (80-99)   88 FL (80-99)   


 


Mean Corpuscular Hemoglobin


  28.3 PG


(27.0-31.0) 27.5 PG


(27.0-31.0) 


 


 


Mean Corpuscular Hemoglobin


Concent 31.8 G/DL


(32.0-36.0)  L 31.3 G/DL


(32.0-36.0)  L 


 


 


Red Cell Distribution Width


  13.0 %


(11.6-14.8) 12.5 %


(11.6-14.8) 


 


 


Platelet Count


  99 K/UL


(150-450)  L 178 K/UL


(150-450)  # 


 


 


Mean Platelet Volume


  7.5 FL


(6.5-10.1) 7.3 FL


(6.5-10.1) 


 


 


Neutrophils (%) (Auto)


  % (45.0-75.0)


  % (45.0-75.0)


  


 


 


Lymphocytes (%) (Auto)


  % (20.0-45.0)


  % (20.0-45.0)


  


 


 


Monocytes (%) (Auto)  % (1.0-10.0)    % (1.0-10.0)   


 


Eosinophils (%) (Auto)  % (0.0-3.0)    % (0.0-3.0)   


 


Basophils (%) (Auto)  % (0.0-2.0)    % (0.0-2.0)   


 


Differential Total Cells


Counted 100  


  100  


  


 


 


Neutrophils % (Manual) 91 % (45-75)  H 83 % (45-75)  H 


 


Lymphocytes % (Manual) 2 % (20-45)  L 10 % (20-45)  L 


 


Monocytes % (Manual) 2 % (1-10)   6 % (1-10)   


 


Eosinophils % (Manual) 3 % (0-3)   1 % (0-3)   


 


Basophils % (Manual) 2 % (0-2)   0 % (0-2)   


 


Band Neutrophils 0 % (0-8)   0 % (0-8)   


 


Platelet Estimate Decreased  L Adequate   


 


Platelet Morphology Normal   Normal   


 


Hypochromasia 4+   2+   


 


Anisocytosis 1+   1+   


 


Sodium Level


  143 MMOL/L


(136-145) 


  


 


 


Potassium Level


  3.2 MMOL/L


(3.5-5.1)  L 


  


 


 


Chloride Level


  108 MMOL/L


()  H 


  


 


 


Carbon Dioxide Level


  25 MMOL/L


(21-32) 


  


 


 


Anion Gap


  10 mmol/L


(5-15) 


  


 


 


Blood Urea Nitrogen


  59 mg/dL


(7-18)  H 


  


 


 


Creatinine


  1.9 MG/DL


(0.55-1.30)  H 


  


 


 


Estimat Glomerular Filtration


Rate 42.5 mL/min


(>60) 


  


 


 


Glucose Level


  347 MG/DL


()  H 


  


 


 


Calcium Level


  6.2 MG/DL


(8.5-10.1)  #L 


  


 


 


Ferritin


  1007 NG/ML


(8-388)  H 


  


 


 


Lactate Dehydrogenase


  390 U/L


()  H 


  


 


 


C-Reactive Protein,


Quantitative 21.6 mg/dL


(0.00-0.90)  H 


  


 


 


Fibrinogen


  


  249 mg/dL


(200-400) 


 


 


D-Dimer


  


  > 35.20 mg/L


FEU 


 


 


Phosphorus Level


  


  4.7 MG/DL


(2.5-4.9) 


 


 


Magnesium Level


  


  3.0 MG/DL


(1.8-2.4)  H 


 


 


Total Bilirubin


  


  0.6 MG/DL


(0.2-1.0) 


 


 


Direct Bilirubin


  


  0.3 MG/DL


(0.0-0.3) 


 


 


Aspartate Amino Transf


(AST/SGOT) 


  20 U/L (15-37)


  


 


 


Alanine Aminotransferase


(ALT/SGPT) 


  24 U/L (12-78)


  


 


 


Alkaline Phosphatase


  


  90 U/L


() 


 


 


Total Protein


  


  7.2 G/DL


(6.4-8.2) 


 


 


Albumin


  


  2.5 G/DL


(3.4-5.0)  L 


 


 


Arterial Blood pH


  


  


  7.304


(7.350-7.450)


 


Arterial Blood Partial


Pressure CO2 


  


  68.8 mmHg


(35.0-45.0)  *H


 


Arterial Blood Partial


Pressure O2 


  


  100.0 mmHg


(75.0-100.0)


 


Arterial Blood HCO3


  


  


  33.4 mmol/L


(22.0-26.0)  H


 


Arterial Blood Oxygen


Saturation 


  


  96.6 %


()


 


Arterial Blood Base Excess   5.7 (-2-2)  H


 


Wong Test   Positive  








Objective


HEAD AND NECK:  Mild JVD.BIPAP is on


LUNGS:  Decreased breath sounds and rhonchi


CARDIOVASCULAR:  Regular S1 and S2 with no gallop.


ABDOMEN:  Soft.


EXTREMITIES:  1+ pitting edema.  


         Defibrillator is in right subclavian.











Tommy Otero MD May 13, 2020 13:46

## 2020-05-13 NOTE — NUR
NURSE NOTES:

Noted abnormal lab results that was drawn from central line. Repeat CBC order and notified 
lab and was told to be drawn at later time. 

-------------------------------------------------------------------------------

Addendum: 05/13/20 at 0707 by Luke Benson RN

-------------------------------------------------------------------------------

No acute changes. No signs of bleeding noted.

## 2020-05-13 NOTE — NUR
NURSE NOTES:

Received report from DIMA Leon. Patient asleep and on BIPAP 15/5 with FiO2 100%. O2 Sat 100% on 
the monitor. NPO status. Swenson intact and draining with yellow color urine. Right femoral 
TLC intact and running with 1/2 NS @ 75ml/hr and levophed 4mcg/min. Left soft wrist band 
restraint on. Left hand is warm to touch. kept dry, clean, comfortable and HOB>30. Will 
continue plan of care.

## 2020-05-13 NOTE — NEPHROLOGY PROGRESS NOTE
Assessment/Plan


Problem List:  


(1) MELVI (acute kidney injury)


(2) Electrolyte imbalance


(3) Suspected COVID-19 virus infection


(4) Hypotension


Assessment:  Resolved





(5) Sepsis


Assessment





Patient's current problem from renal standpoint of view is hypokalemia and 

other electrolyte imbalances





His other conditions:


Patient presented with acute renal failure however it is now resolved


Patient presented with septic shock was on pressors however now resolved


Sepsis leukocytosis improving


Patient has COVID-19 pneumonia


Hypertension


Cardiomyopathy status post AICD


Previous CVA with right residual weakness


Seizure disorders


Plan





COVID-19 positive





May 13:


Patient now in ICU room J


On BiPAP


Discussed with RN


Serum creatinine lower 1.9, 


Will recheck renal parameters and electrolytes and chemistries tomorrow


Continue per consultants











May 12: 


Patient is clinically deteriorating 


Respiratory status worsened 


Blood pressure low


Serum creatinine brian 


ABG indicative of CO2 retention 


Discussed with a pulmonologist 


Patient due transfer to monitored bed for initiation of BiPAP 


Meanwhile we will give albumin  bolus and start half-normal saline IV hydration 


May require pressors


Overall prognosis poor


I favor holding the mind altering medications


Per orders





May 11: 


In view of worsening renal failure and rise of creatinine to 1.8 will 

temporarily hold Lasix and lisinopril


Discussed with Dr. Henderson


Continue rest





Previously:


Magnesium and potassium as needed


Stable from renal standpoint of view


Continue per consultants





We will continue to monitor electrolytes and chemistries





Subjective


ROS Limited/Unobtainable:  No


Constitutional:  Reports: malaise, weakness





Objective


Objective





Last 24 Hour Vital Signs








  Date Time  Temp Pulse Resp B/P (MAP) Pulse Ox O2 Delivery O2 Flow Rate FiO2


 


5/13/20 09:30  80 22 111/63 (79) 100   


 


5/13/20 09:00  79 23 110/67 (81) 100   


 


5/13/20 09:00  78  103/58    


 


5/13/20 08:30  79 24 106/57 (73) 100   


 


5/13/20 08:23     99 Bi-Pap  


 


5/13/20 08:00        100


 


5/13/20 08:00      Bi-pap  


 


5/13/20 08:00 97.8 87 26 107/61 (76) 100   


 


5/13/20 07:38  78 25  100   100


 


5/13/20 07:30  81 22 105/60 (75) 100   


 


5/13/20 07:00  82 22 103/58 (73) 100   


 


5/13/20 06:34  86 25 107/59 (75) 100   


 


5/13/20 06:14    107/58    


 


5/13/20 06:00  87 24 105/59 (74) 100   


 


5/13/20 05:30  82 26 101/53 (69) 98   


 


5/13/20 05:14    99/49    


 


5/13/20 05:00  81 21 99/49 (66) 96   


 


5/13/20 04:30  82 23 96/53 (67) 97   


 


5/13/20 04:14    100/55    


 


5/13/20 04:00        100


 


5/13/20 04:00 98.2 83 23 100/55 (70) 100   


 


5/13/20 04:00      Bi-pap  


 


5/13/20 04:00  76      


 


5/13/20 03:48  84 27  99   100


 


5/13/20 03:30  88 28 97/55 (69) 98   


 


5/13/20 03:14    96/61    


 


5/13/20 03:00  88 24 96/61 (73) 100   


 


5/13/20 02:30  88 23 99/61 (74) 100   


 


5/13/20 02:15  85 24 95/59 (71) 100   


 


5/13/20 02:14    95/59    


 


5/13/20 02:00  88 21 100/62 (75) 100   


 


5/13/20 01:45  86 26 91/56 (68) 100   


 


5/13/20 01:30  87 23 101/57 (72) 100   


 


5/13/20 01:15  90 23 106/67 (80) 100   


 


5/13/20 01:14    101/57    


 


5/13/20 01:00  87 22 101/61 (74) 100   


 


5/13/20 00:45  86 21 97/61 (73) 100   


 


5/13/20 00:30  92 22 107/63 (78) 100   


 


5/13/20 00:15  87 25 89/54 (66) 100   


 


5/13/20 00:14    87/55    


 


5/13/20 00:00        100


 


5/13/20 00:00      Bi-pap  


 


5/13/20 00:00  92      


 


5/13/20 00:00 98.7 87 25 91/55 (67) 100   


 


5/12/20 23:45  87 23 92/57 (69) 100   


 


5/12/20 23:36  93 25  99   100


 


5/12/20 23:30  90 24 98/60 (73) 100   


 


5/12/20 23:15  92 24 97/60 (72) 100   


 


5/12/20 23:14    89/53    


 


5/12/20 23:00  89 24 89/53 (65) 100   


 


5/12/20 22:30  95 23 93/56 (68) 100   


 


5/12/20 22:00  91 22 99/55 (70) 100   


 


5/12/20 21:30  91 21 97/62 (74) 100   


 


5/12/20 21:00  91 20 100/63 (75) 100   


 


5/12/20 20:59  91  86/50    


 


5/12/20 20:30  87 23 86/51 (63) 100   


 


5/12/20 20:18  90 22 89/53 (65) 100   


 


5/12/20 20:00      Bi-pap  


 


5/12/20 20:00 98.5 88 21 95/57 (70)    


 


5/12/20 20:00  91      


 


5/12/20 19:45  89 18 100/61 (74)    


 


5/12/20 19:34  93 20  99   100


 


5/12/20 19:34     99 Bi-Pap  100


 


5/12/20 19:30  89 21 99/61 (74) 97   


 


5/12/20 19:00        100


 


5/12/20 19:00  93 22 97/60 (72) 100   


 


5/12/20 18:00  90 23 90/61 (71) 100   


 


5/12/20 17:30  94 23 99/61 (74) 100   


 


5/12/20 17:00  92 22 90/58 (69) 100   


 


5/12/20 16:00      Bi-pap  


 


5/12/20 16:00 98.6 88 24 92/62 (72) 100   


 


5/12/20 15:15  98 27 118/72 (87) 100   


 


5/12/20 15:12  94 27  100   100


 


5/12/20 15:00  94 25 91/58 (69) 100   


 


5/12/20 14:45  93 24 103/51 (68) 100   


 


5/12/20 14:30  96 29 95/59 (71) 100   


 


5/12/20 14:00  96 29 94/64 (74) 100   


 


5/12/20 13:30  97 30 103/54 (70) 100   


 


5/12/20 13:00  103 30 104/63 (77) 100   


 


5/12/20 12:00  99 30 95/50 (65) 95   


 


5/12/20 12:00      Bi-pap  


 


5/12/20 11:54  97 30  100 Bi-Pap  100


 


5/12/20 11:51  97 30  100   100


 


5/12/20 11:50     100 Bi-Pap  100


 


5/12/20 11:30 99.1 91 24 104/50 (68) 96   

















Intake and Output  


 


 5/12/20 5/13/20





 19:00 07:00


 


Intake Total 868.75 ml 922.5 ml


 


Output Total 200 ml 


 


Balance 668.75 ml 922.5 ml


 


  


 


IV Total 868.75 ml 922.5 ml


 


Output Urine Total 200 ml 


 


# Voids  595








Laboratory Tests


5/12/20 13:10: 


Arterial Blood pH 7.298L, Arterial Blood Partial Pressure CO2 64.7*H, Arterial 

Blood Partial Pressure O2 124.8H, Arterial Blood HCO3 31.0H, Arterial Blood 

Oxygen Saturation 97.1, Arterial Blood Base Excess 3.4H, Wong Test Positive


5/13/20 04:00: 


White Blood Count 9.5, Red Blood Count 2.27L, Hemoglobin 6.4#*L, Hematocrit 20.2

#L, Mean Corpuscular Volume 89, Mean Corpuscular Hemoglobin 28.3, Mean 

Corpuscular Hemoglobin Concent 31.8L, Red Cell Distribution Width 13.0, 

Platelet Count 99L, Mean Platelet Volume 7.5, Neutrophils (%) (Auto) , 

Lymphocytes (%) (Auto) , Monocytes (%) (Auto) , Eosinophils (%) (Auto) , 

Basophils (%) (Auto) , Differential Total Cells Counted 100, Neutrophils % (

Manual) 91H, Lymphocytes % (Manual) 2L, Monocytes % (Manual) 2, Eosinophils % (

Manual) 3, Basophils % (Manual) 2, Band Neutrophils 0, Platelet Estimate 

DecreasedL, Platelet Morphology Normal, Hypochromasia 4+, Anisocytosis 1+, 

Sodium Level 143, Potassium Level 3.2L, Chloride Level 108H, Carbon Dioxide 

Level 25, Anion Gap 10, Blood Urea Nitrogen 59H, Creatinine 1.9H, Estimat 

Glomerular Filtration Rate 42.5, Glucose Level 347H, Calcium Level 6.2#L, 

Ferritin 1007H, Lactate Dehydrogenase 390H, C-Reactive Protein, Quantitative 

21.6H


5/13/20 08:30: 


White Blood Count 13.8H, Red Blood Count 3.55L, Hemoglobin 9.8#L, Hematocrit 

31.2#L, Mean Corpuscular Volume 88, Mean Corpuscular Hemoglobin 27.5, Mean 

Corpuscular Hemoglobin Concent 31.3L, Red Cell Distribution Width 12.5, 

Platelet Count 178#, Mean Platelet Volume 7.3, Neutrophils (%) (Auto) , 

Lymphocytes (%) (Auto) , Monocytes (%) (Auto) , Eosinophils (%) (Auto) , 

Basophils (%) (Auto) , Differential Total Cells Counted 100, Neutrophils % (

Manual) 83H, Lymphocytes % (Manual) 10L, Monocytes % (Manual) 6, Eosinophils % (

Manual) 1, Basophils % (Manual) 0, Band Neutrophils 0, Platelet Estimate 

Adequate, Platelet Morphology Normal, Hypochromasia 2+, Anisocytosis 1+, 

Fibrinogen 249, D-Dimer > 35.20H, Phosphorus Level 4.7, Magnesium Level 3.0H, 

Total Bilirubin 0.6, Direct Bilirubin 0.3, Aspartate Amino Transf (AST/SGOT) 20

, Alanine Aminotransferase (ALT/SGPT) 24, Alkaline Phosphatase 90, Total 

Protein 7.2, Albumin 2.5L


5/13/20 10:29: 


Arterial Blood pH 7.304L, Arterial Blood Partial Pressure CO2 68.8*H, Arterial 

Blood Partial Pressure O2 100.0, Arterial Blood HCO3 33.4H, Arterial Blood 

Oxygen Saturation 96.6, Arterial Blood Base Excess 5.7H, Wong Test Positive


Height (Feet):  5


Height (Inches):  8.00


Weight (Pounds):  173


General Appearance:  mild distress


EENT:  other - On BiPAP


Cardiovascular:  tachycardia - Rate in 80s


Respiratory/Chest:  decreased breath sounds


Abdomen:  distended


Objective


No change











Miguel Ángel Kendall MD May 13, 2020 11:03

## 2020-05-13 NOTE — NUR
NURSE NOTES:

pt sleeping in the bed, arousable by shaking. Bed bath given. Reposition done. No acute 
change noted at this time. Levophed at 4mcg/min, BP of 104/58 noted. L wrist asymptomatic. 
Will continue to monitor.

## 2020-05-13 NOTE — PROGRESS NOTE
DATE:  05/13/2020

SUBJECTIVE:  This is a 69-year-old male patient with altered mental status,

hypertension, sepsis, and patient also has ________, seizures, suspected

COVID-19, COPD, anemia, diabetes causing him to have a decline in

cognition below his baseline and altered mental status.  That is why, his

attending has requested daily psychiatric consultation.



MENTAL STATUS EXAMINATION:  This is a 69-year-old male.  Appearance is

disheveled.  Attitude, irritable and agitated.  Affect, guarded and

restricted.  Intellect poor.  Mood, depressed and anxious.  Motor

activity, psychomotor agitation.  Insight and judgment is poor.



DIAGNOSIS:  Major depressive disorder, mild, recurrent with psychotic

features, rule out dementia with psychosis.



PLAN:  Treat him with a medication regimen consisting of ________.  Patient

will continued to be followed by Psychiatry throughout hospital course.

He is in ICU currently, so he has got significant altered mental status,

confusion, and continued to be followed by Psychiatry in order to prevent

any further decline in his cognition.  Chart reviewed.  Discussed with

staff.  Seen and assessed in ICU.









  ______________________________________________

  Demarcus Love M.D.





DR:  OLGA

D:  05/13/2020 11:10

T:  05/13/2020 19:38

JOB#:  [id number]

CC:

## 2020-05-13 NOTE — PULMONOLOGY PROGRESS NOTE
Subjective


ROS Limited/Unobtainable:  Yes


Interval Events:  Doing poorly; now on BiPAP


Constitutional:  Reports: no symptoms


HEENT:  Repors: no symptoms


Respiratory:  Reports: dry cough, shortness of breath


Cardiovascular:  Reports: no symptoms


Gastrointestinal/Abdominal:  Reports: no symptoms


Genitourinary:  Reports: no symptoms


Neurologic:  Reports: no symptoms


Allergies:  


Coded Allergies:  


     No Known Allergies (Unverified , 12/15/14)


All Systems:  reviewed and negative except above





Objective





Last 24 Hour Vital Signs








  Date Time  Temp Pulse Resp B/P (MAP) Pulse Ox O2 Delivery O2 Flow Rate FiO2


 


5/13/20 09:30  80 22 111/63 (79) 100   


 


5/13/20 09:00  79 23 110/67 (81) 100   


 


5/13/20 09:00  78  103/58    


 


5/13/20 08:30  79 24 106/57 (73) 100   


 


5/13/20 08:23     99 Bi-Pap  


 


5/13/20 08:00        100


 


5/13/20 08:00      Bi-pap  


 


5/13/20 08:00 97.8 87 26 107/61 (76) 100   


 


5/13/20 07:38  78 25  100   100


 


5/13/20 07:30  81 22 105/60 (75) 100   


 


5/13/20 07:00  82 22 103/58 (73) 100   


 


5/13/20 06:34  86 25 107/59 (75) 100   


 


5/13/20 06:14    107/58    


 


5/13/20 06:00  87 24 105/59 (74) 100   


 


5/13/20 05:30  82 26 101/53 (69) 98   


 


5/13/20 05:14    99/49    


 


5/13/20 05:00  81 21 99/49 (66) 96   


 


5/13/20 04:30  82 23 96/53 (67) 97   


 


5/13/20 04:14    100/55    


 


5/13/20 04:00        100


 


5/13/20 04:00 98.2 83 23 100/55 (70) 100   


 


5/13/20 04:00      Bi-pap  


 


5/13/20 04:00  76      


 


5/13/20 03:48  84 27  99   100


 


5/13/20 03:30  88 28 97/55 (69) 98   


 


5/13/20 03:14    96/61    


 


5/13/20 03:00  88 24 96/61 (73) 100   


 


5/13/20 02:30  88 23 99/61 (74) 100   


 


5/13/20 02:15  85 24 95/59 (71) 100   


 


5/13/20 02:14    95/59    


 


5/13/20 02:00  88 21 100/62 (75) 100   


 


5/13/20 01:45  86 26 91/56 (68) 100   


 


5/13/20 01:30  87 23 101/57 (72) 100   


 


5/13/20 01:15  90 23 106/67 (80) 100   


 


5/13/20 01:14    101/57    


 


5/13/20 01:00  87 22 101/61 (74) 100   


 


5/13/20 00:45  86 21 97/61 (73) 100   


 


5/13/20 00:30  92 22 107/63 (78) 100   


 


5/13/20 00:15  87 25 89/54 (66) 100   


 


5/13/20 00:14    87/55    


 


5/13/20 00:00        100


 


5/13/20 00:00      Bi-pap  


 


5/13/20 00:00  92      


 


5/13/20 00:00 98.7 87 25 91/55 (67) 100   


 


5/12/20 23:45  87 23 92/57 (69) 100   


 


5/12/20 23:36  93 25  99   100


 


5/12/20 23:30  90 24 98/60 (73) 100   


 


5/12/20 23:15  92 24 97/60 (72) 100   


 


5/12/20 23:14    89/53    


 


5/12/20 23:00  89 24 89/53 (65) 100   


 


5/12/20 22:30  95 23 93/56 (68) 100   


 


5/12/20 22:00  91 22 99/55 (70) 100   


 


5/12/20 21:30  91 21 97/62 (74) 100   


 


5/12/20 21:00  91 20 100/63 (75) 100   


 


5/12/20 20:59  91  86/50    


 


5/12/20 20:30  87 23 86/51 (63) 100   


 


5/12/20 20:18  90 22 89/53 (65) 100   


 


5/12/20 20:00      Bi-pap  


 


5/12/20 20:00 98.5 88 21 95/57 (70)    


 


5/12/20 20:00  91      


 


5/12/20 19:45  89 18 100/61 (74)    


 


5/12/20 19:34  93 20  99   100


 


5/12/20 19:34     99 Bi-Pap  100


 


5/12/20 19:30  89 21 99/61 (74) 97   


 


5/12/20 19:00        100


 


5/12/20 19:00  93 22 97/60 (72) 100   


 


5/12/20 18:00  90 23 90/61 (71) 100   


 


5/12/20 17:30  94 23 99/61 (74) 100   


 


5/12/20 17:00  92 22 90/58 (69) 100   


 


5/12/20 16:00      Bi-pap  


 


5/12/20 16:00 98.6 88 24 92/62 (72) 100   


 


5/12/20 15:15  98 27 118/72 (87) 100   


 


5/12/20 15:12  94 27  100   100


 


5/12/20 15:00  94 25 91/58 (69) 100   


 


5/12/20 14:45  93 24 103/51 (68) 100   


 


5/12/20 14:30  96 29 95/59 (71) 100   


 


5/12/20 14:00  96 29 94/64 (74) 100   


 


5/12/20 13:30  97 30 103/54 (70) 100   


 


5/12/20 13:00  103 30 104/63 (77) 100   


 


5/12/20 12:00  99 30 95/50 (65) 95   


 


5/12/20 12:00      Bi-pap  


 


5/12/20 11:54  97 30  100 Bi-Pap  100


 


5/12/20 11:51  97 30  100   100


 


5/12/20 11:50     100 Bi-Pap  100


 


5/12/20 11:30 99.1 91 24 104/50 (68) 96   

















Intake and Output  


 


 5/12/20 5/13/20





 19:00 07:00


 


Intake Total 868.75 ml 922.5 ml


 


Output Total 200 ml 


 


Balance 668.75 ml 922.5 ml


 


  


 


IV Total 868.75 ml 922.5 ml


 


Output Urine Total 200 ml 


 


# Voids  595








HEENT:  atraumatic, anicteric


Respiratory/Chest:  chest wall non-tender, decreased breath sounds


Cardiovascular:  normal peripheral pulses


Abdomen:  soft, non tender, non distended


Neurologic/Psychiatric:  alert, responsive


Laboratory Tests


5/12/20 13:10: 


Arterial Blood pH 7.298L, Arterial Blood Partial Pressure CO2 64.7*H, Arterial 

Blood Partial Pressure O2 124.8H, Arterial Blood HCO3 31.0H, Arterial Blood 

Oxygen Saturation 97.1, Arterial Blood Base Excess 3.4H, Wong Test Positive


5/13/20 04:00: 


White Blood Count 9.5, Red Blood Count 2.27L, Hemoglobin 6.4#*L, Hematocrit 20.2

#L, Mean Corpuscular Volume 89, Mean Corpuscular Hemoglobin 28.3, Mean 

Corpuscular Hemoglobin Concent 31.8L, Red Cell Distribution Width 13.0, 

Platelet Count 99L, Mean Platelet Volume 7.5, Neutrophils (%) (Auto) , 

Lymphocytes (%) (Auto) , Monocytes (%) (Auto) , Eosinophils (%) (Auto) , 

Basophils (%) (Auto) , Differential Total Cells Counted 100, Neutrophils % (

Manual) 91H, Lymphocytes % (Manual) 2L, Monocytes % (Manual) 2, Eosinophils % (

Manual) 3, Basophils % (Manual) 2, Band Neutrophils 0, Platelet Estimate 

DecreasedL, Platelet Morphology Normal, Hypochromasia 4+, Anisocytosis 1+, 

Sodium Level 143, Potassium Level 3.2L, Chloride Level 108H, Carbon Dioxide 

Level 25, Anion Gap 10, Blood Urea Nitrogen 59H, Creatinine 1.9H, Estimat 

Glomerular Filtration Rate 42.5, Glucose Level 347H, Calcium Level 6.2#L, 

Ferritin 1007H, Lactate Dehydrogenase 390H, C-Reactive Protein, Quantitative 

21.6H


5/13/20 08:30: 


White Blood Count 13.8H, Red Blood Count 3.55L, Hemoglobin 9.8#L, Hematocrit 

31.2#L, Mean Corpuscular Volume 88, Mean Corpuscular Hemoglobin 27.5, Mean 

Corpuscular Hemoglobin Concent 31.3L, Red Cell Distribution Width 12.5, 

Platelet Count 178#, Mean Platelet Volume 7.3, Neutrophils (%) (Auto) , 

Lymphocytes (%) (Auto) , Monocytes (%) (Auto) , Eosinophils (%) (Auto) , 

Basophils (%) (Auto) , Differential Total Cells Counted 100, Neutrophils % (

Manual) 83H, Lymphocytes % (Manual) 10L, Monocytes % (Manual) 6, Eosinophils % (

Manual) 1, Basophils % (Manual) 0, Band Neutrophils 0, Platelet Estimate 

Adequate, Platelet Morphology Normal, Hypochromasia 2+, Anisocytosis 1+, 

Fibrinogen 249, D-Dimer > 35.20H, Phosphorus Level 4.7, Magnesium Level 3.0H, 

Total Bilirubin 0.6, Direct Bilirubin 0.3, Aspartate Amino Transf (AST/SGOT) 20

, Alanine Aminotransferase (ALT/SGPT) 24, Alkaline Phosphatase 90, Total 

Protein 7.2, Albumin 2.5L


5/13/20 10:29: 


Arterial Blood pH 7.304L, Arterial Blood Partial Pressure CO2 68.8*H, Arterial 

Blood Partial Pressure O2 100.0, Arterial Blood HCO3 33.4H, Arterial Blood 

Oxygen Saturation 96.6, Arterial Blood Base Excess 5.7H, Wong Test Positive





Current Medications








 Medications


  (Trade)  Dose


 Ordered  Sig/Aarti


 Route


 PRN Reason  Start Time


 Stop Time Status Last Admin


Dose Admin


 


 Acetaminophen


  (Tylenol)  650 mg  Q6H  PRN


 ORAL


 Temp >100.5  5/12/20 13:15


 5/26/20 13:14   


 


 


 Acetaminophen


  (Tylenol)  650 mg  Q6H  PRN


 RECTAL


 Temp >100.5  5/12/20 13:15


 6/2/20 13:14   


 


 


 Carvedilol


  (Coreg)  3.125 mg  EVERY 12  HOURS


 ORAL


   5/12/20 21:00


 5/29/20 20:59   


 


 


 Chlorhexidine


 Gluconate


  (Gloria-Hex 2%)  1 applic  DAILY@2000


 TOPIC


   5/13/20 20:00


 8/11/20 19:59   


 


 


 Dextrose


  (Dextrose 50%)  25 ml  Q30M  PRN


 IV


 Hypoglycemia  5/12/20 13:45


 8/10/20 13:44   


 


 


 Dextrose


  (Dextrose 50%)  50 ml  Q30M  PRN


 IV


 Hypoglycemia  5/12/20 13:45


 8/10/20 13:44   


 


 


 Heparin Sodium/


 Sodium Chloride


  (Heparin 1000


 units/500ml


 Premix)  1,000 unit  ONCE


 IV


   5/13/20 09:15


 5/13/20 18:00   


 


 


 Insulin Aspart


  (NovoLOG)    Q6HR


 SUBQ


   5/12/20 18:00


 8/10/20 17:59  5/13/20 05:58


 


 


 Levetiracetam


  (Keppra)  1,000 mg  Q12HR


 ORAL


   5/12/20 21:00


 5/25/20 00:00  5/13/20 08:54


 


 


 Lidocaine HCl


  (Xylocaine 1%


 30ml)  30 ml  ONCE


 INJ


   5/13/20 09:15


 5/13/20 18:00   


 


 


 Norepinephrine


 Bitartrate 4 mg/


 Dextrose  250 ml @ 0


 mls/hr  Q24H  PRN


 IV


 For hypotension  5/12/20 19:00


 6/11/20 18:59  5/12/20 23:14


 


 


 Potassium Chloride  100 ml @ 


 100 mls/hr  Q1H


 IV


   5/13/20 08:15


 5/13/20 12:14  5/13/20 09:57


 


 


 Sodium Chloride  1,000 ml @ 


 75 mls/hr  [id number]


 IV


   5/12/20 13:15


 6/11/20 09:29  5/13/20 02:43


 











Assessment/Plan


Assessment/Plan


IMPRESSION:


1. Cardiomyopathy.


2. Hypotension.


3. Left lung pneumonia.


4. Nursing home resident.


5. Positive COVID-19.





DISCUSSION:


1. Continue isolation


2. Continue current medications.


3. Off pressors.


4. Continue BiPAP; abg adequate


5. Pulmonary hygiene.


6. Broad-spectrum antibiotics.


7. I will follow.


8. Pulmonary hygiene


9. Continue diuresis, on Lasix 40 mg IV BID; 





CXR unchanged




















  ______________________________________________


  Hayder Hahn M.D.











Hayder Hahn MD May 13, 2020 11:01

## 2020-05-13 NOTE — NUR
CASE MANAGEMENT: REVIEW







SI: SEPSIS . COVID-19 

T 98.2 HR 87 RR 25 BP 87/55 % BIPAP FIO2 100

WBC 13.8 H/H 9.8/31.2 K 3.2 BUN 59 CR 1.9 







IS: KCl IVF @ 100ML/HR 

NS IVF @ 75ML/HR 

HEPARIN SODIUM 1,000 UNITS IV X1 

COREG PO Q12HR 

KEPPRA PO Q12HR 

INSERT NGT

 







***ICU STATUS***

DCP: PATIENT IS FROM [facility]

## 2020-05-13 NOTE — PULMONOLOGY PROGRESS NOTE
Subjective


ROS Limited/Unobtainable:  Yes


Interval Events:  Doing poorly; now on BiPAP


Constitutional:  Reports: no symptoms


HEENT:  Repors: no symptoms


Respiratory:  Reports: dry cough, shortness of breath


Cardiovascular:  Reports: no symptoms


Gastrointestinal/Abdominal:  Reports: no symptoms


Genitourinary:  Reports: no symptoms


Neurologic:  Reports: no symptoms


Allergies:  


Coded Allergies:  


     No Known Allergies (Unverified , 12/15/14)


All Systems:  reviewed and negative except above





Objective





Last 24 Hour Vital Signs








  Date Time  Temp Pulse Resp B/P (MAP) Pulse Ox O2 Delivery O2 Flow Rate FiO2


 


5/13/20 09:30  80 22 111/63 (79) 100   


 


5/13/20 09:00  79 23 110/67 (81) 100   


 


5/13/20 09:00  78  103/58    


 


5/13/20 08:30  79 24 106/57 (73) 100   


 


5/13/20 08:23     99 Bi-Pap  


 


5/13/20 08:00        100


 


5/13/20 08:00      Bi-pap  


 


5/13/20 08:00 97.8 87 26 107/61 (76) 100   


 


5/13/20 07:38  78 25  100   100


 


5/13/20 07:30  81 22 105/60 (75) 100   


 


5/13/20 07:00  82 22 103/58 (73) 100   


 


5/13/20 06:34  86 25 107/59 (75) 100   


 


5/13/20 06:14    107/58    


 


5/13/20 06:00  87 24 105/59 (74) 100   


 


5/13/20 05:30  82 26 101/53 (69) 98   


 


5/13/20 05:14    99/49    


 


5/13/20 05:00  81 21 99/49 (66) 96   


 


5/13/20 04:30  82 23 96/53 (67) 97   


 


5/13/20 04:14    100/55    


 


5/13/20 04:00        100


 


5/13/20 04:00 98.2 83 23 100/55 (70) 100   


 


5/13/20 04:00      Bi-pap  


 


5/13/20 04:00  76      


 


5/13/20 03:48  84 27  99   100


 


5/13/20 03:30  88 28 97/55 (69) 98   


 


5/13/20 03:14    96/61    


 


5/13/20 03:00  88 24 96/61 (73) 100   


 


5/13/20 02:30  88 23 99/61 (74) 100   


 


5/13/20 02:15  85 24 95/59 (71) 100   


 


5/13/20 02:14    95/59    


 


5/13/20 02:00  88 21 100/62 (75) 100   


 


5/13/20 01:45  86 26 91/56 (68) 100   


 


5/13/20 01:30  87 23 101/57 (72) 100   


 


5/13/20 01:15  90 23 106/67 (80) 100   


 


5/13/20 01:14    101/57    


 


5/13/20 01:00  87 22 101/61 (74) 100   


 


5/13/20 00:45  86 21 97/61 (73) 100   


 


5/13/20 00:30  92 22 107/63 (78) 100   


 


5/13/20 00:15  87 25 89/54 (66) 100   


 


5/13/20 00:14    87/55    


 


5/13/20 00:00        100


 


5/13/20 00:00      Bi-pap  


 


5/13/20 00:00  92      


 


5/13/20 00:00 98.7 87 25 91/55 (67) 100   


 


5/12/20 23:45  87 23 92/57 (69) 100   


 


5/12/20 23:36  93 25  99   100


 


5/12/20 23:30  90 24 98/60 (73) 100   


 


5/12/20 23:15  92 24 97/60 (72) 100   


 


5/12/20 23:14    89/53    


 


5/12/20 23:00  89 24 89/53 (65) 100   


 


5/12/20 22:30  95 23 93/56 (68) 100   


 


5/12/20 22:00  91 22 99/55 (70) 100   


 


5/12/20 21:30  91 21 97/62 (74) 100   


 


5/12/20 21:00  91 20 100/63 (75) 100   


 


5/12/20 20:59  91  86/50    


 


5/12/20 20:30  87 23 86/51 (63) 100   


 


5/12/20 20:18  90 22 89/53 (65) 100   


 


5/12/20 20:00      Bi-pap  


 


5/12/20 20:00 98.5 88 21 95/57 (70)    


 


5/12/20 20:00  91      


 


5/12/20 19:45  89 18 100/61 (74)    


 


5/12/20 19:34  93 20  99   100


 


5/12/20 19:34     99 Bi-Pap  100


 


5/12/20 19:30  89 21 99/61 (74) 97   


 


5/12/20 19:00        100


 


5/12/20 19:00  93 22 97/60 (72) 100   


 


5/12/20 18:00  90 23 90/61 (71) 100   


 


5/12/20 17:30  94 23 99/61 (74) 100   


 


5/12/20 17:00  92 22 90/58 (69) 100   


 


5/12/20 16:00      Bi-pap  


 


5/12/20 16:00 98.6 88 24 92/62 (72) 100   


 


5/12/20 15:15  98 27 118/72 (87) 100   


 


5/12/20 15:12  94 27  100   100


 


5/12/20 15:00  94 25 91/58 (69) 100   


 


5/12/20 14:45  93 24 103/51 (68) 100   


 


5/12/20 14:30  96 29 95/59 (71) 100   


 


5/12/20 14:00  96 29 94/64 (74) 100   


 


5/12/20 13:30  97 30 103/54 (70) 100   


 


5/12/20 13:00  103 30 104/63 (77) 100   


 


5/12/20 12:00  99 30 95/50 (65) 95   


 


5/12/20 12:00      Bi-pap  


 


5/12/20 11:54  97 30  100 Bi-Pap  100


 


5/12/20 11:51  97 30  100   100


 


5/12/20 11:50     100 Bi-Pap  100


 


5/12/20 11:30 99.1 91 24 104/50 (68) 96   

















Intake and Output  


 


 5/12/20 5/13/20





 19:00 07:00


 


Intake Total 868.75 ml 922.5 ml


 


Output Total 200 ml 


 


Balance 668.75 ml 922.5 ml


 


  


 


IV Total 868.75 ml 922.5 ml


 


Output Urine Total 200 ml 


 


# Voids  595








HEENT:  atraumatic, anicteric


Respiratory/Chest:  chest wall non-tender, decreased breath sounds


Cardiovascular:  normal peripheral pulses


Abdomen:  soft, non tender, non distended


Neurologic/Psychiatric:  alert, responsive


Laboratory Tests


5/12/20 13:10: 


Arterial Blood pH 7.298L, Arterial Blood Partial Pressure CO2 64.7*H, Arterial 

Blood Partial Pressure O2 124.8H, Arterial Blood HCO3 31.0H, Arterial Blood 

Oxygen Saturation 97.1, Arterial Blood Base Excess 3.4H, Wong Test Positive


5/13/20 04:00: 


White Blood Count 9.5, Red Blood Count 2.27L, Hemoglobin 6.4#*L, Hematocrit 20.2

#L, Mean Corpuscular Volume 89, Mean Corpuscular Hemoglobin 28.3, Mean 

Corpuscular Hemoglobin Concent 31.8L, Red Cell Distribution Width 13.0, 

Platelet Count 99L, Mean Platelet Volume 7.5, Neutrophils (%) (Auto) , 

Lymphocytes (%) (Auto) , Monocytes (%) (Auto) , Eosinophils (%) (Auto) , 

Basophils (%) (Auto) , Differential Total Cells Counted 100, Neutrophils % (

Manual) 91H, Lymphocytes % (Manual) 2L, Monocytes % (Manual) 2, Eosinophils % (

Manual) 3, Basophils % (Manual) 2, Band Neutrophils 0, Platelet Estimate 

DecreasedL, Platelet Morphology Normal, Hypochromasia 4+, Anisocytosis 1+, 

Sodium Level 143, Potassium Level 3.2L, Chloride Level 108H, Carbon Dioxide 

Level 25, Anion Gap 10, Blood Urea Nitrogen 59H, Creatinine 1.9H, Estimat 

Glomerular Filtration Rate 42.5, Glucose Level 347H, Calcium Level 6.2#L, 

Ferritin 1007H, Lactate Dehydrogenase 390H, C-Reactive Protein, Quantitative 

21.6H


5/13/20 08:30: 


White Blood Count 13.8H, Red Blood Count 3.55L, Hemoglobin 9.8#L, Hematocrit 

31.2#L, Mean Corpuscular Volume 88, Mean Corpuscular Hemoglobin 27.5, Mean 

Corpuscular Hemoglobin Concent 31.3L, Red Cell Distribution Width 12.5, 

Platelet Count 178#, Mean Platelet Volume 7.3, Neutrophils (%) (Auto) , 

Lymphocytes (%) (Auto) , Monocytes (%) (Auto) , Eosinophils (%) (Auto) , 

Basophils (%) (Auto) , Differential Total Cells Counted 100, Neutrophils % (

Manual) 83H, Lymphocytes % (Manual) 10L, Monocytes % (Manual) 6, Eosinophils % (

Manual) 1, Basophils % (Manual) 0, Band Neutrophils 0, Platelet Estimate 

Adequate, Platelet Morphology Normal, Hypochromasia 2+, Anisocytosis 1+, 

Fibrinogen 249, D-Dimer > 35.20H, Phosphorus Level 4.7, Magnesium Level 3.0H, 

Total Bilirubin 0.6, Direct Bilirubin 0.3, Aspartate Amino Transf (AST/SGOT) 20

, Alanine Aminotransferase (ALT/SGPT) 24, Alkaline Phosphatase 90, Total 

Protein 7.2, Albumin 2.5L


5/13/20 10:29: 


Arterial Blood pH 7.304L, Arterial Blood Partial Pressure CO2 68.8*H, Arterial 

Blood Partial Pressure O2 100.0, Arterial Blood HCO3 33.4H, Arterial Blood 

Oxygen Saturation 96.6, Arterial Blood Base Excess 5.7H, Wong Test Positive





Current Medications








 Medications


  (Trade)  Dose


 Ordered  Sig/Aarti


 Route


 PRN Reason  Start Time


 Stop Time Status Last Admin


Dose Admin


 


 Acetaminophen


  (Tylenol)  650 mg  Q6H  PRN


 ORAL


 Temp >100.5  5/12/20 13:15


 5/26/20 13:14   


 


 


 Acetaminophen


  (Tylenol)  650 mg  Q6H  PRN


 RECTAL


 Temp >100.5  5/12/20 13:15


 6/2/20 13:14   


 


 


 Carvedilol


  (Coreg)  3.125 mg  EVERY 12  HOURS


 ORAL


   5/12/20 21:00


 5/29/20 20:59   


 


 


 Chlorhexidine


 Gluconate


  (Gloria-Hex 2%)  1 applic  DAILY@2000


 TOPIC


   5/13/20 20:00


 8/11/20 19:59   


 


 


 Dextrose


  (Dextrose 50%)  25 ml  Q30M  PRN


 IV


 Hypoglycemia  5/12/20 13:45


 8/10/20 13:44   


 


 


 Dextrose


  (Dextrose 50%)  50 ml  Q30M  PRN


 IV


 Hypoglycemia  5/12/20 13:45


 8/10/20 13:44   


 


 


 Heparin Sodium/


 Sodium Chloride


  (Heparin 1000


 units/500ml


 Premix)  1,000 unit  ONCE


 IV


   5/13/20 09:15


 5/13/20 18:00   


 


 


 Insulin Aspart


  (NovoLOG)    Q6HR


 SUBQ


   5/12/20 18:00


 8/10/20 17:59  5/13/20 05:58


 


 


 Levetiracetam


  (Keppra)  1,000 mg  Q12HR


 ORAL


   5/12/20 21:00


 5/25/20 00:00  5/13/20 08:54


 


 


 Lidocaine HCl


  (Xylocaine 1%


 30ml)  30 ml  ONCE


 INJ


   5/13/20 09:15


 5/13/20 18:00   


 


 


 Norepinephrine


 Bitartrate 4 mg/


 Dextrose  250 ml @ 0


 mls/hr  Q24H  PRN


 IV


 For hypotension  5/12/20 19:00


 6/11/20 18:59  5/12/20 23:14


 


 


 Potassium Chloride  100 ml @ 


 100 mls/hr  Q1H


 IV


   5/13/20 08:15


 5/13/20 12:14  5/13/20 09:57


 


 


 Sodium Chloride  1,000 ml @ 


 75 mls/hr  [id number]


 IV


   5/12/20 13:15


 6/11/20 09:29  5/13/20 02:43


 











Assessment/Plan


Assessment/Plan


IMPRESSION:


1. Cardiomyopathy.


2. Hypotension.


3. Left lung pneumonia.


4. Nursing home resident.


5. Positive COVID-19.





DISCUSSION:


1. Continue isolation


2. Continue current medications.


3. Off pressors.


4. Continue BiPAP; abg adequate


5. Pulmonary hygiene.


6. Broad-spectrum antibiotics.


7. I will follow.


8. Pulmonary hygiene


9. Continue diuresis, on Lasix 40 mg IV BID; 





CXR unchanged


Would like to empirical anticoagulate however will hold off in view of sudden 

anemia requiring prbc




















  ______________________________________________


  LESIA Dempsey Omar Syed MD May 13, 2020 11:02

## 2020-05-13 NOTE — INFECTIOUS DISEASES PROG NOTE
Assessment/Plan


Assessment/Plan





Assessment:


Septic shock-recurrent- back on pressors





Fever;SP


Mild leukocytosis, fluctuating; increased


   -5/3 u/a wbc 15-20, nit neg, leuk +1


          Bcx Neg


   -u/a neg


   -Bcx Neg





Probable PNA- 2ry to COVID19 (from [facility] with confirmed cases)


Acute hypoxic respiratory failure- was on NC, now on NRB- increasing FIo2 

requiremetns


    -5/11 CXR:  Minimally improved left, unchanged right infiltrates, since 

prior exam of 3 days earlier


    -5/8 CXR: Unchanged bilateral infiltrates, likely pneumonia, since prior 

exam of 3 days earlier.


    -5/5 CXR:  Bilateral interstitial and airspace infiltrates are again 

demonstrated.Appearance is overall unchanged. There may be a small left pleural 

effusion,unchange


   -5/4 CRP 41.6, ferritin 1468


  -5/3 CXR: .  Worsening bilateral interstitial and airspace opacities. 

Probable small left pleural effusion.  Difficult to evaluate the right 

costophrenic angle due to overlying pacemaker.


  -4/30 CXR: Slightly improved bilateral interstitial and airspace infiltrates 

versus edema


  -4/28 CXR: Bilateral mid and lower lung interstitial disease, new since prior 

study.Possible developing left pleural effusion


        Ferritn >2000, CRP 22.3


  -4/24 SARS-COV2 PCR +


  -CXR: Left basilar atelectasis and/or infiltrate


 


MELVI, worsening





 HTN


cadiomyopathy s/p AICD


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


seizure disorder


[facility] resident ([facility]) 








Plan:


-empiric IV Meropenem given recurrent leukocytosis and pressors requirements


   -5/10 SP IV Vancomycin #5


   -5/7 SP Zosyn #5


   - SPSolumedrol 40mg IV 12hrs  (5/4-5/6); dc'ed by pulmonologist - (day 10 of 

illness and worsening; around this time is seen the worsening of COVID19 pts 

mainly driven by inflammatory response) 


   -4/29 SP Cefepime #6


   -4/27 SP IV Vancomycin #4  





-f/u cx


-Monitor CBC/CMP, temperatures


-COVID 19 precautions


-clarify goals of care


-aspiration precautions


-inflammatory markers


-f/u cx


-monitor CXR; cxr am


-f/u u/a w/ reflex, Bcx x2, sp cx





Thank you for consulting [facility]. Will continue to follow along with 

you.





Discussed with RN,





Subjective


Allergies:  


Coded Allergies:  


     No Known Allergies (Unverified , 12/15/14)


Subjective


afebrile 


trasnferred to ICU, now on Bipap at 100% FIo2


on Levophed at 4


leukocytosis recurrent


Hgb dropped to 6.4 yesterday





Objective


Vital Signs





Last 24 Hour Vital Signs








  Date Time  Temp Pulse Resp B/P (MAP) Pulse Ox O2 Delivery O2 Flow Rate FiO2


 


5/13/20 11:26  76 24  100   100


 


5/13/20 11:00    114/60    


 


5/13/20 10:00    109/63    


 


5/13/20 09:30  80 22 111/63 (79) 100   


 


5/13/20 09:00  79 23 110/67 (81) 100   


 


5/13/20 09:00    114/63    


 


5/13/20 09:00  78  103/58    


 


5/13/20 08:30  79 24 106/57 (73) 100   


 


5/13/20 08:23     99 Bi-Pap  


 


5/13/20 08:00        100


 


5/13/20 08:00      Bi-pap  


 


5/13/20 08:00    102/65    


 


5/13/20 08:00 97.8 87 26 107/61 (76) 100   


 


5/13/20 07:38  78 25  100   100


 


5/13/20 07:30  81 22 105/60 (75) 100   


 


5/13/20 07:00  82 22 103/58 (73) 100   


 


5/13/20 07:00    99/61    


 


5/13/20 06:34  86 25 107/59 (75) 100   


 


5/13/20 06:14    107/58    


 


5/13/20 06:00  87 24 105/59 (74) 100   


 


5/13/20 05:30  82 26 101/53 (69) 98   


 


5/13/20 05:14    99/49    


 


5/13/20 05:00  81 21 99/49 (66) 96   


 


5/13/20 04:30  82 23 96/53 (67) 97   


 


5/13/20 04:14    100/55    


 


5/13/20 04:00        100


 


5/13/20 04:00 98.2 83 23 100/55 (70) 100   


 


5/13/20 04:00      Bi-pap  


 


5/13/20 04:00  76      


 


5/13/20 03:48  84 27  99   100


 


5/13/20 03:30  88 28 97/55 (69) 98   


 


5/13/20 03:14    96/61    


 


5/13/20 03:00  88 24 96/61 (73) 100   


 


5/13/20 02:30  88 23 99/61 (74) 100   


 


5/13/20 02:15  85 24 95/59 (71) 100   


 


5/13/20 02:14    95/59    


 


5/13/20 02:00  88 21 100/62 (75) 100   


 


5/13/20 01:45  86 26 91/56 (68) 100   


 


5/13/20 01:30  87 23 101/57 (72) 100   


 


5/13/20 01:15  90 23 106/67 (80) 100   


 


5/13/20 01:14    101/57    


 


5/13/20 01:00  87 22 101/61 (74) 100   


 


5/13/20 00:45  86 21 97/61 (73) 100   


 


5/13/20 00:30  92 22 107/63 (78) 100   


 


5/13/20 00:15  87 25 89/54 (66) 100   


 


5/13/20 00:14    87/55    


 


5/13/20 00:00        100


 


5/13/20 00:00      Bi-pap  


 


5/13/20 00:00  92      


 


5/13/20 00:00 98.7 87 25 91/55 (67) 100   


 


5/12/20 23:45  87 23 92/57 (69) 100   


 


5/12/20 23:36  93 25  99   100


 


5/12/20 23:30  90 24 98/60 (73) 100   


 


5/12/20 23:15  92 24 97/60 (72) 100   


 


5/12/20 23:14    89/53    


 


5/12/20 23:00  89 24 89/53 (65) 100   


 


5/12/20 22:30  95 23 93/56 (68) 100   


 


5/12/20 22:00  91 22 99/55 (70) 100   


 


5/12/20 21:30  91 21 97/62 (74) 100   


 


5/12/20 21:00  91 20 100/63 (75) 100   


 


5/12/20 20:59  91  86/50    


 


5/12/20 20:30  87 23 86/51 (63) 100   


 


5/12/20 20:18  90 22 89/53 (65) 100   


 


5/12/20 20:00      Bi-pap  


 


5/12/20 20:00 98.5 88 21 95/57 (70)    


 


5/12/20 20:00  91      


 


5/12/20 19:45  89 18 100/61 (74)    


 


5/12/20 19:34  93 20  99   100


 


5/12/20 19:34     99 Bi-Pap  100


 


5/12/20 19:30  89 21 99/61 (74) 97   


 


5/12/20 19:00        100


 


5/12/20 19:00  93 22 97/60 (72) 100   


 


5/12/20 18:00  90 23 90/61 (71) 100   


 


5/12/20 17:30  94 23 99/61 (74) 100   


 


5/12/20 17:00  92 22 90/58 (69) 100   


 


5/12/20 16:00      Bi-pap  


 


5/12/20 16:00 98.6 88 24 92/62 (72) 100   


 


5/12/20 15:15  98 27 118/72 (87) 100   


 


5/12/20 15:12  94 27  100   100


 


5/12/20 15:00  94 25 91/58 (69) 100   


 


5/12/20 14:45  93 24 103/51 (68) 100   


 


5/12/20 14:30  96 29 95/59 (71) 100   


 


5/12/20 14:00  96 29 94/64 (74) 100   


 


5/12/20 13:30  97 30 103/54 (70) 100   


 


5/12/20 13:00  103 30 104/63 (77) 100   








Height (Feet):  5


Height (Inches):  8.00


Weight (Pounds):  173


Objective


 not examined to limit COVID19 exposure





Laboratory Tests








Test


  5/12/20


13:10 5/13/20


04:00 5/13/20


08:30 5/13/20


10:29


 


Arterial Blood pH


  7.298


(7.350-7.450) 


  


  7.304


(7.350-7.450)


 


Arterial Blood Partial


Pressure CO2 64.7 mmHg


(35.0-45.0)  *H 


  


  68.8 mmHg


(35.0-45.0)  *H


 


Arterial Blood Partial


Pressure O2 124.8 mmHg


(75.0-100.0)  H 


  


  100.0 mmHg


(75.0-100.0)


 


Arterial Blood HCO3


  31.0 mmol/L


(22.0-26.0)  H 


  


  33.4 mmol/L


(22.0-26.0)  H


 


Arterial Blood Oxygen


Saturation 97.1 %


() 


  


  96.6 %


()


 


Arterial Blood Base Excess 3.4 (-2-2)  H   5.7 (-2-2)  H


 


Wong Test Positive     Positive  


 


White Blood Count


  


  9.5 K/UL


(4.8-10.8) 13.8 K/UL


(4.8-10.8)  H 


 


 


Red Blood Count


  


  2.27 M/UL


(4.70-6.10)  L 3.55 M/UL


(4.70-6.10)  L 


 


 


Hemoglobin


  


  6.4 G/DL


(14.2-18.0) 9.8 G/DL


(14.2-18.0)  #L 


 


 


Hematocrit


  


  20.2 %


(42.0-52.0)  #L 31.2 %


(42.0-52.0)  #L 


 


 


Mean Corpuscular Volume  89 FL (80-99)   88 FL (80-99)   


 


Mean Corpuscular Hemoglobin


  


  28.3 PG


(27.0-31.0) 27.5 PG


(27.0-31.0) 


 


 


Mean Corpuscular Hemoglobin


Concent 


  31.8 G/DL


(32.0-36.0)  L 31.3 G/DL


(32.0-36.0)  L 


 


 


Red Cell Distribution Width


  


  13.0 %


(11.6-14.8) 12.5 %


(11.6-14.8) 


 


 


Platelet Count


  


  99 K/UL


(150-450)  L 178 K/UL


(150-450)  # 


 


 


Mean Platelet Volume


  


  7.5 FL


(6.5-10.1) 7.3 FL


(6.5-10.1) 


 


 


Neutrophils (%) (Auto)


  


  % (45.0-75.0)


  % (45.0-75.0)


  


 


 


Lymphocytes (%) (Auto)


  


  % (20.0-45.0)


  % (20.0-45.0)


  


 


 


Monocytes (%) (Auto)   % (1.0-10.0)    % (1.0-10.0)   


 


Eosinophils (%) (Auto)   % (0.0-3.0)    % (0.0-3.0)   


 


Basophils (%) (Auto)   % (0.0-2.0)    % (0.0-2.0)   


 


Differential Total Cells


Counted 


  100  


  100  


  


 


 


Neutrophils % (Manual)  91 % (45-75)  H 83 % (45-75)  H 


 


Lymphocytes % (Manual)  2 % (20-45)  L 10 % (20-45)  L 


 


Monocytes % (Manual)  2 % (1-10)   6 % (1-10)   


 


Eosinophils % (Manual)  3 % (0-3)   1 % (0-3)   


 


Basophils % (Manual)  2 % (0-2)   0 % (0-2)   


 


Band Neutrophils  0 % (0-8)   0 % (0-8)   


 


Platelet Estimate  Decreased  L Adequate   


 


Platelet Morphology  Normal   Normal   


 


Hypochromasia  4+   2+   


 


Anisocytosis  1+   1+   


 


Sodium Level


  


  143 MMOL/L


(136-145) 


  


 


 


Potassium Level


  


  3.2 MMOL/L


(3.5-5.1)  L 


  


 


 


Chloride Level


  


  108 MMOL/L


()  H 


  


 


 


Carbon Dioxide Level


  


  25 MMOL/L


(21-32) 


  


 


 


Anion Gap


  


  10 mmol/L


(5-15) 


  


 


 


Blood Urea Nitrogen


  


  59 mg/dL


(7-18)  H 


  


 


 


Creatinine


  


  1.9 MG/DL


(0.55-1.30)  H 


  


 


 


Estimat Glomerular Filtration


Rate 


  42.5 mL/min


(>60) 


  


 


 


Glucose Level


  


  347 MG/DL


()  H 


  


 


 


Calcium Level


  


  6.2 MG/DL


(8.5-10.1)  #L 


  


 


 


Ferritin


  


  1007 NG/ML


(8-388)  H 


  


 


 


Lactate Dehydrogenase


  


  390 U/L


()  H 


  


 


 


C-Reactive Protein,


Quantitative 


  21.6 mg/dL


(0.00-0.90)  H 


  


 


 


Fibrinogen


  


  


  249 mg/dL


(200-400) 


 


 


D-Dimer


  


  


  > 35.20 mg/L


FEU 


 


 


Phosphorus Level


  


  


  4.7 MG/DL


(2.5-4.9) 


 


 


Magnesium Level


  


  


  3.0 MG/DL


(1.8-2.4)  H 


 


 


Total Bilirubin


  


  


  0.6 MG/DL


(0.2-1.0) 


 


 


Direct Bilirubin


  


  


  0.3 MG/DL


(0.0-0.3) 


 


 


Aspartate Amino Transf


(AST/SGOT) 


  


  20 U/L (15-37)


  


 


 


Alanine Aminotransferase


(ALT/SGPT) 


  


  24 U/L (12-78)


  


 


 


Alkaline Phosphatase


  


  


  90 U/L


() 


 


 


Total Protein


  


  


  7.2 G/DL


(6.4-8.2) 


 


 


Albumin


  


  


  2.5 G/DL


(3.4-5.0)  L 


 











Current Medications








 Medications


  (Trade)  Dose


 Ordered  Sig/Aarti


 Route


 PRN Reason  Start Time


 Stop Time Status Last Admin


Dose Admin


 


 Acetaminophen


  (Tylenol)  650 mg  Q6H  PRN


 ORAL


 Temp >100.5  5/12/20 13:15


 5/26/20 13:14   


 


 


 Acetaminophen


  (Tylenol)  650 mg  Q6H  PRN


 RECTAL


 Temp >100.5  5/12/20 13:15


 6/2/20 13:14   


 


 


 Carvedilol


  (Coreg)  3.125 mg  EVERY 12  HOURS


 ORAL


   5/12/20 21:00


 5/29/20 20:59   


 


 


 Chlorhexidine


 Gluconate


  (Gloria-Hex 2%)  1 applic  DAILY@2000


 TOPIC


   5/13/20 20:00


 8/11/20 19:59   


 


 


 Dextrose


  (Dextrose 50%)  25 ml  Q30M  PRN


 IV


 Hypoglycemia  5/12/20 13:45


 8/10/20 13:44   


 


 


 Dextrose


  (Dextrose 50%)  50 ml  Q30M  PRN


 IV


 Hypoglycemia  5/12/20 13:45


 8/10/20 13:44   


 


 


 Heparin Sodium/


 Sodium Chloride


  (Heparin 1000


 units/500ml


 Premix)  1,000 unit  ONCE


 IV


   5/13/20 09:15


 5/13/20 18:00   


 


 


 Insulin Aspart


  (NovoLOG)    Q6HR


 SUBQ


   5/12/20 18:00


 8/10/20 17:59  5/13/20 11:20


 


 


 Levetiracetam


  (Keppra)  1,000 mg  Q12HR


 ORAL


   5/12/20 21:00


 5/25/20 00:00  5/13/20 08:54


 


 


 Lidocaine HCl


  (Xylocaine 1%


 30ml)  30 ml  ONCE


 INJ


   5/13/20 09:15


 5/13/20 18:00   


 


 


 Norepinephrine


 Bitartrate 4 mg/


 Dextrose  250 ml @ 0


 mls/hr  Q24H  PRN


 IV


 For hypotension  5/12/20 19:00


 6/11/20 18:59  5/12/20 23:14


 


 


 Sodium Chloride  1,000 ml @ 


 75 mls/hr  [id number]


 IV


   5/12/20 13:15


 6/11/20 09:29  5/13/20 02:43


 

















Naila Lindsay M.D. May 13, 2020 12:42

## 2020-05-14 VITALS — DIASTOLIC BLOOD PRESSURE: 63 MMHG | SYSTOLIC BLOOD PRESSURE: 107 MMHG

## 2020-05-14 VITALS — SYSTOLIC BLOOD PRESSURE: 125 MMHG | DIASTOLIC BLOOD PRESSURE: 76 MMHG

## 2020-05-14 VITALS — SYSTOLIC BLOOD PRESSURE: 126 MMHG | DIASTOLIC BLOOD PRESSURE: 67 MMHG

## 2020-05-14 VITALS — DIASTOLIC BLOOD PRESSURE: 73 MMHG | SYSTOLIC BLOOD PRESSURE: 136 MMHG

## 2020-05-14 VITALS — DIASTOLIC BLOOD PRESSURE: 54 MMHG | SYSTOLIC BLOOD PRESSURE: 93 MMHG

## 2020-05-14 VITALS — DIASTOLIC BLOOD PRESSURE: 67 MMHG | SYSTOLIC BLOOD PRESSURE: 112 MMHG

## 2020-05-14 VITALS — DIASTOLIC BLOOD PRESSURE: 81 MMHG | SYSTOLIC BLOOD PRESSURE: 138 MMHG

## 2020-05-14 VITALS — DIASTOLIC BLOOD PRESSURE: 66 MMHG | SYSTOLIC BLOOD PRESSURE: 105 MMHG

## 2020-05-14 VITALS — SYSTOLIC BLOOD PRESSURE: 115 MMHG | DIASTOLIC BLOOD PRESSURE: 69 MMHG

## 2020-05-14 VITALS — DIASTOLIC BLOOD PRESSURE: 75 MMHG | SYSTOLIC BLOOD PRESSURE: 136 MMHG

## 2020-05-14 VITALS — SYSTOLIC BLOOD PRESSURE: 111 MMHG | DIASTOLIC BLOOD PRESSURE: 73 MMHG

## 2020-05-14 VITALS — DIASTOLIC BLOOD PRESSURE: 68 MMHG | SYSTOLIC BLOOD PRESSURE: 117 MMHG

## 2020-05-14 VITALS — DIASTOLIC BLOOD PRESSURE: 61 MMHG | SYSTOLIC BLOOD PRESSURE: 107 MMHG

## 2020-05-14 VITALS — SYSTOLIC BLOOD PRESSURE: 103 MMHG | DIASTOLIC BLOOD PRESSURE: 64 MMHG

## 2020-05-14 VITALS — DIASTOLIC BLOOD PRESSURE: 51 MMHG | SYSTOLIC BLOOD PRESSURE: 103 MMHG

## 2020-05-14 VITALS — DIASTOLIC BLOOD PRESSURE: 51 MMHG | SYSTOLIC BLOOD PRESSURE: 92 MMHG

## 2020-05-14 VITALS — DIASTOLIC BLOOD PRESSURE: 70 MMHG | SYSTOLIC BLOOD PRESSURE: 121 MMHG

## 2020-05-14 VITALS — DIASTOLIC BLOOD PRESSURE: 77 MMHG | SYSTOLIC BLOOD PRESSURE: 140 MMHG

## 2020-05-14 VITALS — SYSTOLIC BLOOD PRESSURE: 79 MMHG | DIASTOLIC BLOOD PRESSURE: 52 MMHG

## 2020-05-14 VITALS — SYSTOLIC BLOOD PRESSURE: 118 MMHG | DIASTOLIC BLOOD PRESSURE: 69 MMHG

## 2020-05-14 VITALS — DIASTOLIC BLOOD PRESSURE: 49 MMHG | SYSTOLIC BLOOD PRESSURE: 112 MMHG

## 2020-05-14 VITALS — SYSTOLIC BLOOD PRESSURE: 98 MMHG | DIASTOLIC BLOOD PRESSURE: 60 MMHG

## 2020-05-14 VITALS — SYSTOLIC BLOOD PRESSURE: 118 MMHG | DIASTOLIC BLOOD PRESSURE: 66 MMHG

## 2020-05-14 VITALS — DIASTOLIC BLOOD PRESSURE: 66 MMHG | SYSTOLIC BLOOD PRESSURE: 108 MMHG

## 2020-05-14 VITALS — SYSTOLIC BLOOD PRESSURE: 115 MMHG | DIASTOLIC BLOOD PRESSURE: 61 MMHG

## 2020-05-14 VITALS — DIASTOLIC BLOOD PRESSURE: 64 MMHG | SYSTOLIC BLOOD PRESSURE: 102 MMHG

## 2020-05-14 VITALS — SYSTOLIC BLOOD PRESSURE: 106 MMHG | DIASTOLIC BLOOD PRESSURE: 69 MMHG

## 2020-05-14 VITALS — SYSTOLIC BLOOD PRESSURE: 101 MMHG | DIASTOLIC BLOOD PRESSURE: 61 MMHG

## 2020-05-14 VITALS — DIASTOLIC BLOOD PRESSURE: 67 MMHG | SYSTOLIC BLOOD PRESSURE: 116 MMHG

## 2020-05-14 VITALS — DIASTOLIC BLOOD PRESSURE: 51 MMHG | SYSTOLIC BLOOD PRESSURE: 101 MMHG

## 2020-05-14 VITALS — SYSTOLIC BLOOD PRESSURE: 99 MMHG | DIASTOLIC BLOOD PRESSURE: 54 MMHG

## 2020-05-14 VITALS — DIASTOLIC BLOOD PRESSURE: 69 MMHG | SYSTOLIC BLOOD PRESSURE: 109 MMHG

## 2020-05-14 VITALS — SYSTOLIC BLOOD PRESSURE: 126 MMHG | DIASTOLIC BLOOD PRESSURE: 69 MMHG

## 2020-05-14 VITALS — SYSTOLIC BLOOD PRESSURE: 120 MMHG | DIASTOLIC BLOOD PRESSURE: 67 MMHG

## 2020-05-14 VITALS — SYSTOLIC BLOOD PRESSURE: 110 MMHG | DIASTOLIC BLOOD PRESSURE: 67 MMHG

## 2020-05-14 VITALS — DIASTOLIC BLOOD PRESSURE: 64 MMHG | SYSTOLIC BLOOD PRESSURE: 124 MMHG

## 2020-05-14 VITALS — SYSTOLIC BLOOD PRESSURE: 127 MMHG | DIASTOLIC BLOOD PRESSURE: 75 MMHG

## 2020-05-14 VITALS — DIASTOLIC BLOOD PRESSURE: 54 MMHG | SYSTOLIC BLOOD PRESSURE: 121 MMHG

## 2020-05-14 VITALS — DIASTOLIC BLOOD PRESSURE: 50 MMHG | SYSTOLIC BLOOD PRESSURE: 96 MMHG

## 2020-05-14 VITALS — SYSTOLIC BLOOD PRESSURE: 111 MMHG | DIASTOLIC BLOOD PRESSURE: 64 MMHG

## 2020-05-14 VITALS — SYSTOLIC BLOOD PRESSURE: 133 MMHG | DIASTOLIC BLOOD PRESSURE: 67 MMHG

## 2020-05-14 VITALS — DIASTOLIC BLOOD PRESSURE: 66 MMHG | SYSTOLIC BLOOD PRESSURE: 106 MMHG

## 2020-05-14 VITALS — SYSTOLIC BLOOD PRESSURE: 118 MMHG | DIASTOLIC BLOOD PRESSURE: 70 MMHG

## 2020-05-14 VITALS — DIASTOLIC BLOOD PRESSURE: 80 MMHG | SYSTOLIC BLOOD PRESSURE: 148 MMHG

## 2020-05-14 VITALS — DIASTOLIC BLOOD PRESSURE: 64 MMHG | SYSTOLIC BLOOD PRESSURE: 104 MMHG

## 2020-05-14 VITALS — DIASTOLIC BLOOD PRESSURE: 85 MMHG | SYSTOLIC BLOOD PRESSURE: 152 MMHG

## 2020-05-14 VITALS — DIASTOLIC BLOOD PRESSURE: 67 MMHG | SYSTOLIC BLOOD PRESSURE: 118 MMHG

## 2020-05-14 VITALS — DIASTOLIC BLOOD PRESSURE: 80 MMHG | SYSTOLIC BLOOD PRESSURE: 136 MMHG

## 2020-05-14 VITALS — DIASTOLIC BLOOD PRESSURE: 71 MMHG | SYSTOLIC BLOOD PRESSURE: 113 MMHG

## 2020-05-14 VITALS — DIASTOLIC BLOOD PRESSURE: 69 MMHG | SYSTOLIC BLOOD PRESSURE: 117 MMHG

## 2020-05-14 VITALS — DIASTOLIC BLOOD PRESSURE: 72 MMHG | SYSTOLIC BLOOD PRESSURE: 122 MMHG

## 2020-05-14 VITALS — SYSTOLIC BLOOD PRESSURE: 114 MMHG | DIASTOLIC BLOOD PRESSURE: 70 MMHG

## 2020-05-14 VITALS — SYSTOLIC BLOOD PRESSURE: 116 MMHG | DIASTOLIC BLOOD PRESSURE: 71 MMHG

## 2020-05-14 VITALS — DIASTOLIC BLOOD PRESSURE: 69 MMHG | SYSTOLIC BLOOD PRESSURE: 115 MMHG

## 2020-05-14 LAB
ADD MANUAL DIFF: YES
ALBUMIN SERPL-MCNC: 2.2 G/DL (ref 3.4–5)
ALBUMIN/GLOB SERPL: 0.5 {RATIO} (ref 1–2.7)
ALP SERPL-CCNC: 88 U/L (ref 46–116)
ALT SERPL-CCNC: 22 U/L (ref 12–78)
ANION GAP SERPL CALC-SCNC: 8 MMOL/L (ref 5–15)
APPEARANCE UR: (no result)
APTT PPP: YELLOW S
AST SERPL-CCNC: 26 U/L (ref 15–37)
BILIRUB SERPL-MCNC: 0.7 MG/DL (ref 0.2–1)
BUN SERPL-MCNC: 67 MG/DL (ref 7–18)
CALCIUM SERPL-MCNC: 9.1 MG/DL (ref 8.5–10.1)
CHLORIDE SERPL-SCNC: 108 MMOL/L (ref 98–107)
CO2 SERPL-SCNC: 30 MMOL/L (ref 21–32)
CREAT SERPL-MCNC: 2.1 MG/DL (ref 0.55–1.3)
ERYTHROCYTE [DISTWIDTH] IN BLOOD BY AUTOMATED COUNT: 12.2 % (ref 11.6–14.8)
GLOBULIN SER-MCNC: 4.8 G/DL
GLUCOSE UR STRIP-MCNC: NEGATIVE MG/DL
HCT VFR BLD CALC: 29.8 % (ref 42–52)
HGB BLD-MCNC: 9.5 G/DL (ref 14.2–18)
KETONES UR QL STRIP: NEGATIVE
LEUKOCYTE ESTERASE UR QL STRIP: (no result)
MCV RBC AUTO: 88 FL (ref 80–99)
NITRITE UR QL STRIP: NEGATIVE
PH UR STRIP: 5 [PH] (ref 4.5–8)
PHOSPHATE SERPL-MCNC: 2.9 MG/DL (ref 2.5–4.9)
PLATELET # BLD: 181 K/UL (ref 150–450)
POTASSIUM SERPL-SCNC: 5.2 MMOL/L (ref 3.5–5.1)
PROT UR QL STRIP: (no result)
RBC # BLD AUTO: 3.39 M/UL (ref 4.7–6.1)
SODIUM SERPL-SCNC: 146 MMOL/L (ref 136–145)
SP GR UR STRIP: 1.01 (ref 1–1.03)
UROBILINOGEN UR-MCNC: 4 MG/DL (ref 0–1)
WBC # BLD AUTO: 13.9 K/UL (ref 4.8–10.8)

## 2020-05-14 RX ADMIN — INSULIN ASPART SCH UNITS: 100 INJECTION, SOLUTION INTRAVENOUS; SUBCUTANEOUS at 05:36

## 2020-05-14 RX ADMIN — CHLORHEXIDINE GLUCONATE SCH APPLIC: 213 SOLUTION TOPICAL at 20:40

## 2020-05-14 RX ADMIN — MEROPENEM SCH MLS/HR: 1 INJECTION INTRAVENOUS at 08:29

## 2020-05-14 RX ADMIN — ACETAMINOPHEN PRN MG: 160 SOLUTION ORAL at 18:23

## 2020-05-14 RX ADMIN — INSULIN ASPART SCH UNITS: 100 INJECTION, SOLUTION INTRAVENOUS; SUBCUTANEOUS at 13:08

## 2020-05-14 RX ADMIN — HEPARIN SODIUM SCH MLS/HR: 5000 INJECTION, SOLUTION INTRAVENOUS at 21:41

## 2020-05-14 RX ADMIN — INSULIN ASPART SCH UNITS: 100 INJECTION, SOLUTION INTRAVENOUS; SUBCUTANEOUS at 17:20

## 2020-05-14 RX ADMIN — MEROPENEM SCH MLS/HR: 1 INJECTION INTRAVENOUS at 20:40

## 2020-05-14 RX ADMIN — LEVETIRACETAM SCH MG: 100 SOLUTION ORAL at 08:30

## 2020-05-14 RX ADMIN — LEVETIRACETAM SCH MG: 100 SOLUTION ORAL at 20:40

## 2020-05-14 RX ADMIN — INSULIN ASPART SCH UNITS: 100 INJECTION, SOLUTION INTRAVENOUS; SUBCUTANEOUS at 23:32

## 2020-05-14 NOTE — NUR
NURSE NOTES:

No changes in patient condition. He continues to be responsive to stimuli. Remains running 
Levophed @ 4mcgs/min.

## 2020-05-14 NOTE — NUR
NURSE NOTES:

Received report from DIMA Miller. Patient asleep and responsive to verbal. On Bipap 15/5 with 
FiO2 100%. NPO status. Right NGT intact and clamped. Right femoral TLC intact and running 
with 1/2NS @75ml/hr and Levophed 2mcg/min. Left soft wrist band restraints on. Left hand is 
warm to touch. Kept dry, clean, comfortable and HOB>30. Will continue plan of care.

## 2020-05-14 NOTE — CARDIAC ELECTROPHYSIOLOGY PN
Assessment/Plan


Assessment/Plan


1. Septic Shock  due to  EF 40% and sepsis.  BNP is 736.  


    On Abx by Dr. Lindsay. Off Levophed today


    


2. CHF EF 40%.   On Coreg 3.125 bid.  





3. Status post right-sided TONY ICD.    





4. Hypertension  


5. History of CVA with residual weakness.


6. Respiratory failure due to COVID-19 PNA,   on BIPAP


Started on heparin drip


7. Hypernatremia.   


8. Acute renal failure. Cr up from 0.9 to 2.8.  Off Lasix and Lisinopril





ODILIA RN and Dr. Kendall





Subjective


Subjective


In Covid isolation in ICU Off levo 4 since 9 am. On BIPAP 15/5. Started on 

heparin drip





Objective





Last 24 Hour Vital Signs








  Date Time  Temp Pulse Resp B/P (MAP) Pulse Ox O2 Delivery O2 Flow Rate FiO2


 


5/14/20 14:00  118 44 136/80 (98) 97   


 


5/14/20 13:30  117 44 136/75 (95) 97   


 


5/14/20 13:00  116 45 127/75 (92) 97   


 


5/14/20 12:30  116 45 138/81 (100) 97   


 


5/14/20 12:00  118 45 152/85 (107) 98   


 


5/14/20 12:00  112      


 


5/14/20 11:30  120 42 148/80 (102) 89   


 


5/14/20 11:00  106 36 140/77 (98) 97   


 


5/14/20 10:30  99 33 136/73 (94) 98   


 


5/14/20 10:00  94 30 116/71 (86) 98   


 


5/14/20 09:30  88 32 126/69 (88) 100   


 


5/14/20 09:00  81  116/67    


 


5/14/20 09:00  90 37 121/54 (76) 84   


 


5/14/20 08:30  84 37 115/61 (79) 97   


 


5/14/20 08:00        100


 


5/14/20 08:00 98.2 82 30 117/68 (84) 98   


 


5/14/20 08:00  81      


 


5/14/20 08:00      Bi-pap  


 


5/14/20 07:30  79 24 111/73 (86) 99   


 


5/14/20 07:00    116/67    


 


5/14/20 07:00  81 28 116/67 (83) 99   


 


5/14/20 06:45  82 30 118/70 (86) 99   


 


5/14/20 06:30  84 35 118/66 (83) 97   


 


5/14/20 06:15  84 33 117/69 (85) 98   


 


5/14/20 06:00  84 31 106/69 (81) 98   


 


5/14/20 06:00    106/56    


 


5/14/20 05:45  86 36 115/69 (84) 98   


 


5/14/20 05:30  84 32 118/67 (84) 97   


 


5/14/20 05:15  85 35 122/72 (89) 100   


 


5/14/20 05:00    122/72    


 


5/14/20 05:00  83 32 126/67 (86) 98   


 


5/14/20 04:45  80 30 121/70 (87) 99   


 


5/14/20 04:30  81 34 120/67 (84) 98   


 


5/14/20 04:15  84 40 133/67 (89) 86   


 


5/14/20 04:00    133/67    


 


5/14/20 04:00      Bi-pap  


 


5/14/20 04:00        100


 


5/14/20 04:00 98.7 79 30 113/71 (85) 94   


 


5/14/20 03:45  79 35 112/67 (82) 94   


 


5/14/20 03:35  78      


 


5/14/20 03:30  81 36 112/67 (82) 95   


 


5/14/20 03:16  100 22  82   60


 


5/14/20 03:15  82 35 115/69 (84) 98   


 


5/14/20 03:00  83 28 114/70 (85) 100   


 


5/14/20 03:00    115/69    


 


5/14/20 02:45  81 26 124/64 (84) 100   


 


5/14/20 02:30  82 27 110/67 (81) 100   


 


5/14/20 02:15  82 27 109/69 (82) 100   


 


5/14/20 02:00    109/69    


 


5/14/20 02:00  82 27 110/67 (81) 100   


 


5/14/20 01:45  83 28 103/64 (77) 100   


 


5/14/20 01:30  81 28 105/66 (79) 100   


 


5/14/20 01:15  85 28 107/63 (78) 100   


 


5/14/20 01:00  79 23 108/66 (80) 100   


 


5/14/20 01:00    107/63    


 


5/14/20 00:45  77 24 108/66 (80) 100   


 


5/14/20 00:30  84 26 104/64 (77) 100   


 


5/14/20 00:15  80 24 102/64 (77) 100   


 


5/14/20 00:00      Bi-pap  


 


5/14/20 00:00 99.8 81 24 107/61 (76) 100   


 


5/14/20 00:00    102/64    


 


5/13/20 23:51  100 22  79   100


 


5/13/20 23:45  85 30 95/64 (74) 99   


 


5/13/20 23:30  79 24 102/63 (76) 100   


 


5/13/20 23:23  82      


 


5/13/20 23:15  84 27 94/64 (74) 99   


 


5/13/20 23:00  82 23 99/60 (73) 100   


 


5/13/20 23:00    94/64    


 


5/13/20 22:45  83 25 93/61 (72) 100   


 


5/13/20 22:30  83 25 93/58 (70) 100   


 


5/13/20 22:15  85 27 101/59 (73) 100   


 


5/13/20 22:00    101/59    


 


5/13/20 22:00  84 27 94/65 (75) 100   


 


5/13/20 21:45  79 26 96/60 (72) 100   


 


5/13/20 21:30  86 29 102/63 (76) 100   


 


5/13/20 21:15  87 27 98/58 (71) 100   


 


5/13/20 21:00    95/66    


 


5/13/20 20:53 100.1       


 


5/13/20 20:30  89 28 107/64 (78) 100   


 


5/13/20 20:15  91 29 102/61 (75) 100   


 


5/13/20 20:11  90  105/64    


 


5/13/20 20:00 100.5 91 29 105/64 (78) 100   


 


5/13/20 20:00        100


 


5/13/20 20:00    105/59    


 


5/13/20 20:00      Bi-pap  


 


5/13/20 19:54     100 Bi-Pap  100


 


5/13/20 19:54  92 33  100   100


 


5/13/20 19:45  91 30 123/64 (83) 100   


 


5/13/20 19:39  92      


 


5/13/20 19:30  92 31 108/62 (77) 100   


 


5/13/20 19:15  91 32 112/63 (79) 100   


 


5/13/20 19:00    112/63    


 


5/13/20 19:00  90 33 108/65 (79) 100   


 


5/13/20 18:30  92 28 114/65 (81)    


 


5/13/20 18:00    111/67    


 


5/13/20 18:00  86 22 113/67 (82)    


 


5/13/20 17:30  87 27 119/64 (82)    


 


5/13/20 17:00  85 32 133/85 (101) 94   


 


5/13/20 17:00    133/85    


 


5/13/20 16:30  82 27 99/64 (76) 95   


 


5/13/20 16:02    107/60    


 


5/13/20 16:00      Bi-pap  


 


5/13/20 16:00    108/59    


 


5/13/20 16:00  82      


 


5/13/20 16:00 97.6 84 24 108/59 (75) 100   


 


5/13/20 16:00        100


 


5/13/20 15:30  82 26 109/57 (74) 100   


 


5/13/20 15:24  82 24  100   100


 


5/13/20 15:00  79 23 115/59 (77) 99   


 


5/13/20 15:00    115/59    


 


5/13/20 14:30  76 23 115/59 (77) 100   

















Intake and Output  


 


 5/13/20 5/14/20





 19:00 07:00


 


Intake Total 1285 ml 1092.50 ml


 


Balance 1285 ml 1092.50 ml


 


  


 


IV Total 1285 ml 1032.50 ml


 


Other  60 ml


 


# Voids [phone]











Laboratory Tests








Test


  5/14/20


03:00 5/14/20


05:17 5/14/20


10:18 5/14/20


11:34


 


Urine Color Yellow     


 


Urine Appearance Cloudy     


 


Urine pH 5 (4.5-8.0)     


 


Urine Specific Gravity


  1.015


(1.005-1.035) 


  


  


 


 


Urine Protein


  2+ (NEGATIVE)


H 


  


  


 


 


Urine Glucose (UA)


  Negative


(NEGATIVE) 


  


  


 


 


Urine Ketones


  Negative


(NEGATIVE) 


  


  


 


 


Urine Blood


  3+ (NEGATIVE)


H 


  


  


 


 


Urine Nitrite


  Negative


(NEGATIVE) 


  


  


 


 


Urine Bilirubin


  Negative


(NEGATIVE) 


  


  


 


 


Urine Urobilinogen


  4 MG/DL


(0.0-1.0)  H 


  


  


 


 


Urine Leukocyte Esterase


  2+ (NEGATIVE)


H 


  


  


 


 


Urine RBC


  5-10 /HPF (0 -


0)  H 


  


  


 


 


Urine WBC


  5-10 /HPF (0 -


0)  H 


  


  


 


 


Urine Squamous Epithelial


Cells Few /LPF


(NONE/OCC) 


  


  


 


 


Urine Uric Acid Crystals


  Many /LPF


(NONE)  H 


  


  


 


 


Urine Bacteria


  Many /HPF


(NONE)  H 


  


  


 


 


Urine Yeast


  Many /HPF


(NONE)  H 


  


  


 


 


White Blood Count


  


  13.9 K/UL


(4.8-10.8)  H 


  


 


 


Red Blood Count


  


  3.39 M/UL


(4.70-6.10)  L 


  


 


 


Hemoglobin


  


  9.5 G/DL


(14.2-18.0)  L 


  


 


 


Hematocrit


  


  29.8 %


(42.0-52.0)  L 


  


 


 


Mean Corpuscular Volume  88 FL (80-99)    


 


Mean Corpuscular Hemoglobin


  


  28.0 PG


(27.0-31.0) 


  


 


 


Mean Corpuscular Hemoglobin


Concent 


  31.9 G/DL


(32.0-36.0)  L 


  


 


 


Red Cell Distribution Width


  


  12.2 %


(11.6-14.8) 


  


 


 


Platelet Count


  


  181 K/UL


(150-450) 


  


 


 


Mean Platelet Volume


  


  8.5 FL


(6.5-10.1) 


  


 


 


Neutrophils (%) (Auto)


  


  % (45.0-75.0)


  


  


 


 


Lymphocytes (%) (Auto)


  


  % (20.0-45.0)


  


  


 


 


Monocytes (%) (Auto)   % (1.0-10.0)    


 


Eosinophils (%) (Auto)   % (0.0-3.0)    


 


Basophils (%) (Auto)   % (0.0-2.0)    


 


Differential Total Cells


Counted 


  100  


  


  


 


 


Neutrophils % (Manual)  84 % (45-75)  H  


 


Lymphocytes % (Manual)  6 % (20-45)  L  


 


Monocytes % (Manual)  7 % (1-10)    


 


Eosinophils % (Manual)  2 % (0-3)    


 


Basophils % (Manual)  1 % (0-2)    


 


Band Neutrophils  0 % (0-8)    


 


Platelet Estimate  Adequate    


 


Platelet Morphology  Normal    


 


Hypochromasia  2+    


 


Anisocytosis  1+    


 


Sodium Level


  


  146 MMOL/L


(136-145)  H 


  


 


 


Potassium Level


  


  5.2 MMOL/L


(3.5-5.1)  #H 


  


 


 


Chloride Level


  


  108 MMOL/L


()  H 


  


 


 


Carbon Dioxide Level


  


  30 MMOL/L


(21-32) 


  


 


 


Anion Gap


  


  8 mmol/L


(5-15) 


  


 


 


Blood Urea Nitrogen


  


  67 mg/dL


(7-18)  H 


  


 


 


Creatinine


  


  2.1 MG/DL


(0.55-1.30)  H 


  


 


 


Estimat Glomerular Filtration


Rate 


  37.9 mL/min


(>60) 


  


 


 


Glucose Level


  


  246 MG/DL


()  #H 


  


 


 


Calcium Level


  


  9.1 MG/DL


(8.5-10.1)  # 


  


 


 


Phosphorus Level


  


  2.9 MG/DL


(2.5-4.9) 


  


 


 


Magnesium Level


  


  2.7 MG/DL


(1.8-2.4)  H 


  


 


 


Total Bilirubin


  


  0.7 MG/DL


(0.2-1.0) 


  


 


 


Aspartate Amino Transf


(AST/SGOT) 


  26 U/L (15-37)


  


  


 


 


Alanine Aminotransferase


(ALT/SGPT) 


  22 U/L (12-78)


  


  


 


 


Alkaline Phosphatase


  


  88 U/L


() 


  


 


 


Troponin I


  


  0.008 ng/mL


(0.000-0.056) 


  


 


 


Total Protein


  


  7.0 G/DL


(6.4-8.2) 


  


 


 


Albumin


  


  2.2 G/DL


(3.4-5.0)  L 


  


 


 


Globulin  4.8 g/dL    


 


Albumin/Globulin Ratio


  


  0.5 (1.0-2.7)


L 


  


 


 


Arterial Blood pH


  


  


  7.407


(7.350-7.450) 


 


 


Arterial Blood Partial


Pressure CO2 


  


  51.2 mmHg


(35.0-45.0)  H 


 


 


Arterial Blood Partial


Pressure O2 


  


  57.0 mmHg


(75.0-100.0)  L 


 


 


Arterial Blood HCO3


  


  


  31.5 mmol/L


(22.0-26.0)  H 


 


 


Arterial Blood Oxygen


Saturation 


  


  88.5 %


()  *L 


 


 


Arterial Blood Base Excess   5.9 (-2-2)  H 


 


Wong Test   Positive   


 


Activated Partial


Thromboplast Time 


  


  


  38 SEC (23-33)


H








Objective


HEAD AND NECK:  Mild JVD.BIPAP is on


LUNGS:  Decreased breath sounds and rhonchi


CARDIOVASCULAR:  Regular S1 and S2 with no gallop.


ABDOMEN:  Soft.


EXTREMITIES:  1+ pitting edema.  


         Defibrillator is in right subclavian.











Tommy Otero MD May 14, 2020 14:27

## 2020-05-14 NOTE — INFECTIOUS DISEASES PROG NOTE
Assessment/Plan


Assessment/Plan





Assessment:


Septic shock-recurrent- off pressors now





Fever;SP


Mild leukocytosis, fluctuating; increased


   -5/14 uA wbc 5-10, nit neg, leuk +2; ucx p


   -5/12 Bcx p


           sp cx


   -5/3 u/a wbc 15-20, nit neg, leuk +1


          Bcx Neg


   -u/a neg


   -Bcx Neg





Probable PNA- 2ry to COVID19 (from [facility] with confirmed cases)


Acute hypoxic respiratory failure- was on NC, now on NRB- increasing FIo2 

requiremetns


    -5/11 CXR:  Minimally improved left, unchanged right infiltrates, since 

prior exam of 3 days earlier


    -5/8 CXR: Unchanged bilateral infiltrates, likely pneumonia, since prior 

exam of 3 days earlier.


    -5/5 CXR:  Bilateral interstitial and airspace infiltrates are again 

demonstrated.Appearance is overall unchanged. There may be a small left pleural 

effusion,unchange


   -5/4 CRP 41.6, ferritin 1468


  -5/3 CXR: .  Worsening bilateral interstitial and airspace opacities. 

Probable small left pleural effusion.  Difficult to evaluate the right 

costophrenic angle due to overlying pacemaker.


  -4/30 CXR: Slightly improved bilateral interstitial and airspace infiltrates 

versus edema


  -4/28 CXR: Bilateral mid and lower lung interstitial disease, new since prior 

study.Possible developing left pleural effusion


        Ferritn >2000, CRP 22.3


  -4/24 SARS-COV2 PCR +


  -CXR: Left basilar atelectasis and/or infiltrate


 


MELVI, worsening





 HTN


cadiomyopathy s/p AICD


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


seizure disorder


[facility] 








Plan:


-Cont empiric IV Meropenem #2 given recurrent leukocytosis and pressors 

requirements


-Consider steroids


   -5/10 SP IV Vancomycin #5


   -5/7 SP Zosyn #5


   - SPSolumedrol 40mg IV 12hrs  (5/4-5/6); dc'ed by pulmonologist - (day 10 of 

illness and worsening; around this time is seen the worsening of COVID19 pts 

mainly driven by inflammatory response) 


   -4/29 SP Cefepime #6


   -4/27 SP IV Vancomycin #4  





-f/u cx


-Monitor CBC/CMP, temperatures


-COVID 19 precautions


-clarify goals of care


-aspiration precautions


-inflammatory markers


-f/u cx


-monitor CXR


-f/u ucx, Bcx x2, sp cx





Thank you for consulting [facility]. Will continue to follow along with 

you.





Discussed with RN,





Subjective


Allergies:  


Coded Allergies:  


     No Known Allergies (Unverified , 12/15/14)


Subjective


Tm 100.5


remains on Bipap Fio2 100%, PaO2 57


mild leukocytosis


off pressors now





Objective


Vital Signs





Last 24 Hour Vital Signs








  Date Time  Temp Pulse Resp B/P (MAP) Pulse Ox O2 Delivery O2 Flow Rate FiO2


 


5/14/20 11:30  120 42 148/80 (102) 89   


 


5/14/20 11:00  106 36 140/77 (98) 97   


 


5/14/20 10:30  99 33 136/73 (94) 98   


 


5/14/20 10:00  94 30 116/71 (86) 98   


 


5/14/20 09:30  88 32 126/69 (88) 100   


 


5/14/20 09:00  81  116/67    


 


5/14/20 09:00  90 37 121/54 (76) 84   


 


5/14/20 08:30  84 37 115/61 (79) 97   


 


5/14/20 08:00        100


 


5/14/20 08:00 98.2 82 30 117/68 (84) 98   


 


5/14/20 08:00  81      


 


5/14/20 08:00      Bi-pap  


 


5/14/20 07:30  79 24 111/73 (86) 99   


 


5/14/20 07:00    116/67    


 


5/14/20 07:00  81 28 116/67 (83) 99   


 


5/14/20 06:45  82 30 118/70 (86) 99   


 


5/14/20 06:30  84 35 118/66 (83) 97   


 


5/14/20 06:15  84 33 117/69 (85) 98   


 


5/14/20 06:00  84 31 106/69 (81) 98   


 


5/14/20 06:00    106/56    


 


5/14/20 05:45  86 36 115/69 (84) 98   


 


5/14/20 05:30  84 32 118/67 (84) 97   


 


5/14/20 05:15  85 35 122/72 (89) 100   


 


5/14/20 05:00    122/72    


 


5/14/20 05:00  83 32 126/67 (86) 98   


 


5/14/20 04:45  80 30 121/70 (87) 99   


 


5/14/20 04:30  81 34 120/67 (84) 98   


 


5/14/20 04:15  84 40 133/67 (89) 86   


 


5/14/20 04:00    133/67    


 


5/14/20 04:00      Bi-pap  


 


5/14/20 04:00        100


 


5/14/20 04:00 98.7 79 30 113/71 (85) 94   


 


5/14/20 03:45  79 35 112/67 (82) 94   


 


5/14/20 03:35  78      


 


5/14/20 03:30  81 36 112/67 (82) 95   


 


5/14/20 03:16  100 22  82   60


 


5/14/20 03:15  82 35 115/69 (84) 98   


 


5/14/20 03:00  83 28 114/70 (85) 100   


 


5/14/20 03:00    115/69    


 


5/14/20 02:45  81 26 124/64 (84) 100   


 


5/14/20 02:30  82 27 110/67 (81) 100   


 


5/14/20 02:15  82 27 109/69 (82) 100   


 


5/14/20 02:00    109/69    


 


5/14/20 02:00  82 27 110/67 (81) 100   


 


5/14/20 01:45  83 28 103/64 (77) 100   


 


5/14/20 01:30  81 28 105/66 (79) 100   


 


5/14/20 01:15  85 28 107/63 (78) 100   


 


5/14/20 01:00  79 23 108/66 (80) 100   


 


5/14/20 01:00    107/63    


 


5/14/20 00:45  77 24 108/66 (80) 100   


 


5/14/20 00:30  84 26 104/64 (77) 100   


 


5/14/20 00:15  80 24 102/64 (77) 100   


 


5/14/20 00:00      Bi-pap  


 


5/14/20 00:00 99.8 81 24 107/61 (76) 100   


 


5/14/20 00:00    102/64    


 


5/13/20 23:51  100 22  79   100


 


5/13/20 23:45  85 30 95/64 (74) 99   


 


5/13/20 23:30  79 24 102/63 (76) 100   


 


5/13/20 23:23  82      


 


5/13/20 23:15  84 27 94/64 (74) 99   


 


5/13/20 23:00  82 23 99/60 (73) 100   


 


5/13/20 23:00    94/64    


 


5/13/20 22:45  83 25 93/61 (72) 100   


 


5/13/20 22:30  83 25 93/58 (70) 100   


 


5/13/20 22:15  85 27 101/59 (73) 100   


 


5/13/20 22:00    101/59    


 


5/13/20 22:00  84 27 94/65 (75) 100   


 


5/13/20 21:45  79 26 96/60 (72) 100   


 


5/13/20 21:30  86 29 102/63 (76) 100   


 


5/13/20 21:15  87 27 98/58 (71) 100   


 


5/13/20 21:00    95/66    


 


5/13/20 20:53 100.1       


 


5/13/20 20:30  89 28 107/64 (78) 100   


 


5/13/20 20:15  91 29 102/61 (75) 100   


 


5/13/20 20:11  90  105/64    


 


5/13/20 20:00 100.5 91 29 105/64 (78) 100   


 


5/13/20 20:00        100


 


5/13/20 20:00    105/59    


 


5/13/20 20:00      Bi-pap  


 


5/13/20 19:54     100 Bi-Pap  100


 


5/13/20 19:54  92 33  100   100


 


5/13/20 19:45  91 30 123/64 (83) 100   


 


5/13/20 19:39  92      


 


5/13/20 19:30  92 31 108/62 (77) 100   


 


5/13/20 19:15  91 32 112/63 (79) 100   


 


5/13/20 19:00    112/63    


 


5/13/20 19:00  90 33 108/65 (79) 100   


 


5/13/20 18:30  92 28 114/65 (81)    


 


5/13/20 18:00    111/67    


 


5/13/20 18:00  86 22 113/67 (82)    


 


5/13/20 17:30  87 27 119/64 (82)    


 


5/13/20 17:00  85 32 133/85 (101) 94   


 


5/13/20 17:00    133/85    


 


5/13/20 16:30  82 27 99/64 (76) 95   


 


5/13/20 16:02    107/60    


 


5/13/20 16:00      Bi-pap  


 


5/13/20 16:00    108/59    


 


5/13/20 16:00  82      


 


5/13/20 16:00 97.6 84 24 108/59 (75) 100   


 


5/13/20 16:00        100


 


5/13/20 15:30  82 26 109/57 (74) 100   


 


5/13/20 15:24  82 24  100   100


 


5/13/20 15:00  79 23 115/59 (77) 99   


 


5/13/20 15:00    115/59    


 


5/13/20 14:30  76 23 115/59 (77) 100   


 


5/13/20 14:00    123/62    


 


5/13/20 14:00  77 23 123/62 (82) 100   


 


5/13/20 13:30  77 26 112/55 (74) 100   


 


5/13/20 13:00  79 26 104/58 (73) 100   


 


5/13/20 13:00    112/55    


 


5/13/20 12:30  76 24 102/61 (75) 100   








Height (Feet):  5


Height (Inches):  8.00


Weight (Pounds):  173


Objective


 not examined to limit COVID19 exposure





Laboratory Tests








Test


  5/14/20


03:00 5/14/20


05:17 5/14/20


10:18 5/14/20


11:34


 


Urine Color Yellow     


 


Urine Appearance Cloudy     


 


Urine pH 5 (4.5-8.0)     


 


Urine Specific Gravity


  1.015


(1.005-1.035) 


  


  


 


 


Urine Protein


  2+ (NEGATIVE)


H 


  


  


 


 


Urine Glucose (UA)


  Negative


(NEGATIVE) 


  


  


 


 


Urine Ketones


  Negative


(NEGATIVE) 


  


  


 


 


Urine Blood


  3+ (NEGATIVE)


H 


  


  


 


 


Urine Nitrite


  Negative


(NEGATIVE) 


  


  


 


 


Urine Bilirubin


  Negative


(NEGATIVE) 


  


  


 


 


Urine Urobilinogen


  4 MG/DL


(0.0-1.0)  H 


  


  


 


 


Urine Leukocyte Esterase


  2+ (NEGATIVE)


H 


  


  


 


 


Urine RBC


  5-10 /HPF (0 -


0)  H 


  


  


 


 


Urine WBC


  5-10 /HPF (0 -


0)  H 


  


  


 


 


Urine Squamous Epithelial


Cells Few /LPF


(NONE/OCC) 


  


  


 


 


Urine Uric Acid Crystals


  Many /LPF


(NONE)  H 


  


  


 


 


Urine Bacteria


  Many /HPF


(NONE)  H 


  


  


 


 


Urine Yeast


  Many /HPF


(NONE)  H 


  


  


 


 


White Blood Count


  


  13.9 K/UL


(4.8-10.8)  H 


  


 


 


Red Blood Count


  


  3.39 M/UL


(4.70-6.10)  L 


  


 


 


Hemoglobin


  


  9.5 G/DL


(14.2-18.0)  L 


  


 


 


Hematocrit


  


  29.8 %


(42.0-52.0)  L 


  


 


 


Mean Corpuscular Volume  88 FL (80-99)    


 


Mean Corpuscular Hemoglobin


  


  28.0 PG


(27.0-31.0) 


  


 


 


Mean Corpuscular Hemoglobin


Concent 


  31.9 G/DL


(32.0-36.0)  L 


  


 


 


Red Cell Distribution Width


  


  12.2 %


(11.6-14.8) 


  


 


 


Platelet Count


  


  181 K/UL


(150-450) 


  


 


 


Mean Platelet Volume


  


  8.5 FL


(6.5-10.1) 


  


 


 


Neutrophils (%) (Auto)


  


  % (45.0-75.0)


  


  


 


 


Lymphocytes (%) (Auto)


  


  % (20.0-45.0)


  


  


 


 


Monocytes (%) (Auto)   % (1.0-10.0)    


 


Eosinophils (%) (Auto)   % (0.0-3.0)    


 


Basophils (%) (Auto)   % (0.0-2.0)    


 


Differential Total Cells


Counted 


  100  


  


  


 


 


Neutrophils % (Manual)  84 % (45-75)  H  


 


Lymphocytes % (Manual)  6 % (20-45)  L  


 


Monocytes % (Manual)  7 % (1-10)    


 


Eosinophils % (Manual)  2 % (0-3)    


 


Basophils % (Manual)  1 % (0-2)    


 


Band Neutrophils  0 % (0-8)    


 


Platelet Estimate  Adequate    


 


Platelet Morphology  Normal    


 


Hypochromasia  2+    


 


Anisocytosis  1+    


 


Sodium Level


  


  146 MMOL/L


(136-145)  H 


  


 


 


Potassium Level


  


  5.2 MMOL/L


(3.5-5.1)  #H 


  


 


 


Chloride Level


  


  108 MMOL/L


()  H 


  


 


 


Carbon Dioxide Level


  


  30 MMOL/L


(21-32) 


  


 


 


Anion Gap


  


  8 mmol/L


(5-15) 


  


 


 


Blood Urea Nitrogen


  


  67 mg/dL


(7-18)  H 


  


 


 


Creatinine


  


  2.1 MG/DL


(0.55-1.30)  H 


  


 


 


Estimat Glomerular Filtration


Rate 


  37.9 mL/min


(>60) 


  


 


 


Glucose Level


  


  246 MG/DL


()  #H 


  


 


 


Calcium Level


  


  9.1 MG/DL


(8.5-10.1)  # 


  


 


 


Phosphorus Level


  


  2.9 MG/DL


(2.5-4.9) 


  


 


 


Magnesium Level


  


  2.7 MG/DL


(1.8-2.4)  H 


  


 


 


Total Bilirubin


  


  0.7 MG/DL


(0.2-1.0) 


  


 


 


Aspartate Amino Transf


(AST/SGOT) 


  26 U/L (15-37)


  


  


 


 


Alanine Aminotransferase


(ALT/SGPT) 


  22 U/L (12-78)


  


  


 


 


Alkaline Phosphatase


  


  88 U/L


() 


  


 


 


Troponin I


  


  0.008 ng/mL


(0.000-0.056) 


  


 


 


Total Protein


  


  7.0 G/DL


(6.4-8.2) 


  


 


 


Albumin


  


  2.2 G/DL


(3.4-5.0)  L 


  


 


 


Globulin  4.8 g/dL    


 


Albumin/Globulin Ratio


  


  0.5 (1.0-2.7)


L 


  


 


 


Arterial Blood pH


  


  


  7.407


(7.350-7.450) 


 


 


Arterial Blood Partial


Pressure CO2 


  


  51.2 mmHg


(35.0-45.0)  H 


 


 


Arterial Blood Partial


Pressure O2 


  


  57.0 mmHg


(75.0-100.0)  L 


 


 


Arterial Blood HCO3


  


  


  31.5 mmol/L


(22.0-26.0)  H 


 


 


Arterial Blood Oxygen


Saturation 


  


  88.5 %


()  *L 


 


 


Arterial Blood Base Excess   5.9 (-2-2)  H 


 


Wong Test   Positive   


 


Activated Partial


Thromboplast Time 


  


  


  Pending  


 











Current Medications








 Medications


  (Trade)  Dose


 Ordered  Sig/Aarti


 Route


 PRN Reason  Start Time


 Stop Time Status Last Admin


Dose Admin


 


 Acetaminophen


  (Tylenol)  650 mg  Q6H  PRN


 ORAL


 Temp >100.5  5/12/20 13:15


 5/26/20 13:14  5/13/20 20:23


 


 


 Acetaminophen


  (Tylenol)  650 mg  Q6H  PRN


 RECTAL


 Temp >100.5  5/12/20 13:15


 6/2/20 13:14   


 


 


 Carvedilol


  (Coreg)  3.125 mg  EVERY 12  HOURS


 ORAL


   5/12/20 21:00


 5/29/20 20:59   


 


 


 Chlorhexidine


 Gluconate


  (Gloria-Hex 2%)  1 applic  DAILY@2000


 TOPIC


   5/13/20 20:00


 8/11/20 19:59  5/13/20 20:22


 


 


 Dextrose


  (Dextrose 50%)  25 ml  Q30M  PRN


 IV


 Hypoglycemia  5/12/20 13:45


 8/10/20 13:44   


 


 


 Dextrose


  (Dextrose 50%)  50 ml  Q30M  PRN


 IV


 Hypoglycemia  5/12/20 13:45


 8/10/20 13:44   


 


 


 Heparin Sodium


  (Porcine)


  (Heparin 5000


 units/ml)  Initial


 bolus = 80


 units...  ONCE  ONCE


 IV


   5/14/20 11:15


 5/14/20 11:16 UNV  


 


 


 Heparin Sodium/


 Dextrose  500 ml @ 


 28.25 mls/


 hr  ADJUST PER  PROTOCOL


 IV


   5/14/20 11:15


 6/13/20 11:14 UNV  


 


 


 Insulin Aspart


  (NovoLOG)    Q6HR


 SUBQ


   5/12/20 18:00


 8/10/20 17:59  5/14/20 05:36


 


 


 Levetiracetam


  (Keppra)  1,000 mg  Q12HR


 NG


   5/14/20 09:00


 6/13/20 08:59  5/14/20 08:30


 


 


 Meropenem 1 gm/


 Sodium Chloride  55 ml @ 


 110 mls/hr  Q12HR


 IVPB


   5/13/20 14:00


 5/18/20 13:59  5/14/20 08:29


 


 


 Norepinephrine


 Bitartrate 4 mg/


 Dextrose  250 ml @ 0


 mls/hr  Q24H  PRN


 IV


 For hypotension  5/12/20 19:00


 6/11/20 18:59  5/13/20 16:02


 


 


 Sodium Chloride  1,000 ml @ 


 75 mls/hr  [id number]


 IV


   5/12/20 13:15


 6/11/20 09:29  5/14/20 05:35


 

















Naila Lindsay M.D. May 14, 2020 12:14

## 2020-05-14 NOTE — PULMONOLOGY PROGRESS NOTE
Subjective


ROS Limited/Unobtainable:  No


Interval Events:  Doing poorly; now on BiPAP


Constitutional:  Reports: no symptoms


HEENT:  Repors: no symptoms


Respiratory:  Reports: dry cough, shortness of breath


Cardiovascular:  Reports: no symptoms


Gastrointestinal/Abdominal:  Reports: no symptoms


Genitourinary:  Reports: no symptoms


Neurologic:  Reports: no symptoms


Allergies:  


Coded Allergies:  


     No Known Allergies (Unverified , 12/15/14)


All Systems:  reviewed and negative except above





Objective





Last 24 Hour Vital Signs








  Date Time  Temp Pulse Resp B/P (MAP) Pulse Ox O2 Delivery O2 Flow Rate FiO2


 


5/14/20 09:00  81  116/67    


 


5/14/20 09:00  90 37 121/54 (76) 84   


 


5/14/20 08:30  84 37 115/61 (79) 97   


 


5/14/20 08:00        100


 


5/14/20 08:00 98.2 82 30 117/68 (84) 98   


 


5/14/20 08:00  81      


 


5/14/20 08:00      Bi-pap  


 


5/14/20 07:30  79 24 111/73 (86) 99   


 


5/14/20 07:00    116/67    


 


5/14/20 07:00  81 28 116/67 (83) 99   


 


5/14/20 06:45  82 30 118/70 (86) 99   


 


5/14/20 06:30  84 35 118/66 (83) 97   


 


5/14/20 06:15  84 33 117/69 (85) 98   


 


5/14/20 06:00  84 31 106/69 (81) 98   


 


5/14/20 06:00    106/56    


 


5/14/20 05:45  86 36 115/69 (84) 98   


 


5/14/20 05:30  84 32 118/67 (84) 97   


 


5/14/20 05:15  85 35 122/72 (89) 100   


 


5/14/20 05:00    122/72    


 


5/14/20 05:00  83 32 126/67 (86) 98   


 


5/14/20 04:45  80 30 121/70 (87) 99   


 


5/14/20 04:30  81 34 120/67 (84) 98   


 


5/14/20 04:15  84 40 133/67 (89) 86   


 


5/14/20 04:00    133/67    


 


5/14/20 04:00      Bi-pap  


 


5/14/20 04:00        100


 


5/14/20 04:00 98.7 79 30 113/71 (85) 94   


 


5/14/20 03:45  79 35 112/67 (82) 94   


 


5/14/20 03:35  78      


 


5/14/20 03:30  81 36 112/67 (82) 95   


 


5/14/20 03:16  100 22  82   60


 


5/14/20 03:15  82 35 115/69 (84) 98   


 


5/14/20 03:00  83 28 114/70 (85) 100   


 


5/14/20 03:00    115/69    


 


5/14/20 02:45  81 26 124/64 (84) 100   


 


5/14/20 02:30  82 27 110/67 (81) 100   


 


5/14/20 02:15  82 27 109/69 (82) 100   


 


5/14/20 02:00    109/69    


 


5/14/20 02:00  82 27 110/67 (81) 100   


 


5/14/20 01:45  83 28 103/64 (77) 100   


 


5/14/20 01:30  81 28 105/66 (79) 100   


 


5/14/20 01:15  85 28 107/63 (78) 100   


 


5/14/20 01:00  79 23 108/66 (80) 100   


 


5/14/20 01:00    107/63    


 


5/14/20 00:45  77 24 108/66 (80) 100   


 


5/14/20 00:30  84 26 104/64 (77) 100   


 


5/14/20 00:15  80 24 102/64 (77) 100   


 


5/14/20 00:00      Bi-pap  


 


5/14/20 00:00 99.8 81 24 107/61 (76) 100   


 


5/14/20 00:00    102/64    


 


5/13/20 23:51  100 22  79   100


 


5/13/20 23:45  85 30 95/64 (74) 99   


 


5/13/20 23:30  79 24 102/63 (76) 100   


 


5/13/20 23:23  82      


 


5/13/20 23:15  84 27 94/64 (74) 99   


 


5/13/20 23:00  82 23 99/60 (73) 100   


 


5/13/20 23:00    94/64    


 


5/13/20 22:45  83 25 93/61 (72) 100   


 


5/13/20 22:30  83 25 93/58 (70) 100   


 


5/13/20 22:15  85 27 101/59 (73) 100   


 


5/13/20 22:00    101/59    


 


5/13/20 22:00  84 27 94/65 (75) 100   


 


5/13/20 21:45  79 26 96/60 (72) 100   


 


5/13/20 21:30  86 29 102/63 (76) 100   


 


5/13/20 21:15  87 27 98/58 (71) 100   


 


5/13/20 21:00    95/66    


 


5/13/20 20:53 100.1       


 


5/13/20 20:30  89 28 107/64 (78) 100   


 


5/13/20 20:15  91 29 102/61 (75) 100   


 


5/13/20 20:11  90  105/64    


 


5/13/20 20:00 100.5 91 29 105/64 (78) 100   


 


5/13/20 20:00        100


 


5/13/20 20:00    105/59    


 


5/13/20 20:00      Bi-pap  


 


5/13/20 19:54     100 Bi-Pap  100


 


5/13/20 19:54  92 33  100   100


 


5/13/20 19:45  91 30 123/64 (83) 100   


 


5/13/20 19:39  92      


 


5/13/20 19:30  92 31 108/62 (77) 100   


 


5/13/20 19:15  91 32 112/63 (79) 100   


 


5/13/20 19:00    112/63    


 


5/13/20 19:00  90 33 108/65 (79) 100   


 


5/13/20 18:30  92 28 114/65 (81)    


 


5/13/20 18:00    111/67    


 


5/13/20 18:00  86 22 113/67 (82)    


 


5/13/20 17:30  87 27 119/64 (82)    


 


5/13/20 17:00  85 32 133/85 (101) 94   


 


5/13/20 17:00    133/85    


 


5/13/20 16:30  82 27 99/64 (76) 95   


 


5/13/20 16:02    107/60    


 


5/13/20 16:00      Bi-pap  


 


5/13/20 16:00    108/59    


 


5/13/20 16:00  82      


 


5/13/20 16:00 97.6 84 24 108/59 (75) 100   


 


5/13/20 16:00        100


 


5/13/20 15:30  82 26 109/57 (74) 100   


 


5/13/20 15:24  82 24  100   100


 


5/13/20 15:00  79 23 115/59 (77) 99   


 


5/13/20 15:00    115/59    


 


5/13/20 14:30  76 23 115/59 (77) 100   


 


5/13/20 14:00    123/62    


 


5/13/20 14:00  77 23 123/62 (82) 100   


 


5/13/20 13:30  77 26 112/55 (74) 100   


 


5/13/20 13:00  79 26 104/58 (73) 100   


 


5/13/20 13:00    112/55    


 


5/13/20 12:30  76 24 102/61 (75) 100   


 


5/13/20 12:00      Bi-pap  


 


5/13/20 12:00  76      


 


5/13/20 12:00        100


 


5/13/20 12:00 97.2 74 23 108/61 (77) 100   


 


5/13/20 12:00    108/61    


 


5/13/20 11:30  76 24 114/61 (78) 100   


 


5/13/20 11:26  76 24  100   100

















Intake and Output  


 


 5/13/20 5/14/20





 19:00 07:00


 


Intake Total 1285 ml 1092.50 ml


 


Balance 1285 ml 1092.50 ml


 


  


 


IV Total 1285 ml 1032.50 ml


 


Other  60 ml


 


# Voids [phone]








HEENT:  atraumatic, anicteric


Respiratory/Chest:  chest wall non-tender, decreased breath sounds


Cardiovascular:  normal peripheral pulses


Abdomen:  soft, non tender, non distended


Neurologic/Psychiatric:  alert, responsive


Laboratory Tests


5/14/20 03:00: 


Urine Color Yellow, Urine Appearance Cloudy, Urine pH 5, Urine Specific Gravity 

1.015, Urine Protein 2+H, Urine Glucose (UA) Negative, Urine Ketones Negative, 

Urine Blood 3+H, Urine Nitrite Negative, Urine Bilirubin Negative, Urine 

Urobilinogen 4H, Urine Leukocyte Esterase 2+H, Urine RBC 5-10H, Urine WBC 5-10H

, Urine Squamous Epithelial Cells Few, Urine Uric Acid Crystals ManyH, Urine 

Bacteria ManyH, Urine Yeast ManyH


5/14/20 05:17: 


White Blood Count 13.9H, Red Blood Count 3.39L, Hemoglobin 9.5L, Hematocrit 

29.8L, Mean Corpuscular Volume 88, Mean Corpuscular Hemoglobin 28.0, Mean 

Corpuscular Hemoglobin Concent 31.9L, Red Cell Distribution Width 12.2, 

Platelet Count 181, Mean Platelet Volume 8.5, Neutrophils (%) (Auto) , 

Lymphocytes (%) (Auto) , Monocytes (%) (Auto) , Eosinophils (%) (Auto) , 

Basophils (%) (Auto) , Differential Total Cells Counted 100, Neutrophils % (

Manual) 84H, Lymphocytes % (Manual) 6L, Monocytes % (Manual) 7, Eosinophils % (

Manual) 2, Basophils % (Manual) 1, Band Neutrophils 0, Platelet Estimate 

Adequate, Platelet Morphology Normal, Hypochromasia 2+, Anisocytosis 1+, Sodium 

Level 146H, Potassium Level 5.2#H, Chloride Level 108H, Carbon Dioxide Level 30

, Anion Gap 8, Blood Urea Nitrogen 67H, Creatinine 2.1H, Estimat Glomerular 

Filtration Rate 37.9, Glucose Level 246#H, Calcium Level 9.1#, Phosphorus Level 

2.9, Magnesium Level 2.7H, Total Bilirubin 0.7, Aspartate Amino Transf (AST/SGOT

) 26, Alanine Aminotransferase (ALT/SGPT) 22, Alkaline Phosphatase 88, Troponin 

I 0.008, Total Protein 7.0, Albumin 2.2L, Globulin 4.8, Albumin/Globulin Ratio 

0.5L


5/14/20 10:18: 


Arterial Blood pH 7.407, Arterial Blood Partial Pressure CO2 51.2H, Arterial 

Blood Partial Pressure O2 57.0L, Arterial Blood HCO3 31.5H, Arterial Blood 

Oxygen Saturation 88.5*L, Arterial Blood Base Excess 5.9H, Wong Test Positive





Current Medications








 Medications


  (Trade)  Dose


 Ordered  Sig/Aarti


 Route


 PRN Reason  Start Time


 Stop Time Status Last Admin


Dose Admin


 


 Acetaminophen


  (Tylenol)  650 mg  Q6H  PRN


 ORAL


 Temp >100.5  5/12/20 13:15


 5/26/20 13:14  5/13/20 20:23


 


 


 Acetaminophen


  (Tylenol)  650 mg  Q6H  PRN


 RECTAL


 Temp >100.5  5/12/20 13:15


 6/2/20 13:14   


 


 


 Carvedilol


  (Coreg)  3.125 mg  EVERY 12  HOURS


 ORAL


   5/12/20 21:00


 5/29/20 20:59   


 


 


 Chlorhexidine


 Gluconate


  (Gloria-Hex 2%)  1 applic  DAILY@2000


 TOPIC


   5/13/20 20:00


 8/11/20 19:59  5/13/20 20:22


 


 


 Dextrose


  (Dextrose 50%)  25 ml  Q30M  PRN


 IV


 Hypoglycemia  5/12/20 13:45


 8/10/20 13:44   


 


 


 Dextrose


  (Dextrose 50%)  50 ml  Q30M  PRN


 IV


 Hypoglycemia  5/12/20 13:45


 8/10/20 13:44   


 


 


 Insulin Aspart


  (NovoLOG)    Q6HR


 SUBQ


   5/12/20 18:00


 8/10/20 17:59  5/14/20 05:36


 


 


 Levetiracetam


  (Keppra)  1,000 mg  Q12HR


 NG


   5/14/20 09:00


 6/13/20 08:59  5/14/20 08:30


 


 


 Meropenem 1 gm/


 Sodium Chloride  55 ml @ 


 110 mls/hr  Q12HR


 IVPB


   5/13/20 14:00


 5/18/20 13:59  5/14/20 08:29


 


 


 Norepinephrine


 Bitartrate 4 mg/


 Dextrose  250 ml @ 0


 mls/hr  Q24H  PRN


 IV


 For hypotension  5/12/20 19:00


 6/11/20 18:59  5/13/20 16:02


 


 


 Sodium Chloride  1,000 ml @ 


 75 mls/hr  [id number]


 IV


   5/12/20 13:15


 6/11/20 09:29  5/14/20 05:35


 











Assessment/Plan


Assessment/Plan


IMPRESSION:


1. Cardiomyopathy.


2. Hypotension.


3. Left lung pneumonia.


4. Nursing home resident.


5. Positive COVID-19.





DISCUSSION:


1. Continue isolation


2. Continue current medications.


3. Off pressors.


4. Continue BiPAP; abg adequate


5. Pulmonary hygiene.


6. Broad-spectrum antibiotics.


7. I will follow.


8. Pulmonary hygiene


9. Continue diuresis, on Lasix 40 mg IV BID; 





CXR unchanged


Would like to empirical anticoagulate however will hold off in view of sudden 

anemia requiring prbc




















  ______________________________________________


  LESIA Dempsey Omar Syed MD May 14, 2020 11:03

## 2020-05-14 NOTE — NUR
NURSE NOTES:

Patient is resting. No changes in condition. Blood sugar 163, 3 units novolog given. 
Levophed continues at 4mcgs. BP:107/61, HR:85.

## 2020-05-14 NOTE — NEPHROLOGY PROGRESS NOTE
Assessment/Plan


Problem List:  


(1) MELVI (acute kidney injury)


(2) Electrolyte imbalance


(3) Suspected COVID-19 virus infection


(4) Hypotension


Assessment:  Resolved





(5) Sepsis


Assessment





Patient's current problem from renal standpoint of view is hypokalemia and 

other electrolyte imbalances





His other conditions:


Patient presented with acute renal failure however it is now resolved


Patient presented with septic shock was on pressors however now resolved


Sepsis leukocytosis improving


Patient has COVID-19 pneumonia


Hypertension


Cardiomyopathy status post AICD


Previous CVA with right residual weakness


Seizure disorders


Plan





COVID-19 positive





May 14:


Remains on BiPAP


Remains in ICU


Serum creatinine slightly higher


Discuss with pulmonologist


Continue per current management 


continue with slow hydration








May 13:


Patient now in ICU room J


On BiPAP


Discussed with RN


Serum creatinine lower 1.9, 


Will recheck renal parameters and electrolytes and chemistries tomorrow


Continue per consultants











May 12: 


Patient is clinically deteriorating 


Respiratory status worsened 


Blood pressure low


Serum creatinine brian 


ABG indicative of CO2 retention 


Discussed with a pulmonologist 


Patient due transfer to monitored bed for initiation of BiPAP 


Meanwhile we will give albumin  bolus and start half-normal saline IV hydration 


May require pressors


Overall prognosis poor


I favor holding the mind altering medications


Per orders





May 11: 


In view of worsening renal failure and rise of creatinine to 1.8 will 

temporarily hold Lasix and lisinopril


Discussed with Dr. Henderson


Continue rest





Previously:


Magnesium and potassium as needed


Stable from renal standpoint of view


Continue per consultants





We will continue to monitor electrolytes and chemistries





Subjective


ROS Limited/Unobtainable:  No


Constitutional:  Reports: malaise, weakness





Objective


Objective





Last 24 Hour Vital Signs








  Date Time  Temp Pulse Resp B/P (MAP) Pulse Ox O2 Delivery O2 Flow Rate FiO2


 


5/14/20 11:30  120 42 148/80 (102) 89   


 


5/14/20 11:00  106 36 140/77 (98) 97   


 


5/14/20 10:30  99 33 136/73 (94) 98   


 


5/14/20 10:00  94 30 116/71 (86) 98   


 


5/14/20 09:30  88 32 126/69 (88) 100   


 


5/14/20 09:00  81  116/67    


 


5/14/20 09:00  90 37 121/54 (76) 84   


 


5/14/20 08:30  84 37 115/61 (79) 97   


 


5/14/20 08:00        100


 


5/14/20 08:00 98.2 82 30 117/68 (84) 98   


 


5/14/20 08:00  81      


 


5/14/20 08:00      Bi-pap  


 


5/14/20 07:30  79 24 111/73 (86) 99   


 


5/14/20 07:00    116/67    


 


5/14/20 07:00  81 28 116/67 (83) 99   


 


5/14/20 06:45  82 30 118/70 (86) 99   


 


5/14/20 06:30  84 35 118/66 (83) 97   


 


5/14/20 06:15  84 33 117/69 (85) 98   


 


5/14/20 06:00  84 31 106/69 (81) 98   


 


5/14/20 06:00    106/56    


 


5/14/20 05:45  86 36 115/69 (84) 98   


 


5/14/20 05:30  84 32 118/67 (84) 97   


 


5/14/20 05:15  85 35 122/72 (89) 100   


 


5/14/20 05:00    122/72    


 


5/14/20 05:00  83 32 126/67 (86) 98   


 


5/14/20 04:45  80 30 121/70 (87) 99   


 


5/14/20 04:30  81 34 120/67 (84) 98   


 


5/14/20 04:15  84 40 133/67 (89) 86   


 


5/14/20 04:00    133/67    


 


5/14/20 04:00      Bi-pap  


 


5/14/20 04:00        100


 


5/14/20 04:00 98.7 79 30 113/71 (85) 94   


 


5/14/20 03:45  79 35 112/67 (82) 94   


 


5/14/20 03:35  78      


 


5/14/20 03:30  81 36 112/67 (82) 95   


 


5/14/20 03:16  100 22  82   60


 


5/14/20 03:15  82 35 115/69 (84) 98   


 


5/14/20 03:00  83 28 114/70 (85) 100   


 


5/14/20 03:00    115/69    


 


5/14/20 02:45  81 26 124/64 (84) 100   


 


5/14/20 02:30  82 27 110/67 (81) 100   


 


5/14/20 02:15  82 27 109/69 (82) 100   


 


5/14/20 02:00    109/69    


 


5/14/20 02:00  82 27 110/67 (81) 100   


 


5/14/20 01:45  83 28 103/64 (77) 100   


 


5/14/20 01:30  81 28 105/66 (79) 100   


 


5/14/20 01:15  85 28 107/63 (78) 100   


 


5/14/20 01:00  79 23 108/66 (80) 100   


 


5/14/20 01:00    107/63    


 


5/14/20 00:45  77 24 108/66 (80) 100   


 


5/14/20 00:30  84 26 104/64 (77) 100   


 


5/14/20 00:15  80 24 102/64 (77) 100   


 


5/14/20 00:00      Bi-pap  


 


5/14/20 00:00 99.8 81 24 107/61 (76) 100   


 


5/14/20 00:00    102/64    


 


5/13/20 23:51  100 22  79   100


 


5/13/20 23:45  85 30 95/64 (74) 99   


 


5/13/20 23:30  79 24 102/63 (76) 100   


 


5/13/20 23:23  82      


 


5/13/20 23:15  84 27 94/64 (74) 99   


 


5/13/20 23:00  82 23 99/60 (73) 100   


 


5/13/20 23:00    94/64    


 


5/13/20 22:45  83 25 93/61 (72) 100   


 


5/13/20 22:30  83 25 93/58 (70) 100   


 


5/13/20 22:15  85 27 101/59 (73) 100   


 


5/13/20 22:00    101/59    


 


5/13/20 22:00  84 27 94/65 (75) 100   


 


5/13/20 21:45  79 26 96/60 (72) 100   


 


5/13/20 21:30  86 29 102/63 (76) 100   


 


5/13/20 21:15  87 27 98/58 (71) 100   


 


5/13/20 21:00    95/66    


 


5/13/20 20:53 100.1       


 


5/13/20 20:30  89 28 107/64 (78) 100   


 


5/13/20 20:15  91 29 102/61 (75) 100   


 


5/13/20 20:11  90  105/64    


 


5/13/20 20:00 100.5 91 29 105/64 (78) 100   


 


5/13/20 20:00        100


 


5/13/20 20:00    105/59    


 


5/13/20 20:00      Bi-pap  


 


5/13/20 19:54     100 Bi-Pap  100


 


5/13/20 19:54  92 33  100   100


 


5/13/20 19:45  91 30 123/64 (83) 100   


 


5/13/20 19:39  92      


 


5/13/20 19:30  92 31 108/62 (77) 100   


 


5/13/20 19:15  91 32 112/63 (79) 100   


 


5/13/20 19:00    112/63    


 


5/13/20 19:00  90 33 108/65 (79) 100   


 


5/13/20 18:30  92 28 114/65 (81)    


 


5/13/20 18:00    111/67    


 


5/13/20 18:00  86 22 113/67 (82)    


 


5/13/20 17:30  87 27 119/64 (82)    


 


5/13/20 17:00  85 32 133/85 (101) 94   


 


5/13/20 17:00    133/85    


 


5/13/20 16:30  82 27 99/64 (76) 95   


 


5/13/20 16:02    107/60    


 


5/13/20 16:00      Bi-pap  


 


5/13/20 16:00    108/59    


 


5/13/20 16:00  82      


 


5/13/20 16:00 97.6 84 24 108/59 (75) 100   


 


5/13/20 16:00        100


 


5/13/20 15:30  82 26 109/57 (74) 100   


 


5/13/20 15:24  82 24  100   100


 


5/13/20 15:00  79 23 115/59 (77) 99   


 


5/13/20 15:00    115/59    


 


5/13/20 14:30  76 23 115/59 (77) 100   


 


5/13/20 14:00    123/62    


 


5/13/20 14:00  77 23 123/62 (82) 100   

















Intake and Output  


 


 5/13/20 5/14/20





 19:00 07:00


 


Intake Total 1285 ml 1092.50 ml


 


Balance 1285 ml 1092.50 ml


 


  


 


IV Total 1285 ml 1032.50 ml


 


Other  60 ml


 


# Voids [phone]








Laboratory Tests


5/14/20 03:00: 


Urine Color Yellow, Urine Appearance Cloudy, Urine pH 5, Urine Specific Gravity 

1.015, Urine Protein 2+H, Urine Glucose (UA) Negative, Urine Ketones Negative, 

Urine Blood 3+H, Urine Nitrite Negative, Urine Bilirubin Negative, Urine 

Urobilinogen 4H, Urine Leukocyte Esterase 2+H, Urine RBC 5-10H, Urine WBC 5-10H

, Urine Squamous Epithelial Cells Few, Urine Uric Acid Crystals ManyH, Urine 

Bacteria ManyH, Urine Yeast ManyH


5/14/20 05:17: 


White Blood Count 13.9H, Red Blood Count 3.39L, Hemoglobin 9.5L, Hematocrit 

29.8L, Mean Corpuscular Volume 88, Mean Corpuscular Hemoglobin 28.0, Mean 

Corpuscular Hemoglobin Concent 31.9L, Red Cell Distribution Width 12.2, 

Platelet Count 181, Mean Platelet Volume 8.5, Neutrophils (%) (Auto) , 

Lymphocytes (%) (Auto) , Monocytes (%) (Auto) , Eosinophils (%) (Auto) , 

Basophils (%) (Auto) , Differential Total Cells Counted 100, Neutrophils % (

Manual) 84H, Lymphocytes % (Manual) 6L, Monocytes % (Manual) 7, Eosinophils % (

Manual) 2, Basophils % (Manual) 1, Band Neutrophils 0, Platelet Estimate 

Adequate, Platelet Morphology Normal, Hypochromasia 2+, Anisocytosis 1+, Sodium 

Level 146H, Potassium Level 5.2#H, Chloride Level 108H, Carbon Dioxide Level 30

, Anion Gap 8, Blood Urea Nitrogen 67H, Creatinine 2.1H, Estimat Glomerular 

Filtration Rate 37.9, Glucose Level 246#H, Calcium Level 9.1#, Phosphorus Level 

2.9, Magnesium Level 2.7H, Total Bilirubin 0.7, Aspartate Amino Transf (AST/SGOT

) 26, Alanine Aminotransferase (ALT/SGPT) 22, Alkaline Phosphatase 88, Troponin 

I 0.008, Total Protein 7.0, Albumin 2.2L, Globulin 4.8, Albumin/Globulin Ratio 

0.5L


5/14/20 10:18: 


Arterial Blood pH 7.407, Arterial Blood Partial Pressure CO2 51.2H, Arterial 

Blood Partial Pressure O2 57.0L, Arterial Blood HCO3 31.5H, Arterial Blood 

Oxygen Saturation 88.5*L, Arterial Blood Base Excess 5.9H, Wong Test Positive


5/14/20 11:34: Activated Partial Thromboplast Time 38H


Height (Feet):  5


Height (Inches):  8.00


Weight (Pounds):  173


General Appearance:  no apparent distress


EENT:  other - On BiPAP


Cardiovascular:  tachycardia


Respiratory/Chest:  decreased breath sounds


Abdomen:  distended


Objective


No change











Miguel Ángel Kendall MD May 14, 2020 13:47

## 2020-05-14 NOTE — NUR
NURSE NOTES:

Changed right femoral TLC dressing. Called lab for collecting blood for PTT, Arleen will 
come soon.

## 2020-05-14 NOTE — NUR
NURSE NOTES:

Heparin drip held for an hour for  per protocol. Restarted with a rate of 15units 
(23.541ml/hr). Current BP:93/54, HR:88. Levophed had been held this afternoon. Will continue 
to monitor. Patient continues to wake and respond to stimuli and voice.

## 2020-05-14 NOTE — NUR
NURSE NOTES:

Patient is resting and response to stimuli. On BiPAP 15/5 @100%, O2 saturation:99%. 
BP:99/54, HR:97, RESP:35, TEMP:98.7F axiillary. L NGT in place but NPO status at the moment. 
Swenson catheter in place and draining. Right femoral TLC running 1/2 NS @ 75ml/hr and Heparin 
@ 18units (28.25ml/hr). Awaiting PTT results from lab. Safety measures in place. Will 
continue plan of care.

## 2020-05-14 NOTE — GENERAL PROGRESS NOTE
Assessment/Plan


Problem List:  


(1) Suspected COVID-19 virus infection


ICD Codes:  Z20.828 - Contact with and (suspected) exposure to other viral 

communicable diseases


SNOMED:  449668074


(2) Anemia


ICD Codes:  D64.9 - Anemia, unspecified


SNOMED:  484320945


(3) Weak


ICD Codes:  R53.1 - Weakness


SNOMED:  73619627


(4) COPD (chronic obstructive pulmonary disease)


ICD Codes:  J44.9 - Chronic obstructive pulmonary disease, unspecified


SNOMED:  55435397


(5) Diabetes


ICD Codes:  E11.9 - Type 2 diabetes mellitus without complications


SNOMED:  17299506


(6) Epileptic seizure, generalized


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 

not intractable, without status epilepticus


SNOMED:  35091537


(7) Altered mental status


ICD Codes:  R41.82 - Altered mental status, unspecified


SNOMED:  244918188


Qualifiers:  


   Qualified Codes:  R41.82 - Altered mental status, unspecified


(8) Hypotension


ICD Codes:  I95.9 - Hypotension, unspecified


SNOMED:  65359674


Qualifiers:  


   Qualified Codes:  I95.9 - Hypotension, unspecified


(9) UTI (urinary tract infection)


ICD Codes:  N39.0 - Urinary tract infection, site not specified


SNOMED:  72468970


Status:  unchanged


Assessment/Plan:


o2 pulm tx abx pt diet cbc bmp am needs picc, ltach eval





Subjective


Constitutional:  Reports: weakness


Allergies:  


Coded Allergies:  


     No Known Allergies (Unverified , 12/15/14)


All Systems:  reviewed and negative except above


Subjective


bipap sleepy in icu





Objective





Last 24 Hour Vital Signs








  Date Time  Temp Pulse Resp B/P (MAP) Pulse Ox O2 Delivery O2 Flow Rate FiO2


 


5/14/20 11:30  120 42 148/80 (102) 89   


 


5/14/20 11:00  106 36 140/77 (98) 97   


 


5/14/20 10:30  99 33 136/73 (94) 98   


 


5/14/20 10:00  94 30 116/71 (86) 98   


 


5/14/20 09:30  88 32 126/69 (88) 100   


 


5/14/20 09:00  81  116/67    


 


5/14/20 09:00  90 37 121/54 (76) 84   


 


5/14/20 08:30  84 37 115/61 (79) 97   


 


5/14/20 08:00        100


 


5/14/20 08:00 98.2 82 30 117/68 (84) 98   


 


5/14/20 08:00  81      


 


5/14/20 08:00      Bi-pap  


 


5/14/20 07:30  79 24 111/73 (86) 99   


 


5/14/20 07:00    116/67    


 


5/14/20 07:00  81 28 116/67 (83) 99   


 


5/14/20 06:45  82 30 118/70 (86) 99   


 


5/14/20 06:30  84 35 118/66 (83) 97   


 


5/14/20 06:15  84 33 117/69 (85) 98   


 


5/14/20 06:00  84 31 106/69 (81) 98   


 


5/14/20 06:00    106/56    


 


5/14/20 05:45  86 36 115/69 (84) 98   


 


5/14/20 05:30  84 32 118/67 (84) 97   


 


5/14/20 05:15  85 35 122/72 (89) 100   


 


5/14/20 05:00    122/72    


 


5/14/20 05:00  83 32 126/67 (86) 98   


 


5/14/20 04:45  80 30 121/70 (87) 99   


 


5/14/20 04:30  81 34 120/67 (84) 98   


 


5/14/20 04:15  84 40 133/67 (89) 86   


 


5/14/20 04:00    133/67    


 


5/14/20 04:00      Bi-pap  


 


5/14/20 04:00        100


 


5/14/20 04:00 98.7 79 30 113/71 (85) 94   


 


5/14/20 03:45  79 35 112/67 (82) 94   


 


5/14/20 03:35  78      


 


5/14/20 03:30  81 36 112/67 (82) 95   


 


5/14/20 03:16  100 22  82   60


 


5/14/20 03:15  82 35 115/69 (84) 98   


 


5/14/20 03:00  83 28 114/70 (85) 100   


 


5/14/20 03:00    115/69    


 


5/14/20 02:45  81 26 124/64 (84) 100   


 


5/14/20 02:30  82 27 110/67 (81) 100   


 


5/14/20 02:15  82 27 109/69 (82) 100   


 


5/14/20 02:00    109/69    


 


5/14/20 02:00  82 27 110/67 (81) 100   


 


5/14/20 01:45  83 28 103/64 (77) 100   


 


5/14/20 01:30  81 28 105/66 (79) 100   


 


5/14/20 01:15  85 28 107/63 (78) 100   


 


5/14/20 01:00  79 23 108/66 (80) 100   


 


5/14/20 01:00    107/63    


 


5/14/20 00:45  77 24 108/66 (80) 100   


 


5/14/20 00:30  84 26 104/64 (77) 100   


 


5/14/20 00:15  80 24 102/64 (77) 100   


 


5/14/20 00:00      Bi-pap  


 


5/14/20 00:00 99.8 81 24 107/61 (76) 100   


 


5/14/20 00:00    102/64    


 


5/13/20 23:51  100 22  79   100


 


5/13/20 23:45  85 30 95/64 (74) 99   


 


5/13/20 23:30  79 24 102/63 (76) 100   


 


5/13/20 23:23  82      


 


5/13/20 23:15  84 27 94/64 (74) 99   


 


5/13/20 23:00  82 23 99/60 (73) 100   


 


5/13/20 23:00    94/64    


 


5/13/20 22:45  83 25 93/61 (72) 100   


 


5/13/20 22:30  83 25 93/58 (70) 100   


 


5/13/20 22:15  85 27 101/59 (73) 100   


 


5/13/20 22:00    101/59    


 


5/13/20 22:00  84 27 94/65 (75) 100   


 


5/13/20 21:45  79 26 96/60 (72) 100   


 


5/13/20 21:30  86 29 102/63 (76) 100   


 


5/13/20 21:15  87 27 98/58 (71) 100   


 


5/13/20 21:00    95/66    


 


5/13/20 20:53 100.1       


 


5/13/20 20:30  89 28 107/64 (78) 100   


 


5/13/20 20:15  91 29 102/61 (75) 100   


 


5/13/20 20:11  90  105/64    


 


5/13/20 20:00 100.5 91 29 105/64 (78) 100   


 


5/13/20 20:00        100


 


5/13/20 20:00    105/59    


 


5/13/20 20:00      Bi-pap  


 


5/13/20 19:54     100 Bi-Pap  100


 


5/13/20 19:54  92 33  100   100


 


5/13/20 19:45  91 30 123/64 (83) 100   


 


5/13/20 19:39  92      


 


5/13/20 19:30  92 31 108/62 (77) 100   


 


5/13/20 19:15  91 32 112/63 (79) 100   


 


5/13/20 19:00    112/63    


 


5/13/20 19:00  90 33 108/65 (79) 100   


 


5/13/20 18:30  92 28 114/65 (81)    


 


5/13/20 18:00    111/67    


 


5/13/20 18:00  86 22 113/67 (82)    


 


5/13/20 17:30  87 27 119/64 (82)    


 


5/13/20 17:00  85 32 133/85 (101) 94   


 


5/13/20 17:00    133/85    


 


5/13/20 16:30  82 27 99/64 (76) 95   


 


5/13/20 16:02    107/60    


 


5/13/20 16:00      Bi-pap  


 


5/13/20 16:00    108/59    


 


5/13/20 16:00  82      


 


5/13/20 16:00 97.6 84 24 108/59 (75) 100   


 


5/13/20 16:00        100


 


5/13/20 15:30  82 26 109/57 (74) 100   


 


5/13/20 15:24  82 24  100   100


 


5/13/20 15:00  79 23 115/59 (77) 99   


 


5/13/20 15:00    115/59    


 


5/13/20 14:30  76 23 115/59 (77) 100   


 


5/13/20 14:00    123/62    


 


5/13/20 14:00  77 23 123/62 (82) 100   


 


5/13/20 13:30  77 26 112/55 (74) 100   

















Intake and Output  


 


 5/13/20 5/14/20





 19:00 07:00


 


Intake Total 1285 ml 1092.50 ml


 


Balance 1285 ml 1092.50 ml


 


  


 


IV Total 1285 ml 1032.50 ml


 


Other  60 ml


 


# Voids [phone]








Laboratory Tests


5/14/20 03:00: 


Urine Color Yellow, Urine Appearance Cloudy, Urine pH 5, Urine Specific Gravity 

1.015, Urine Protein 2+H, Urine Glucose (UA) Negative, Urine Ketones Negative, 

Urine Blood 3+H, Urine Nitrite Negative, Urine Bilirubin Negative, Urine 

Urobilinogen 4H, Urine Leukocyte Esterase 2+H, Urine RBC 5-10H, Urine WBC 5-10H

, Urine Squamous Epithelial Cells Few, Urine Uric Acid Crystals ManyH, Urine 

Bacteria ManyH, Urine Yeast ManyH


5/14/20 05:17: 


White Blood Count 13.9H, Red Blood Count 3.39L, Hemoglobin 9.5L, Hematocrit 

29.8L, Mean Corpuscular Volume 88, Mean Corpuscular Hemoglobin 28.0, Mean 

Corpuscular Hemoglobin Concent 31.9L, Red Cell Distribution Width 12.2, 

Platelet Count 181, Mean Platelet Volume 8.5, Neutrophils (%) (Auto) , 

Lymphocytes (%) (Auto) , Monocytes (%) (Auto) , Eosinophils (%) (Auto) , 

Basophils (%) (Auto) , Differential Total Cells Counted 100, Neutrophils % (

Manual) 84H, Lymphocytes % (Manual) 6L, Monocytes % (Manual) 7, Eosinophils % (

Manual) 2, Basophils % (Manual) 1, Band Neutrophils 0, Platelet Estimate 

Adequate, Platelet Morphology Normal, Hypochromasia 2+, Anisocytosis 1+, Sodium 

Level 146H, Potassium Level 5.2#H, Chloride Level 108H, Carbon Dioxide Level 30

, Anion Gap 8, Blood Urea Nitrogen 67H, Creatinine 2.1H, Estimat Glomerular 

Filtration Rate 37.9, Glucose Level 246#H, Calcium Level 9.1#, Phosphorus Level 

2.9, Magnesium Level 2.7H, Total Bilirubin 0.7, Aspartate Amino Transf (AST/SGOT

) 26, Alanine Aminotransferase (ALT/SGPT) 22, Alkaline Phosphatase 88, Troponin 

I 0.008, Total Protein 7.0, Albumin 2.2L, Globulin 4.8, Albumin/Globulin Ratio 

0.5L


5/14/20 10:18: 


Arterial Blood pH 7.407, Arterial Blood Partial Pressure CO2 51.2H, Arterial 

Blood Partial Pressure O2 57.0L, Arterial Blood HCO3 31.5H, Arterial Blood 

Oxygen Saturation 88.5*L, Arterial Blood Base Excess 5.9H, Wong Test Positive


5/14/20 11:34: Activated Partial Thromboplast Time 38H


Height (Feet):  5


Height (Inches):  8.00


Weight (Pounds):  173


General Appearance:  lethargic


EENT:  normal ENT inspection


Neck:  normal alignment


Cardiovascular:  normal rate, regular rhythm


Respiratory/Chest:  no respiratory distress, no accessory muscle use


Extremities:  normal inspection


Skin:  normal pigmentation











Arron Barkley DO May 14, 2020 13:27

## 2020-05-15 VITALS — DIASTOLIC BLOOD PRESSURE: 61 MMHG | SYSTOLIC BLOOD PRESSURE: 119 MMHG

## 2020-05-15 VITALS — SYSTOLIC BLOOD PRESSURE: 126 MMHG | DIASTOLIC BLOOD PRESSURE: 89 MMHG

## 2020-05-15 VITALS — DIASTOLIC BLOOD PRESSURE: 56 MMHG | SYSTOLIC BLOOD PRESSURE: 99 MMHG

## 2020-05-15 VITALS — DIASTOLIC BLOOD PRESSURE: 85 MMHG | SYSTOLIC BLOOD PRESSURE: 135 MMHG

## 2020-05-15 VITALS — DIASTOLIC BLOOD PRESSURE: 77 MMHG | SYSTOLIC BLOOD PRESSURE: 128 MMHG

## 2020-05-15 VITALS — DIASTOLIC BLOOD PRESSURE: 57 MMHG | SYSTOLIC BLOOD PRESSURE: 127 MMHG

## 2020-05-15 VITALS — SYSTOLIC BLOOD PRESSURE: 119 MMHG | DIASTOLIC BLOOD PRESSURE: 74 MMHG

## 2020-05-15 VITALS — DIASTOLIC BLOOD PRESSURE: 80 MMHG | SYSTOLIC BLOOD PRESSURE: 139 MMHG

## 2020-05-15 VITALS — DIASTOLIC BLOOD PRESSURE: 73 MMHG | SYSTOLIC BLOOD PRESSURE: 118 MMHG

## 2020-05-15 VITALS — DIASTOLIC BLOOD PRESSURE: 73 MMHG | SYSTOLIC BLOOD PRESSURE: 135 MMHG

## 2020-05-15 VITALS — DIASTOLIC BLOOD PRESSURE: 85 MMHG | SYSTOLIC BLOOD PRESSURE: 137 MMHG

## 2020-05-15 VITALS — SYSTOLIC BLOOD PRESSURE: 134 MMHG | DIASTOLIC BLOOD PRESSURE: 81 MMHG

## 2020-05-15 VITALS — SYSTOLIC BLOOD PRESSURE: 124 MMHG | DIASTOLIC BLOOD PRESSURE: 78 MMHG

## 2020-05-15 VITALS — DIASTOLIC BLOOD PRESSURE: 74 MMHG | SYSTOLIC BLOOD PRESSURE: 134 MMHG

## 2020-05-15 VITALS — DIASTOLIC BLOOD PRESSURE: 75 MMHG | SYSTOLIC BLOOD PRESSURE: 128 MMHG

## 2020-05-15 VITALS — DIASTOLIC BLOOD PRESSURE: 69 MMHG | SYSTOLIC BLOOD PRESSURE: 136 MMHG

## 2020-05-15 VITALS — DIASTOLIC BLOOD PRESSURE: 66 MMHG | SYSTOLIC BLOOD PRESSURE: 113 MMHG

## 2020-05-15 VITALS — DIASTOLIC BLOOD PRESSURE: 68 MMHG | SYSTOLIC BLOOD PRESSURE: 134 MMHG

## 2020-05-15 VITALS — DIASTOLIC BLOOD PRESSURE: 70 MMHG | SYSTOLIC BLOOD PRESSURE: 119 MMHG

## 2020-05-15 VITALS — SYSTOLIC BLOOD PRESSURE: 124 MMHG | DIASTOLIC BLOOD PRESSURE: 61 MMHG

## 2020-05-15 VITALS — DIASTOLIC BLOOD PRESSURE: 85 MMHG | SYSTOLIC BLOOD PRESSURE: 130 MMHG

## 2020-05-15 VITALS — SYSTOLIC BLOOD PRESSURE: 131 MMHG | DIASTOLIC BLOOD PRESSURE: 70 MMHG

## 2020-05-15 VITALS — DIASTOLIC BLOOD PRESSURE: 67 MMHG | SYSTOLIC BLOOD PRESSURE: 112 MMHG

## 2020-05-15 VITALS — DIASTOLIC BLOOD PRESSURE: 75 MMHG | SYSTOLIC BLOOD PRESSURE: 139 MMHG

## 2020-05-15 VITALS — DIASTOLIC BLOOD PRESSURE: 79 MMHG | SYSTOLIC BLOOD PRESSURE: 129 MMHG

## 2020-05-15 VITALS — SYSTOLIC BLOOD PRESSURE: 121 MMHG | DIASTOLIC BLOOD PRESSURE: 68 MMHG

## 2020-05-15 VITALS — DIASTOLIC BLOOD PRESSURE: 77 MMHG | SYSTOLIC BLOOD PRESSURE: 117 MMHG

## 2020-05-15 VITALS — SYSTOLIC BLOOD PRESSURE: 125 MMHG | DIASTOLIC BLOOD PRESSURE: 79 MMHG

## 2020-05-15 VITALS — SYSTOLIC BLOOD PRESSURE: 126 MMHG | DIASTOLIC BLOOD PRESSURE: 77 MMHG

## 2020-05-15 VITALS — DIASTOLIC BLOOD PRESSURE: 76 MMHG | SYSTOLIC BLOOD PRESSURE: 124 MMHG

## 2020-05-15 VITALS — DIASTOLIC BLOOD PRESSURE: 81 MMHG | SYSTOLIC BLOOD PRESSURE: 135 MMHG

## 2020-05-15 VITALS — DIASTOLIC BLOOD PRESSURE: 89 MMHG | SYSTOLIC BLOOD PRESSURE: 135 MMHG

## 2020-05-15 VITALS — SYSTOLIC BLOOD PRESSURE: 119 MMHG | DIASTOLIC BLOOD PRESSURE: 64 MMHG

## 2020-05-15 VITALS — DIASTOLIC BLOOD PRESSURE: 70 MMHG | SYSTOLIC BLOOD PRESSURE: 121 MMHG

## 2020-05-15 VITALS — SYSTOLIC BLOOD PRESSURE: 129 MMHG | DIASTOLIC BLOOD PRESSURE: 71 MMHG

## 2020-05-15 LAB
ADD MANUAL DIFF: YES
ADD MANUAL DIFF: YES
ALBUMIN SERPL-MCNC: 2 G/DL (ref 3.4–5)
ALP SERPL-CCNC: 114 U/L (ref 46–116)
ALT SERPL-CCNC: 23 U/L (ref 12–78)
ANION GAP SERPL CALC-SCNC: 7 MMOL/L (ref 5–15)
AST SERPL-CCNC: 27 U/L (ref 15–37)
BILIRUB DIRECT SERPL-MCNC: 0.2 MG/DL (ref 0–0.3)
BILIRUB SERPL-MCNC: 0.4 MG/DL (ref 0.2–1)
BUN SERPL-MCNC: 54 MG/DL (ref 7–18)
CALCIUM SERPL-MCNC: 8.7 MG/DL (ref 8.5–10.1)
CHLORIDE SERPL-SCNC: 110 MMOL/L (ref 98–107)
CO2 SERPL-SCNC: 33 MMOL/L (ref 21–32)
CREAT SERPL-MCNC: 1.7 MG/DL (ref 0.55–1.3)
ERYTHROCYTE [DISTWIDTH] IN BLOOD BY AUTOMATED COUNT: 12.2 % (ref 11.6–14.8)
ERYTHROCYTE [DISTWIDTH] IN BLOOD BY AUTOMATED COUNT: 13.4 % (ref 11.6–14.8)
HCT VFR BLD CALC: 20.3 % (ref 42–52)
HCT VFR BLD CALC: 27.9 % (ref 42–52)
HGB BLD-MCNC: 6.3 G/DL (ref 14.2–18)
HGB BLD-MCNC: 9.1 G/DL (ref 14.2–18)
MCV RBC AUTO: 87 FL (ref 80–99)
MCV RBC AUTO: 90 FL (ref 80–99)
PHOSPHATE SERPL-MCNC: 2.6 MG/DL (ref 2.5–4.9)
PLATELET # BLD: 192 K/UL (ref 150–450)
PLATELET # BLD: 90 K/UL (ref 150–450)
POTASSIUM SERPL-SCNC: 4.1 MMOL/L (ref 3.5–5.1)
RBC # BLD AUTO: 2.25 M/UL (ref 4.7–6.1)
RBC # BLD AUTO: 3.23 M/UL (ref 4.7–6.1)
SODIUM SERPL-SCNC: 150 MMOL/L (ref 136–145)
WBC # BLD AUTO: 17.1 K/UL (ref 4.8–10.8)
WBC # BLD AUTO: 8 K/UL (ref 4.8–10.8)

## 2020-05-15 PROCEDURE — 02HV33Z INSERTION OF INFUSION DEVICE INTO SUPERIOR VENA CAVA, PERCUTANEOUS APPROACH: ICD-10-PCS

## 2020-05-15 PROCEDURE — B548ZZA ULTRASONOGRAPHY OF SUPERIOR VENA CAVA, GUIDANCE: ICD-10-PCS

## 2020-05-15 RX ADMIN — INSULIN ASPART SCH UNITS: 100 INJECTION, SOLUTION INTRAVENOUS; SUBCUTANEOUS at 13:29

## 2020-05-15 RX ADMIN — INSULIN ASPART SCH UNITS: 100 INJECTION, SOLUTION INTRAVENOUS; SUBCUTANEOUS at 05:37

## 2020-05-15 RX ADMIN — DEXTROSE MONOHYDRATE SCH MLS/HR: 50 INJECTION, SOLUTION INTRAVENOUS at 12:35

## 2020-05-15 RX ADMIN — LEVETIRACETAM SCH MG: 100 SOLUTION ORAL at 20:49

## 2020-05-15 RX ADMIN — MEROPENEM SCH MLS/HR: 1 INJECTION INTRAVENOUS at 08:52

## 2020-05-15 RX ADMIN — LEVETIRACETAM SCH MG: 100 SOLUTION ORAL at 08:52

## 2020-05-15 RX ADMIN — CHLORHEXIDINE GLUCONATE SCH APPLIC: 213 SOLUTION TOPICAL at 20:49

## 2020-05-15 RX ADMIN — HEPARIN SODIUM SCH MLS/HR: 5000 INJECTION, SOLUTION INTRAVENOUS at 09:14

## 2020-05-15 RX ADMIN — MEROPENEM SCH MLS/HR: 1 INJECTION INTRAVENOUS at 20:49

## 2020-05-15 RX ADMIN — INSULIN ASPART SCH UNITS: 100 INJECTION, SOLUTION INTRAVENOUS; SUBCUTANEOUS at 18:23

## 2020-05-15 NOTE — PROGRESS NOTE
DATE:  05/15/2020

SUBJECTIVE:  This is a 69-year-old patient with altered mental status,

hypertension and sepsis.  He still has some confusion, altered mental

status, and decline in cognition below his baseline that is why his

attending has requested daily psychiatric consultation.



MENTAL STATUS EXAMINATION:  This is a 69-year-old male.  Appearance is

disheveled.  Attitude, irritable and agitated.  Affect, guarded and

restricted.  Intellect poor.  Mood, depressed and anxious.  Motor

activity, psychomotor agitation.  Insight and judgment poor.



DIAGNOSIS:  Major depressive disorder, mild, recurrent with psychotic

features, rule out dementia with psychosis.



PLAN:  Continue treatment with medications to stabilize his mood.  20

minutes of cognitive behavioral therapy will help him identify his

automatic negative thoughts, help him convert his negative thoughts to

more positive thoughts to reduce depression, anxiety, and mood lability.

Also 20 minutes of insight-oriented psychotherapy.  This patient is in the

ICU, but he has lot of psychomotor agitation and irritability.  He will

continue to be followed by Psychiatry throughout his hospital course. He

still has lot of anxiety and agitation, so I am going continue his  Ativan

as needed to reduce anxiety and agitation.  Chart reviewed and discussed

with staff.  Seen and assessed in ICU.









  ______________________________________________

  Demarcus Love M.D.





DR:  Bernabe

D:  05/15/2020 09:07

T:  05/15/2020 18:35

JOB#:  [id number]

CC:

## 2020-05-15 NOTE — NUR
NURSE NOTES:

CALLED AND LEFT MESSAGE REGARDING NEED FOR PO CARDIZEM. PT OFF DRIP SINCE 1300. AWAITING 
CALL BACK.

-------------------------------------------------------------------------------

Addendum: 05/15/20 at 1934 by Nessa Farris RN

-------------------------------------------------------------------------------

WRONG PT

## 2020-05-15 NOTE — NUR
NURSE NOTES:

LATE ENTRY:

RECEIVED REPORT FROM [facility] PT IN [facility], LABORED BREATHS, TACHYPNEIC AT 38, 
SP02 SATING 93. BIPAP 15/5, 100%FI02. VS: 90, 134/68, DIMINISHED THROUGH OUT. PT. A/OX1, 
VERY DROWSY AND FATIGUED. NPO, RT NARES NGT. HYPOACTIVE BOWEL SOUNDS. HERNADEZ DRAINING BEAR 
URINE. NO BM AT THIS TIME. SKIN- SEE ASSESSMENT. RT FEMORAL TLC , WILL REMOVED TODAY. PICC 
CONSENT BY MD. FLUIDS 1/2 NS @75ML/HR, HEP DRIP AT 15U/HR. 98 TEMP. LFT ARM WRIST 
RESTRAINTS. RT PARALYSIS. BED LOCKED, IN LOW POSITION. AIRBORNE ISOLATION.

## 2020-05-15 NOTE — NUR
NURSE NOTES:WOUND CARE NOTES:Pt presents with non-blanching erythema with  multiple 
scattered partial thickness shearing sacrum,R and L buttocks. Scattered areas of 
hyperpigmentation from previous wounds also noted.



Tx.Plan:Apply Moisture Barrier Paste to Buttocks and Sacral areas. Cover Sacrum with 
Optifoam drsg.Change every 3 days 

           and PRN.

           Apply Cavilon Skin Barrier to Both heels. Cover each heel with Optifoam drsg. 
Change every 7 days and prn.

           Reposition at least every 2hours or as tolerated.

           Place pillow between knees. 

           Off-load heels with pillow

## 2020-05-15 NOTE — DIAGNOSTIC IMAGING REPORT
EXAM: XRAY Abdomen 1v

 

HISTORY: NG tube placement

 

COMPARISON: 5/12/2020 at 1213 hours. Present study at 1616 hours.

 

TECHNIQUE:  Frontal view of the abdomen obtained.

 

FINDINGS:

 

NG tube noted in stomach. There is a right femoral line in place.  There is a

nonobstructed bowel gas pattern. No definite pathologic calcifications identified. 

There is no sign of free air. No acute abnormality noted of the visualized osseous

structures.

 

IMPRESSION:

 

NG TUBE IN STOMACH.

 

NONOBSTRUCTIVE BOWEL GAS PATTERN.

 

RIGHT FEMORAL LINE IN PLACE.

## 2020-05-15 NOTE — CONSULTATION
DATE OF CONSULTATION:  05/14/2020

SUBJECTIVE:  This is a 69-year-old male with altered mental status,

hypertension, and sepsis.  He has got decline in cognition below his

baseline.  That is why his attending physician has requested daily

psychiatric consultation.  This patient does have some mood lability,

confusion, disorganized thought process, and decline in cognition below

his baseline. He has COPD, diabetes, hypertension, seizure disorder, and

decline in cognition below his baseline.



MENTAL STATUS EXAMINATION:  This is a 69-year-old male.  Appearance is

disheveled.  Attitude, irritable and agitated.  Affect, guarded and

restricted.  Intellect, poor.  Mood, depressed and anxious.  Motor

activity, psychomotor agitation.  Insight and judgment are poor.



DIAGNOSIS:  Major depressive disorder, mild, recurrent with psychotic

features, rule out dementia with psychosis.



PLAN:  I am going to treat him with psychotropic medication regimen

consisting of Ativan 1 every 6 hours p.r.n. anxiety and agitation.  So,

_____00:56 I will renew his Ativan today, confused, has some psychomotor

agitation, irritability.  Twenty minutes of behavioral management

provided.  Chart was reviewed.  Discussed with staff.  Seen and assessed

in ICU.









  ______________________________________________

  Demarcus Love M.D.





DR:  SUSAN

D:  05/14/2020 14:23

T:  05/14/2020 22:37

JOB#:  [id number]

CC:

## 2020-05-15 NOTE — GENERAL PROGRESS NOTE
Assessment/Plan


Problem List:  


(1) Suspected COVID-19 virus infection


ICD Codes:  Z20.828 - Contact with and (suspected) exposure to other viral 

communicable diseases


SNOMED:  736748715


(2) Anemia


ICD Codes:  D64.9 - Anemia, unspecified


SNOMED:  439124046


(3) Weak


ICD Codes:  R53.1 - Weakness


SNOMED:  18175078


(4) COPD (chronic obstructive pulmonary disease)


ICD Codes:  J44.9 - Chronic obstructive pulmonary disease, unspecified


SNOMED:  83583494


(5) Diabetes


ICD Codes:  E11.9 - Type 2 diabetes mellitus without complications


SNOMED:  52568056


(6) Epileptic seizure, generalized


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 

not intractable, without status epilepticus


SNOMED:  88066105


(7) Altered mental status


ICD Codes:  R41.82 - Altered mental status, unspecified


SNOMED:  988608780


Qualifiers:  


   Qualified Codes:  R41.82 - Altered mental status, unspecified


(8) Hypotension


ICD Codes:  I95.9 - Hypotension, unspecified


SNOMED:  43168927


Qualifiers:  


   Qualified Codes:  I95.9 - Hypotension, unspecified


(9) UTI (urinary tract infection)


ICD Codes:  N39.0 - Urinary tract infection, site not specified


SNOMED:  66037930


Status:  unchanged


Assessment/Plan:


o2 pulm tx abx pt diet cbc bmp am needs picc, per nurse no family members and 

pt require picc line for treatment, ltach eval





Subjective


Constitutional:  Reports: weakness


Allergies:  


Coded Allergies:  


     No Known Allergies (Unverified , 12/15/14)


All Systems:  reviewed and negative except above


Subjective


bipap sleepy in icu





Objective





Last 24 Hour Vital Signs








  Date Time  Temp Pulse Resp B/P (MAP) Pulse Ox O2 Delivery O2 Flow Rate FiO2


 


5/15/20 08:53  92  131/70    


 


5/15/20 07:08     90 Bi-Pap  100


 


5/15/20 07:08  101 44  90   100


 


5/15/20 07:00  93 40 126/89 (101) 96   


 


5/15/20 06:30  90 36 128/75 (92) 96   


 


5/15/20 06:00  96 41 134/74 (94) 97   


 


5/15/20 05:30  87 40 119/64 (82) 99   


 


5/15/20 05:00  90 40 121/70 (87) 97   


 


5/15/20 04:30  82 38 112/67 (82) 95   


 


5/15/20 04:00      Bi-pap  


 


5/15/20 04:00 98.9 78 36 127/57 (80) 90   


 


5/15/20 04:00        100


 


5/15/20 03:32  85      


 


5/15/20 03:00  87 41 113/66 (82) 94   


 


5/15/20 02:35  81 32  99   100


 


5/15/20 02:30  90 36 129/79 (96) 99   


 


5/15/20 02:00  92 36 124/61 (82) 100   


 


5/15/20 01:30  85 36 119/70 (86) 100   


 


5/15/20 01:00  81 35 99/56 (70) 99   


 


5/15/20 00:30  83 36 119/61 (80) 99   


 


5/15/20 00:00      Bi-pap  


 


5/15/20 00:00 98.3 89 34 117/77 (90) 97   


 


5/15/20 00:00        100


 


5/14/20 23:30  85 37 106/66 (79) 100   


 


5/14/20 23:29  86      


 


5/14/20 23:10  86 31  100   100


 


5/14/20 23:00  87 37 101/51 (68) 100   


 


5/14/20 22:30  85 34 96/50 (65) 100   


 


5/14/20 22:00  87 31 93/54 (67) 100   


 


5/14/20 21:30  90 34 112/49 (70) 100   


 


5/14/20 21:00  93 34 103/51 (68) 100   


 


5/14/20 20:30  96 37 98/60 (73) 99   


 


5/14/20 20:09  99  99/54    


 


5/14/20 20:01  98 36 99/54 (69) 100   


 


5/14/20 20:00      Bi-pap  


 


5/14/20 20:00        100


 


5/14/20 20:00 98.7 96 34 79/52 (61) 100   


 


5/14/20 19:31  101      


 


5/14/20 19:30  101 37 92/51 (65) 98   


 


5/14/20 19:15     100 Bi-Pap  100


 


5/14/20 19:15  106 36  94   100


 


5/14/20 19:00  108 40 101/61 (74) 96   


 


5/14/20 18:00  109 45 111/64 (80) 95   


 


5/14/20 17:00      Bi-pap  


 


5/14/20 17:00  114 44 125/76 (92) 95   


 


5/14/20 16:00      Bi-pap  


 


5/14/20 16:00        100


 


5/14/20 16:00  113 44 118/69 (85) 97   


 


5/14/20 16:00  113      


 


5/14/20 16:00 99.6 113 44 118/69 (85) 97   


 


5/14/20 15:15  115 44  95   95


 


5/14/20 15:00  118 40 112/67 (82) 95   


 


5/14/20 14:00  118 44 136/80 (98) 97   


 


5/14/20 13:30  117 44 136/75 (95) 97   


 


5/14/20 13:00  116 45 127/75 (92) 97   


 


5/14/20 12:41  117 46  100   100


 


5/14/20 12:30  116 45 138/81 (100) 97   


 


5/14/20 12:00  118 45 152/85 (107) 98   


 


5/14/20 12:00        100


 


5/14/20 12:00  112      


 


5/14/20 12:00      Bi-pap  


 


5/14/20 11:30  120 42 148/80 (102) 89   


 


5/14/20 11:00  106 36 140/77 (98) 97   


 


5/14/20 10:30  99 33 136/73 (94) 98   


 


5/14/20 10:00  94 30 116/71 (86) 98   

















Intake and Output  


 


 5/14/20 5/15/20





 19:00 07:00


 


Intake Total 1144.67 ml 1128.0736 ml


 


Balance 1144.67 ml 1128.0736 ml


 


  


 


IV Total 1144.67 ml 1128.0736 ml


 


# Voids 665 600


 


# Bowel Movements 4 2








Laboratory Tests


5/14/20 10:18: 


Arterial Blood pH 7.407, Arterial Blood Partial Pressure CO2 51.2H, Arterial 

Blood Partial Pressure O2 57.0L, Arterial Blood HCO3 31.5H, Arterial Blood 

Oxygen Saturation 88.5*L, Arterial Blood Base Excess 5.9H, Wong Test Positive


5/14/20 11:34: Activated Partial Thromboplast Time 38H


5/14/20 19:45: Activated Partial Thromboplast Time 148H


5/15/20 03:35: 


Activated Partial Thromboplast Time 73H, White Blood Count 8.0, Red Blood Count 

2.25L, Hemoglobin 6.3#*L, Hematocrit 20.3#L, Mean Corpuscular Volume 90, Mean 

Corpuscular Hemoglobin 28.0, Mean Corpuscular Hemoglobin Concent 31.0L, Red 

Cell Distribution Width 13.4, Platelet Count 90#L, Mean Platelet Volume 6.3L, 

Neutrophils (%) (Auto) , Lymphocytes (%) (Auto) , Monocytes (%) (Auto) , 

Eosinophils (%) (Auto) , Basophils (%) (Auto) , Differential Total Cells 

Counted 100, Neutrophils % (Manual) 86H, Lymphocytes % (Manual) 9L, Monocytes % 

(Manual) 3, Eosinophils % (Manual) 1, Basophils % (Manual) 0, Band Neutrophils 1

, Nucleated Red Blood Cells , Platelet Estimate DecreasedL, Platelet Morphology 

Normal, Hypochromasia 1+, Sodium Level 150H, Potassium Level 4.1, Chloride 

Level 110H, Carbon Dioxide Level 33H, Anion Gap 7, Blood Urea Nitrogen 54H, 

Creatinine 1.7H, Estimat Glomerular Filtration Rate 48.4, Glucose Level 176H, 

Calcium Level 8.7, Phosphorus Level 2.6, Magnesium Level 2.5H, Total Bilirubin 

0.4, Direct Bilirubin 0.2, Aspartate Amino Transf (AST/SGOT) 27, Alanine 

Aminotransferase (ALT/SGPT) 23, Alkaline Phosphatase 114, Total Protein 6.7, 

Albumin 2.0L


5/15/20 07:00: 


White Blood Count 17.1#H, Red Blood Count 3.23L, Hemoglobin 9.1#L, Hematocrit 

27.9#L, Mean Corpuscular Volume 87, Mean Corpuscular Hemoglobin 28.1, Mean 

Corpuscular Hemoglobin Concent 32.5, Red Cell Distribution Width 12.2, Platelet 

Count 192#, Mean Platelet Volume 7.1, Neutrophils (%) (Auto) , Lymphocytes (%) (

Auto) , Monocytes (%) (Auto) , Eosinophils (%) (Auto) , Basophils (%) (Auto) , 

Neutrophils % (Manual) [Pending], Lymphocytes % (Manual) [Pending], Platelet 

Estimate [Pending], Platelet Morphology [Pending]


5/15/20 07:45: 


Arterial Blood pH 7.372, Arterial Blood Partial Pressure CO2 55.0H, Arterial 

Blood Partial Pressure O2 59.3L, Arterial Blood HCO3 31.2H, Arterial Blood 

Oxygen Saturation 88.0*L, Arterial Blood Base Excess 5.0H, Wong Test Positive


Height (Feet):  5


Height (Inches):  8.00


Weight (Pounds):  173


General Appearance:  lethargic


EENT:  normal ENT inspection


Neck:  normal alignment


Cardiovascular:  normal rate, regular rhythm


Respiratory/Chest:  no respiratory distress, no accessory muscle use


Extremities:  normal inspection


Skin:  normal pigmentation











Arron Barkley DO May 15, 2020 09:51

## 2020-05-15 NOTE — NUR
NURSE NOTES:

LATE ENTRY:

ACCUCHK 189-4UNITS GIVEN. PT ASYMPTOMATIC. ORAL CARE PROVIDED, PT DESATS WHEN BIPAP REMOVED, 
THICK ORAL SECRETION BUILD UP NOTED.

## 2020-05-15 NOTE — PULMONOLOGY PROGRESS NOTE
Subjective


ROS Limited/Unobtainable:  Yes


Interval Events:  Doing poorly; remains on BiPAP


Constitutional:  Reports: no symptoms


HEENT:  Repors: no symptoms


Respiratory:  Reports: dry cough, shortness of breath


Cardiovascular:  Reports: no symptoms


Gastrointestinal/Abdominal:  Reports: no symptoms


Genitourinary:  Reports: no symptoms


Neurologic:  Reports: no symptoms


Allergies:  


Coded Allergies:  


     No Known Allergies (Unverified , 12/15/14)


All Systems:  reviewed and negative except above





Objective





Last 24 Hour Vital Signs








  Date Time  Temp Pulse Resp B/P (MAP) Pulse Ox O2 Delivery O2 Flow Rate FiO2


 


5/15/20 11:30  86 43 119/74 (89) 95   


 


5/15/20 11:12  91 43  94   100


 


5/15/20 11:00  87 38 121/68 (85) 95   


 


5/15/20 10:30  90 39 118/73 (88) 96   


 


5/15/20 10:00  88 36 125/79 (94) 97   


 


5/15/20 09:30  91 40 126/77 (93) 93   


 


5/15/20 09:00  90 39 136/69 (91) 92   


 


5/15/20 08:53  92  131/70    


 


5/15/20 08:30  90 38 131/70 (90) 93   


 


5/15/20 08:00      Bi-pap  


 


5/15/20 08:00        100


 


5/15/20 08:00 98.8 94 43 134/68 (90) 92   


 


5/15/20 07:08     90 Bi-Pap  100


 


5/15/20 07:08  101 44  90   100


 


5/15/20 07:00  93 40 126/89 (101) 96   


 


5/15/20 06:30  90 36 128/75 (92) 96   


 


5/15/20 06:00  96 41 134/74 (94) 97   


 


5/15/20 05:30  87 40 119/64 (82) 99   


 


5/15/20 05:00  90 40 121/70 (87) 97   


 


5/15/20 04:30  82 38 112/67 (82) 95   


 


5/15/20 04:00      Bi-pap  


 


5/15/20 04:00 98.9 78 36 127/57 (80) 90   


 


5/15/20 04:00        100


 


5/15/20 03:32  85      


 


5/15/20 03:00  87 41 113/66 (82) 94   


 


5/15/20 02:35  81 32  99   100


 


5/15/20 02:30  90 36 129/79 (96) 99   


 


5/15/20 02:00  92 36 124/61 (82) 100   


 


5/15/20 01:30  85 36 119/70 (86) 100   


 


5/15/20 01:00  81 35 99/56 (70) 99   


 


5/15/20 00:30  83 36 119/61 (80) 99   


 


5/15/20 00:00      Bi-pap  


 


5/15/20 00:00 98.3 89 34 117/77 (90) 97   


 


5/15/20 00:00        100


 


5/14/20 23:30  85 37 106/66 (79) 100   


 


5/14/20 23:29  86      


 


5/14/20 23:10  86 31  100   100


 


5/14/20 23:00  87 37 101/51 (68) 100   


 


5/14/20 22:30  85 34 96/50 (65) 100   


 


5/14/20 22:00  87 31 93/54 (67) 100   


 


5/14/20 21:30  90 34 112/49 (70) 100   


 


5/14/20 21:00  93 34 103/51 (68) 100   


 


5/14/20 20:30  96 37 98/60 (73) 99   


 


5/14/20 20:09  99  99/54    


 


5/14/20 20:01  98 36 99/54 (69) 100   


 


5/14/20 20:00      Bi-pap  


 


5/14/20 20:00        100


 


5/14/20 20:00 98.7 96 34 79/52 (61) 100   


 


5/14/20 19:31  101      


 


5/14/20 19:30  101 37 92/51 (65) 98   


 


5/14/20 19:15     100 Bi-Pap  100


 


5/14/20 19:15  106 36  94   100


 


5/14/20 19:00  108 40 101/61 (74) 96   


 


5/14/20 18:00  109 45 111/64 (80) 95   


 


5/14/20 17:00      Bi-pap  


 


5/14/20 17:00  114 44 125/76 (92) 95   


 


5/14/20 16:00      Bi-pap  


 


5/14/20 16:00        100


 


5/14/20 16:00  113 44 118/69 (85) 97   


 


5/14/20 16:00  113      


 


5/14/20 16:00 99.6 113 44 118/69 (85) 97   


 


5/14/20 15:15  115 44  95   95


 


5/14/20 15:00  118 40 112/67 (82) 95   


 


5/14/20 14:00  118 44 136/80 (98) 97   


 


5/14/20 13:30  117 44 136/75 (95) 97   


 


5/14/20 13:00  116 45 127/75 (92) 97   


 


5/14/20 12:41  117 46  100   100


 


5/14/20 12:30  116 45 138/81 (100) 97   


 


5/14/20 12:00  118 45 152/85 (107) 98   


 


5/14/20 12:00        100


 


5/14/20 12:00  112      


 


5/14/20 12:00      Bi-pap  

















Intake and Output  


 


 5/14/20 5/15/20





 19:00 07:00


 


Intake Total 1144.67 ml 1128.0736 ml


 


Balance 1144.67 ml 1128.0736 ml


 


  


 


IV Total 1144.67 ml 1128.0736 ml


 


# Voids 665 600


 


# Bowel Movements 4 2








HEENT:  atraumatic, anicteric


Respiratory/Chest:  chest wall non-tender, decreased breath sounds


Cardiovascular:  normal peripheral pulses


Abdomen:  soft, non tender, non distended


Neurologic/Psychiatric:  alert, responsive





Microbiology








 Date/Time


Source Procedure


Growth Status


 


 


 5/14/20 03:00


Urine,Clean Catch Urine Culture - Preliminary


Yeast Species Resulted








Laboratory Tests


5/14/20 19:45: Activated Partial Thromboplast Time 148H


5/15/20 03:35: 


Activated Partial Thromboplast Time 73H, White Blood Count 8.0, Red Blood Count 

2.25L, Hemoglobin 6.3#*L, Hematocrit 20.3#L, Mean Corpuscular Volume 90, Mean 

Corpuscular Hemoglobin 28.0, Mean Corpuscular Hemoglobin Concent 31.0L, Red 

Cell Distribution Width 13.4, Platelet Count 90#L, Mean Platelet Volume 6.3L, 

Neutrophils (%) (Auto) , Lymphocytes (%) (Auto) , Monocytes (%) (Auto) , 

Eosinophils (%) (Auto) , Basophils (%) (Auto) , Differential Total Cells 

Counted 100, Neutrophils % (Manual) 86H, Lymphocytes % (Manual) 9L, Monocytes % 

(Manual) 3, Eosinophils % (Manual) 1, Basophils % (Manual) 0, Band Neutrophils 1

, Nucleated Red Blood Cells , Platelet Estimate DecreasedL, Platelet Morphology 

Normal, Hypochromasia 1+, Sodium Level 150H, Potassium Level 4.1, Chloride 

Level 110H, Carbon Dioxide Level 33H, Anion Gap 7, Blood Urea Nitrogen 54H, 

Creatinine 1.7H, Estimat Glomerular Filtration Rate 48.4, Glucose Level 176H, 

Calcium Level 8.7, Phosphorus Level 2.6, Magnesium Level 2.5H, Total Bilirubin 

0.4, Direct Bilirubin 0.2, Aspartate Amino Transf (AST/SGOT) 27, Alanine 

Aminotransferase (ALT/SGPT) 23, Alkaline Phosphatase 114, Total Protein 6.7, 

Albumin 2.0L


5/15/20 07:00: 


White Blood Count 17.1#H, Red Blood Count 3.23L, Hemoglobin 9.1#L, Hematocrit 

27.9#L, Mean Corpuscular Volume 87, Mean Corpuscular Hemoglobin 28.1, Mean 

Corpuscular Hemoglobin Concent 32.5, Red Cell Distribution Width 12.2, Platelet 

Count 192#, Mean Platelet Volume 7.1, Neutrophils (%) (Auto) , Lymphocytes (%) (

Auto) , Monocytes (%) (Auto) , Eosinophils (%) (Auto) , Basophils (%) (Auto) , 

Differential Total Cells Counted 100, Neutrophils % (Manual) 87H, Lymphocytes % 

(Manual) 8L, Monocytes % (Manual) 4, Eosinophils % (Manual) 1, Basophils % (

Manual) 0, Band Neutrophils 0, Platelet Estimate Adequate, Platelet Morphology 

Normal, Hypochromasia 1+


5/15/20 07:45: 


Arterial Blood pH 7.372, Arterial Blood Partial Pressure CO2 55.0H, Arterial 

Blood Partial Pressure O2 59.3L, Arterial Blood HCO3 31.2H, Arterial Blood 

Oxygen Saturation 88.0*L, Arterial Blood Base Excess 5.0H, Wong Test Positive





Current Medications








 Medications


  (Trade)  Dose


 Ordered  Sig/Aarti


 Route


 PRN Reason  Start Time


 Stop Time Status Last Admin


Dose Admin


 


 Acetaminophen


  (Tylenol)  650 mg  Q6H  PRN


 NG


 Mild Pain (Pain Scale 1-3)  5/14/20 17:00


 6/13/20 16:59  5/14/20 18:23


 


 


 Carvedilol


  (Coreg)  3.125 mg  EVERY 12  HOURS


 NG


   5/14/20 21:00


 5/29/20 20:59  5/15/20 08:53


 


 


 Chlorhexidine


 Gluconate


  (Gloria-Hex 2%)  1 applic  DAILY@2000


 TOPIC


   5/13/20 20:00


 8/11/20 19:59  5/14/20 20:40


 


 


 Dextrose


  (Dextrose 50%)  25 ml  Q30M  PRN


 IV


 Hypoglycemia  5/12/20 13:45


 8/10/20 13:44   


 


 


 Dextrose


  (Dextrose 50%)  50 ml  Q30M  PRN


 IV


 Hypoglycemia  5/12/20 13:45


 8/10/20 13:44   


 


 


 Heparin Sodium/


 Dextrose  500 ml @ 


 23.541 mls/


 hr  ADJUST PER  PROTOCOL


 IV


   5/14/20 21:30


 6/13/20 21:29  5/15/20 09:14


 


 


 Insulin Aspart


  (NovoLOG)    Q6HR


 SUBQ


   5/12/20 18:00


 8/10/20 17:59  5/15/20 05:37


 


 


 Levetiracetam


  (Keppra)  1,000 mg  Q12HR


 NG


   5/14/20 09:00


 6/13/20 08:59  5/15/20 08:52


 


 


 Lorazepam


  (Ativan 2mg/ml


 1ml)  1 mg  Q6H  PRN


 IV


 For Anxiety  5/14/20 13:45


 5/21/20 13:44  5/14/20 13:42


 


 


 Meropenem 1 gm/


 Sodium Chloride  55 ml @ 


 110 mls/hr  Q12HR


 IVPB


   5/13/20 14:00


 5/18/20 13:59  5/15/20 08:52


 


 


 Micafungin Sodium


 100 mg/Sodium


 Chloride  110 ml @ 


 110 mls/hr  Q24H


 IVPB


   5/15/20 12:30


 5/22/20 12:29   


 


 


 Norepinephrine


 Bitartrate 4 mg/


 Dextrose  250 ml @ 0


 mls/hr  Q24H  PRN


 IV


 For hypotension  5/12/20 19:00


 6/11/20 18:59  5/13/20 16:02


 


 


 Sodium Chloride  1,000 ml @ 


 75 mls/hr  [id number]


 IV


   5/12/20 13:15


 6/11/20 09:29  5/15/20 05:37


 


 


 Vancomycin HCl


  (Vanco rx to


 dose)  1 ea  DAILY  PRN


 MISC


 Per rx protocol  5/15/20 11:15


 6/14/20 11:14   


 


 


 Vancomycin/Sodium


 Chloride  275 ml @ 


 137.5 mls/


 hr  ONCE


 IVPB


   5/15/20 12:30


 5/15/20 13:30   


 











Assessment/Plan


Assessment/Plan


IMPRESSION:


1. Cardiomyopathy.


2. Hypotension.


3. Left lung pneumonia.


4. Nursing home resident.


5. Positive COVID-19.





DISCUSSION:


1. Continue isolation


2. Continue current medications.


3. Off pressors.


4. Continue BiPAP; abg adequate


5. Pulmonary hygiene.


6. Broad-spectrum antibiotics.


7. I will follow.


8. Pulmonary hygiene


9. Continue diuresis, on Lasix 40 mg IV BID; 





CXR unchanged


On anticoagulation




















  ______________________________________________


  Hayder Hahn M.D.











Hayder Hahn MD May 15, 2020 12:00

## 2020-05-15 NOTE — CARDIAC ELECTROPHYSIOLOGY PN
Assessment/Plan


Assessment/Plan


1. Septic Shock  due to  EF 40% and sepsis.  BNP is 736.  


    On Abx by Dr. Lindsay. Off Levophed  


    


2. CHF EF 40%.   On Coreg 3.125 bid.  





3. Status post right-sided TONY ICD.    





4. Hypertension  


5. History of CVA with residual weakness.


6. Respiratory failure due to COVID-19 PNA,  on BIPAP on heparin drip


7. Hypernatremia.   


8. Acute renal failure. Cr up from 0.9 to 2.8. 





ODILIA RN





Subjective


Subjective


In Covid isolation in ICU Off levo. On BIPAP 15/5 and heparin drip





Objective





Last 24 Hour Vital Signs








  Date Time  Temp Pulse Resp B/P (MAP) Pulse Ox O2 Delivery O2 Flow Rate FiO2


 


5/15/20 11:30  86 43 119/74 (89) 95   


 


5/15/20 11:12  91 43  94   100


 


5/15/20 11:00  87 38 121/68 (85) 95   


 


5/15/20 10:30  90 39 118/73 (88) 96   


 


5/15/20 10:00  88 36 125/79 (94) 97   


 


5/15/20 09:30  91 40 126/77 (93) 93   


 


5/15/20 09:00  90 39 136/69 (91) 92   


 


5/15/20 08:53  92  131/70    


 


5/15/20 08:30  90 38 131/70 (90) 93   


 


5/15/20 08:00      Bi-pap  


 


5/15/20 08:00        100


 


5/15/20 08:00  91      


 


5/15/20 08:00 98.8 94 43 134/68 (90) 92   


 


5/15/20 07:08     90 Bi-Pap  100


 


5/15/20 07:08  101 44  90   100


 


5/15/20 07:00  93 40 126/89 (101) 96   


 


5/15/20 06:30  90 36 128/75 (92) 96   


 


5/15/20 06:00  96 41 134/74 (94) 97   


 


5/15/20 05:30  87 40 119/64 (82) 99   


 


5/15/20 05:00  90 40 121/70 (87) 97   


 


5/15/20 04:30  82 38 112/67 (82) 95   


 


5/15/20 04:00      Bi-pap  


 


5/15/20 04:00 98.9 78 36 127/57 (80) 90   


 


5/15/20 04:00        100


 


5/15/20 03:32  85      


 


5/15/20 03:00  87 41 113/66 (82) 94   


 


5/15/20 02:35  81 32  99   100


 


5/15/20 02:30  90 36 129/79 (96) 99   


 


5/15/20 02:00  92 36 124/61 (82) 100   


 


5/15/20 01:30  85 36 119/70 (86) 100   


 


5/15/20 01:00  81 35 99/56 (70) 99   


 


5/15/20 00:30  83 36 119/61 (80) 99   


 


5/15/20 00:00      Bi-pap  


 


5/15/20 00:00 98.3 89 34 117/77 (90) 97   


 


5/15/20 00:00        100


 


5/14/20 23:30  85 37 106/66 (79) 100   


 


5/14/20 23:29  86      


 


5/14/20 23:10  86 31  100   100


 


5/14/20 23:00  87 37 101/51 (68) 100   


 


5/14/20 22:30  85 34 96/50 (65) 100   


 


5/14/20 22:00  87 31 93/54 (67) 100   


 


5/14/20 21:30  90 34 112/49 (70) 100   


 


5/14/20 21:00  93 34 103/51 (68) 100   


 


5/14/20 20:30  96 37 98/60 (73) 99   


 


5/14/20 20:09  99  99/54    


 


5/14/20 20:01  98 36 99/54 (69) 100   


 


5/14/20 20:00      Bi-pap  


 


5/14/20 20:00        100


 


5/14/20 20:00 98.7 96 34 79/52 (61) 100   


 


5/14/20 19:31  101      


 


5/14/20 19:30  101 37 92/51 (65) 98   


 


5/14/20 19:15     100 Bi-Pap  100


 


5/14/20 19:15  106 36  94   100


 


5/14/20 19:00  108 40 101/61 (74) 96   


 


5/14/20 18:00  109 45 111/64 (80) 95   


 


5/14/20 17:00      Bi-pap  


 


5/14/20 17:00  114 44 125/76 (92) 95   


 


5/14/20 16:00      Bi-pap  


 


5/14/20 16:00        100


 


5/14/20 16:00  113 44 118/69 (85) 97   


 


5/14/20 16:00  113      


 


5/14/20 16:00 99.6 113 44 118/69 (85) 97   


 


5/14/20 15:15  115 44  95   95


 


5/14/20 15:00  118 40 112/67 (82) 95   

















Intake and Output  


 


 5/14/20 5/15/20





 19:00 07:00


 


Intake Total 1144.67 ml 1128.0736 ml


 


Balance 1144.67 ml 1128.0736 ml


 


  


 


IV Total 1144.67 ml 1128.0736 ml


 


# Voids 665 600


 


# Bowel Movements 4 2











Laboratory Tests








Test


  5/14/20


19:45 5/15/20


03:35 5/15/20


07:00 5/15/20


07:45


 


Activated Partial


Thromboplast Time 148 SEC


(23-33)  H 73 SEC (23-33)


H 


  


 


 


White Blood Count


  


  8.0 K/UL


(4.8-10.8) 17.1 K/UL


(4.8-10.8)  #H 


 


 


Red Blood Count


  


  2.25 M/UL


(4.70-6.10)  L 3.23 M/UL


(4.70-6.10)  L 


 


 


Hemoglobin


  


  6.3 G/DL


(14.2-18.0) 9.1 G/DL


(14.2-18.0)  #L 


 


 


Hematocrit


  


  20.3 %


(42.0-52.0)  #L 27.9 %


(42.0-52.0)  #L 


 


 


Mean Corpuscular Volume  90 FL (80-99)   87 FL (80-99)   


 


Mean Corpuscular Hemoglobin


  


  28.0 PG


(27.0-31.0) 28.1 PG


(27.0-31.0) 


 


 


Mean Corpuscular Hemoglobin


Concent 


  31.0 G/DL


(32.0-36.0)  L 32.5 G/DL


(32.0-36.0) 


 


 


Red Cell Distribution Width


  


  13.4 %


(11.6-14.8) 12.2 %


(11.6-14.8) 


 


 


Platelet Count


  


  90 K/UL


(150-450)  #L 192 K/UL


(150-450)  # 


 


 


Mean Platelet Volume


  


  6.3 FL


(6.5-10.1)  L 7.1 FL


(6.5-10.1) 


 


 


Neutrophils (%) (Auto)


  


  % (45.0-75.0)


  % (45.0-75.0)


  


 


 


Lymphocytes (%) (Auto)


  


  % (20.0-45.0)


  % (20.0-45.0)


  


 


 


Monocytes (%) (Auto)   % (1.0-10.0)    % (1.0-10.0)   


 


Eosinophils (%) (Auto)   % (0.0-3.0)    % (0.0-3.0)   


 


Basophils (%) (Auto)   % (0.0-2.0)    % (0.0-2.0)   


 


Differential Total Cells


Counted 


  100  


  100  


  


 


 


Neutrophils % (Manual)  86 % (45-75)  H 87 % (45-75)  H 


 


Lymphocytes % (Manual)  9 % (20-45)  L 8 % (20-45)  L 


 


Monocytes % (Manual)  3 % (1-10)   4 % (1-10)   


 


Eosinophils % (Manual)  1 % (0-3)   1 % (0-3)   


 


Basophils % (Manual)  0 % (0-2)   0 % (0-2)   


 


Band Neutrophils  1 % (0-8)   0 % (0-8)   


 


Nucleated Red Blood Cells   /100 WBC    


 


Platelet Estimate  Decreased  L Adequate   


 


Platelet Morphology  Normal   Normal   


 


Hypochromasia  1+   1+   


 


Sodium Level


  


  150 MMOL/L


(136-145)  H 


  


 


 


Potassium Level


  


  4.1 MMOL/L


(3.5-5.1) 


  


 


 


Chloride Level


  


  110 MMOL/L


()  H 


  


 


 


Carbon Dioxide Level


  


  33 MMOL/L


(21-32)  H 


  


 


 


Anion Gap


  


  7 mmol/L


(5-15) 


  


 


 


Blood Urea Nitrogen


  


  54 mg/dL


(7-18)  H 


  


 


 


Creatinine


  


  1.7 MG/DL


(0.55-1.30)  H 


  


 


 


Estimat Glomerular Filtration


Rate 


  48.4 mL/min


(>60) 


  


 


 


Glucose Level


  


  176 MG/DL


()  H 


  


 


 


Calcium Level


  


  8.7 MG/DL


(8.5-10.1) 


  


 


 


Phosphorus Level


  


  2.6 MG/DL


(2.5-4.9) 


  


 


 


Magnesium Level


  


  2.5 MG/DL


(1.8-2.4)  H 


  


 


 


Total Bilirubin


  


  0.4 MG/DL


(0.2-1.0) 


  


 


 


Direct Bilirubin


  


  0.2 MG/DL


(0.0-0.3) 


  


 


 


Aspartate Amino Transf


(AST/SGOT) 


  27 U/L (15-37)


  


  


 


 


Alanine Aminotransferase


(ALT/SGPT) 


  23 U/L (12-78)


  


  


 


 


Alkaline Phosphatase


  


  114 U/L


() 


  


 


 


Total Protein


  


  6.7 G/DL


(6.4-8.2) 


  


 


 


Albumin


  


  2.0 G/DL


(3.4-5.0)  L 


  


 


 


Arterial Blood pH


  


  


  


  7.372


(7.350-7.450)


 


Arterial Blood Partial


Pressure CO2 


  


  


  55.0 mmHg


(35.0-45.0)  H


 


Arterial Blood Partial


Pressure O2 


  


  


  59.3 mmHg


(75.0-100.0)  L


 


Arterial Blood HCO3


  


  


  


  31.2 mmol/L


(22.0-26.0)  H


 


Arterial Blood Oxygen


Saturation 


  


  


  88.0 %


()  *L


 


Arterial Blood Base Excess    5.0 (-2-2)  H


 


Wong Test    Positive  











Microbiology








 Date/Time


Source Procedure


Growth Status


 


 


 5/14/20 03:00


Urine,Clean Catch Urine Culture - Preliminary


Yeast Species Resulted








Objective


HEAD AND NECK:  Mild JVD.BIPAP is on


LUNGS:  Decreased breath sounds and rhonchi


CARDIOVASCULAR:  Regular S1 and S2 with no gallop.


ABDOMEN:  Soft.


EXTREMITIES:  1+ pitting edema.  


         Defibrillator  right subclavian.











Tomym Otero MD May 15, 2020 14:06

## 2020-05-15 NOTE — NUR
NURSE NOTES:

LATE ENTRY:

HYGIENE PROVIDED, PT REPOSITIONED, HIGH FOWLERS. NO BM AT THIS TIME. ACCUHK 185, 4 UNITS 
GIVEN.

## 2020-05-15 NOTE — NUR
NURSE NOTES:

No changes in patient condition. Heparin drip continues at 15units. 1/2 NS @ 75ml/hr. Blood 
sugar 190, 4 units Novolog given subQ.

## 2020-05-15 NOTE — NUR
NURSE NOTES:

LATE ENTRY:

PICC INSERTED ROBERT. AWAITING CONFIRMATION FOR USEAGE. PT VSS. WILL CONTINUE TO MONITOR.

## 2020-05-15 NOTE — DIAGNOSTIC IMAGING REPORT
Procedure: XRAY Chest/Abdomen 1v

Reason for study: Reason For Exam: NGT

 

Comparison films:  5/11/2020.

 

FINDINGS:

 

CHEST:

 

Cardiac pacer remains in place. There is an NG tube with the tip in the midesophagus.

Vascularity is normal. Hazy interstitial infiltrates remain bilaterally. Cardiomegaly

unchanged.  No large effusion seen. The bony thorax appear unremarkable.

 

KUB:

 

Only the upper abdomen is shown. Technologist noted that the patient is on BiPAP and

could not be abruptly situated to include the lower abdomen.

 

There is nonspecific bowel gas pattern. There is no mass or mass effect noted.  No

definite pathologic calcifications identified.  There is no signs of free air. No

acute abnormality noted of the visualized osseous structures.

 

IMPRESSION:  

 

NG tube in midesophagus. Recommend advancement.

 

Bilateral interstitial infiltrates unchanged.

 

No gross acute abnormality noted in the upper abdomen.

## 2020-05-15 NOTE — DIAGNOSTIC IMAGING REPORT
Procedure: XRAY Chest 1v

Reason for study: Reason For Exam: COUGH

 

Comparison films:  5/11/2020.

 

FINDINGS:

 

Cardiac pacer remains in place. There is a new NG tube in the stomach. Vascularity is

normal. Bilateral infiltrates unchanged. Cardiac and mediastinal silhouette are

within normal limits. CP angles are sharp.  The bony thorax appear unremarkable.

 

IMPRESSION:  

 

New NG tube in good position. Otherwise no change.

## 2020-05-15 NOTE — NUR
NURSE NOTES:

LATE ENTRY:

 MD MATTHEW HERE TO SEE PT. WAS INFORMED PT HAS PVCS, OCCASIONAL ST. BP STABLE. NO NEW ORDERS 
AT THIS TIME.

## 2020-05-15 NOTE — NUR
NURSE NOTES:

BP:128/75, HR:88, O2:98%. PTT resulted 73; no changes to rate and repeat PTT 5/16 AM per 
protocol.

## 2020-05-15 NOTE — NUR
RD ASSESSMENT & RECOMMENDATIONS

SEE CARE ACTIVITY FOR COMPLETE ASSESSMENT



DAILY ESTIMATED NEEDS:

Needs based on Cardiac, DM, Pulmonary 72kg abw 

25-30  kcals/kg 

3335-6228  total kcals

1-1.5  g protein/kg

  g total protein 

20-25  mL/kg

1527-4780  total fluid mLs



NUTRITION DIAGNOSIS:

* Inadequate oral intake R/T poor appetite, clincal condition, respiratory

status as evidenced by refusing most meals since adm, now on BIPAP.

* Altered nutrition related lab values r/t clinical status, DM, dehydration

as evidenced by elev BG (300, 224 -> 1762, 46), A1C 7.4, elev BUN/creat

(67->54/2.8 ->1.7), febrile-> now afebrile, critically elevated pCO2

(68.0-> improved 55.0).





CURRENT DIET:NPO- PT ON BIPAP    



 



PO DIET RECOMMENDATIONS:

WHEN OFF BIPAP AND SAFE FOR PO ->  liberalized Regular / texture per SLP  





ENTERAL NUTRITION RECOMMENDATIONS:

WHEN OFF BIPAP -> Glucerna 1.5 @ 50ml/hr x 24 hrs  to provide 1200ml, 1800kcal, 99g prot, 
911ml free water 



* WHEN OFF BIPAP, initiate Glucerna 1.5 @ 10ml/hr x 6 hrs, advance 10ml q 4-6 hrs as 
tolerated to goal rate

* HOB over 30 degrees/ water flush of 150ml q 6 hrs

-----

PT AT HIGH RISK FOR REFEEDING SYNDROME, START TF LOW AND INCREASE SLOWLY, CHECK LYTES 
CLOSELY, REPLETE AS NEEDED







PARENTERAL NUTRITION RECOMMENDATIONS:

 **IF PT UNABLE TO WEAN FROM BIPAP, REC INITIATING PARENTERAL NUTRITION 

              - PT IS 21 DAYS WITH 0 TO MINIMAL NUTRITION ->  PROLONGED PO REFUSAL PRIOR TO 
NPO STATUS AND NOW NPO**







ADDITIONAL RECOMMENDATIONS:

1) Check A1C -> pt w/elev BG (300, 224); a1c 7.4 

2) Monitor ability to feed: No/minimal intake x 21 days, pt now on BIPAP 

 -> rec PARENTERAL NUTRITION if unable to wean from BIPAP 

3) Obtain a calibrated bedscale wt 

4) Monitor renal fxn- now improving 

5) rec adjusting IVF: on 1/2NS, Na trending up  

6) Monitor lytes, replete as needed

## 2020-05-15 NOTE — NEPHROLOGY PROGRESS NOTE
Assessment/Plan


Problem List:  


(1) MELVI (acute kidney injury)


(2) Electrolyte imbalance


(3) Suspected COVID-19 virus infection


(4) Hypotension


Assessment:  Resolved





(5) Sepsis


Assessment





Patient's current problem from renal standpoint of view is hypokalemia and 

other electrolyte imbalances





His other conditions:


Patient presented with acute renal failure however it is now resolved


Patient presented with septic shock was on pressors however now resolved


Sepsis leukocytosis improving


Patient has COVID-19 pneumonia


Hypertension


Cardiomyopathy status post AICD


Previous CVA with right residual weakness


Seizure disorders


Plan





COVID-19 positive





May 15 


Remains on BiPAP 


Creatinine down to 1.7 


Not much to add from renal standpoint of view 





May 14:


Remains on BiPAP


Remains in ICU


Serum creatinine slightly higher


Discuss with pulmonologist


Continue per current management 


continue with slow hydration








May 13:


Patient now in ICU room J


On BiPAP


Discussed with RN


Serum creatinine lower 1.9, 


Will recheck renal parameters and electrolytes and chemistries tomorrow


Continue per consultants











May 12: 


Patient is clinically deteriorating 


Respiratory status worsened 


Blood pressure low


Serum creatinine brian 


ABG indicative of CO2 retention 


Discussed with a pulmonologist 


Patient due transfer to monitored bed for initiation of BiPAP 


Meanwhile we will give albumin  bolus and start half-normal saline IV hydration 


May require pressors


Overall prognosis poor


I favor holding the mind altering medications


Per orders





May 11: 


In view of worsening renal failure and rise of creatinine to 1.8 will 

temporarily hold Lasix and lisinopril


Discussed with Dr. Henderson


Continue rest





Previously:


Magnesium and potassium as needed


Stable from renal standpoint of view


Continue per consultants





We will continue to monitor electrolytes and chemistries





Subjective


ROS Limited/Unobtainable:  Yes





Objective


Objective





Last 24 Hour Vital Signs








  Date Time  Temp Pulse Resp B/P (MAP) Pulse Ox O2 Delivery O2 Flow Rate FiO2


 


5/15/20 11:30  86 43 119/74 (89) 95   


 


5/15/20 11:12  91 43  94   100


 


5/15/20 11:00  87 38 121/68 (85) 95   


 


5/15/20 10:30  90 39 118/73 (88) 96   


 


5/15/20 10:00  88 36 125/79 (94) 97   


 


5/15/20 09:30  91 40 126/77 (93) 93   


 


5/15/20 09:00  90 39 136/69 (91) 92   


 


5/15/20 08:53  92  131/70    


 


5/15/20 08:30  90 38 131/70 (90) 93   


 


5/15/20 08:00        100


 


5/15/20 08:00 98.8 94 43 134/68 (90) 92   


 


5/15/20 07:08     90 Bi-Pap  100


 


5/15/20 07:08  101 44  90   100


 


5/15/20 07:00  93 40 126/89 (101) 96   


 


5/15/20 06:30  90 36 128/75 (92) 96   


 


5/15/20 06:00  96 41 134/74 (94) 97   


 


5/15/20 05:30  87 40 119/64 (82) 99   


 


5/15/20 05:00  90 40 121/70 (87) 97   


 


5/15/20 04:30  82 38 112/67 (82) 95   


 


5/15/20 04:00      Bi-pap  


 


5/15/20 04:00 98.9 78 36 127/57 (80) 90   


 


5/15/20 04:00        100


 


5/15/20 03:32  85      


 


5/15/20 03:00  87 41 113/66 (82) 94   


 


5/15/20 02:35  81 32  99   100


 


5/15/20 02:30  90 36 129/79 (96) 99   


 


5/15/20 02:00  92 36 124/61 (82) 100   


 


5/15/20 01:30  85 36 119/70 (86) 100   


 


5/15/20 01:00  81 35 99/56 (70) 99   


 


5/15/20 00:30  83 36 119/61 (80) 99   


 


5/15/20 00:00      Bi-pap  


 


5/15/20 00:00 98.3 89 34 117/77 (90) 97   


 


5/15/20 00:00        100


 


5/14/20 23:30  85 37 106/66 (79) 100   


 


5/14/20 23:29  86      


 


5/14/20 23:10  86 31  100   100


 


5/14/20 23:00  87 37 101/51 (68) 100   


 


5/14/20 22:30  85 34 96/50 (65) 100   


 


5/14/20 22:00  87 31 93/54 (67) 100   


 


5/14/20 21:30  90 34 112/49 (70) 100   


 


5/14/20 21:00  93 34 103/51 (68) 100   


 


5/14/20 20:30  96 37 98/60 (73) 99   


 


5/14/20 20:09  99  99/54    


 


5/14/20 20:01  98 36 99/54 (69) 100   


 


5/14/20 20:00      Bi-pap  


 


5/14/20 20:00        100


 


5/14/20 20:00 98.7 96 34 79/52 (61) 100   


 


5/14/20 19:31  101      


 


5/14/20 19:30  101 37 92/51 (65) 98   


 


5/14/20 19:15     100 Bi-Pap  100


 


5/14/20 19:15  106 36  94   100


 


5/14/20 19:00  108 40 101/61 (74) 96   


 


5/14/20 18:00  109 45 111/64 (80) 95   


 


5/14/20 17:00      Bi-pap  


 


5/14/20 17:00  114 44 125/76 (92) 95   


 


5/14/20 16:00      Bi-pap  


 


5/14/20 16:00        100


 


5/14/20 16:00  113 44 118/69 (85) 97   


 


5/14/20 16:00  113      


 


5/14/20 16:00 99.6 113 44 118/69 (85) 97   


 


5/14/20 15:15  115 44  95   95


 


5/14/20 15:00  118 40 112/67 (82) 95   


 


5/14/20 14:00  118 44 136/80 (98) 97   


 


5/14/20 13:30  117 44 136/75 (95) 97   


 


5/14/20 13:00  116 45 127/75 (92) 97   


 


5/14/20 12:41  117 46  100   100


 


5/14/20 12:30  116 45 138/81 (100) 97   


 


5/14/20 12:00  118 45 152/85 (107) 98   


 


5/14/20 12:00        100


 


5/14/20 12:00  112      


 


5/14/20 12:00      Bi-pap  

















Intake and Output  


 


 5/14/20 5/15/20





 19:00 07:00


 


Intake Total 1144.67 ml 1128.0736 ml


 


Balance 1144.67 ml 1128.0736 ml


 


  


 


IV Total 1144.67 ml 1128.0736 ml


 


# Voids 665 600


 


# Bowel Movements 4 2








Laboratory Tests


5/14/20 19:45: Activated Partial Thromboplast Time 148H


5/15/20 03:35: 


Activated Partial Thromboplast Time 73H, White Blood Count 8.0, Red Blood Count 

2.25L, Hemoglobin 6.3#*L, Hematocrit 20.3#L, Mean Corpuscular Volume 90, Mean 

Corpuscular Hemoglobin 28.0, Mean Corpuscular Hemoglobin Concent 31.0L, Red 

Cell Distribution Width 13.4, Platelet Count 90#L, Mean Platelet Volume 6.3L, 

Neutrophils (%) (Auto) , Lymphocytes (%) (Auto) , Monocytes (%) (Auto) , 

Eosinophils (%) (Auto) , Basophils (%) (Auto) , Differential Total Cells 

Counted 100, Neutrophils % (Manual) 86H, Lymphocytes % (Manual) 9L, Monocytes % 

(Manual) 3, Eosinophils % (Manual) 1, Basophils % (Manual) 0, Band Neutrophils 1

, Nucleated Red Blood Cells , Platelet Estimate DecreasedL, Platelet Morphology 

Normal, Hypochromasia 1+, Sodium Level 150H, Potassium Level 4.1, Chloride 

Level 110H, Carbon Dioxide Level 33H, Anion Gap 7, Blood Urea Nitrogen 54H, 

Creatinine 1.7H, Estimat Glomerular Filtration Rate 48.4, Glucose Level 176H, 

Calcium Level 8.7, Phosphorus Level 2.6, Magnesium Level 2.5H, Total Bilirubin 

0.4, Direct Bilirubin 0.2, Aspartate Amino Transf (AST/SGOT) 27, Alanine 

Aminotransferase (ALT/SGPT) 23, Alkaline Phosphatase 114, Total Protein 6.7, 

Albumin 2.0L


5/15/20 07:00: 


White Blood Count 17.1#H, Red Blood Count 3.23L, Hemoglobin 9.1#L, Hematocrit 

27.9#L, Mean Corpuscular Volume 87, Mean Corpuscular Hemoglobin 28.1, Mean 

Corpuscular Hemoglobin Concent 32.5, Red Cell Distribution Width 12.2, Platelet 

Count 192#, Mean Platelet Volume 7.1, Neutrophils (%) (Auto) , Lymphocytes (%) (

Auto) , Monocytes (%) (Auto) , Eosinophils (%) (Auto) , Basophils (%) (Auto) , 

Differential Total Cells Counted 100, Neutrophils % (Manual) 87H, Lymphocytes % 

(Manual) 8L, Monocytes % (Manual) 4, Eosinophils % (Manual) 1, Basophils % (

Manual) 0, Band Neutrophils 0, Platelet Estimate Adequate, Platelet Morphology 

Normal, Hypochromasia 1+


5/15/20 07:45: 


Arterial Blood pH 7.372, Arterial Blood Partial Pressure CO2 55.0H, Arterial 

Blood Partial Pressure O2 59.3L, Arterial Blood HCO3 31.2H, Arterial Blood 

Oxygen Saturation 88.0*L, Arterial Blood Base Excess 5.0H, Wong Test Positive


Height (Feet):  5


Height (Inches):  8.00


Weight (Pounds):  173


General Appearance:  mild distress


EENT:  other


Cardiovascular:  tachycardia


Respiratory/Chest:  decreased breath sounds


Abdomen:  distended


Objective


No change











Miguel Ángel Kendall MD May 15, 2020 11:50

## 2020-05-15 NOTE — PRE-PROCEDURE NOTE/ATTESTATION
Pre-Procedure Note/Attestation


Complete Prior to Procedure


Planned Procedure:  not applicable


Procedure Narrative:


PICC line





Indications for Procedure


Pre-Operative Diagnosis:


need iv access





Attestation


informed consent obtained by primary team.  this was confirmed prior to 

procedure.  








I attest that I re-evaluated the patient just prior to the surgery and that 

there has been no change in the patient's H&P, except as documented below:











Billy Colon M.D. May 15, 2020 16:55

## 2020-05-15 NOTE — NUR
NURSE NOTES:

Received report from [place] RN. Patient in bed sleeping able to responds to verbal and 
tactile stimuli. On BiPAP 15/5 @100%, O2 saturation %. HOB elevated.  L NGT in place. 
NPO.  Swenson catheter in place and draining. Left  Upper Arm PICC line intact running 1/2 NS 
@ 75ml/hr and Heparin @ 15units. Next timed   PTT in am. Left soft non violent restraint 
intact checked.  Safety measures in place. Airborne, contact isolation maintained and 
observed. Will continue plan of care.

## 2020-05-15 NOTE — NUR
CASE MANAGEMENT: REVIEW 



5/15/2020



SI:SEVERE SEPSIS. 

T 99 HR 86 RR 41 B/P 135/85 SATS 97% ON BIPAP FIO2 100 

LABS: WBC 17.1   CO2 33 BUN 54 CR 1.7  MG 2.5 



IS:NS @ 75 ML/HR 

COREG NG Q12H 

HEPARIN DRIP PER PARAMETERS 

MEROPENEM IV Q12H 

KEPPRA NG Q12H 

INSULIN ASPART SUBQ Q6H 

LEVOPHED IV PER PARAMETERS 



***ICU** 



PLAN OF CARE: 

Continue isolation

Continue current medications

Continue BiPAP

Pulmonary hygiene

Broad-spectrum antibiotics

## 2020-05-15 NOTE — NUR
NURSE NOTES:

On BiPAP 15/5 @100%, O2 saturation %. HOB elevated.  L NGT in place. NPO.  Swenson 
catheter in place and draining. Left  Upper Arm PICC line intact running 1/2 NS @ 75ml/hr 
and Heparin @ 15units. Safety measures in place. Will continue plan of care.

## 2020-05-15 NOTE — INFECTIOUS DISEASES PROG NOTE
Assessment/Plan


Assessment/Plan





Assessment:


Septic shock-recurrent- off pressors now





Fever;improving


Leukocytosis, fluctuating; increased


   -5/14 uA wbc 5-10, nit neg, leuk +2; ucx yeast sp


   -5/3 u/a wbc 15-20, nit neg, leuk +1


          Bcx Neg


   -u/a neg


   -Bcx Neg





Probable PNA- 2ry to COVID19 (from [facility] with confirmed cases)


Acute hypoxic respiratory failure- was on NC, now on NRB- increasing FIo2 

requiremetns


    -5/11 CXR:  Minimally improved left, unchanged right infiltrates, since 

prior exam of 3 days earlier


    -5/8 CXR: Unchanged bilateral infiltrates, likely pneumonia, since prior 

exam of 3 days earlier.


    -5/5 CXR:  Bilateral interstitial and airspace infiltrates are again 

demonstrated.Appearance is overall unchanged. There may be a small left pleural 

effusion,unchange


   -5/4 CRP 41.6, ferritin 1468


  -5/3 CXR: .  Worsening bilateral interstitial and airspace opacities. 

Probable small left pleural effusion.  Difficult to evaluate the right 

costophrenic angle due to overlying pacemaker.


  -4/30 CXR: Slightly improved bilateral interstitial and airspace infiltrates 

versus edema


  -4/28 CXR: Bilateral mid and lower lung interstitial disease, new since prior 

study.Possible developing left pleural effusion


        Ferritn >2000, CRP 22.3


  -4/24 SARS-COV2 PCR +


  -CXR: Left basilar atelectasis and/or infiltrate


 


MELVI, worsening





 HTN


cadiomyopathy s/p AICD


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


seizure disorder


[facility] 








Plan:


-Cont empiric IV Meropenem #3 and add IV Vancomycin and Micafungin given 

recurrent leukocytosis


-Consider steroids


   -5/10 SP IV Vancomycin #5


   -5/7 SP Zosyn #5


   - SPSolumedrol 40mg IV 12hrs  (5/4-5/6); dc'ed by pulmonologist - (day 10 of 

illness and worsening; around this time is seen the worsening of COVID19 pts 

mainly driven by inflammatory response) 


   -4/29 SP Cefepime #6


   -4/27 SP IV Vancomycin #4  





-f/u cx


-Monitor CBC/CMP, temperatures


-COVID 19 precautions


-clarify goals of care


-aspiration precautions


-inflammatory markers


-f/u cx


-monitor CXR


-f/u Bcx x2, sp cx





Thank you for consulting [facility]. Will continue to follow along with 

you.





Discussed with RN,





Subjective


Allergies:  


Coded Allergies:  


     No Known Allergies (Unverified , 12/15/14)


Subjective


afebrile in ~36hrs


remains on BIpap at 100%


wbc increased





Objective


Vital Signs





Last 24 Hour Vital Signs








  Date Time  Temp Pulse Resp B/P (MAP) Pulse Ox O2 Delivery O2 Flow Rate FiO2


 


5/15/20 08:53  92  131/70    


 


5/15/20 08:00        100


 


5/15/20 07:08     90 Bi-Pap  100


 


5/15/20 07:08  101 44  90   100


 


5/15/20 07:00  93 40 126/89 (101) 96   


 


5/15/20 06:30  90 36 128/75 (92) 96   


 


5/15/20 06:00  96 41 134/74 (94) 97   


 


5/15/20 05:30  87 40 119/64 (82) 99   


 


5/15/20 05:00  90 40 121/70 (87) 97   


 


5/15/20 04:30  82 38 112/67 (82) 95   


 


5/15/20 04:00      Bi-pap  


 


5/15/20 04:00 98.9 78 36 127/57 (80) 90   


 


5/15/20 04:00        100


 


5/15/20 03:32  85      


 


5/15/20 03:00  87 41 113/66 (82) 94   


 


5/15/20 02:35  81 32  99   100


 


5/15/20 02:30  90 36 129/79 (96) 99   


 


5/15/20 02:00  92 36 124/61 (82) 100   


 


5/15/20 01:30  85 36 119/70 (86) 100   


 


5/15/20 01:00  81 35 99/56 (70) 99   


 


5/15/20 00:30  83 36 119/61 (80) 99   


 


5/15/20 00:00      Bi-pap  


 


5/15/20 00:00 98.3 89 34 117/77 (90) 97   


 


5/15/20 00:00        100


 


5/14/20 23:30  85 37 106/66 (79) 100   


 


5/14/20 23:29  86      


 


5/14/20 23:10  86 31  100   100


 


5/14/20 23:00  87 37 101/51 (68) 100   


 


5/14/20 22:30  85 34 96/50 (65) 100   


 


5/14/20 22:00  87 31 93/54 (67) 100   


 


5/14/20 21:30  90 34 112/49 (70) 100   


 


5/14/20 21:00  93 34 103/51 (68) 100   


 


5/14/20 20:30  96 37 98/60 (73) 99   


 


5/14/20 20:09  99  99/54    


 


5/14/20 20:01  98 36 99/54 (69) 100   


 


5/14/20 20:00      Bi-pap  


 


5/14/20 20:00        100


 


5/14/20 20:00 98.7 96 34 79/52 (61) 100   


 


5/14/20 19:31  101      


 


5/14/20 19:30  101 37 92/51 (65) 98   


 


5/14/20 19:15     100 Bi-Pap  100


 


5/14/20 19:15  106 36  94   100


 


5/14/20 19:00  108 40 101/61 (74) 96   


 


5/14/20 18:00  109 45 111/64 (80) 95   


 


5/14/20 17:00      Bi-pap  


 


5/14/20 17:00  114 44 125/76 (92) 95   


 


5/14/20 16:00      Bi-pap  


 


5/14/20 16:00        100


 


5/14/20 16:00  113 44 118/69 (85) 97   


 


5/14/20 16:00  113      


 


5/14/20 16:00 99.6 113 44 118/69 (85) 97   


 


5/14/20 15:15  115 44  95   95


 


5/14/20 15:00  118 40 112/67 (82) 95   


 


5/14/20 14:00  118 44 136/80 (98) 97   


 


5/14/20 13:30  117 44 136/75 (95) 97   


 


5/14/20 13:00  116 45 127/75 (92) 97   


 


5/14/20 12:41  117 46  100   100


 


5/14/20 12:30  116 45 138/81 (100) 97   


 


5/14/20 12:00  118 45 152/85 (107) 98   


 


5/14/20 12:00        100


 


5/14/20 12:00  112      


 


5/14/20 12:00      Bi-pap  


 


5/14/20 11:30  120 42 148/80 (102) 89   








Height (Feet):  5


Height (Inches):  8.00


Weight (Pounds):  173


Objective


 not examined to limit COVID19 exposure





Microbiology








 Date/Time


Source Procedure


Growth Status


 


 


 5/14/20 03:00


Urine,Clean Catch Urine Culture - Preliminary


Yeast Species Resulted











Laboratory Tests








Test


  5/14/20


11:34 5/14/20


19:45 5/15/20


03:35 5/15/20


07:00


 


Activated Partial


Thromboplast Time 38 SEC (23-33)


H 148 SEC


(23-33)  H 73 SEC (23-33)


H 


 


 


White Blood Count


  


  


  8.0 K/UL


(4.8-10.8) 17.1 K/UL


(4.8-10.8)  #H


 


Red Blood Count


  


  


  2.25 M/UL


(4.70-6.10)  L 3.23 M/UL


(4.70-6.10)  L


 


Hemoglobin


  


  


  6.3 G/DL


(14.2-18.0) 9.1 G/DL


(14.2-18.0)  #L


 


Hematocrit


  


  


  20.3 %


(42.0-52.0)  #L 27.9 %


(42.0-52.0)  #L


 


Mean Corpuscular Volume   90 FL (80-99)   87 FL (80-99)  


 


Mean Corpuscular Hemoglobin


  


  


  28.0 PG


(27.0-31.0) 28.1 PG


(27.0-31.0)


 


Mean Corpuscular Hemoglobin


Concent 


  


  31.0 G/DL


(32.0-36.0)  L 32.5 G/DL


(32.0-36.0)


 


Red Cell Distribution Width


  


  


  13.4 %


(11.6-14.8) 12.2 %


(11.6-14.8)


 


Platelet Count


  


  


  90 K/UL


(150-450)  #L 192 K/UL


(150-450)  #


 


Mean Platelet Volume


  


  


  6.3 FL


(6.5-10.1)  L 7.1 FL


(6.5-10.1)


 


Neutrophils (%) (Auto)


  


  


  % (45.0-75.0)


  % (45.0-75.0)


 


 


Lymphocytes (%) (Auto)


  


  


  % (20.0-45.0)


  % (20.0-45.0)


 


 


Monocytes (%) (Auto)    % (1.0-10.0)    % (1.0-10.0)  


 


Eosinophils (%) (Auto)    % (0.0-3.0)    % (0.0-3.0)  


 


Basophils (%) (Auto)    % (0.0-2.0)    % (0.0-2.0)  


 


Differential Total Cells


Counted 


  


  100  


  100  


 


 


Neutrophils % (Manual)   86 % (45-75)  H 87 % (45-75)  H


 


Lymphocytes % (Manual)   9 % (20-45)  L 8 % (20-45)  L


 


Monocytes % (Manual)   3 % (1-10)   4 % (1-10)  


 


Eosinophils % (Manual)   1 % (0-3)   1 % (0-3)  


 


Basophils % (Manual)   0 % (0-2)   0 % (0-2)  


 


Band Neutrophils   1 % (0-8)   0 % (0-8)  


 


Nucleated Red Blood Cells    /100 WBC   


 


Platelet Estimate   Decreased  L Adequate  


 


Platelet Morphology   Normal   Normal  


 


Hypochromasia   1+   1+  


 


Sodium Level


  


  


  150 MMOL/L


(136-145)  H 


 


 


Potassium Level


  


  


  4.1 MMOL/L


(3.5-5.1) 


 


 


Chloride Level


  


  


  110 MMOL/L


()  H 


 


 


Carbon Dioxide Level


  


  


  33 MMOL/L


(21-32)  H 


 


 


Anion Gap


  


  


  7 mmol/L


(5-15) 


 


 


Blood Urea Nitrogen


  


  


  54 mg/dL


(7-18)  H 


 


 


Creatinine


  


  


  1.7 MG/DL


(0.55-1.30)  H 


 


 


Estimat Glomerular Filtration


Rate 


  


  48.4 mL/min


(>60) 


 


 


Glucose Level


  


  


  176 MG/DL


()  H 


 


 


Calcium Level


  


  


  8.7 MG/DL


(8.5-10.1) 


 


 


Phosphorus Level


  


  


  2.6 MG/DL


(2.5-4.9) 


 


 


Magnesium Level


  


  


  2.5 MG/DL


(1.8-2.4)  H 


 


 


Total Bilirubin


  


  


  0.4 MG/DL


(0.2-1.0) 


 


 


Direct Bilirubin


  


  


  0.2 MG/DL


(0.0-0.3) 


 


 


Aspartate Amino Transf


(AST/SGOT) 


  


  27 U/L (15-37)


  


 


 


Alanine Aminotransferase


(ALT/SGPT) 


  


  23 U/L (12-78)


  


 


 


Alkaline Phosphatase


  


  


  114 U/L


() 


 


 


Total Protein


  


  


  6.7 G/DL


(6.4-8.2) 


 


 


Albumin


  


  


  2.0 G/DL


(3.4-5.0)  L 


 


 


Test


  5/15/20


07:45 


  


  


 


 


Arterial Blood pH


  7.372


(7.350-7.450) 


  


  


 


 


Arterial Blood Partial


Pressure CO2 55.0 mmHg


(35.0-45.0)  H 


  


  


 


 


Arterial Blood Partial


Pressure O2 59.3 mmHg


(75.0-100.0)  L 


  


  


 


 


Arterial Blood HCO3


  31.2 mmol/L


(22.0-26.0)  H 


  


  


 


 


Arterial Blood Oxygen


Saturation 88.0 %


()  *L 


  


  


 


 


Arterial Blood Base Excess 5.0 (-2-2)  H   


 


Wong Test Positive     











Current Medications








 Medications


  (Trade)  Dose


 Ordered  Sig/Aarti


 Route


 PRN Reason  Start Time


 Stop Time Status Last Admin


Dose Admin


 


 Acetaminophen


  (Tylenol)  650 mg  Q6H  PRN


 NG


 Mild Pain (Pain Scale 1-3)  5/14/20 17:00


 6/13/20 16:59  5/14/20 18:23


 


 


 Carvedilol


  (Coreg)  3.125 mg  EVERY 12  HOURS


 NG


   5/14/20 21:00


 5/29/20 20:59  5/15/20 08:53


 


 


 Chlorhexidine


 Gluconate


  (Gloria-Hex 2%)  1 applic  DAILY@2000


 TOPIC


   5/13/20 20:00


 8/11/20 19:59  5/14/20 20:40


 


 


 Dextrose


  (Dextrose 50%)  25 ml  Q30M  PRN


 IV


 Hypoglycemia  5/12/20 13:45


 8/10/20 13:44   


 


 


 Dextrose


  (Dextrose 50%)  50 ml  Q30M  PRN


 IV


 Hypoglycemia  5/12/20 13:45


 8/10/20 13:44   


 


 


 Heparin Sodium/


 Dextrose  500 ml @ 


 23.541 mls/


 hr  ADJUST PER  PROTOCOL


 IV


   5/14/20 21:30


 6/13/20 21:29  5/15/20 09:14


 


 


 Insulin Aspart


  (NovoLOG)    Q6HR


 SUBQ


   5/12/20 18:00


 8/10/20 17:59  5/15/20 05:37


 


 


 Levetiracetam


  (Keppra)  1,000 mg  Q12HR


 NG


   5/14/20 09:00


 6/13/20 08:59  5/15/20 08:52


 


 


 Lorazepam


  (Ativan 2mg/ml


 1ml)  1 mg  Q6H  PRN


 IV


 For Anxiety  5/14/20 13:45


 5/21/20 13:44  5/14/20 13:42


 


 


 Meropenem 1 gm/


 Sodium Chloride  55 ml @ 


 110 mls/hr  Q12HR


 IVPB


   5/13/20 14:00


 5/18/20 13:59  5/15/20 08:52


 


 


 Norepinephrine


 Bitartrate 4 mg/


 Dextrose  250 ml @ 0


 mls/hr  Q24H  PRN


 IV


 For hypotension  5/12/20 19:00


 6/11/20 18:59  5/13/20 16:02


 


 


 Sodium Chloride  1,000 ml @ 


 75 mls/hr  [id number]


 IV


   5/12/20 13:15


 6/11/20 09:29  5/15/20 05:37


 

















Naila Lindsay M.D. May 15, 2020 11:12

## 2020-05-15 NOTE — NUR
NURSE NOTES:

Bed bath given with bahman-hex, linens changed, turned and repositioned. Patient is 
comfortable.

## 2020-05-15 NOTE — DIAGNOSTIC IMAGING REPORT
Indication: Shortness of breath

 

Technique: XRAY Chest 1v

 

Comparison: 5/14/2020

 

Findings: Heart size and mediastinal contours are stable. Atherosclerotic

calcifications in right-sided pacer/AICD again noted. There is interstitial

opacification/edema which appears increased slightly compared to the prior exam.

There are patchy perihilar airspace opacities. Some dense opacities the left base are

noted. No significant pleural effusion. No evidence of pneumothorax. NG tube remains

in place.

 

IMPRESSION: 

Interstitial and bilateral airspace disease, slightly increased compared to one day

prior. Findings may related to CHF and/or multifocal pneumonia.

 

 NG tube remains in place, satisfactory in position.

 

Cardiomegaly and indwelling pacer/AICD.

## 2020-05-15 NOTE — NUR
NURSE NOTES:

Significant drop in hgb, ordered a re-draw. (occurred previously and hgb resulted within 
normal range)

## 2020-05-15 NOTE — NUR
NURSE NOTES:

On BiPAP 15/5 @100%, O2 saturation %. HOB elevated.  L NGT in place. NPO.  Swenson 
catheter in place and draining. Left  Upper Arm PICC line intact running 1/2 NS @ 75ml/hr 
and Heparin @ 15units. No bleeding.Safety measures in place. Will continue plan of care.

## 2020-05-15 NOTE — NUR
RESPIRATORY NOTE:

Received pt on Bipap 15/5-100%FiO2-back up rate 16, saturated at 90%. Pt is resting bed, 
labor/shallow breathing noted RR 43-45bpm. No redness or skin breakdown noted. New foam 
tapes applied on nose bridge, cheeks and chin. Adjusted the mask to release pressure. RN 
Nessa aware of labor breathing. Alarms are set and audible, Bipap is plugged into the 
red outlet, ambu bag is at bedside. Will continue to monitor.

## 2020-05-15 NOTE — DIAGNOSTIC IMAGING REPORT
Indications: Needs long-term IV access

 

Technique: Procedure performed at bedside. Procedural timeout performed. Ultrasound

confirms patent compressible left basilic vein. Total sterile technique, including

sterile probe cover and sterile gel, sterile gloves, hand hygiene, hat, mask,,

sterile gown, large sterile drape, and preparation with 2% chlorhexidine utilized.

Local anesthesia with 1% lidocaine. Under real-time ultrasound guidance, puncture

left basilic vein using 21-gauge needle, passage 0.018 guidewire, exchange for 5

[place] peel-away sheath. 4 [place] dual-lumen power PICC cut to 40 cm. It was inserted

through the peel-away sheath. Peel-away sheath and guidewire removed. Catheter fixed

to the skin. Both catheter ports aspirated and flushed. Patient tolerated procedure

well, without immediate complication. Followup chest x-ray obtained, documents

catheter tip position at the innominate venous confluence. Additional findings not

significantly changed from radiograph earlier today.

 

Impression: Successful bedside placement of 4 [place] double-lumen PICC under

sonographic guidance, as described above.

 

Catheter cleared for immediate use.

## 2020-05-15 NOTE — NUR
NURSE NOTES:

LATE ENTRY:

MD SARGENT HERE TO SEE PT. INFORMED PT TACHYPNEIC. RR40'S, ABG: Lauren 55, PO2 59. BIPAP 100% 
FI02. labored. NO NEW ORDERS.

## 2020-05-16 VITALS — SYSTOLIC BLOOD PRESSURE: 87 MMHG | DIASTOLIC BLOOD PRESSURE: 61 MMHG

## 2020-05-16 VITALS — SYSTOLIC BLOOD PRESSURE: 133 MMHG | DIASTOLIC BLOOD PRESSURE: 64 MMHG

## 2020-05-16 VITALS — SYSTOLIC BLOOD PRESSURE: 96 MMHG | DIASTOLIC BLOOD PRESSURE: 60 MMHG

## 2020-05-16 VITALS — SYSTOLIC BLOOD PRESSURE: 137 MMHG | DIASTOLIC BLOOD PRESSURE: 84 MMHG

## 2020-05-16 VITALS — DIASTOLIC BLOOD PRESSURE: 69 MMHG | SYSTOLIC BLOOD PRESSURE: 128 MMHG

## 2020-05-16 VITALS — SYSTOLIC BLOOD PRESSURE: 106 MMHG | DIASTOLIC BLOOD PRESSURE: 69 MMHG

## 2020-05-16 VITALS — SYSTOLIC BLOOD PRESSURE: 99 MMHG | DIASTOLIC BLOOD PRESSURE: 54 MMHG

## 2020-05-16 VITALS — SYSTOLIC BLOOD PRESSURE: 83 MMHG | DIASTOLIC BLOOD PRESSURE: 53 MMHG

## 2020-05-16 VITALS — SYSTOLIC BLOOD PRESSURE: 84 MMHG | DIASTOLIC BLOOD PRESSURE: 61 MMHG

## 2020-05-16 VITALS — DIASTOLIC BLOOD PRESSURE: 66 MMHG | SYSTOLIC BLOOD PRESSURE: 94 MMHG

## 2020-05-16 VITALS — DIASTOLIC BLOOD PRESSURE: 79 MMHG | SYSTOLIC BLOOD PRESSURE: 117 MMHG

## 2020-05-16 VITALS — SYSTOLIC BLOOD PRESSURE: 110 MMHG | DIASTOLIC BLOOD PRESSURE: 59 MMHG

## 2020-05-16 VITALS — SYSTOLIC BLOOD PRESSURE: 96 MMHG | DIASTOLIC BLOOD PRESSURE: 58 MMHG

## 2020-05-16 VITALS — DIASTOLIC BLOOD PRESSURE: 50 MMHG | SYSTOLIC BLOOD PRESSURE: 94 MMHG

## 2020-05-16 VITALS — DIASTOLIC BLOOD PRESSURE: 58 MMHG | SYSTOLIC BLOOD PRESSURE: 112 MMHG

## 2020-05-16 VITALS — DIASTOLIC BLOOD PRESSURE: 75 MMHG | SYSTOLIC BLOOD PRESSURE: 117 MMHG

## 2020-05-16 VITALS — DIASTOLIC BLOOD PRESSURE: 72 MMHG | SYSTOLIC BLOOD PRESSURE: 120 MMHG

## 2020-05-16 VITALS — DIASTOLIC BLOOD PRESSURE: 91 MMHG | SYSTOLIC BLOOD PRESSURE: 144 MMHG

## 2020-05-16 VITALS — SYSTOLIC BLOOD PRESSURE: 92 MMHG | DIASTOLIC BLOOD PRESSURE: 63 MMHG

## 2020-05-16 VITALS — DIASTOLIC BLOOD PRESSURE: 74 MMHG | SYSTOLIC BLOOD PRESSURE: 136 MMHG

## 2020-05-16 VITALS — SYSTOLIC BLOOD PRESSURE: 117 MMHG | DIASTOLIC BLOOD PRESSURE: 60 MMHG

## 2020-05-16 LAB
ADD MANUAL DIFF: YES
ALBUMIN SERPL-MCNC: 1.8 G/DL (ref 3.4–5)
ALP SERPL-CCNC: 144 U/L (ref 46–116)
ALT SERPL-CCNC: 20 U/L (ref 12–78)
ANION GAP SERPL CALC-SCNC: 8 MMOL/L (ref 5–15)
AST SERPL-CCNC: 22 U/L (ref 15–37)
BILIRUB DIRECT SERPL-MCNC: 0.2 MG/DL (ref 0–0.3)
BILIRUB SERPL-MCNC: 0.4 MG/DL (ref 0.2–1)
BUN SERPL-MCNC: 49 MG/DL (ref 7–18)
CALCIUM SERPL-MCNC: 8.4 MG/DL (ref 8.5–10.1)
CHLORIDE SERPL-SCNC: 109 MMOL/L (ref 98–107)
CO2 SERPL-SCNC: 30 MMOL/L (ref 21–32)
CREAT SERPL-MCNC: 1.7 MG/DL (ref 0.55–1.3)
ERYTHROCYTE [DISTWIDTH] IN BLOOD BY AUTOMATED COUNT: 11.9 % (ref 11.6–14.8)
HCT VFR BLD CALC: 27.2 % (ref 42–52)
HGB BLD-MCNC: 8.9 G/DL (ref 14.2–18)
MCV RBC AUTO: 86 FL (ref 80–99)
PHOSPHATE SERPL-MCNC: 3.2 MG/DL (ref 2.5–4.9)
PLATELET # BLD: 238 K/UL (ref 150–450)
POTASSIUM SERPL-SCNC: 4.4 MMOL/L (ref 3.5–5.1)
RBC # BLD AUTO: 3.17 M/UL (ref 4.7–6.1)
SODIUM SERPL-SCNC: 147 MMOL/L (ref 136–145)
WBC # BLD AUTO: 18.1 K/UL (ref 4.8–10.8)

## 2020-05-16 PROCEDURE — 0BH17EZ INSERTION OF ENDOTRACHEAL AIRWAY INTO TRACHEA, VIA NATURAL OR ARTIFICIAL OPENING: ICD-10-PCS

## 2020-05-16 PROCEDURE — 5A12012 PERFORMANCE OF CARDIAC OUTPUT, SINGLE, MANUAL: ICD-10-PCS

## 2020-05-16 RX ADMIN — INSULIN ASPART SCH UNITS: 100 INJECTION, SOLUTION INTRAVENOUS; SUBCUTANEOUS at 00:00

## 2020-05-16 RX ADMIN — MEROPENEM SCH MLS/HR: 1 INJECTION INTRAVENOUS at 08:25

## 2020-05-16 RX ADMIN — LEVETIRACETAM SCH MG: 100 SOLUTION ORAL at 08:25

## 2020-05-16 RX ADMIN — INSULIN ASPART SCH UNITS: 100 INJECTION, SOLUTION INTRAVENOUS; SUBCUTANEOUS at 18:07

## 2020-05-16 RX ADMIN — DEXTROSE MONOHYDRATE SCH MLS/HR: 50 INJECTION, SOLUTION INTRAVENOUS at 12:11

## 2020-05-16 RX ADMIN — ACETAMINOPHEN PRN MG: 160 SOLUTION ORAL at 12:12

## 2020-05-16 RX ADMIN — INSULIN ASPART SCH UNITS: 100 INJECTION, SOLUTION INTRAVENOUS; SUBCUTANEOUS at 06:54

## 2020-05-16 RX ADMIN — INSULIN ASPART SCH UNITS: 100 INJECTION, SOLUTION INTRAVENOUS; SUBCUTANEOUS at 13:49

## 2020-05-16 NOTE — CARDIAC ELECTROPHYSIOLOGY PN
Assessment/Plan


Assessment/Plan


1. Septic Shock and CHF  EF 40% .  [place]NP [place] [address]  


    On Abx by Dr. Lindsay. Off Levophed  


    


2. CHF EF 40%.   On Coreg 3.125 bid. DC iv fluid 





3. Status post right-sided TONY ICD.    





4. Hypertension  


5. History of CVA with residual weakness.


6. Respiratory failure due to COVID-19 PNA,  on BIPAP and heparin drip


7. Hypernatremia.  On 1/2 NS 75cc/hr 


8. Acute renal failure. Cr up from 0.9 to 2.8. Decreased to 1.7





DW RN





Subjective


Subjective


In Covid isolation in ICU Off levo. On BIPAP 15/5 and heparin drip. Still full 

code.





Objective





Last 24 Hour Vital Signs








  Date Time  Temp Pulse Resp B/P (MAP) Pulse Ox O2 Delivery O2 Flow Rate FiO2


 


5/16/20 11:00  93 44 83/53 (63) 100   


 


5/16/20 10:36  93 46  100   100


 


5/16/20 10:00  95 39 99/54 (69) 100   


 


5/16/20 09:19  97 38  100   100


 


5/16/20 09:00 97.8 96 40 112/58 (76) 69   


 


5/16/20 08:26  101  128/69    


 


5/16/20 08:00      Bi-pap  


 


5/16/20 08:00  101 43 128/69 (88) 93   


 


5/16/20 08:00        100


 


5/16/20 08:00  97      


 


5/16/20 07:35  94 49  94   100


 


5/16/20 07:34     94 Bi-Pap  100


 


5/16/20 07:00  97 39 117/60 (79)    


 


5/16/20 06:00  99 40 120/72 (88) 94   


 


5/16/20 05:00  100 42 117/75 (89) 95   


 


5/16/20 04:00 98.7 95 37 106/69 (81) 95   


 


5/16/20 04:00  102      


 


5/16/20 04:00        100


 


5/16/20 04:00      Bi-pap  


 


5/16/20 03:20  97 40  99   100


 


5/16/20 03:00  98 38 110/59 (76) 99   


 


5/16/20 02:00  109 44 133/64 (87) 99   


 


5/16/20 01:00  106 48 137/84 (101) 96   


 


5/16/20 00:00 98.4 102 36 144/91 (108) 91   


 


5/16/20 00:00        100


 


5/16/20 00:00  94      


 


5/16/20 00:00      Bi-pap  


 


5/15/20 23:19  98 42  100   100


 


5/15/20 23:00  91 43 134/81 (98)    


 


5/15/20 22:00 99.9 90 42 137/85 (102) 99   


 


5/15/20 21:09  93 44  100   100


 


5/15/20 21:00  91 38 124/78 (93) 99   


 


5/15/20 20:49  101  135/89    


 


5/15/20 20:00        100


 


5/15/20 20:00  95      


 


5/15/20 20:00  95 36 124/76 (92) 99   


 


5/15/20 20:00      Bi-pap  


 


5/15/20 19:02     100 Bi-Pap  100


 


5/15/20 19:01  101 45  100   100


 


5/15/20 19:00  98 39 135/89 (104)    


 


5/15/20 18:00  101 41 135/81 (99) 97   


 


5/15/20 17:00  91 46 139/80 (99) 90   


 


5/15/20 16:00        100


 


5/15/20 16:00  91      


 


5/15/20 16:00 99.3 88 49 130/85 (100) 87   


 


5/15/20 16:00      Bi-pap  


 


5/15/20 15:00  87 41 139/75 (96) 93   


 


5/15/20 14:56  82 38  93   100


 


5/15/20 14:00  86 42 135/73 (93) 91   

















Intake and Output  


 


 5/15/20 5/16/20





 19:00 07:00


 


Intake Total 1205.410 ml 1383.501 ml


 


Output Total 260 ml 420 ml


 


Balance 945.410 ml 963.501 ml


 


  


 


Free Water  100 ml


 


IV Total 1205.410 ml 1223.501 ml


 


Other  60 ml


 


Output Urine Total 260 ml 420 ml


 


# Voids 105 











Laboratory Tests








Test


  5/16/20


04:35 5/16/20


08:27


 


White Blood Count


  18.1 K/UL


(4.8-10.8)  H 


 


 


Red Blood Count


  3.17 M/UL


(4.70-6.10)  L 


 


 


Hemoglobin


  8.9 G/DL


(14.2-18.0)  L 


 


 


Hematocrit


  27.2 %


(42.0-52.0)  L 


 


 


Mean Corpuscular Volume 86 FL (80-99)   


 


Mean Corpuscular Hemoglobin


  28.1 PG


(27.0-31.0) 


 


 


Mean Corpuscular Hemoglobin


Concent 32.7 G/DL


(32.0-36.0) 


 


 


Red Cell Distribution Width


  11.9 %


(11.6-14.8) 


 


 


Platelet Count


  238 K/UL


(150-450) 


 


 


Mean Platelet Volume


  8.4 FL


(6.5-10.1) 


 


 


Neutrophils (%) (Auto)


  % (45.0-75.0)


  


 


 


Lymphocytes (%) (Auto)


  % (20.0-45.0)


  


 


 


Monocytes (%) (Auto)  % (1.0-10.0)   


 


Eosinophils (%) (Auto)  % (0.0-3.0)   


 


Basophils (%) (Auto)  % (0.0-2.0)   


 


Differential Total Cells


Counted 100  


  


 


 


Neutrophils % (Manual) 96 % (45-75)  H 


 


Lymphocytes % (Manual) 1 % (20-45)  L 


 


Monocytes % (Manual) 3 % (1-10)   


 


Eosinophils % (Manual) 0 % (0-3)   


 


Basophils % (Manual) 0 % (0-2)   


 


Band Neutrophils 0 % (0-8)   


 


Platelet Estimate Adequate   


 


Platelet Morphology Normal   


 


Hypochromasia 1+   


 


Activated Partial


Thromboplast Time 50 SEC (23-33)


H 


 


 


Sodium Level


  147 MMOL/L


(136-145)  H 


 


 


Potassium Level


  4.4 MMOL/L


(3.5-5.1) 


 


 


Chloride Level


  109 MMOL/L


()  H 


 


 


Carbon Dioxide Level


  30 MMOL/L


(21-32) 


 


 


Anion Gap


  8 mmol/L


(5-15) 


 


 


Blood Urea Nitrogen


  49 mg/dL


(7-18)  H 


 


 


Creatinine


  1.7 MG/DL


(0.55-1.30)  H 


 


 


Estimat Glomerular Filtration


Rate 48.4 mL/min


(>60) 


 


 


Glucose Level


  197 MG/DL


()  H 


 


 


Calcium Level


  8.4 MG/DL


(8.5-10.1)  L 


 


 


Phosphorus Level


  3.2 MG/DL


(2.5-4.9) 


 


 


Magnesium Level


  2.1 MG/DL


(1.8-2.4) 


 


 


Total Bilirubin


  0.4 MG/DL


(0.2-1.0) 


 


 


Direct Bilirubin


  0.2 MG/DL


(0.0-0.3) 


 


 


Aspartate Amino Transf


(AST/SGOT) 22 U/L (15-37)


  


 


 


Alanine Aminotransferase


(ALT/SGPT) 20 U/L (12-78)


  


 


 


Alkaline Phosphatase


  144 U/L


()  H 


 


 


C-Reactive Protein,


Quantitative 31.1 mg/dL


(0.00-0.90)  H 


 


 


Pro-B-Type Natriuretic Peptide


  09929 pg/mL


(0-125)  H 


 


 


Total Protein


  6.6 G/DL


(6.4-8.2) 


 


 


Albumin


  1.8 G/DL


(3.4-5.0)  L 


 


 


Random Vancomycin Level 20.7 ug/mL   


 


Arterial Blood pH


  


  7.328


(7.350-7.450)


 


Arterial Blood Partial


Pressure CO2 


  53.0 mmHg


(35.0-45.0)  H


 


Arterial Blood Partial


Pressure O2 


  46.3 mmHg


(75.0-100.0)


 


Arterial Blood HCO3


  


  27.2 mmol/L


(22.0-26.0)  H


 


Arterial Blood Oxygen


Saturation 


  76.2 %


()  *L


 


Arterial Blood Base Excess  0.8 (-2-2)  


 


Wong Test  Positive  











Microbiology








 Date/Time


Source Procedure


Growth Status


 


 


 5/14/20 03:00


Urine,Clean Catch Urine Culture - Final


Candida Albicans Complete








Objective


HEAD AND NECK:  Mild JVD.BIPAP is on


LUNGS:  Decreased breath sounds and rhonchi


CARDIOVASCULAR:  Regular S1 and S2 with no gallop.


ABDOMEN:  Soft.


EXTREMITIES:  1+ pitting edema.  


         Defibrillator  right subclavian.











Tommy Otero MD May 16, 2020 13:07

## 2020-05-16 NOTE — NUR
NURSE NOTES:

MD MIRAMONTES HERE TO SEE PT. INFORMED OF PVC'S, WIDE QRS COMPLEX. NO NEW ORDERS AT THIS TIME.

## 2020-05-16 NOTE — NUR
NURSE NOTES:

MD FLOR here to see pt. no new orders at this time. informed remains on bipap, may 
require intubation

## 2020-05-16 NOTE — NUR
NURSE NOTES:

Patricia [facility]. [facility] not [facility]. Spoke with Vangie MARKS

## 2020-05-16 NOTE — NUR
NURSE NOTES:

Repositioned patient in bed. comfort measure provided. frequent visual checks continued.

## 2020-05-16 NOTE — NUR
CODE BLUE:

See Code sheet which remains on paper.

 PT . WITH ADDITIONAL EPI GIVEN. ASYSTOLE. NO ROSC

## 2020-05-16 NOTE — INFECTIOUS DISEASES PROG NOTE
Assessment/Plan


Assessment/Plan





Assessment:


Septic shock-recurrent- off pressors now





Fever;improving


Leukocytosis, fluctuating; increased


   -5/14 uA wbc 5-10, nit neg, leuk +2; ucx yeast sp


   -5/3 u/a wbc 15-20, nit neg, leuk +1


          Bcx Neg


   -u/a neg


   -Bcx Neg





Probable PNA- 2ry to COVID19 (from NH with confirmed cases)


Acute hypoxic respiratory failure- was on NC, now on NRB- increasing FIo2 

requiremetns


    -5/11 CXR:  Minimally improved left, unchanged right infiltrates, since 

prior exam of 3 days earlier


    -5/8 CXR: Unchanged bilateral infiltrates, likely pneumonia, since prior 

exam of 3 days earlier.


    -5/5 CXR:  Bilateral interstitial and airspace infiltrates are again 

demonstrated.Appearance is overall unchanged. There may be a small left pleural 

effusion,unchange


   -5/4 CRP 41.6, ferritin 1468


  -5/3 CXR: .  Worsening bilateral interstitial and airspace opacities. 

Probable small left pleural effusion.  Difficult to evaluate the right 

costophrenic angle due to overlying pacemaker.


  -4/30 CXR: Slightly improved bilateral interstitial and airspace infiltrates 

versus edema


  -4/28 CXR: Bilateral mid and lower lung interstitial disease, new since prior 

study.Possible developing left pleural effusion


        Ferritn >2000, CRP 22.3


  -4/24 SARS-COV2 PCR +


  -CXR: Left basilar atelectasis and/or infiltrate


 


MELVI, worsening





 HTN


cadiomyopathy s/p AICD


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


seizure disorder


[facility] 








Plan:


-Cont empiric IV Meropenem #4 and add IV Vancomycin #2 and Micafungin #2 given 

recurrent leukocytosis


-Consider steroids


   -5/10 SP IV Vancomycin #5


   -5/7 SP Zosyn #5


   - SPSolumedrol 40mg IV 12hrs  (5/4-5/6); dc'ed by pulmonologist - (day 10 of 

illness and worsening; around this time is seen the worsening of COVID19 pts 

mainly driven by inflammatory response) 


   -4/29 SP Cefepime #6


   -4/27 SP IV Vancomycin #4  





-f/u cx


-Monitor CBC/CMP, temperatures


-COVID 19 precautions


-clarify goals of care


-aspiration precautions


-inflammatory markers


-f/u cx


-monitor CXR


-f/u Bcx x2, sp cx





Thank you for consulting [facility]. Will continue to follow along with 

you.





Discussed with RN,





Subjective


Allergies:  


Coded Allergies:  


     No Known Allergies (Unverified , 12/15/14)


Subjective


Afebrile


WBCs up to 18 from 17


Patient on BiPAP





Objective


Vital Signs





Last 24 Hour Vital Signs








  Date Time  Temp Pulse Resp B/P (MAP) Pulse Ox O2 Delivery O2 Flow Rate FiO2


 


5/16/20 10:00  95 39 99/54 (69) 100   


 


5/16/20 09:19  97 38  100   100


 


5/16/20 09:00 97.8 96 40 112/58 (76) 69   


 


5/16/20 08:26  101  128/69    


 


5/16/20 08:00      Bi-pap  


 


5/16/20 08:00  101 43 128/69 (88) 93   


 


5/16/20 08:00        100


 


5/16/20 08:00  97      


 


5/16/20 07:35  94 49  94   100


 


5/16/20 07:34     94 Bi-Pap  100


 


5/16/20 07:00  97 39 117/60 (79)    


 


5/16/20 06:00  99 40 120/72 (88) 94   


 


5/16/20 05:00  100 42 117/75 (89) 95   


 


5/16/20 04:00 98.7 95 37 106/69 (81) 95   


 


5/16/20 04:00  102      


 


5/16/20 04:00        100


 


5/16/20 04:00      Bi-pap  


 


5/16/20 03:20  97 40  99   100


 


5/16/20 03:00  98 38 110/59 (76) 99   


 


5/16/20 02:00  109 44 133/64 (87) 99   


 


5/16/20 01:00  106 48 137/84 (101) 96   


 


5/16/20 00:00 98.4 102 36 144/91 (108) 91   


 


5/16/20 00:00        100


 


5/16/20 00:00  94      


 


5/16/20 00:00      Bi-pap  


 


5/15/20 23:19  98 42  100   100


 


5/15/20 23:00  91 43 134/81 (98)    


 


5/15/20 22:00 99.9 90 42 137/85 (102) 99   


 


5/15/20 21:09  93 44  100   100


 


5/15/20 21:00  91 38 124/78 (93) 99   


 


5/15/20 20:49  101  135/89    


 


5/15/20 20:00        100


 


5/15/20 20:00  95      


 


5/15/20 20:00  95 36 124/76 (92) 99   


 


5/15/20 20:00      Bi-pap  


 


5/15/20 19:02     100 Bi-Pap  100


 


5/15/20 19:01  101 45  100   100


 


5/15/20 19:00  98 39 135/89 (104)    


 


5/15/20 18:00  101 41 135/81 (99) 97   


 


5/15/20 17:00  91 46 139/80 (99) 90   


 


5/15/20 16:00        100


 


5/15/20 16:00  91      


 


5/15/20 16:00 99.3 88 49 130/85 (100) 87   


 


5/15/20 16:00      Bi-pap  


 


5/15/20 15:00  87 41 139/75 (96) 93   


 


5/15/20 14:56  82 38  93   100


 


5/15/20 14:00  86 42 135/73 (93) 91   


 


5/15/20 13:00  93 44 129/71 (90) 92   


 


5/15/20 12:30  88 43 128/77 (94) 97   


 


5/15/20 12:00 99.0 86 41 135/85 (102) 97   


 


5/15/20 12:00        100


 


5/15/20 12:00      Bi-pap  


 


5/15/20 11:30  86 43 119/74 (89) 95   


 


5/15/20 11:12  91 43  94   100


 


5/15/20 11:00  87 38 121/68 (85) 95   








Height (Feet):  5


Height (Inches):  8.00


Weight (Pounds):  175


Objective


Patient not examined to day in order to conserve PPE





Patient clinical status discussed with nurse and other physician notes and 

exams reviewed





Microbiology








 Date/Time


Source Procedure


Growth Status


 


 


 5/14/20 03:00


Urine,Clean Catch Urine Culture - Final


Candida Albicans Complete











Laboratory Tests








Test


  5/16/20


04:35 5/16/20


08:27


 


White Blood Count


  18.1 K/UL


(4.8-10.8)  H 


 


 


Red Blood Count


  3.17 M/UL


(4.70-6.10)  L 


 


 


Hemoglobin


  8.9 G/DL


(14.2-18.0)  L 


 


 


Hematocrit


  27.2 %


(42.0-52.0)  L 


 


 


Mean Corpuscular Volume 86 FL (80-99)   


 


Mean Corpuscular Hemoglobin


  28.1 PG


(27.0-31.0) 


 


 


Mean Corpuscular Hemoglobin


Concent 32.7 G/DL


(32.0-36.0) 


 


 


Red Cell Distribution Width


  11.9 %


(11.6-14.8) 


 


 


Platelet Count


  238 K/UL


(150-450) 


 


 


Mean Platelet Volume


  8.4 FL


(6.5-10.1) 


 


 


Neutrophils (%) (Auto)


  % (45.0-75.0)


  


 


 


Lymphocytes (%) (Auto)


  % (20.0-45.0)


  


 


 


Monocytes (%) (Auto)  % (1.0-10.0)   


 


Eosinophils (%) (Auto)  % (0.0-3.0)   


 


Basophils (%) (Auto)  % (0.0-2.0)   


 


Neutrophils % (Manual) Pending   


 


Lymphocytes % (Manual) Pending   


 


Platelet Estimate Pending   


 


Platelet Morphology Pending   


 


Activated Partial


Thromboplast Time 50 SEC (23-33)


H 


 


 


Sodium Level


  147 MMOL/L


(136-145)  H 


 


 


Potassium Level


  4.4 MMOL/L


(3.5-5.1) 


 


 


Chloride Level


  109 MMOL/L


()  H 


 


 


Carbon Dioxide Level


  30 MMOL/L


(21-32) 


 


 


Anion Gap


  8 mmol/L


(5-15) 


 


 


Blood Urea Nitrogen


  49 mg/dL


(7-18)  H 


 


 


Creatinine


  1.7 MG/DL


(0.55-1.30)  H 


 


 


Estimat Glomerular Filtration


Rate 48.4 mL/min


(>60) 


 


 


Glucose Level


  197 MG/DL


()  H 


 


 


Calcium Level


  8.4 MG/DL


(8.5-10.1)  L 


 


 


Phosphorus Level


  3.2 MG/DL


(2.5-4.9) 


 


 


Magnesium Level


  2.1 MG/DL


(1.8-2.4) 


 


 


Total Bilirubin


  0.4 MG/DL


(0.2-1.0) 


 


 


Direct Bilirubin


  0.2 MG/DL


(0.0-0.3) 


 


 


Aspartate Amino Transf


(AST/SGOT) 22 U/L (15-37)


  


 


 


Alanine Aminotransferase


(ALT/SGPT) 20 U/L (12-78)


  


 


 


Alkaline Phosphatase


  144 U/L


()  H 


 


 


C-Reactive Protein,


Quantitative 31.1 mg/dL


(0.00-0.90)  H 


 


 


Pro-B-Type Natriuretic Peptide


  90717 pg/mL


(0-125)  H 


 


 


Total Protein


  6.6 G/DL


(6.4-8.2) 


 


 


Albumin


  1.8 G/DL


(3.4-5.0)  L 


 


 


Random Vancomycin Level 20.7 ug/mL   


 


Arterial Blood pH


  


  7.328


(7.350-7.450)


 


Arterial Blood Partial


Pressure CO2 


  53.0 mmHg


(35.0-45.0)  H


 


Arterial Blood Partial


Pressure O2 


  46.3 mmHg


(75.0-100.0)


 


Arterial Blood HCO3


  


  27.2 mmol/L


(22.0-26.0)  H


 


Arterial Blood Oxygen


Saturation 


  76.2 %


()  *L


 


Arterial Blood Base Excess  0.8 (-2-2)  


 


Wong Test  Positive  











Current Medications








 Medications


  (Trade)  Dose


 Ordered  Sig/Aarti


 Route


 PRN Reason  Start Time


 Stop Time Status Last Admin


Dose Admin


 


 Acetaminophen


  (Tylenol)  650 mg  Q6H  PRN


 NG


 Mild Pain (Pain Scale 1-3)  5/14/20 17:00


 6/13/20 16:59  5/14/20 18:23


 


 


 Carvedilol


  (Coreg)  3.125 mg  EVERY 12  HOURS


 NG


   5/14/20 21:00


 5/29/20 20:59  5/16/20 08:26


 


 


 Chlorhexidine


 Gluconate


  (Gloria-Hex 2%)  1 applic  DAILY@2000


 TOPIC


   5/13/20 20:00


 8/11/20 19:59  5/15/20 20:49


 


 


 Dextrose


  (Dextrose 50%)  25 ml  Q30M  PRN


 IV


 Hypoglycemia  5/12/20 13:45


 8/10/20 13:44   


 


 


 Dextrose


  (Dextrose 50%)  50 ml  Q30M  PRN


 IV


 Hypoglycemia  5/12/20 13:45


 8/10/20 13:44   


 


 


 Heparin Sodium/


 Dextrose  500 ml @ 


 29.819 mls/


 hr  ADJUST PER  PROTOCOL


 IV


   5/16/20 07:00


 6/15/20 06:59  5/16/20 06:48


 


 


 Heparin Sodium/


 Sodium Chloride


  (Heparin 1000


 units/500ml


 Premix)  1,000 unit  ONCE  PRN


 IV


 dialysis  5/15/20 14:15


 5/17/20 14:14   


 


 


 Insulin Aspart


  (NovoLOG)    Q6HR


 SUBQ


   5/12/20 18:00


 8/10/20 17:59  5/16/20 06:54


 


 


 Levetiracetam


  (Keppra)  1,000 mg  Q12HR


 NG


   5/14/20 09:00


 6/13/20 08:59  5/16/20 08:25


 


 


 Lidocaine HCl


  (Xylocaine 1%


 30ml)  30 ml  ONCE  PRN


 INJ


 picc line placement  5/15/20 14:15


 5/17/20 14:14   


 


 


 Lorazepam


  (Ativan 2mg/ml


 1ml)  1 mg  Q6H  PRN


 IV


 For Anxiety  5/14/20 13:45


 5/21/20 13:44  5/14/20 13:42


 


 


 Meropenem 1 gm/


 Sodium Chloride  55 ml @ 


 110 mls/hr  Q12HR


 IVPB


   5/13/20 14:00


 5/18/20 13:59  5/16/20 08:25


 


 


 Micafungin Sodium


 100 mg/Sodium


 Chloride  110 ml @ 


 110 mls/hr  Q24H


 IVPB


   5/15/20 12:30


 5/22/20 12:29  5/15/20 12:35


 


 


 Norepinephrine


 Bitartrate 4 mg/


 Dextrose  250 ml @ 0


 mls/hr  Q24H  PRN


 IV


 For hypotension  5/12/20 19:00


 6/11/20 18:59  5/13/20 16:02


 


 


 Sodium Chloride  1,000 ml @ 


 75 mls/hr  [id number]


 IV


   5/12/20 13:15


 6/11/20 09:29  5/15/20 20:50


 


 


 Vancomycin HCl


  (Vanco rx to


 dose)  1 ea  DAILY  PRN


 MISC


 Per rx protocol  5/15/20 11:15


 6/14/20 11:14   


 


 


 Vancomycin HCl 1


 gm/Dextrose  275 ml @ 


 183.708


 mls/hr  ONCE


 IVPB


   5/16/20 21:00


 5/16/20 23:00   


 

















Dustin Ugalde MD May 16, 2020 10:33

## 2020-05-16 NOTE — GENERAL PROGRESS NOTE
Assessment/Plan


Problem List:  


(1) Suspected COVID-19 virus infection


ICD Codes:  Z20.828 - Contact with and (suspected) exposure to other viral 

communicable diseases


SNOMED:  526812684


(2) Anemia


ICD Codes:  D64.9 - Anemia, unspecified


SNOMED:  122010728


(3) Weak


ICD Codes:  R53.1 - Weakness


SNOMED:  45817632


(4) COPD (chronic obstructive pulmonary disease)


ICD Codes:  J44.9 - Chronic obstructive pulmonary disease, unspecified


SNOMED:  84787244


(5) Diabetes


ICD Codes:  E11.9 - Type 2 diabetes mellitus without complications


SNOMED:  25504710


(6) Epileptic seizure, generalized


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 

not intractable, without status epilepticus


SNOMED:  14366732


(7) Altered mental status


ICD Codes:  R41.82 - Altered mental status, unspecified


SNOMED:  725876149


Qualifiers:  


   Qualified Codes:  R41.82 - Altered mental status, unspecified


(8) Hypotension


ICD Codes:  I95.9 - Hypotension, unspecified


SNOMED:  47408452


Qualifiers:  


   Qualified Codes:  I95.9 - Hypotension, unspecified


(9) UTI (urinary tract infection)


ICD Codes:  N39.0 - Urinary tract infection, site not specified


SNOMED:  78730079


Status:  unchanged


Assessment/Plan:


o2 pulm tx abx pt diet cbc bmp am needs picc, ltach eval





Subjective


Constitutional:  Reports: weakness


Allergies:  


Coded Allergies:  


     No Known Allergies (Unverified , 12/15/14)


All Systems:  reviewed and negative except above


Subjective


bipap sleepy in icu





Objective





Last 24 Hour Vital Signs








  Date Time  Temp Pulse Resp B/P (MAP) Pulse Ox O2 Delivery O2 Flow Rate FiO2


 


5/16/20 09:19  97 38  100   100


 


5/16/20 08:26  101  128/69    


 


5/16/20 08:00      Bi-pap  


 


5/16/20 08:00  101 43 128/69 (88) 93   


 


5/16/20 08:00        100


 


5/16/20 07:35  94 49  94   100


 


5/16/20 07:34     94 Bi-Pap  100


 


5/16/20 07:00  97 39 117/60 (79)    


 


5/16/20 06:00  99 40 120/72 (88) 94   


 


5/16/20 05:00  100 42 117/75 (89) 95   


 


5/16/20 04:00 98.7 95 37 106/69 (81) 95   


 


5/16/20 04:00  102      


 


5/16/20 04:00        100


 


5/16/20 04:00      Bi-pap  


 


5/16/20 03:20  97 40  99   100


 


5/16/20 03:00  98 38 110/59 (76) 99   


 


5/16/20 02:00  109 44 133/64 (87) 99   


 


5/16/20 01:00  106 48 137/84 (101) 96   


 


5/16/20 00:00 98.4 102 36 144/91 (108) 91   


 


5/16/20 00:00        100


 


5/16/20 00:00  94      


 


5/16/20 00:00      Bi-pap  


 


5/15/20 23:19  98 42  100   100


 


5/15/20 23:00  91 43 134/81 (98)    


 


5/15/20 22:00 99.9 90 42 137/85 (102) 99   


 


5/15/20 21:09  93 44  100   100


 


5/15/20 21:00  91 38 124/78 (93) 99   


 


5/15/20 20:49  101  135/89    


 


5/15/20 20:00        100


 


5/15/20 20:00  95      


 


5/15/20 20:00  95 36 124/76 (92) 99   


 


5/15/20 20:00      Bi-pap  


 


5/15/20 19:02     100 Bi-Pap  100


 


5/15/20 19:01  101 45  100   100


 


5/15/20 19:00  98 39 135/89 (104)    


 


5/15/20 18:00  101 41 135/81 (99) 97   


 


5/15/20 17:00  91 46 139/80 (99) 90   


 


5/15/20 16:00        100


 


5/15/20 16:00  91      


 


5/15/20 16:00 99.3 88 49 130/85 (100) 87   


 


5/15/20 16:00      Bi-pap  


 


5/15/20 15:00  87 41 139/75 (96) 93   


 


5/15/20 14:56  82 38  93   100


 


5/15/20 14:00  86 42 135/73 (93) 91   


 


5/15/20 13:00  93 44 129/71 (90) 92   


 


5/15/20 12:30  88 43 128/77 (94) 97   


 


5/15/20 12:00 99.0 86 41 135/85 (102) 97   


 


5/15/20 12:00        100


 


5/15/20 12:00      Bi-pap  


 


5/15/20 11:30  86 43 119/74 (89) 95   


 


5/15/20 11:12  91 43  94   100


 


5/15/20 11:00  87 38 121/68 (85) 95   


 


5/15/20 10:30  90 39 118/73 (88) 96   


 


5/15/20 10:00  88 36 125/79 (94) 97   

















Intake and Output  


 


 5/15/20 5/16/20





 19:00 07:00


 


Intake Total 1205.410 ml 1383.501 ml


 


Output Total 260 ml 420 ml


 


Balance 945.410 ml 963.501 ml


 


  


 


Free Water  100 ml


 


IV Total 1205.410 ml 1223.501 ml


 


Other  60 ml


 


Output Urine Total 260 ml 420 ml


 


# Voids 105 








Laboratory Tests


5/16/20 04:35: 


White Blood Count 18.1H, Red Blood Count 3.17L, Hemoglobin 8.9L, Hematocrit 

27.2L, Mean Corpuscular Volume 86, Mean Corpuscular Hemoglobin 28.1, Mean 

Corpuscular Hemoglobin Concent 32.7, Red Cell Distribution Width 11.9, Platelet 

Count 238, Mean Platelet Volume 8.4, Neutrophils (%) (Auto) , Lymphocytes (%) (

Auto) , Monocytes (%) (Auto) , Eosinophils (%) (Auto) , Basophils (%) (Auto) , 

Neutrophils % (Manual) [Pending], Lymphocytes % (Manual) [Pending], Platelet 

Estimate [Pending], Platelet Morphology [Pending], Activated Partial 

Thromboplast Time 50H, Sodium Level 147H, Potassium Level 4.4, Chloride Level 

109H, Carbon Dioxide Level 30, Anion Gap 8, Blood Urea Nitrogen 49H, Creatinine 

1.7H, Estimat Glomerular Filtration Rate 48.4, Glucose Level 197H, Calcium 

Level 8.4L, Phosphorus Level 3.2, Magnesium Level 2.1, Total Bilirubin 0.4, 

Direct Bilirubin 0.2, Aspartate Amino Transf (AST/SGOT) 22, Alanine 

Aminotransferase (ALT/SGPT) 20, Alkaline Phosphatase 144H, C-Reactive Protein, 

Quantitative 31.1H, Pro-B-Type Natriuretic Peptide 77577O, Total Protein 6.6, 

Albumin 1.8L, Random Vancomycin Level 20.7


5/16/20 08:27: 


Arterial Blood pH 7.328L, Arterial Blood Partial Pressure CO2 53.0H, Arterial 

Blood Partial Pressure O2 46.3*L, Arterial Blood HCO3 27.2H, Arterial Blood 

Oxygen Saturation 76.2*L, Arterial Blood Base Excess 0.8, Wong Test Positive


Height (Feet):  5


Height (Inches):  8.00


Weight (Pounds):  175


General Appearance:  lethargic


EENT:  normal ENT inspection


Neck:  normal alignment


Cardiovascular:  normal rate, regular rhythm


Respiratory/Chest:  no respiratory distress, no accessory muscle use


Extremities:  normal inspection


Skin:  normal pigmentation











Arron Barkley DO May 16, 2020 09:55

## 2020-05-16 NOTE — PROGRESS NOTE
DATE:  05/16/2020

HISTORY OF PRESENT ILLNESS:  This is a male patient with hypotension,

sepsis, causing him to have altered mental status and decline in cognition

below his baseline.  That is why his attending has requested daily

psychiatric consultation.



MENTAL STATUS EXAMINATION:  This is a 69-year-old male.  Appearance is

disheveled.  Attitude, irritable and agitated.  Affect, guarded and

restricted.  Intellect poor.  Mood, depressed and anxious.  Motor

activity, psychomotor agitation.  Attention span is poor.  Orientation x2.

Speech is low volume, slurred.  Thought process, disorganized and

illogical.  Judgment is poor.



DIAGNOSIS:  Major depressive disorder, mild, recurrent with psychotic

features.



PLAN:  Treat with Ativan 1 every 6 hours p.r.n. anxiety and agitation.

Twenty minutes of insight-oriented psychotherapy which would provide

insight into his psychiatric condition and physical condition to have

better impulse control and behavior on the unit.  Chart was reviewed and

discussed with staff.  Seen and assessed at bedside.









  ______________________________________________

  Demarcus Love M.D.





DR:  Tonny

D:  05/16/2020 12:02

T:  05/16/2020 21:42

JOB#:  [id number]

CC:

## 2020-05-16 NOTE — NUR
NURSE NOTES:

MD CONSTABLE HERE TO SEE PT, INFORMED WBC 18.1, CRE 1.7, AFEBRILE. RECEIVING MERREM 1GRAM.

## 2020-05-16 NOTE — NUR
CODE BLUE:

BP HYPOTENSIVE BACK ON LEVOPHED. PT BRADYCARDIAC, HYPOTENSIVE, PEA. NO FEMORAL PULSE FELT, 
CODE CALLED. EPI GIVEN X4, ROCS AND PT INTUBATED. 

See Code sheet which remains on paper. Impression: Dry Eye Syndrome: A37.373. Plan: Dry eyes account for the patient's complaints. There is no evidence of permanent changes to the cornea. Explained condition does not have a cure and will need artificial tears for maintenance.

## 2020-05-16 NOTE — NEPHROLOGY PROGRESS NOTE
Assessment/Plan


Problem List:  


(1) MELVI (acute kidney injury)


(2) Electrolyte imbalance


(3) Suspected COVID-19 virus infection


(4) Hypotension


Assessment:  Resolved





(5) Sepsis


Assessment





Patient's current problem from renal standpoint of view is hypokalemia and 

other electrolyte imbalances





His other conditions:


Patient presented with acute renal failure however it is now resolved


Patient presented with septic shock was on pressors however now resolved


Sepsis leukocytosis improving


Patient has COVID-19 pneumonia


Hypertension


Cardiomyopathy status post AICD


Previous CVA with right residual weakness


Seizure disorders


Plan





COVID-19 positive





May 16:


White BCs are rising


Blood pressure low, albumin bolus given


Renal parameters stable


Remains on BiPAP


Continue to do poorly from a clinical standpoint of view


Discussed with cardiologist will stop IV fluid and give Lasix





May 15 


Remains on BiPAP 


Creatinine down to 1.7 


Not much to add from renal standpoint of view 





May 14:


Remains on BiPAP


Remains in ICU


Serum creatinine slightly higher


Discuss with pulmonologist


Continue per current management 


continue with slow hydration








May 13:


Patient now in ICU room J


On BiPAP


Discussed with RN


Serum creatinine lower 1.9, 


Will recheck renal parameters and electrolytes and chemistries tomorrow


Continue per consultants











May 12: 


Patient is clinically deteriorating 


Respiratory status worsened 


Blood pressure low


Serum creatinine brian 


ABG indicative of CO2 retention 


Discussed with a pulmonologist 


Patient due transfer to monitored bed for initiation of BiPAP 


Meanwhile we will give albumin  bolus and start half-normal saline IV hydration 


May require pressors


Overall prognosis poor


I favor holding the mind altering medications


Per orders





May 11: 


In view of worsening renal failure and rise of creatinine to 1.8 will 

temporarily hold Lasix and lisinopril


Discussed with Dr. Henderson


Continue rest





Previously:


Magnesium and potassium as needed


Stable from renal standpoint of view


Continue per consultants





We will continue to monitor electrolytes and chemistries





Subjective


ROS Limited/Unobtainable:  Yes





Objective


Objective





Last 24 Hour Vital Signs








  Date Time  Temp Pulse Resp B/P (MAP) Pulse Ox O2 Delivery O2 Flow Rate FiO2


 


5/16/20 11:00  93 44 83/53 (63) 100   


 


5/16/20 10:36  93 46  100   100


 


5/16/20 10:00  95 39 99/54 (69) 100   


 


5/16/20 09:19  97 38  100   100


 


5/16/20 09:00 97.8 96 40 112/58 (76) 69   


 


5/16/20 08:26  101  128/69    


 


5/16/20 08:00      Bi-pap  


 


5/16/20 08:00  101 43 128/69 (88) 93   


 


5/16/20 08:00        100


 


5/16/20 08:00  97      


 


5/16/20 07:35  94 49  94   100


 


5/16/20 07:34     94 Bi-Pap  100


 


5/16/20 07:00  97 39 117/60 (79)    


 


5/16/20 06:00  99 40 120/72 (88) 94   


 


5/16/20 05:00  100 42 117/75 (89) 95   


 


5/16/20 04:00 98.7 95 37 106/69 (81) 95   


 


5/16/20 04:00  102      


 


5/16/20 04:00        100


 


5/16/20 04:00      Bi-pap  


 


5/16/20 03:20  97 40  99   100


 


5/16/20 03:00  98 38 110/59 (76) 99   


 


5/16/20 02:00  109 44 133/64 (87) 99   


 


5/16/20 01:00  106 48 137/84 (101) 96   


 


5/16/20 00:00 98.4 102 36 144/91 (108) 91   


 


5/16/20 00:00        100


 


5/16/20 00:00  94      


 


5/16/20 00:00      Bi-pap  


 


5/15/20 23:19  98 42  100   100


 


5/15/20 23:00  91 43 134/81 (98)    


 


5/15/20 22:00 99.9 90 42 137/85 (102) 99   


 


5/15/20 21:09  93 44  100   100


 


5/15/20 21:00  91 38 124/78 (93) 99   


 


5/15/20 20:49  101  135/89    


 


5/15/20 20:00        100


 


5/15/20 20:00  95      


 


5/15/20 20:00  95 36 124/76 (92) 99   


 


5/15/20 20:00      Bi-pap  


 


5/15/20 19:02     100 Bi-Pap  100


 


5/15/20 19:01  101 45  100   100


 


5/15/20 19:00  98 39 135/89 (104)    


 


5/15/20 18:00  101 41 135/81 (99) 97   


 


5/15/20 17:00  91 46 139/80 (99) 90   


 


5/15/20 16:00        100


 


5/15/20 16:00  91      


 


5/15/20 16:00 99.3 88 49 130/85 (100) 87   


 


5/15/20 16:00      Bi-pap  


 


5/15/20 15:00  87 41 139/75 (96) 93   


 


5/15/20 14:56  82 38  93   100


 


5/15/20 14:00  86 42 135/73 (93) 91   


 


5/15/20 13:00  93 44 129/71 (90) 92   


 


5/15/20 12:30  88 43 128/77 (94) 97   


 


5/15/20 12:00 99.0 86 41 135/85 (102) 97   


 


5/15/20 12:00        100


 


5/15/20 12:00      Bi-pap  

















Intake and Output  


 


 5/15/20 5/16/20





 19:00 07:00


 


Intake Total 1205.410 ml 1383.501 ml


 


Output Total 260 ml 420 ml


 


Balance 945.410 ml 963.501 ml


 


  


 


Free Water  100 ml


 


IV Total 1205.410 ml 1223.501 ml


 


Other  60 ml


 


Output Urine Total 260 ml 420 ml


 


# Voids 105 








Laboratory Tests


5/16/20 04:35: 


White Blood Count 18.1H, Red Blood Count 3.17L, Hemoglobin 8.9L, Hematocrit 

27.2L, Mean Corpuscular Volume 86, Mean Corpuscular Hemoglobin 28.1, Mean 

Corpuscular Hemoglobin Concent 32.7, Red Cell Distribution Width 11.9, Platelet 

Count 238, Mean Platelet Volume 8.4, Neutrophils (%) (Auto) , Lymphocytes (%) (

Auto) , Monocytes (%) (Auto) , Eosinophils (%) (Auto) , Basophils (%) (Auto) , 

Differential Total Cells Counted 100, Neutrophils % (Manual) 96H, Lymphocytes % 

(Manual) 1L, Monocytes % (Manual) 3, Eosinophils % (Manual) 0, Basophils % (

Manual) 0, Band Neutrophils 0, Platelet Estimate Adequate, Platelet Morphology 

Normal, Hypochromasia 1+, Activated Partial Thromboplast Time 50H, Sodium Level 

147H, Potassium Level 4.4, Chloride Level 109H, Carbon Dioxide Level 30, Anion 

Gap 8, Blood Urea Nitrogen 49H, Creatinine 1.7H, Estimat Glomerular Filtration 

Rate 48.4, Glucose Level 197H, Calcium Level 8.4L, Phosphorus Level 3.2, 

Magnesium Level 2.1, Total Bilirubin 0.4, Direct Bilirubin 0.2, Aspartate Amino 

Transf (AST/SGOT) 22, Alanine Aminotransferase (ALT/SGPT) 20, Alkaline 

Phosphatase 144H, C-Reactive Protein, Quantitative 31.1H, Pro-B-Type 

Natriuretic Peptide 26448O, Total Protein 6.6, Albumin 1.8L, Random Vancomycin 

Level 20.7


5/16/20 08:27: 


Arterial Blood pH 7.328L, Arterial Blood Partial Pressure CO2 53.0H, Arterial 

Blood Partial Pressure O2 46.3*L, Arterial Blood HCO3 27.2H, Arterial Blood 

Oxygen Saturation 76.2*L, Arterial Blood Base Excess 0.8, Wong Test Positive


Height (Feet):  5


Height (Inches):  8.00


Weight (Pounds):  175


General Appearance:  mild distress


Cardiovascular:  tachycardia


Respiratory/Chest:  decreased breath sounds


Abdomen:  distended


Objective


No change











Miguel Ángel Kendall MD May 16, 2020 11:41

## 2020-05-16 NOTE — NUR
NURSE NOTES:

LATE ENTRY:

RECEIVED REPORT FROM GENEVIEVE KOEHLER PT IN [facility], NOTED LABORED BREATHS, TACHYPNEIC AT 
43, SP02 SATING 87. BIPAP 15/5, 100%FI02. VS: 101, 128/69, DIMINISHED LUNG SOUNDS. PT. 
A/OX1, FATIGUED. NPO, RT NARES NGT. HYPOACTIVE BOWEL SOUNDS.ABDOMEN SOFT. HERNADEZ DRAINING 
BEAR URINE. NO BM AT THIS TIME. SKIN- SEE ASSESSMENT. ROBERT PICC. FLUIDS 1/2 NS @75ML/HR, HEP 
DRIP AT 19U/HR. 97.8 TEMP. LFT ARM WRIST RESTRAINT. RT SIDE PARALYSIS. BED LOCKED, IN LOW 
POSITION. AIRBORNE ISOLATION.

## 2020-05-16 NOTE — EMERGENCY ROOM REPORT
Physical Exam


Called for code blue.


Patient on BiPAP.


Deniz then asystole.


Epi X3 before I arrived.


On pressors.


Last 24 Hour Vital Signs








  Date Time  Temp Pulse Resp B/P (MAP) Pulse Ox O2 Delivery O2 Flow Rate FiO2


 


20 18:00  91 38 94/66 (75) 97   


 


20 17:45    86/62    


 


20 17:00  90 40 87/61 (70) 98   


 


20 16:30  92 39 96/58 (71) 100   


 


20 16:00  100      


 


20 16:00      Bi-pap  


 


20 16:00        100


 


20 16:00 99.0 100 40 117/79 (92) 100   


 


20 15:30  102 40 136/74 (94) 95   


 


20 15:13  98 41  94   100


 


20 15:00  93 40 94/50 (65) 90   


 


20 14:00  95 46 84/61 (69) 92   


 


20 13:00  94 44 92/63 (73) 92   


 


20 13:00  95 45 92/63 (73) 91   


 


20 12:00  95      


 


20 12:00 98.0 96 45 96/60 (72) 100   


 


20 12:00      Bi-pap  


 


20 12:00  91 40 96/60 (72) 99   


 


20 12:00        100


 


20 11:00  93 44 83/53 (63) 100   


 


20 10:36  93 46  100   100


 


20 10:00  95 39 99/54 (69) 100   


 


20 09:19  97 38  100   100


 


20 09:00 97.8 96 40 112/58 (76) 69   


 


20 08:26  101  128/69    


 


20 08:00      Bi-pap  


 


20 08:00  101 43 128/69 (88) 93   


 


20 08:00        100


 


20 08:00  97      


 


20 07:35  94 49  94   100


 


20 07:34     94 Bi-Pap  100


 


20 07:00  97 39 117/60 (79)    


 


20 06:00  99 40 120/72 (88) 94   


 


20 05:00  100 42 117/75 (89) 95   


 


20 04:00 98.7 95 37 106/69 (81) 95   


 


20 04:00  102      


 


20 04:00        100


 


20 04:00      Bi-pap  


 


20 03:20  97 40  99   100


 


20 03:00  98 38 110/59 (76) 99   


 


20 02:00  109 44 133/64 (87) 99   


 


20 01:00  106 48 137/84 (101) 96   


 


20 00:00 98.4 102 36 144/91 (108) 91   


 


20 00:00        100


 


20 00:00  94      


 


20 00:00      Bi-pap  


 


5/15/20 23:19  98 42  100   100


 


5/15/20 23:00  91 43 134/81 (98)    


 


5/15/20 22:00 99.9 90 42 137/85 (102) 99   


 


5/15/20 21:09  93 44  100   100


 


5/15/20 21:00  91 38 124/78 (93) 99   


 


5/15/20 20:49  101  135/89    


 


5/15/20 20:00        100


 


5/15/20 20:00  95      


 


5/15/20 20:00  95 36 124/76 (92) 99   


 


5/15/20 20:00      Bi-pap  








Sp02 EP Interpretation:  reviewed, abnormal - Interpreted as low by me


General Appearance:  other - Flaccid and unresponsive


Head:  normocephalic, atraumatic


Eyes:  bilateral eye other - Pupils unreactive and no aspirin, advil, aleve, 

alkaselzer, peptobismol or alcohol.  Tylenol and mylanta OK.Follow up with your 

private physician.You should see a gastroenterologist.You need to have testing 

for H. pylori.  Extraocular motions


ENT:  moist mucus membranes, other - BiPAP mask


Neck:  other - Flaccid


Respiratory:  other - No spontaneous respirations


Cardiovascular #1:  other - Pulses with CPR only


Cardiovascular #2:  2+ femoral (L)


Gastrointestinal:  abnormal bowel sounds


Musculoskeletal:  other - Flaccid


Neurologic:  other - Unresponsive


Psychiatric:  other - Unresponsive


Skin:  pallor


CPR/Code Blue


CPR/Code Blue Narrative


CODE BLUE #1


Code called 1908.


When I arrived CPR was being performed and was supervised by me.


Epinephrine had been given x3.


Patient intubated.  Initial esophageal intubation.  Re-Janak intubation 

successful.


Epinephrine repeated.  Pulses present and sinus tachycardia on monitor.





CODE BLUE #2


Code called 193.


Epinephrine given x1.


Return of spontaneous circulation at 1932.  Sinus tachycardia on monitor





CODE BLUE #3


Patient in asystole.


CPR and medications withheld due to futile care.


Patient pronounced .





Intubation


Intubation :  


   Consent:  Emergent


   Intubation Method:  orotracheal


   Tube Size (cm):  7.5


   Medications:  Other - None


   Breath Sounds after Intubation:  equal


   Intubation Complications:  no complications


   Post Intubation Xray:  No


   Attempts:  Other - 2


   Patient Tolerated:  Well


   Complications:  Other - Initial esophageal intubation immediately removed.





Medical Decision Making


Diagnostic Impression:  


 Primary Impression:  


 Cardiopulmonary arrest


 Additional Impressions:  


 Respiratory failure


 Qualified Codes:  J96.01 - Acute respiratory failure with hypoxia; J96.02 - 

Acute respiratory failure with hypercapnia


 COVID-19 virus infection


ER Course


Patient on BiPAP coded with asystole.


Patient on maximal pressors and unresponsive the duration of this shift.  COVID-

19 positive.


Please see CODE BLUE reports.





There were a total of 3 CODE BLUE events.  The first 2 led to return of 

spontaneous circulation with tachycardia and blood pressure.


The third code it was felt that the patient demonstrated futile care and CPR 

and medications were withheld.


The patient was pronounced at 19:42.





Laboratory Tests








Test


  5/15/20


03:35 5/15/20


07:00 5/15/20


07:45 20


04:35


 


White Blood Count


  8.0 K/UL


(4.8-10.8) 17.1 K/UL


(4.8-10.8)  #H 


  18.1 K/UL


(4.8-10.8)  H


 


Red Blood Count


  2.25 M/UL


(4.70-6.10)  L 3.23 M/UL


(4.70-6.10)  L 


  3.17 M/UL


(4.70-6.10)  L


 


Hemoglobin


  6.3 G/DL


(14.2-18.0) 9.1 G/DL


(14.2-18.0)  #L 


  8.9 G/DL


(14.2-18.0)  L


 


Hematocrit


  20.3 %


(42.0-52.0)  #L 27.9 %


(42.0-52.0)  #L 


  27.2 %


(42.0-52.0)  L


 


Mean Corpuscular Volume 90 FL (80-99)   87 FL (80-99)    86 FL (80-99)  


 


Mean Corpuscular Hemoglobin


  28.0 PG


(27.0-31.0) 28.1 PG


(27.0-31.0) 


  28.1 PG


(27.0-31.0)


 


Mean Corpuscular Hemoglobin


Concent 31.0 G/DL


(32.0-36.0)  L 32.5 G/DL


(32.0-36.0) 


  32.7 G/DL


(32.0-36.0)


 


Red Cell Distribution Width


  13.4 %


(11.6-14.8) 12.2 %


(11.6-14.8) 


  11.9 %


(11.6-14.8)


 


Platelet Count


  90 K/UL


(150-450)  #L 192 K/UL


(150-450)  # 


  238 K/UL


(150-450)


 


Mean Platelet Volume


  6.3 FL


(6.5-10.1)  L 7.1 FL


(6.5-10.1) 


  8.4 FL


(6.5-10.1)


 


Neutrophils (%) (Auto)


  % (45.0-75.0)


  % (45.0-75.0)


  


  % (45.0-75.0)


 


 


Lymphocytes (%) (Auto)


  % (20.0-45.0)


  % (20.0-45.0)


  


  % (20.0-45.0)


 


 


Monocytes (%) (Auto)  % (1.0-10.0)    % (1.0-10.0)     % (1.0-10.0)  


 


Eosinophils (%) (Auto)  % (0.0-3.0)    % (0.0-3.0)     % (0.0-3.0)  


 


Basophils (%) (Auto)  % (0.0-2.0)    % (0.0-2.0)     % (0.0-2.0)  


 


Differential Total Cells


Counted 100  


  100  


  


  100  


 


 


Neutrophils % (Manual) 86 % (45-75)  H 87 % (45-75)  H  96 % (45-75)  H


 


Lymphocytes % (Manual) 9 % (20-45)  L 8 % (20-45)  L  1 % (20-45)  L


 


Monocytes % (Manual) 3 % (1-10)   4 % (1-10)    3 % (1-10)  


 


Eosinophils % (Manual) 1 % (0-3)   1 % (0-3)    0 % (0-3)  


 


Basophils % (Manual) 0 % (0-2)   0 % (0-2)    0 % (0-2)  


 


Band Neutrophils 1 % (0-8)   0 % (0-8)    0 % (0-8)  


 


Nucleated Red Blood Cells  /100 WBC     


 


Platelet Estimate Decreased  L Adequate    Adequate  


 


Platelet Morphology Normal   Normal    Normal  


 


Hypochromasia 1+   1+    1+  


 


Activated Partial


Thromboplast Time 73 SEC (23-33)


H 


  


  50 SEC (23-33)


H


 


Sodium Level


  150 MMOL/L


(136-145)  H 


  


  147 MMOL/L


(136-145)  H


 


Potassium Level


  4.1 MMOL/L


(3.5-5.1) 


  


  4.4 MMOL/L


(3.5-5.1)


 


Chloride Level


  110 MMOL/L


()  H 


  


  109 MMOL/L


()  H


 


Carbon Dioxide Level


  33 MMOL/L


(21-32)  H 


  


  30 MMOL/L


(21-32)


 


Anion Gap


  7 mmol/L


(5-15) 


  


  8 mmol/L


(5-15)


 


Blood Urea Nitrogen


  54 mg/dL


(7-18)  H 


  


  49 mg/dL


(7-18)  H


 


Creatinine


  1.7 MG/DL


(0.55-1.30)  H 


  


  1.7 MG/DL


(0.55-1.30)  H


 


Estimated Glomerular


Filtration Rate 48.4 mL/min


(>60) 


  


  48.4 mL/min


(>60)


 


Glucose Level


  176 MG/DL


()  H 


  


  197 MG/DL


()  H


 


Calcium Level


  8.7 MG/DL


(8.5-10.1) 


  


  8.4 MG/DL


(8.5-10.1)  L


 


Phosphorus Level


  2.6 MG/DL


(2.5-4.9) 


  


  3.2 MG/DL


(2.5-4.9)


 


Magnesium Level


  2.5 MG/DL


(1.8-2.4)  H 


  


  2.1 MG/DL


(1.8-2.4)


 


Total Bilirubin


  0.4 MG/DL


(0.2-1.0) 


  


  0.4 MG/DL


(0.2-1.0)


 


Direct Bilirubin


  0.2 MG/DL


(0.0-0.3) 


  


  0.2 MG/DL


(0.0-0.3)


 


Aspartate Amino Transferase


(AST) 27 U/L (15-37)


  


  


  22 U/L (15-37)


 


 


Alanine Aminotransferase (ALT)


  23 U/L (12-78)


  


  


  20 U/L (12-78)


 


 


Alkaline Phosphatase


  114 U/L


() 


  


  144 U/L


()  H


 


Total Protein


  6.7 G/DL


(6.4-8.2) 


  


  6.6 G/DL


(6.4-8.2)


 


Albumin


  2.0 G/DL


(3.4-5.0)  L 


  


  1.8 G/DL


(3.4-5.0)  L


 


Arterial Blood pH


  


  


  7.372


(7.350-7.450) 


 


 


Arterial Blood Partial


Pressure CO2 


  


  55.0 mmHg


(35.0-45.0)  H 


 


 


Arterial Blood Partial


Pressure O2 


  


  59.3 mmHg


(75.0-100.0)  L 


 


 


Arterial Blood HCO3


  


  


  31.2 mmol/L


(22.0-26.0)  H 


 


 


Arterial Blood Oxygen


Saturation 


  


  88.0 %


()  *L 


 


 


Arterial Blood Base Excess   5.0 (-2-2)  H 


 


Wong Test   Positive   


 


C-Reactive Protein,


Quantitative 


  


  


  31.1 mg/dL


(0.00-0.90)  H


 


Pro-B-Type Natriuretic Peptide


  


  


  


  18233 pg/mL


(0-125)  H


 


Random Vancomycin Level    20.7 ug/mL  


 


Test


  20


08:27 20


14:45 


  


 


 


Arterial Blood pH


  7.328


(7.350-7.450) 


  


  


 


 


Arterial Blood Partial


Pressure CO2 53.0 mmHg


(35.0-45.0)  H 


  


  


 


 


Arterial Blood Partial


Pressure O2 46.3 mmHg


(75.0-100.0) 


  


  


 


 


Arterial Blood HCO3


  27.2 mmol/L


(22.0-26.0)  H 


  


  


 


 


Arterial Blood Oxygen


Saturation 76.2 %


()  *L 


  


  


 


 


Arterial Blood Base Excess 0.8 (-2-2)     


 


Wong Test Positive     


 


Activated Partial


Thromboplast Time 


  > 150 SEC


(23-33)  *H 


  


 








Rhythm Strip Diag. Results


Rhythm:  no PVC's, no ectopy, other - Tachycardia


Status:  worsened


Disposition:  


Condition:  


Referrals:  


Arron Barkley DO (PCP)











Dustin Peralta MD May 16, 2020 19:51

## 2020-05-16 NOTE — PULMONOLOGY PROGRESS NOTE
Subjective


ROS Limited/Unobtainable:  Yes


Interval Events:  Doing poorly; remains on BiPAP


Constitutional:  Reports: no symptoms


HEENT:  Repors: no symptoms


Respiratory:  Reports: dry cough, shortness of breath


Cardiovascular:  Reports: no symptoms


Gastrointestinal/Abdominal:  Reports: no symptoms


Genitourinary:  Reports: no symptoms


Neurologic:  Reports: no symptoms


Allergies:  


Coded Allergies:  


     No Known Allergies (Unverified , 12/15/14)


All Systems:  reviewed and negative except above





Objective





Last 24 Hour Vital Signs








  Date Time  Temp Pulse Resp B/P (MAP) Pulse Ox O2 Delivery O2 Flow Rate FiO2


 


5/16/20 11:00  93 44 83/53 (63) 100   


 


5/16/20 10:36  93 46  100   100


 


5/16/20 10:00  95 39 99/54 (69) 100   


 


5/16/20 09:19  97 38  100   100


 


5/16/20 09:00 97.8 96 40 112/58 (76) 69   


 


5/16/20 08:26  101  128/69    


 


5/16/20 08:00      Bi-pap  


 


5/16/20 08:00  101 43 128/69 (88) 93   


 


5/16/20 08:00        100


 


5/16/20 08:00  97      


 


5/16/20 07:35  94 49  94   100


 


5/16/20 07:34     94 Bi-Pap  100


 


5/16/20 07:00  97 39 117/60 (79)    


 


5/16/20 06:00  99 40 120/72 (88) 94   


 


5/16/20 05:00  100 42 117/75 (89) 95   


 


5/16/20 04:00 98.7 95 37 106/69 (81) 95   


 


5/16/20 04:00  102      


 


5/16/20 04:00        100


 


5/16/20 04:00      Bi-pap  


 


5/16/20 03:20  97 40  99   100


 


5/16/20 03:00  98 38 110/59 (76) 99   


 


5/16/20 02:00  109 44 133/64 (87) 99   


 


5/16/20 01:00  106 48 137/84 (101) 96   


 


5/16/20 00:00 98.4 102 36 144/91 (108) 91   


 


5/16/20 00:00        100


 


5/16/20 00:00  94      


 


5/16/20 00:00      Bi-pap  


 


5/15/20 23:19  98 42  100   100


 


5/15/20 23:00  91 43 134/81 (98)    


 


5/15/20 22:00 99.9 90 42 137/85 (102) 99   


 


5/15/20 21:09  93 44  100   100


 


5/15/20 21:00  91 38 124/78 (93) 99   


 


5/15/20 20:49  101  135/89    


 


5/15/20 20:00        100


 


5/15/20 20:00  95      


 


5/15/20 20:00  95 36 124/76 (92) 99   


 


5/15/20 20:00      Bi-pap  


 


5/15/20 19:02     100 Bi-Pap  100


 


5/15/20 19:01  101 45  100   100


 


5/15/20 19:00  98 39 135/89 (104)    


 


5/15/20 18:00  101 41 135/81 (99) 97   


 


5/15/20 17:00  91 46 139/80 (99) 90   


 


5/15/20 16:00        100


 


5/15/20 16:00  91      


 


5/15/20 16:00 99.3 88 49 130/85 (100) 87   


 


5/15/20 16:00      Bi-pap  


 


5/15/20 15:00  87 41 139/75 (96) 93   


 


5/15/20 14:56  82 38  93   100


 


5/15/20 14:00  86 42 135/73 (93) 91   


 


5/15/20 13:00  93 44 129/71 (90) 92   


 


5/15/20 12:30  88 43 128/77 (94) 97   

















Intake and Output  


 


 5/15/20 5/16/20





 19:00 07:00


 


Intake Total 1205.410 ml 1383.501 ml


 


Output Total 260 ml 420 ml


 


Balance 945.410 ml 963.501 ml


 


  


 


Free Water  100 ml


 


IV Total 1205.410 ml 1223.501 ml


 


Other  60 ml


 


Output Urine Total 260 ml 420 ml


 


# Voids 105 








General Appearance:  no acute distress


HEENT:  normocephalic


Respiratory:  chest wall non-tender, decreased breath sounds


Cardiovascular:  normal peripheral pulses, normal rate


Abdomen:  normal bowel sounds





Microbiology








 Date/Time


Source Procedure


Growth Status


 


 


 5/14/20 03:00


Urine,Clean Catch Urine Culture - Final


Candida Albicans Complete








Laboratory Tests


5/16/20 04:35: 


White Blood Count 18.1H, Red Blood Count 3.17L, Hemoglobin 8.9L, Hematocrit 

27.2L, Mean Corpuscular Volume 86, Mean Corpuscular Hemoglobin 28.1, Mean 

Corpuscular Hemoglobin Concent 32.7, Red Cell Distribution Width 11.9, Platelet 

Count 238, Mean Platelet Volume 8.4, Neutrophils (%) (Auto) , Lymphocytes (%) (

Auto) , Monocytes (%) (Auto) , Eosinophils (%) (Auto) , Basophils (%) (Auto) , 

Differential Total Cells Counted 100, Neutrophils % (Manual) 96H, Lymphocytes % 

(Manual) 1L, Monocytes % (Manual) 3, Eosinophils % (Manual) 0, Basophils % (

Manual) 0, Band Neutrophils 0, Platelet Estimate Adequate, Platelet Morphology 

Normal, Hypochromasia 1+, Activated Partial Thromboplast Time 50H, Sodium Level 

147H, Potassium Level 4.4, Chloride Level 109H, Carbon Dioxide Level 30, Anion 

Gap 8, Blood Urea Nitrogen 49H, Creatinine 1.7H, Estimat Glomerular Filtration 

Rate 48.4, Glucose Level 197H, Calcium Level 8.4L, Phosphorus Level 3.2, 

Magnesium Level 2.1, Total Bilirubin 0.4, Direct Bilirubin 0.2, Aspartate Amino 

Transf (AST/SGOT) 22, Alanine Aminotransferase (ALT/SGPT) 20, Alkaline 

Phosphatase 144H, C-Reactive Protein, Quantitative 31.1H, Pro-B-Type 

Natriuretic Peptide 85134X, Total Protein 6.6, Albumin 1.8L, Random Vancomycin 

Level 20.7


5/16/20 08:27: 


Arterial Blood pH 7.328L, Arterial Blood Partial Pressure CO2 53.0H, Arterial 

Blood Partial Pressure O2 46.3*L, Arterial Blood HCO3 27.2H, Arterial Blood 

Oxygen Saturation 76.2*L, Arterial Blood Base Excess 0.8, Wong Test Positive





Current Medications








 Medications


  (Trade)  Dose


 Ordered  Sig/Aarti


 Route


 PRN Reason  Start Time


 Stop Time Status Last Admin


Dose Admin


 


 Acetaminophen


  (Tylenol)  650 mg  Q6H  PRN


 NG


 Mild Pain (Pain Scale 1-3)  5/14/20 17:00


 6/13/20 16:59  5/16/20 12:12


 


 


 Albumin Human  100 ml @ 


 100 mls/hr  ONCE  ONCE


 IV


   5/16/20 11:45


 5/16/20 12:44  5/16/20 12:11


 


 


 Carvedilol


  (Coreg)  3.125 mg  EVERY 12  HOURS


 NG


   5/14/20 21:00


 5/29/20 20:59  5/16/20 08:26


 


 


 Chlorhexidine


 Gluconate


  (Gloria-Hex 2%)  1 applic  DAILY@2000


 TOPIC


   5/13/20 20:00


 8/11/20 19:59  5/15/20 20:49


 


 


 Dextrose


  (Dextrose 50%)  25 ml  Q30M  PRN


 IV


 Hypoglycemia  5/12/20 13:45


 8/10/20 13:44   


 


 


 Dextrose


  (Dextrose 50%)  50 ml  Q30M  PRN


 IV


 Hypoglycemia  5/12/20 13:45


 8/10/20 13:44   


 


 


 Heparin Sodium/


 Dextrose  500 ml @ 


 29.819 mls/


 hr  ADJUST PER  PROTOCOL


 IV


   5/16/20 07:00


 6/15/20 06:59  5/16/20 06:48


 


 


 Heparin Sodium/


 Sodium Chloride


  (Heparin 1000


 units/500ml


 Premix)  1,000 unit  ONCE  PRN


 IV


 dialysis  5/15/20 14:15


 5/17/20 14:14   


 


 


 Insulin Aspart


  (NovoLOG)    Q6HR


 SUBQ


   5/12/20 18:00


 8/10/20 17:59  5/16/20 06:54


 


 


 Levetiracetam


  (Keppra)  1,000 mg  Q12HR


 NG


   5/14/20 09:00


 6/13/20 08:59  5/16/20 08:25


 


 


 Lidocaine HCl


  (Xylocaine 1%


 30ml)  30 ml  ONCE  PRN


 INJ


 picc line placement  5/15/20 14:15


 5/17/20 14:14   


 


 


 Lorazepam


  (Ativan 2mg/ml


 1ml)  1 mg  Q6H  PRN


 IV


 For Anxiety  5/14/20 13:45


 5/21/20 13:44  5/14/20 13:42


 


 


 Meropenem 1 gm/


 Sodium Chloride  55 ml @ 


 110 mls/hr  Q12HR


 IVPB


   5/13/20 14:00


 5/18/20 13:59  5/16/20 08:25


 


 


 Micafungin Sodium


 100 mg/Sodium


 Chloride  110 ml @ 


 110 mls/hr  Q24H


 IVPB


   5/15/20 12:30


 5/22/20 12:29  5/16/20 12:11


 


 


 Norepinephrine


 Bitartrate 4 mg/


 Dextrose  250 ml @ 0


 mls/hr  Q24H  PRN


 IV


 For hypotension  5/12/20 19:00


 6/11/20 18:59  5/13/20 16:02


 


 


 Sodium Chloride  1,000 ml @ 


 75 mls/hr  B30S15L


 IV


   5/12/20 13:15


 6/11/20 09:29  5/16/20 11:03


 


 


 Vancomycin HCl


  (Vanco rx to


 dose)  1 ea  DAILY  PRN


 MISC


 Per rx protocol  5/15/20 11:15


 6/14/20 11:14   


 


 


 Vancomycin HCl 1


 gm/Dextrose  275 ml @ 


 183.708


 mls/hr  ONCE


 IVPB


   5/16/20 21:00


 5/16/20 23:00   


 











Assessment/Plan


Assessment/Plan


IMPRESSION:


1. Cardiomyopathy.


2. Hypotension.


3. Left lung pneumonia.


4. Nursing home resident.


5. Positive COVID-19.





DISCUSSION:


1. Continue isolation


2. Continue current medications.


3. Off pressors.


4. Continue BiPAP; abg adequate


5. Pulmonary hygiene.


6. Broad-spectrum antibiotics.


7. I will follow.


8. Pulmonary hygiene


9. Continue diuresis, on Lasix 40 mg IV BID; 





CXR unchanged


On anticoagulation




















  ______________________________________________


  LESIA Dempsey Omar Syed MD May 16, 2020 12:23

## 2020-05-16 NOTE — NUR
HAND-OFF: 

Report given to DIMA Farris. Per Pharmacist will send the Heparin vial, endorsed to 
[facility].

## 2020-05-16 NOTE — NUR
NURSE NOTES:

late entry:

called lab for ptt draw, unable to get from [facility]. will be up as soon as possible.

## 2020-05-16 NOTE — NUR
NURSE NOTES:

MD SARGENT HERE TO SEE PT. WAS INFORMED PT RR 45, LABORED RESPIRATIONS, BIPAP CONTINUOS AT 
15/5, FI02 100%. PT DESATS TO 50'S THEN TRENDS BACK UP SLOWLY. AM ABG RESULTS PH: 7.328, 
PCO2 53.0, P02 46.3, HCO3 27.2, 02 SAT 76.2. NO NEW ORDERS AT THIS TIME.

## 2020-05-16 NOTE — NUR
NURSE NOTES:

Bed bath given tolerated well. repositioned patient in bed. no s/s of acute distress noted. 
Continue on Heparin drip at 15units/kg/hr no bleeding. will continue plan of care.

## 2020-05-18 NOTE — DISCHARGE SUMMARY
Discharge Summary


Discharge Summary


_


DEATH SUMMARY 





DATE OF ADMISSION: 4/24/2020


DATE OF EXPIRATION: 5/16/2020








 





REASON FOR ADMISSION: 


69 years old male with past medical history of hypertension, cardiomyopathy, 

AICD, presented from the skilled nursing facility with hypotension and altered 

mental status.  


Patient presented from [facility] nursing [facility] , where many cases of COVID-19 

were identified.  


Patient was afebrile , but hypotensive and hypoxic , requiring supplemental 

oxygen.  


Central line was placed in emergency department for pressors.  


Laboratory work-up revealed no leukocytosis , hemoglobin 11.6, hematocrit 34.4, 

platelet count 149.  Lymphopenia noted.


Stable electrolytes.


BUN 71, creatinine 2.4.


Glucose 229.


Lactic acid 1.3.


Troponin   0.003, pro  .


Stable LFT and lipase.


Urinalysis revealed +2 protein, no evidence of urinary tract infection.


EKG revealed sinus rhythm, no acute ischemic changes, left axis deviation.


Chest x-ray demonstrated left basilar atelectasis and/or infiltrate.


Septic work-up initiated.


Patient received fluid resuscitation, pancultured, started on empiric 

antibiotics and pressors, and admitted to ICU for further management.


In addition, patient was swabbed for COVID-19 given exposure at the facility.








CONSULTANTS:


cardiologist Dr. Stroud


pulmonary Dr. Hahn


ID specialist Dr. Lindsay


nephrologist Dr. Kendall


psychiatrist Dr. Love


 


[facility] COURSE: 


Patient admitted to ICU.


Hemodynamic status was closely monitored.


Pressors titrated to keep mean arterial blood pressure above 65.


Patient started on empiric antibiotics.


Patient was kept in isolation.  Delete


SARS-CoV-2 by PCR 4/24 was detected.


Blood culture initial and repeated were negative.


Influenza swab test was negative.


Urine culture revealed Candida.





Supplemental oxygen provided and titrated to keep pulse oximetry above 92%.


Pulmonary toilet provided.


Patient was follow-up with ABG and chest x-ray.


Respiratory status worsened : f  patient required Venturi mask and  then 100% 

nonrebreathing mask.


Patient was   later placed on the BiPAP.


Patient developed leukocytosis and fevers.  


Antibiotic  regimen further optimized as per ID specialist recommendation.





Echocardiogram revealed reduced ejection fraction of 40% with mild  

anteroseptal wall akinesia and septal wall dyskinesia.  


Right ventricular systolic pressure of 35 consistent with   mild pulmonary 

hypertension.  


Serial troponin were negative.


EKG revealed no acute ischemic changes.


Patient was ruled out for acute MI.


Patient was able to be weaned from Levophed.   Anti--failure regimen continued.


Patient was on beta-blocker.





Patient was  gently hydrated with close monitoring of renal parameters and 

electrolytes.  


Electrolytes corrected as needed.  


Creatinine from   initial 2.4 on admission down to 1.7. 


Pro BNP trended up.  P


atient started on diuresis with IV Lasix.


Diuresis provided with close monitoring of volumes and renal parameters.  


D-dimer significantly elevated.


Patient started on heparin drip. 


Hemodynamic status was closely monitored.  


Patient received albumin boluses for hypotension.





Hemoglobin and hematocrit were closely monitored with goal to keep hemoglobin 

above 7. 


Seizure precaution maintained.  


Antiepileptic continued.  


No evidence of seizure activity while in the hospital.


Psychiatrist followed.


Psychiatric medication regimen optimized S per psychiatrist.





On 5/16  CODE BLUE was called.  Patient initially was bradycardic and then went 

to asystole.  


ACLS protocol initiated.  


Patient was orally intubated.


Patient started back on pressors.


Patient subsequently undergone additional 2 codes.


During the third code he  remained in asystole.


All resuscitative efforts unfortunately appeared  to be futile.


Patient was pronounced on 5/16 at 19:42.  


Cause of death :cardiopulmonary arrest likely due to COVID-19 infection.








FINAL DIAGNOSES: 


s/p cardiopulmonary arrest x 3


Acute hypoxemic respiratory failure requiring intubation ( during the code) 


Confirmed COVID-19 infection


Acute hypoxemic respiratory failure requiring increased FiO2 requirement


Pneumonia probably due to COVID-19 virus


Septic shock


Cardiomyopathy with ejection fraction 40%


CHF


Acute renal failure/acute kidney injury


Status post right -sided Andrew ICD


History of hypertension


Electrolyte imbalance


Altered mental status


History of CVA with right residual weakness.


Major depressive disorder, mild, recurrent with psychotic features


 





I have been assigned to dictate discharge summary for this account. 


I was not involved in the patient's management.











Dorita Duke NP May 18, 2020 16:43
